# Patient Record
Sex: FEMALE | Race: WHITE | Employment: OTHER | URBAN - METROPOLITAN AREA
[De-identification: names, ages, dates, MRNs, and addresses within clinical notes are randomized per-mention and may not be internally consistent; named-entity substitution may affect disease eponyms.]

---

## 2017-01-03 PROBLEM — F17.200 SMOKER: Chronic | Status: ACTIVE | Noted: 2017-01-03

## 2018-08-20 ENCOUNTER — HOSPITAL ENCOUNTER (OUTPATIENT)
Age: 68
Setting detail: SPECIMEN
Discharge: HOME | End: 2018-08-20
Payer: MEDICARE

## 2018-08-22 LAB — SURGICAL PATHOLOGY REPORT: NORMAL

## 2020-06-29 ENCOUNTER — OFFICE VISIT (OUTPATIENT)
Dept: FAMILY MEDICINE CLINIC | Age: 70
End: 2020-06-29
Payer: MEDICARE

## 2020-06-29 VITALS
HEIGHT: 65 IN | BODY MASS INDEX: 35.32 KG/M2 | DIASTOLIC BLOOD PRESSURE: 72 MMHG | OXYGEN SATURATION: 98 % | RESPIRATION RATE: 16 BRPM | HEART RATE: 62 BPM | SYSTOLIC BLOOD PRESSURE: 132 MMHG | WEIGHT: 212 LBS

## 2020-06-29 PROCEDURE — G8427 DOCREV CUR MEDS BY ELIG CLIN: HCPCS | Performed by: FAMILY MEDICINE

## 2020-06-29 PROCEDURE — G8400 PT W/DXA NO RESULTS DOC: HCPCS | Performed by: FAMILY MEDICINE

## 2020-06-29 PROCEDURE — 4040F PNEUMOC VAC/ADMIN/RCVD: CPT | Performed by: FAMILY MEDICINE

## 2020-06-29 PROCEDURE — 99213 OFFICE O/P EST LOW 20 MIN: CPT | Performed by: FAMILY MEDICINE

## 2020-06-29 PROCEDURE — 1090F PRES/ABSN URINE INCON ASSESS: CPT | Performed by: FAMILY MEDICINE

## 2020-06-29 PROCEDURE — 3017F COLORECTAL CA SCREEN DOC REV: CPT | Performed by: FAMILY MEDICINE

## 2020-06-29 PROCEDURE — 1036F TOBACCO NON-USER: CPT | Performed by: FAMILY MEDICINE

## 2020-06-29 PROCEDURE — 1123F ACP DISCUSS/DSCN MKR DOCD: CPT | Performed by: FAMILY MEDICINE

## 2020-06-29 PROCEDURE — G8417 CALC BMI ABV UP PARAM F/U: HCPCS | Performed by: FAMILY MEDICINE

## 2020-06-29 RX ORDER — ESOMEPRAZOLE MAGNESIUM 40 MG/1
40 FOR SUSPENSION ORAL DAILY
COMMUNITY
End: 2021-11-10

## 2020-06-29 RX ORDER — TRIAMCINOLONE ACETONIDE 5 MG/G
CREAM TOPICAL
Qty: 30 G | Refills: 1 | Status: SHIPPED | OUTPATIENT
Start: 2020-06-29 | End: 2021-11-10

## 2020-06-29 ASSESSMENT — PATIENT HEALTH QUESTIONNAIRE - PHQ9
SUM OF ALL RESPONSES TO PHQ QUESTIONS 1-9: 0
1. LITTLE INTEREST OR PLEASURE IN DOING THINGS: 0
SUM OF ALL RESPONSES TO PHQ9 QUESTIONS 1 & 2: 0
2. FEELING DOWN, DEPRESSED OR HOPELESS: 0
SUM OF ALL RESPONSES TO PHQ QUESTIONS 1-9: 0

## 2020-06-29 ASSESSMENT — ENCOUNTER SYMPTOMS
RESPIRATORY NEGATIVE: 1
GASTROINTESTINAL NEGATIVE: 1

## 2020-06-29 NOTE — PROGRESS NOTES
Subjective:      Patient ID: Essence Valdez is a 77 y.o. female. Had a rash that started 10 days ago that itches. It also painful. Review of Systems   HENT: Negative. Respiratory: Negative. Cardiovascular: Negative. Gastrointestinal: Negative. Skin: Positive for rash. Objective:   Physical Exam  Vitals signs and nursing note reviewed. Constitutional:       Appearance: Normal appearance. HENT:      Head: Normocephalic and atraumatic. Cardiovascular:      Rate and Rhythm: Normal rate and regular rhythm. Heart sounds: No murmur. Pulmonary:      Effort: Pulmonary effort is normal.   Musculoskeletal:      Right lower leg: No edema. Left lower leg: No edema. Skin:     Comments: Has several red raised regions on her buttocks    Neurological:      Mental Status: She is alert. Assessment:      Rash       Plan: The bites may be chiggers. At this time will start some steroid cream and see how this evolves.           Lynn Hernandez MD

## 2021-11-10 ENCOUNTER — HOSPITAL ENCOUNTER (OUTPATIENT)
Dept: PREADMISSION TESTING | Age: 71
Discharge: HOME | End: 2021-11-14
Payer: MEDICARE

## 2021-11-10 VITALS
DIASTOLIC BLOOD PRESSURE: 83 MMHG | RESPIRATION RATE: 16 BRPM | BODY MASS INDEX: 30.77 KG/M2 | OXYGEN SATURATION: 100 % | WEIGHT: 184.7 LBS | HEIGHT: 65 IN | HEART RATE: 68 BPM | SYSTOLIC BLOOD PRESSURE: 132 MMHG | TEMPERATURE: 98 F

## 2021-11-10 LAB
ABO/RH: NORMAL
ANION GAP SERPL CALCULATED.3IONS-SCNC: 13 MMOL/L (ref 9–17)
ANTIBODY SCREEN: NEGATIVE
ARM BAND NUMBER: NORMAL
BUN BLDV-MCNC: 19 MG/DL (ref 8–23)
BUN/CREAT BLD: 27 (ref 9–20)
CALCIUM SERPL-MCNC: 9.4 MG/DL (ref 8.6–10.4)
CHLORIDE BLD-SCNC: 104 MMOL/L (ref 98–107)
CO2: 23 MMOL/L (ref 20–31)
CREAT SERPL-MCNC: 0.71 MG/DL (ref 0.5–0.9)
EKG ATRIAL RATE: 55 BPM
EKG P AXIS: 66 DEGREES
EKG P-R INTERVAL: 142 MS
EKG Q-T INTERVAL: 446 MS
EKG QRS DURATION: 86 MS
EKG QTC CALCULATION (BAZETT): 426 MS
EKG T AXIS: 21 DEGREES
EKG VENTRICULAR RATE: 55 BPM
ESTIMATED AVERAGE GLUCOSE: 117 MG/DL
EXPIRATION DATE: NORMAL
GFR AFRICAN AMERICAN: >60 ML/MIN
GFR NON-AFRICAN AMERICAN: >60 ML/MIN
GFR SERPL CREATININE-BSD FRML MDRD: ABNORMAL ML/MIN/{1.73_M2}
GFR SERPL CREATININE-BSD FRML MDRD: ABNORMAL ML/MIN/{1.73_M2}
GLUCOSE BLD-MCNC: 93 MG/DL (ref 70–99)
HBA1C MFR BLD: 5.7 % (ref 4–6)
HCT VFR BLD CALC: 43.1 % (ref 36.3–47.1)
HEMOGLOBIN: 14 G/DL (ref 11.9–15.1)
MCH RBC QN AUTO: 30 PG (ref 25.2–33.5)
MCHC RBC AUTO-ENTMCNC: 32.5 G/DL (ref 28.4–34.8)
MCV RBC AUTO: 92.5 FL (ref 82.6–102.9)
NRBC AUTOMATED: 0 PER 100 WBC
PDW BLD-RTO: 14.3 % (ref 11.8–14.4)
PLATELET # BLD: 248 K/UL (ref 138–453)
PMV BLD AUTO: 9.8 FL (ref 8.1–13.5)
POTASSIUM SERPL-SCNC: 4.3 MMOL/L (ref 3.7–5.3)
RBC # BLD: 4.66 M/UL (ref 3.95–5.11)
SODIUM BLD-SCNC: 140 MMOL/L (ref 135–144)
WBC # BLD: 5.2 K/UL (ref 3.5–11.3)

## 2021-11-10 PROCEDURE — 83036 HEMOGLOBIN GLYCOSYLATED A1C: CPT

## 2021-11-10 PROCEDURE — 36415 COLL VENOUS BLD VENIPUNCTURE: CPT

## 2021-11-10 PROCEDURE — 86901 BLOOD TYPING SEROLOGIC RH(D): CPT

## 2021-11-10 PROCEDURE — 86850 RBC ANTIBODY SCREEN: CPT

## 2021-11-10 PROCEDURE — 86900 BLOOD TYPING SEROLOGIC ABO: CPT

## 2021-11-10 PROCEDURE — 85027 COMPLETE CBC AUTOMATED: CPT

## 2021-11-10 PROCEDURE — 80048 BASIC METABOLIC PNL TOTAL CA: CPT

## 2021-11-10 PROCEDURE — 93005 ELECTROCARDIOGRAM TRACING: CPT | Performed by: COLON & RECTAL SURGERY

## 2021-11-10 RX ORDER — CYCLOBENZAPRINE HCL 10 MG
10 TABLET ORAL NIGHTLY
Status: ON HOLD | COMMUNITY
Start: 2021-10-29 | End: 2021-11-30 | Stop reason: ALTCHOICE

## 2021-11-10 RX ORDER — SPIRONOLACTONE 25 MG/1
25 TABLET ORAL DAILY
COMMUNITY

## 2021-11-10 RX ORDER — CELECOXIB 200 MG/1
200 CAPSULE ORAL DAILY
COMMUNITY

## 2021-11-10 RX ORDER — ROSUVASTATIN CALCIUM 5 MG/1
5 TABLET, COATED ORAL DAILY
COMMUNITY

## 2021-11-10 RX ORDER — FAMOTIDINE 40 MG/1
40 TABLET, FILM COATED ORAL DAILY PRN
COMMUNITY

## 2021-11-10 RX ORDER — ESOMEPRAZOLE MAGNESIUM 40 MG/1
40 CAPSULE, DELAYED RELEASE ORAL DAILY
COMMUNITY

## 2021-11-10 ASSESSMENT — PAIN SCALES - GENERAL: PAINLEVEL_OUTOF10: 7

## 2021-11-10 ASSESSMENT — PAIN DESCRIPTION - DESCRIPTORS: DESCRIPTORS: CRAMPING;SHARP

## 2021-11-10 ASSESSMENT — PAIN DESCRIPTION - PAIN TYPE: TYPE: CHRONIC PAIN

## 2021-11-10 ASSESSMENT — PAIN DESCRIPTION - LOCATION: LOCATION: ABDOMEN

## 2021-11-10 ASSESSMENT — PAIN DESCRIPTION - FREQUENCY: FREQUENCY: INTERMITTENT

## 2021-11-10 NOTE — PROGRESS NOTES
PAT Progress Note    Pt Name: Florence Mckenzie  MRN: [id number]  YOB: 1950  Date of evaluation: 11/10/2021      [x] Called to PAT. I spoke to the patient, Florence Mckenzie, a 72 y.o. female, who is scheduled for an upcoming abdominal exploration low anterior resection possible stoma by Dr. Karla Rao for sigmoid stricture on 11/24/2021 at 1200. [x] I reviewed the hard copy general surgery progress note by Dr. Karla Rao dated 11/3/2021 for an Interval History and Physical Note the day of surgery. Hard copy placed in short chart. Functional Capacity per pt:  1) Pt is able to walk 2 city blocks on level ground without SOB. 2) Pt is not able to climb 2 flights of stairs without SOB. 3) Pt is not able to walk up a hill for 1-2 city blocks without SOB. Physical Exam:     General Appearance:  Alert, well appearing, and in no acute distress. Mental status:  Oriented to person, place, and time. Lungs:  Bilateral equal air entry, clear to auscultation, no wheezing, rales or rhonchi, and normal effort. Cardiovascular:  Normal rate, regular rhythm, no murmur, gallop, or rub.     Vital signs: /83   Pulse 68   Temp 98 °F (36.7 °C)   Resp 16   Ht 5' 4.5\" (1.638 m)   Wt 184 lb 11.2 oz (83.8 kg)   LMP  (LMP Unknown)   SpO2 100%   BMI 31.21 kg/m²     LESLY Otero CNP    Electronically signed 11/10/2021 at 8:16 AM

## 2021-11-10 NOTE — PLAN OF CARE
powder, deodorant, lotion/cream/oil, perfume/aftershave, cosmetics, or alcohol-based skin or hair products after showering. Do Not shave near the planned surgical site unless specifically instructed to. Additional Instructions:      1. If you are having any type of anesthesia, you are to have NOTHING to eat or drink after midnight the night before surgery. This includes no gum, hard candy, mints or water. The only exception to this is small sips of water to take the medications listed above. No smoking or chewing tobacco after midnight. No alcoholic beverages for 24 hours prior to surgery. 2. Brush your teeth but do not swallow any water. 3. If you wear glasses bring a case for them if you have one. No contacts should be worn the day of surgery. You may also bring your hearing aids. If you have dentures, most surgical procedures involving anesthesia will require that you remove them prior to surgery. 4. If you sleep with a CPAP or BiPAP machine at home and plan on staying in the hospital overnight after surgery, please bring your machine with you. 5. Do not wear any jewelry or body piercings the day of surgery. No nail polish on the operative extremity (arm/hand or leg/foot surgeries). 6. If you are staying overnight with us, you may bring a small bag of necessary personal items. 7. Please wear loose, comfortable clothing. If you are potentially going to have a cast, sling, brace or bulky dressing, make sure to wear clothing that will fit over it. 8. In case of illness - If you have cold or flu like symptoms (high fever, runny nose, sore throat, cough, etc.) rash, nausea, vomiting, loose stools, and/or recent contact with someone who has a contagious disease (chicken pox, measles, COVID-19, etc.). Please call your surgeon before coming to the hospital.    Transportation After Your Surgery/Procedure:      If you are going home the same day of surgery you need someone to drive you home. Your  must be at least 25years of age. A taxi cab or other nonmedical public transportation is not acceptable unless you have someone to ride home in the vehicle with you. For your safety, someone must remain with you for the first 24 hours after your surgery if you receive anesthesia or medication. If you do not have someone to stay with you, your procedure may be cancelled. As a patient at [facility] you can expect quality medical and nursing care that is centered on you individual needs. Our goal is to make your surgical experience as comfortable as possible.     Any questions about preparing for your surgery please call [phone].      ____________________________   ____________________________  Signature (Patient)                                 Signature (Nurse)                     Date

## 2021-11-24 ENCOUNTER — ANESTHESIA (OUTPATIENT)
Dept: OPERATING ROOM | Age: 71
DRG: 329 | End: 2021-11-24
Payer: MEDICARE

## 2021-11-24 ENCOUNTER — HOSPITAL ENCOUNTER (INPATIENT)
Age: 71
LOS: 29 days | Discharge: ANOTHER HOSPITAL | DRG: 329 | End: 2021-12-23
Attending: COLON & RECTAL SURGERY | Admitting: COLON & RECTAL SURGERY
Payer: MEDICARE

## 2021-11-24 ENCOUNTER — ANESTHESIA EVENT (OUTPATIENT)
Dept: OPERATING ROOM | Age: 71
DRG: 329 | End: 2021-11-24
Payer: MEDICARE

## 2021-11-24 VITALS
DIASTOLIC BLOOD PRESSURE: 67 MMHG | TEMPERATURE: 99.7 F | SYSTOLIC BLOOD PRESSURE: 115 MMHG | RESPIRATION RATE: 16 BRPM | OXYGEN SATURATION: 100 %

## 2021-11-24 DIAGNOSIS — R07.9 CHEST PAIN, UNSPECIFIED TYPE: Primary | ICD-10-CM

## 2021-11-24 PROBLEM — K56.699 STRICTURE OF SIGMOID COLON (HCC): Status: ACTIVE | Noted: 2021-11-24

## 2021-11-24 LAB
GLUCOSE BLD-MCNC: 130 MG/DL (ref 65–105)
GLUCOSE BLD-MCNC: 95 MG/DL (ref 65–105)

## 2021-11-24 PROCEDURE — 2580000003 HC RX 258: Performed by: STUDENT IN AN ORGANIZED HEALTH CARE EDUCATION/TRAINING PROGRAM

## 2021-11-24 PROCEDURE — 82947 ASSAY GLUCOSE BLOOD QUANT: CPT

## 2021-11-24 PROCEDURE — 2720000010 HC SURG SUPPLY STERILE: Performed by: COLON & RECTAL SURGERY

## 2021-11-24 PROCEDURE — 6360000002 HC RX W HCPCS: Performed by: NURSE ANESTHETIST, CERTIFIED REGISTERED

## 2021-11-24 PROCEDURE — 3700000001 HC ADD 15 MINUTES (ANESTHESIA): Performed by: COLON & RECTAL SURGERY

## 2021-11-24 PROCEDURE — 2580000003 HC RX 258: Performed by: ANESTHESIOLOGY

## 2021-11-24 PROCEDURE — 0DTB0ZZ RESECTION OF ILEUM, OPEN APPROACH: ICD-10-PCS | Performed by: UROLOGY

## 2021-11-24 PROCEDURE — 2500000003 HC RX 250 WO HCPCS: Performed by: NURSE ANESTHETIST, CERTIFIED REGISTERED

## 2021-11-24 PROCEDURE — 7100000001 HC PACU RECOVERY - ADDTL 15 MIN: Performed by: COLON & RECTAL SURGERY

## 2021-11-24 PROCEDURE — C1769 GUIDE WIRE: HCPCS | Performed by: COLON & RECTAL SURGERY

## 2021-11-24 PROCEDURE — 0DJD8ZZ INSPECTION OF LOWER INTESTINAL TRACT, VIA NATURAL OR ARTIFICIAL OPENING ENDOSCOPIC: ICD-10-PCS | Performed by: UROLOGY

## 2021-11-24 PROCEDURE — C1758 CATHETER, URETERAL: HCPCS | Performed by: COLON & RECTAL SURGERY

## 2021-11-24 PROCEDURE — 3700000000 HC ANESTHESIA ATTENDED CARE: Performed by: COLON & RECTAL SURGERY

## 2021-11-24 PROCEDURE — 6360000002 HC RX W HCPCS: Performed by: COLON & RECTAL SURGERY

## 2021-11-24 PROCEDURE — 6360000002 HC RX W HCPCS: Performed by: STUDENT IN AN ORGANIZED HEALTH CARE EDUCATION/TRAINING PROGRAM

## 2021-11-24 PROCEDURE — 0T788DZ DILATION OF BILATERAL URETERS WITH INTRALUMINAL DEVICE, VIA NATURAL OR ARTIFICIAL OPENING ENDOSCOPIC: ICD-10-PCS | Performed by: UROLOGY

## 2021-11-24 PROCEDURE — 2500000003 HC RX 250 WO HCPCS: Performed by: STUDENT IN AN ORGANIZED HEALTH CARE EDUCATION/TRAINING PROGRAM

## 2021-11-24 PROCEDURE — 2500000003 HC RX 250 WO HCPCS: Performed by: COLON & RECTAL SURGERY

## 2021-11-24 PROCEDURE — 88307 TISSUE EXAM BY PATHOLOGIST: CPT

## 2021-11-24 PROCEDURE — 0DBH0ZZ EXCISION OF CECUM, OPEN APPROACH: ICD-10-PCS | Performed by: UROLOGY

## 2021-11-24 PROCEDURE — 3600000003 HC SURGERY LEVEL 3 BASE: Performed by: COLON & RECTAL SURGERY

## 2021-11-24 PROCEDURE — 7100000000 HC PACU RECOVERY - FIRST 15 MIN: Performed by: COLON & RECTAL SURGERY

## 2021-11-24 PROCEDURE — 0DN80ZZ RELEASE SMALL INTESTINE, OPEN APPROACH: ICD-10-PCS | Performed by: UROLOGY

## 2021-11-24 PROCEDURE — 88300 SURGICAL PATH GROSS: CPT

## 2021-11-24 PROCEDURE — 2060000000 HC ICU INTERMEDIATE R&B

## 2021-11-24 PROCEDURE — 3600000013 HC SURGERY LEVEL 3 ADDTL 15MIN: Performed by: COLON & RECTAL SURGERY

## 2021-11-24 PROCEDURE — 6360000002 HC RX W HCPCS: Performed by: ANESTHESIOLOGY

## 2021-11-24 PROCEDURE — 2709999900 HC NON-CHARGEABLE SUPPLY: Performed by: COLON & RECTAL SURGERY

## 2021-11-24 RX ORDER — POTASSIUM CHLORIDE 20 MEQ/1
40 TABLET, EXTENDED RELEASE ORAL PRN
Status: DISCONTINUED | OUTPATIENT
Start: 2021-11-24 | End: 2021-11-30

## 2021-11-24 RX ORDER — LEVOTHYROXINE SODIUM 0.1 MG/1
100 TABLET ORAL DAILY
Status: DISCONTINUED | OUTPATIENT
Start: 2021-11-25 | End: 2021-12-24 | Stop reason: HOSPADM

## 2021-11-24 RX ORDER — ONDANSETRON 2 MG/ML
4 INJECTION INTRAMUSCULAR; INTRAVENOUS
Status: DISCONTINUED | OUTPATIENT
Start: 2021-11-24 | End: 2021-11-24 | Stop reason: HOSPADM

## 2021-11-24 RX ORDER — SODIUM CHLORIDE 9 MG/ML
25 INJECTION, SOLUTION INTRAVENOUS PRN
Status: DISCONTINUED | OUTPATIENT
Start: 2021-11-24 | End: 2021-12-24 | Stop reason: HOSPADM

## 2021-11-24 RX ORDER — ACETAMINOPHEN 500 MG
1000 TABLET ORAL EVERY 8 HOURS SCHEDULED
Status: DISCONTINUED | OUTPATIENT
Start: 2021-11-24 | End: 2021-12-24 | Stop reason: HOSPADM

## 2021-11-24 RX ORDER — ONDANSETRON 2 MG/ML
INJECTION INTRAMUSCULAR; INTRAVENOUS PRN
Status: DISCONTINUED | OUTPATIENT
Start: 2021-11-24 | End: 2021-11-24 | Stop reason: SDUPTHER

## 2021-11-24 RX ORDER — LIDOCAINE HYDROCHLORIDE 20 MG/ML
INJECTION, SOLUTION EPIDURAL; INFILTRATION; INTRACAUDAL; PERINEURAL PRN
Status: DISCONTINUED | OUTPATIENT
Start: 2021-11-24 | End: 2021-11-24 | Stop reason: SDUPTHER

## 2021-11-24 RX ORDER — FENTANYL CITRATE 50 UG/ML
50 INJECTION, SOLUTION INTRAMUSCULAR; INTRAVENOUS EVERY 5 MIN PRN
Status: DISCONTINUED | OUTPATIENT
Start: 2021-11-24 | End: 2021-11-24 | Stop reason: HOSPADM

## 2021-11-24 RX ORDER — FENTANYL CITRATE 50 UG/ML
50 INJECTION, SOLUTION INTRAMUSCULAR; INTRAVENOUS
Status: COMPLETED | OUTPATIENT
Start: 2021-11-24 | End: 2021-11-26

## 2021-11-24 RX ORDER — MIDAZOLAM HYDROCHLORIDE 1 MG/ML
INJECTION INTRAMUSCULAR; INTRAVENOUS PRN
Status: DISCONTINUED | OUTPATIENT
Start: 2021-11-24 | End: 2021-11-24 | Stop reason: SDUPTHER

## 2021-11-24 RX ORDER — DEXAMETHASONE SODIUM PHOSPHATE 10 MG/ML
INJECTION INTRAMUSCULAR; INTRAVENOUS PRN
Status: DISCONTINUED | OUTPATIENT
Start: 2021-11-24 | End: 2021-11-24 | Stop reason: SDUPTHER

## 2021-11-24 RX ORDER — FENTANYL CITRATE 50 UG/ML
INJECTION, SOLUTION INTRAMUSCULAR; INTRAVENOUS PRN
Status: DISCONTINUED | OUTPATIENT
Start: 2021-11-24 | End: 2021-11-24 | Stop reason: SDUPTHER

## 2021-11-24 RX ORDER — PROMETHAZINE HYDROCHLORIDE 25 MG/ML
6.25 INJECTION, SOLUTION INTRAMUSCULAR; INTRAVENOUS
Status: DISCONTINUED | OUTPATIENT
Start: 2021-11-24 | End: 2021-11-24 | Stop reason: HOSPADM

## 2021-11-24 RX ORDER — GLYCOPYRROLATE 1 MG/5 ML
SYRINGE (ML) INTRAVENOUS PRN
Status: DISCONTINUED | OUTPATIENT
Start: 2021-11-24 | End: 2021-11-24 | Stop reason: SDUPTHER

## 2021-11-24 RX ORDER — SODIUM CHLORIDE 0.9 % (FLUSH) 0.9 %
5-40 SYRINGE (ML) INJECTION PRN
Status: DISCONTINUED | OUTPATIENT
Start: 2021-11-24 | End: 2021-12-24 | Stop reason: HOSPADM

## 2021-11-24 RX ORDER — ONDANSETRON 2 MG/ML
4 INJECTION INTRAMUSCULAR; INTRAVENOUS EVERY 6 HOURS PRN
Status: DISCONTINUED | OUTPATIENT
Start: 2021-11-24 | End: 2021-12-24 | Stop reason: HOSPADM

## 2021-11-24 RX ORDER — CEFAZOLIN SODIUM 1 G/3ML
INJECTION, POWDER, FOR SOLUTION INTRAMUSCULAR; INTRAVENOUS PRN
Status: DISCONTINUED | OUTPATIENT
Start: 2021-11-24 | End: 2021-11-24 | Stop reason: SDUPTHER

## 2021-11-24 RX ORDER — GABAPENTIN 300 MG/1
300 CAPSULE ORAL EVERY 8 HOURS
Status: DISCONTINUED | OUTPATIENT
Start: 2021-11-24 | End: 2021-12-22

## 2021-11-24 RX ORDER — MAGNESIUM SULFATE IN WATER 40 MG/ML
2000 INJECTION, SOLUTION INTRAVENOUS PRN
Status: DISCONTINUED | OUTPATIENT
Start: 2021-11-24 | End: 2021-11-30

## 2021-11-24 RX ORDER — SODIUM CHLORIDE, SODIUM LACTATE, POTASSIUM CHLORIDE, CALCIUM CHLORIDE 600; 310; 30; 20 MG/100ML; MG/100ML; MG/100ML; MG/100ML
INJECTION, SOLUTION INTRAVENOUS CONTINUOUS
Status: DISCONTINUED | OUTPATIENT
Start: 2021-11-24 | End: 2021-11-24

## 2021-11-24 RX ORDER — SPIRONOLACTONE 25 MG/1
25 TABLET ORAL DAILY
Status: DISCONTINUED | OUTPATIENT
Start: 2021-11-25 | End: 2021-11-30

## 2021-11-24 RX ORDER — AMLODIPINE BESYLATE 10 MG/1
10 TABLET ORAL DAILY
Status: DISCONTINUED | OUTPATIENT
Start: 2021-11-25 | End: 2021-11-26

## 2021-11-24 RX ORDER — CITALOPRAM 20 MG/1
20 TABLET ORAL DAILY
Status: DISCONTINUED | OUTPATIENT
Start: 2021-11-25 | End: 2021-11-30

## 2021-11-24 RX ORDER — OXYCODONE HYDROCHLORIDE 5 MG/1
5 TABLET ORAL EVERY 6 HOURS PRN
Status: DISCONTINUED | OUTPATIENT
Start: 2021-11-24 | End: 2021-11-28

## 2021-11-24 RX ORDER — KETAMINE HCL IN NACL, ISO-OSM 100MG/10ML
SYRINGE (ML) INJECTION PRN
Status: DISCONTINUED | OUTPATIENT
Start: 2021-11-24 | End: 2021-11-24 | Stop reason: SDUPTHER

## 2021-11-24 RX ORDER — ROSUVASTATIN CALCIUM 5 MG/1
5 TABLET, COATED ORAL NIGHTLY
Status: DISCONTINUED | OUTPATIENT
Start: 2021-11-25 | End: 2021-12-24 | Stop reason: HOSPADM

## 2021-11-24 RX ORDER — ONDANSETRON 4 MG/1
4 TABLET, ORALLY DISINTEGRATING ORAL EVERY 8 HOURS PRN
Status: DISCONTINUED | OUTPATIENT
Start: 2021-11-24 | End: 2021-12-24 | Stop reason: HOSPADM

## 2021-11-24 RX ORDER — ROCURONIUM BROMIDE 10 MG/ML
INJECTION, SOLUTION INTRAVENOUS PRN
Status: DISCONTINUED | OUTPATIENT
Start: 2021-11-24 | End: 2021-11-24 | Stop reason: SDUPTHER

## 2021-11-24 RX ORDER — KETOROLAC TROMETHAMINE 30 MG/ML
INJECTION, SOLUTION INTRAMUSCULAR; INTRAVENOUS PRN
Status: DISCONTINUED | OUTPATIENT
Start: 2021-11-24 | End: 2021-11-24 | Stop reason: SDUPTHER

## 2021-11-24 RX ORDER — POTASSIUM CHLORIDE 7.45 MG/ML
10 INJECTION INTRAVENOUS PRN
Status: DISCONTINUED | OUTPATIENT
Start: 2021-11-24 | End: 2021-11-30

## 2021-11-24 RX ORDER — DIAZEPAM 2 MG/1
2 TABLET ORAL EVERY 6 HOURS PRN
Status: DISCONTINUED | OUTPATIENT
Start: 2021-11-24 | End: 2021-11-28

## 2021-11-24 RX ORDER — NEOSTIGMINE METHYLSULFATE 5 MG/5 ML
SYRINGE (ML) INTRAVENOUS PRN
Status: DISCONTINUED | OUTPATIENT
Start: 2021-11-24 | End: 2021-11-24 | Stop reason: SDUPTHER

## 2021-11-24 RX ORDER — FENTANYL CITRATE 50 UG/ML
25 INJECTION, SOLUTION INTRAMUSCULAR; INTRAVENOUS EVERY 5 MIN PRN
Status: DISCONTINUED | OUTPATIENT
Start: 2021-11-24 | End: 2021-11-24 | Stop reason: HOSPADM

## 2021-11-24 RX ORDER — PANTOPRAZOLE SODIUM 40 MG/1
40 TABLET, DELAYED RELEASE ORAL DAILY
Status: DISCONTINUED | OUTPATIENT
Start: 2021-11-25 | End: 2021-12-06

## 2021-11-24 RX ORDER — LABETALOL HYDROCHLORIDE 5 MG/ML
INJECTION, SOLUTION INTRAVENOUS PRN
Status: DISCONTINUED | OUTPATIENT
Start: 2021-11-24 | End: 2021-11-24 | Stop reason: SDUPTHER

## 2021-11-24 RX ORDER — DEXTROSE, SODIUM CHLORIDE, AND POTASSIUM CHLORIDE 5; .45; .15 G/100ML; G/100ML; G/100ML
INJECTION INTRAVENOUS CONTINUOUS
Status: DISCONTINUED | OUTPATIENT
Start: 2021-11-24 | End: 2021-11-30

## 2021-11-24 RX ORDER — HYDROMORPHONE HCL 110MG/55ML
PATIENT CONTROLLED ANALGESIA SYRINGE INTRAVENOUS PRN
Status: DISCONTINUED | OUTPATIENT
Start: 2021-11-24 | End: 2021-11-24 | Stop reason: SDUPTHER

## 2021-11-24 RX ORDER — PROPOFOL 10 MG/ML
INJECTION, EMULSION INTRAVENOUS PRN
Status: DISCONTINUED | OUTPATIENT
Start: 2021-11-24 | End: 2021-11-24 | Stop reason: SDUPTHER

## 2021-11-24 RX ORDER — SODIUM CHLORIDE 0.9 % (FLUSH) 0.9 %
5-40 SYRINGE (ML) INJECTION EVERY 12 HOURS SCHEDULED
Status: DISCONTINUED | OUTPATIENT
Start: 2021-11-24 | End: 2021-12-24 | Stop reason: HOSPADM

## 2021-11-24 RX ORDER — KETOROLAC TROMETHAMINE 30 MG/ML
15 INJECTION, SOLUTION INTRAMUSCULAR; INTRAVENOUS EVERY 6 HOURS
Status: DISPENSED | OUTPATIENT
Start: 2021-11-24 | End: 2021-11-27

## 2021-11-24 RX ORDER — CYCLOBENZAPRINE HCL 10 MG
10 TABLET ORAL 3 TIMES DAILY
Status: DISCONTINUED | OUTPATIENT
Start: 2021-11-24 | End: 2021-11-28

## 2021-11-24 RX ADMIN — CEFAZOLIN SODIUM 2000 MG: 10 INJECTION, POWDER, FOR SOLUTION INTRAVENOUS at 22:23

## 2021-11-24 RX ADMIN — METRONIDAZOLE 500 MG: 500 INJECTION, SOLUTION INTRAVENOUS at 23:04

## 2021-11-24 RX ADMIN — HYDROMORPHONE HYDROCHLORIDE 1 MG: 2 INJECTION INTRAMUSCULAR; INTRAVENOUS; SUBCUTANEOUS at 17:58

## 2021-11-24 RX ADMIN — Medication 50 MCG: at 12:18

## 2021-11-24 RX ADMIN — Medication 0.8 MG: at 19:26

## 2021-11-24 RX ADMIN — SODIUM CHLORIDE, POTASSIUM CHLORIDE, SODIUM LACTATE AND CALCIUM CHLORIDE: 600; 310; 30; 20 INJECTION, SOLUTION INTRAVENOUS at 17:37

## 2021-11-24 RX ADMIN — KETOROLAC TROMETHAMINE 30 MG: 30 INJECTION, SOLUTION INTRAMUSCULAR; INTRAVENOUS at 19:10

## 2021-11-24 RX ADMIN — ROCURONIUM BROMIDE 10 MG: 10 INJECTION, SOLUTION INTRAVENOUS at 14:16

## 2021-11-24 RX ADMIN — KETOROLAC TROMETHAMINE 15 MG: 30 INJECTION, SOLUTION INTRAMUSCULAR; INTRAVENOUS at 22:19

## 2021-11-24 RX ADMIN — METRONIDAZOLE 500 MG: 500 INJECTION, SOLUTION INTRAVENOUS at 11:41

## 2021-11-24 RX ADMIN — HYDROMORPHONE HYDROCHLORIDE 0.5 MG: 2 INJECTION INTRAMUSCULAR; INTRAVENOUS; SUBCUTANEOUS at 15:52

## 2021-11-24 RX ADMIN — ROCURONIUM BROMIDE 10 MG: 10 INJECTION, SOLUTION INTRAVENOUS at 17:26

## 2021-11-24 RX ADMIN — LIDOCAINE HYDROCHLORIDE 100 MG: 20 INJECTION, SOLUTION EPIDURAL; INFILTRATION; INTRACAUDAL; PERINEURAL at 11:16

## 2021-11-24 RX ADMIN — HYDROMORPHONE HYDROCHLORIDE 0.5 MG: 2 INJECTION INTRAMUSCULAR; INTRAVENOUS; SUBCUTANEOUS at 16:10

## 2021-11-24 RX ADMIN — ROCURONIUM BROMIDE 50 MG: 10 INJECTION, SOLUTION INTRAVENOUS at 11:16

## 2021-11-24 RX ADMIN — MIDAZOLAM 2 MG: 1 INJECTION INTRAMUSCULAR; INTRAVENOUS at 11:11

## 2021-11-24 RX ADMIN — Medication 25 MG: at 12:26

## 2021-11-24 RX ADMIN — PROPOFOL 130 MG: 10 INJECTION, EMULSION INTRAVENOUS at 11:16

## 2021-11-24 RX ADMIN — DEXAMETHASONE SODIUM PHOSPHATE 10 MG: 10 INJECTION INTRAMUSCULAR; INTRAVENOUS at 11:54

## 2021-11-24 RX ADMIN — SODIUM CHLORIDE, POTASSIUM CHLORIDE, SODIUM LACTATE AND CALCIUM CHLORIDE: 600; 310; 30; 20 INJECTION, SOLUTION INTRAVENOUS at 19:17

## 2021-11-24 RX ADMIN — CEFAZOLIN 2000 MG: 1 INJECTION, POWDER, FOR SOLUTION INTRAMUSCULAR; INTRAVENOUS at 19:20

## 2021-11-24 RX ADMIN — POTASSIUM CHLORIDE, DEXTROSE MONOHYDRATE AND SODIUM CHLORIDE: 150; 5; 450 INJECTION, SOLUTION INTRAVENOUS at 21:46

## 2021-11-24 RX ADMIN — LABETALOL HYDROCHLORIDE 5 MG: 5 INJECTION, SOLUTION INTRAVENOUS at 18:45

## 2021-11-24 RX ADMIN — Medication 50 MCG: at 11:16

## 2021-11-24 RX ADMIN — CEFAZOLIN 2000 MG: 10 INJECTION, POWDER, FOR SOLUTION INTRAVENOUS at 11:22

## 2021-11-24 RX ADMIN — ONDANSETRON 4 MG: 2 INJECTION, SOLUTION INTRAMUSCULAR; INTRAVENOUS at 18:36

## 2021-11-24 RX ADMIN — CEFAZOLIN 2000 MG: 10 INJECTION, POWDER, FOR SOLUTION INTRAVENOUS at 15:20

## 2021-11-24 RX ADMIN — Medication 50 MCG: at 12:26

## 2021-11-24 RX ADMIN — ROCURONIUM BROMIDE 20 MG: 10 INJECTION, SOLUTION INTRAVENOUS at 13:44

## 2021-11-24 RX ADMIN — LABETALOL HYDROCHLORIDE 5 MG: 5 INJECTION, SOLUTION INTRAVENOUS at 16:44

## 2021-11-24 RX ADMIN — Medication 50 MCG: at 11:50

## 2021-11-24 RX ADMIN — SODIUM CHLORIDE, POTASSIUM CHLORIDE, SODIUM LACTATE AND CALCIUM CHLORIDE: 600; 310; 30; 20 INJECTION, SOLUTION INTRAVENOUS at 10:24

## 2021-11-24 RX ADMIN — Medication 4.3 MG: at 19:26

## 2021-11-24 RX ADMIN — Medication 50 MCG: at 19:36

## 2021-11-24 RX ADMIN — Medication 0.2 MG: at 12:10

## 2021-11-24 RX ADMIN — SODIUM CHLORIDE, POTASSIUM CHLORIDE, SODIUM LACTATE AND CALCIUM CHLORIDE: 600; 310; 30; 20 INJECTION, SOLUTION INTRAVENOUS at 15:30

## 2021-11-24 RX ADMIN — ROCURONIUM BROMIDE 20 MG: 10 INJECTION, SOLUTION INTRAVENOUS at 15:03

## 2021-11-24 RX ADMIN — ROCURONIUM BROMIDE 10 MG: 10 INJECTION, SOLUTION INTRAVENOUS at 16:04

## 2021-11-24 RX ADMIN — Medication 25 MG: at 11:53

## 2021-11-24 ASSESSMENT — PULMONARY FUNCTION TESTS
PIF_VALUE: 20
PIF_VALUE: 21
PIF_VALUE: 21
PIF_VALUE: 22
PIF_VALUE: 19
PIF_VALUE: 21
PIF_VALUE: 22
PIF_VALUE: 20
PIF_VALUE: 24
PIF_VALUE: 21
PIF_VALUE: 23
PIF_VALUE: 21
PIF_VALUE: 22
PIF_VALUE: 23
PIF_VALUE: 20
PIF_VALUE: 20
PIF_VALUE: 22
PIF_VALUE: 21
PIF_VALUE: 20
PIF_VALUE: 26
PIF_VALUE: 20
PIF_VALUE: 21
PIF_VALUE: 17
PIF_VALUE: 23
PIF_VALUE: 25
PIF_VALUE: 23
PIF_VALUE: 20
PIF_VALUE: 21
PIF_VALUE: 2
PIF_VALUE: 20
PIF_VALUE: 21
PIF_VALUE: 22
PIF_VALUE: 19
PIF_VALUE: 25
PIF_VALUE: 21
PIF_VALUE: 17
PIF_VALUE: 22
PIF_VALUE: 24
PIF_VALUE: 21
PIF_VALUE: 21
PIF_VALUE: 22
PIF_VALUE: 0
PIF_VALUE: 22
PIF_VALUE: 21
PIF_VALUE: 20
PIF_VALUE: 21
PIF_VALUE: 21
PIF_VALUE: 23
PIF_VALUE: 21
PIF_VALUE: 17
PIF_VALUE: 19
PIF_VALUE: 13
PIF_VALUE: 20
PIF_VALUE: 21
PIF_VALUE: 18
PIF_VALUE: 13
PIF_VALUE: 25
PIF_VALUE: 18
PIF_VALUE: 23
PIF_VALUE: 22
PIF_VALUE: 21
PIF_VALUE: 19
PIF_VALUE: 20
PIF_VALUE: 25
PIF_VALUE: 21
PIF_VALUE: 20
PIF_VALUE: 21
PIF_VALUE: 22
PIF_VALUE: 21
PIF_VALUE: 21
PIF_VALUE: 12
PIF_VALUE: 15
PIF_VALUE: 20
PIF_VALUE: 23
PIF_VALUE: 23
PIF_VALUE: 22
PIF_VALUE: 9
PIF_VALUE: 24
PIF_VALUE: 25
PIF_VALUE: 20
PIF_VALUE: 23
PIF_VALUE: 23
PIF_VALUE: 20
PIF_VALUE: 21
PIF_VALUE: 22
PIF_VALUE: 21
PIF_VALUE: 24
PIF_VALUE: 20
PIF_VALUE: 21
PIF_VALUE: 24
PIF_VALUE: 21
PIF_VALUE: 23
PIF_VALUE: 20
PIF_VALUE: 18
PIF_VALUE: 24
PIF_VALUE: 2
PIF_VALUE: 21
PIF_VALUE: 24
PIF_VALUE: 22
PIF_VALUE: 22
PIF_VALUE: 21
PIF_VALUE: 25
PIF_VALUE: 22
PIF_VALUE: 21
PIF_VALUE: 23
PIF_VALUE: 20
PIF_VALUE: 21
PIF_VALUE: 19
PIF_VALUE: 21
PIF_VALUE: 20
PIF_VALUE: 22
PIF_VALUE: 23
PIF_VALUE: 14
PIF_VALUE: 23
PIF_VALUE: 21
PIF_VALUE: 22
PIF_VALUE: 21
PIF_VALUE: 20
PIF_VALUE: 1
PIF_VALUE: 21
PIF_VALUE: 18
PIF_VALUE: 24
PIF_VALUE: 20
PIF_VALUE: 22
PIF_VALUE: 21
PIF_VALUE: 22
PIF_VALUE: 20
PIF_VALUE: 20
PIF_VALUE: 22
PIF_VALUE: 19
PIF_VALUE: 19
PIF_VALUE: 22
PIF_VALUE: 22
PIF_VALUE: 20
PIF_VALUE: 19
PIF_VALUE: 21
PIF_VALUE: 20
PIF_VALUE: 17
PIF_VALUE: 2
PIF_VALUE: 18
PIF_VALUE: 24
PIF_VALUE: 14
PIF_VALUE: 25
PIF_VALUE: 20
PIF_VALUE: 17
PIF_VALUE: 21
PIF_VALUE: 17
PIF_VALUE: 22
PIF_VALUE: 20
PIF_VALUE: 20
PIF_VALUE: 19
PIF_VALUE: 23
PIF_VALUE: 22
PIF_VALUE: 20
PIF_VALUE: 26
PIF_VALUE: 21
PIF_VALUE: 23
PIF_VALUE: 20
PIF_VALUE: 14
PIF_VALUE: 22
PIF_VALUE: 26
PIF_VALUE: 18
PIF_VALUE: 20
PIF_VALUE: 18
PIF_VALUE: 21
PIF_VALUE: 19
PIF_VALUE: 22
PIF_VALUE: 21
PIF_VALUE: 1
PIF_VALUE: 23
PIF_VALUE: 20
PIF_VALUE: 23
PIF_VALUE: 22
PIF_VALUE: 22
PIF_VALUE: 24
PIF_VALUE: 21
PIF_VALUE: 21
PIF_VALUE: 18
PIF_VALUE: 21
PIF_VALUE: 25
PIF_VALUE: 22
PIF_VALUE: 21
PIF_VALUE: 23
PIF_VALUE: 21
PIF_VALUE: 25
PIF_VALUE: 21
PIF_VALUE: 20
PIF_VALUE: 18
PIF_VALUE: 20
PIF_VALUE: 21
PIF_VALUE: 23
PIF_VALUE: 19
PIF_VALUE: 20
PIF_VALUE: 20
PIF_VALUE: 19
PIF_VALUE: 22
PIF_VALUE: 22
PIF_VALUE: 20
PIF_VALUE: 22
PIF_VALUE: 22
PIF_VALUE: 24
PIF_VALUE: 20
PIF_VALUE: 24
PIF_VALUE: 21
PIF_VALUE: 19
PIF_VALUE: 20
PIF_VALUE: 19
PIF_VALUE: 20
PIF_VALUE: 21
PIF_VALUE: 22
PIF_VALUE: 20
PIF_VALUE: 21
PIF_VALUE: 21
PIF_VALUE: 20
PIF_VALUE: 24
PIF_VALUE: 22
PIF_VALUE: 23
PIF_VALUE: 20
PIF_VALUE: 21
PIF_VALUE: 20
PIF_VALUE: 20
PIF_VALUE: 19
PIF_VALUE: 22
PIF_VALUE: 21
PIF_VALUE: 20
PIF_VALUE: 21
PIF_VALUE: 24
PIF_VALUE: 22
PIF_VALUE: 19
PIF_VALUE: 20
PIF_VALUE: 21
PIF_VALUE: 19
PIF_VALUE: 21
PIF_VALUE: 23
PIF_VALUE: 22
PIF_VALUE: 20
PIF_VALUE: 21
PIF_VALUE: 22
PIF_VALUE: 22
PIF_VALUE: 21
PIF_VALUE: 21
PIF_VALUE: 23
PIF_VALUE: 20
PIF_VALUE: 20
PIF_VALUE: 21
PIF_VALUE: 20
PIF_VALUE: 18
PIF_VALUE: 20
PIF_VALUE: 21
PIF_VALUE: 21
PIF_VALUE: 23
PIF_VALUE: 24
PIF_VALUE: 20
PIF_VALUE: 21
PIF_VALUE: 21
PIF_VALUE: 18
PIF_VALUE: 21
PIF_VALUE: 24
PIF_VALUE: 22
PIF_VALUE: 21
PIF_VALUE: 21
PIF_VALUE: 20
PIF_VALUE: 21
PIF_VALUE: 20
PIF_VALUE: 21
PIF_VALUE: 23
PIF_VALUE: 20
PIF_VALUE: 21
PIF_VALUE: 21
PIF_VALUE: 22
PIF_VALUE: 1
PIF_VALUE: 22
PIF_VALUE: 20
PIF_VALUE: 22
PIF_VALUE: 21
PIF_VALUE: 20
PIF_VALUE: 20
PIF_VALUE: 23
PIF_VALUE: 21
PIF_VALUE: 22
PIF_VALUE: 20
PIF_VALUE: 23
PIF_VALUE: 15
PIF_VALUE: 23
PIF_VALUE: 23
PIF_VALUE: 20
PIF_VALUE: 22
PIF_VALUE: 20
PIF_VALUE: 21
PIF_VALUE: 20
PIF_VALUE: 21
PIF_VALUE: 18
PIF_VALUE: 20
PIF_VALUE: 21
PIF_VALUE: 23
PIF_VALUE: 21
PIF_VALUE: 21
PIF_VALUE: 24
PIF_VALUE: 21
PIF_VALUE: 19
PIF_VALUE: 23
PIF_VALUE: 20
PIF_VALUE: 22
PIF_VALUE: 24
PIF_VALUE: 21
PIF_VALUE: 20
PIF_VALUE: 21
PIF_VALUE: 20
PIF_VALUE: 21
PIF_VALUE: 15
PIF_VALUE: 26
PIF_VALUE: 18
PIF_VALUE: 23
PIF_VALUE: 20
PIF_VALUE: 20
PIF_VALUE: 18
PIF_VALUE: 23
PIF_VALUE: 1
PIF_VALUE: 20
PIF_VALUE: 20
PIF_VALUE: 22
PIF_VALUE: 17
PIF_VALUE: 21
PIF_VALUE: 21
PIF_VALUE: 23
PIF_VALUE: 21
PIF_VALUE: 19
PIF_VALUE: 22
PIF_VALUE: 22
PIF_VALUE: 21
PIF_VALUE: 24
PIF_VALUE: 23
PIF_VALUE: 23
PIF_VALUE: 21
PIF_VALUE: 20
PIF_VALUE: 19
PIF_VALUE: 20
PIF_VALUE: 21
PIF_VALUE: 23
PIF_VALUE: 24
PIF_VALUE: 21
PIF_VALUE: 20
PIF_VALUE: 21
PIF_VALUE: 22
PIF_VALUE: 20
PIF_VALUE: 21
PIF_VALUE: 20
PIF_VALUE: 21
PIF_VALUE: 19
PIF_VALUE: 25
PIF_VALUE: 20
PIF_VALUE: 17
PIF_VALUE: 19
PIF_VALUE: 20
PIF_VALUE: 19
PIF_VALUE: 22
PIF_VALUE: 21
PIF_VALUE: 20
PIF_VALUE: 21
PIF_VALUE: 21
PIF_VALUE: 17
PIF_VALUE: 21
PIF_VALUE: 17
PIF_VALUE: 24
PIF_VALUE: 21
PIF_VALUE: 14
PIF_VALUE: 21
PIF_VALUE: 21
PIF_VALUE: 20
PIF_VALUE: 23
PIF_VALUE: 21
PIF_VALUE: 21
PIF_VALUE: 19
PIF_VALUE: 21
PIF_VALUE: 20
PIF_VALUE: 20
PIF_VALUE: 22
PIF_VALUE: 21
PIF_VALUE: 20
PIF_VALUE: 23
PIF_VALUE: 20
PIF_VALUE: 19
PIF_VALUE: 22
PIF_VALUE: 20
PIF_VALUE: 21
PIF_VALUE: 21
PIF_VALUE: 20
PIF_VALUE: 23
PIF_VALUE: 20
PIF_VALUE: 22
PIF_VALUE: 22
PIF_VALUE: 21
PIF_VALUE: 21
PIF_VALUE: 17
PIF_VALUE: 21
PIF_VALUE: 19
PIF_VALUE: 21
PIF_VALUE: 21
PIF_VALUE: 23
PIF_VALUE: 21
PIF_VALUE: 23
PIF_VALUE: 23
PIF_VALUE: 21
PIF_VALUE: 22
PIF_VALUE: 21
PIF_VALUE: 24
PIF_VALUE: 1
PIF_VALUE: 21
PIF_VALUE: 21
PIF_VALUE: 20
PIF_VALUE: 22
PIF_VALUE: 20
PIF_VALUE: 22
PIF_VALUE: 24
PIF_VALUE: 21
PIF_VALUE: 24
PIF_VALUE: 22
PIF_VALUE: 20
PIF_VALUE: 20
PIF_VALUE: 24
PIF_VALUE: 1
PIF_VALUE: 18
PIF_VALUE: 21
PIF_VALUE: 23
PIF_VALUE: 20
PIF_VALUE: 20
PIF_VALUE: 21
PIF_VALUE: 20
PIF_VALUE: 21
PIF_VALUE: 22
PIF_VALUE: 19
PIF_VALUE: 20
PIF_VALUE: 21
PIF_VALUE: 24
PIF_VALUE: 21
PIF_VALUE: 20
PIF_VALUE: 24
PIF_VALUE: 21
PIF_VALUE: 18
PIF_VALUE: 22
PIF_VALUE: 23
PIF_VALUE: 21
PIF_VALUE: 20
PIF_VALUE: 18
PIF_VALUE: 20
PIF_VALUE: 24
PIF_VALUE: 18
PIF_VALUE: 20
PIF_VALUE: 19
PIF_VALUE: 1
PIF_VALUE: 22
PIF_VALUE: 20
PIF_VALUE: 22
PIF_VALUE: 24
PIF_VALUE: 19
PIF_VALUE: 23
PIF_VALUE: 22
PIF_VALUE: 21
PIF_VALUE: 26
PIF_VALUE: 23
PIF_VALUE: 20
PIF_VALUE: 20
PIF_VALUE: 24
PIF_VALUE: 23
PIF_VALUE: 22
PIF_VALUE: 21
PIF_VALUE: 19
PIF_VALUE: 21
PIF_VALUE: 24
PIF_VALUE: 20
PIF_VALUE: 23
PIF_VALUE: 21
PIF_VALUE: 19
PIF_VALUE: 15
PIF_VALUE: 17
PIF_VALUE: 20
PIF_VALUE: 20
PIF_VALUE: 19
PIF_VALUE: 20
PIF_VALUE: 24

## 2021-11-24 ASSESSMENT — PAIN - FUNCTIONAL ASSESSMENT: PAIN_FUNCTIONAL_ASSESSMENT: 0-10

## 2021-11-24 ASSESSMENT — ENCOUNTER SYMPTOMS: SHORTNESS OF BREATH: 0

## 2021-11-24 ASSESSMENT — PAIN DESCRIPTION - DESCRIPTORS: DESCRIPTORS: CRAMPING

## 2021-11-24 NOTE — H&P
Interval H&P Note    Pt Name: Graciela Collins  MRN: [id number]  YOB: 1950  Date of evaluation: 11/24/2021      [x] I have reviewed the hardcopy Surgery Progress Note by Dr Chidi Magallanes dated 11/13/21 labeled in short chart for an Interval History and Physical note. [x] I have examined  Graciela Collins  There are no changes to the patient who is scheduled for an abdominal exploration low anterior resection possible stoma by Dr. Celina Nielsen for sigmoid stricture  The patient followed the preoperative bowel prep until clear. The patient denies new health changes, fever, chills, wheezing, cough, increased SOB, chest pain, open sores or wounds. Last Celebrex 11/20 and ibuprofen > month     Functional Capacity per pt:  1) Pt is able to walk 2 city blocks on level ground without SOB. 2) Pt is not able to climb 2 flights of stairs without SOB. 3) Pt is not able to walk up a hill for 1-2 city blocks without SOB. Past Medical History:     Past Medical History:   Diagnosis Date    Abdominal pain     Arthritis     Constipation     COPD (chronic obstructive pulmonary disease) (HCC)     Depressed     GERD (gastroesophageal reflux disease)     HLD (hyperlipidemia)     HTN (hypertension)     Hypothyroid     IBS (irritable bowel syndrome)     Lumbar spondylolysis     Myofascial pain     Poor appetite     RLS (restless legs syndrome)     Wears glasses         Past Surgical History:     Past Surgical History:   Procedure Laterality Date    ABDOMINAL ADHESION SURGERY  3/9/2015    APPENDECTOMY      BLADDER SURGERY      COLONOSCOPY      CYSTOCELE REPAIR      DILATATION, ESOPHAGUS      ENDOSCOPY, COLON, DIAGNOSTIC      ENTEROCELE REPAIR      EYE SURGERY      HERNIA REPAIR      HYSTERECTOMY      RECTAL SURGERY      TONSILLECTOMY          Medications Prior to Admission:     Prior to Admission medications    Medication Sig Start Date End Date Taking?  Authorizing Provider   cyclobenzaprine (FLEXERIL) 10 MG tablet Take 10 mg by mouth nightly 10/29/21  Yes Historical Provider, MD   spironolactone (ALDACTONE) 25 MG tablet Take 25 mg by mouth daily   Yes Historical Provider, MD   rosuvastatin (CRESTOR) 5 MG tablet Take 5 mg by mouth daily   Yes Historical Provider, MD   esomeprazole Magnesium (NEXIUM) 40 MG PACK Take 40 mg by mouth daily   Yes Historical Provider, MD   famotidine (PEPCID) 40 MG tablet Take 40 mg by mouth daily   Yes Historical Provider, MD   amLODIPine (NORVASC) 10 MG tablet  3/17/15  Yes Historical Provider, MD   citalopram (CELEXA) 40 MG tablet  3/5/15  Yes Historical Provider, MD   diphenoxylate-atropine (LOMOTIL) 2.5-0.025 MG per tablet  12/22/14  Yes Historical Provider, MD   levothyroxine (SYNTHROID) 100 MCG tablet  3/5/15  Yes Historical Provider, MD   celecoxib (CELEBREX) 200 MG capsule Take 200 mg by mouth 2 times daily    Historical Provider, MD   ibuprofen (ADVIL;MOTRIN) 800 MG tablet  3/12/15   Historical Provider, MD        Allergies:     Morphine and Sulfa antibiotics    Social History:     Tobacco:    reports that she has been smoking cigarettes. She has a 26.00 pack-year smoking history. She has never used smokeless tobacco.  Alcohol:      reports current alcohol use. Drug Use:  reports current drug use. Drug: Marijuana Shrestha Raza). Family History:     Family History   Problem Relation Age of Onset    High Blood Pressure Mother        Vital signs: BP (!) 140/68   Pulse 75   Temp 96.8 °F (36 °C)   Resp 16   Ht 5' 4.5\" (1.638 m)   Wt 184 lb (83.5 kg)   LMP  (LMP Unknown)   SpO2 95%   BMI 31.10 kg/m²     Allergies:  Morphine and Sulfa antibiotics    This is a 72 y.o. female who is pleasant, cooperative, alert and oriented x3, in no acute distress    Physical Exam:  Lungs: Bilateral equal air entry, unlabored, clear to ausculation, no wheezing, rales or rhonchi, normal effort  Cardiovascular: HR 75 normal rate, regular rhythm, no murmur, gallop, rub.   Abdomen: Soft,round, nontender, nondistended, normal bowel sounds. Labs:  Recent Labs     11/10/21  0745   HGB 14.0   HCT 43.1   WBC 5.2   MCV 92.5         K 4.3      CO2 23   BUN 19   CREATININE 0.71   GLUCOSE 93       No results for input(s): COVID19 in the last 720 hours.     LESLY Delacruz CNP   Electronically signed 11/24/2021 at 10:11 AM

## 2021-11-24 NOTE — ANESTHESIA PRE PROCEDURE
MD Scott           Allergies: Allergies   Allergen Reactions    Morphine Itching    Sulfa Antibiotics        Problem List:    Patient Active Problem List   Diagnosis Code    Intestinal adhesions K66.0    Obesity (BMI 30-39. 9) E66.9    Urinary incontinence R32    Constipation K59.00    Ventral incisional hernia K43.2    Smoker F17.200       Past Medical History:        Diagnosis Date    Abdominal pain     Arthritis     Constipation     COPD (chronic obstructive pulmonary disease) (HCC)     Depressed     GERD (gastroesophageal reflux disease)     HLD (hyperlipidemia)     HTN (hypertension)     Hypothyroid     IBS (irritable bowel syndrome)     Lumbar spondylolysis     Myofascial pain     Poor appetite     RLS (restless legs syndrome)     Wears glasses        Past Surgical History:        Procedure Laterality Date    ABDOMINAL ADHESION SURGERY  3/9/2015    APPENDECTOMY      BLADDER SURGERY      COLONOSCOPY      CYSTOCELE REPAIR      DILATATION, ESOPHAGUS      ENDOSCOPY, COLON, DIAGNOSTIC      ENTEROCELE REPAIR      EYE SURGERY      HERNIA REPAIR      HYSTERECTOMY      RECTAL SURGERY      TONSILLECTOMY         Social History:    Social History     Tobacco Use    Smoking status: Current Every Day Smoker     Packs/day: 0.50     Years: 52.00     Pack years: 26.00     Types: Cigarettes    Smokeless tobacco: Never Used   Substance Use Topics    Alcohol use: Yes     Comment: every other week                                Ready to quit: Not Answered  Counseling given: Not Answered      Vital Signs (Current):   Vitals:    11/24/21 0948 11/24/21 0949   BP: (!) 140/68    Pulse: 75    Resp: 16    Temp: 96.8 °F (36 °C)    SpO2: 95%    Weight:  184 lb (83.5 kg)   Height:  5' 4.5\" (1.638 m)                                              BP Readings from Last 3 Encounters:   11/24/21 (!) 140/68   11/10/21 132/83   06/29/20 132/72       NPO Status: Time of last liquid consumption: 0100 Time of last solid consumption: 1000                        Date of last liquid consumption: 11/24/21                        Date of last solid food consumption: 11/23/21    BMI:   Wt Readings from Last 3 Encounters:   11/24/21 184 lb (83.5 kg)   11/10/21 184 lb 11.2 oz (83.8 kg)   06/29/20 212 lb (96.2 kg)     Body mass index is 31.1 kg/m². CBC:   Lab Results   Component Value Date    WBC 5.2 11/10/2021    RBC 4.66 11/10/2021    HGB 14.0 11/10/2021    HCT 43.1 11/10/2021    MCV 92.5 11/10/2021    RDW 14.3 11/10/2021     11/10/2021       CMP:   Lab Results   Component Value Date     11/10/2021    K 4.3 11/10/2021     11/10/2021    CO2 23 11/10/2021    BUN 19 11/10/2021    CREATININE 0.71 11/10/2021    GFRAA >60 11/10/2021    LABGLOM >60 11/10/2021    GLUCOSE 93 11/10/2021    CALCIUM 9.4 11/10/2021       POC Tests: No results for input(s): POCGLU, POCNA, POCK, POCCL, POCBUN, POCHEMO, POCHCT in the last 72 hours. Coags: No results found for: PROTIME, INR, APTT    HCG (If Applicable): No results found for: PREGTESTUR, PREGSERUM, HCG, HCGQUANT     ABGs: No results found for: PHART, PO2ART, KGL4XAM, YZV5MWG, BEART, A1MWQMQD     Type & Screen (If Applicable):  No results found for: LABABO, LABRH    Drug/Infectious Status (If Applicable):  No results found for: HIV, HEPCAB    COVID-19 Screening (If Applicable): No results found for: COVID19        Anesthesia Evaluation    Airway: Mallampati: I  TM distance: >3 FB   Neck ROM: full  Mouth opening: > = 3 FB Dental:          Pulmonary:   (+) COPD:      (-) shortness of breath                           Cardiovascular:    (+) hypertension:,     (-)  angina                Neuro/Psych:               GI/Hepatic/Renal:             Endo/Other:                     Abdominal:             Vascular:           Other Findings:             Anesthesia Plan      general     ASA 3                                 Lazara [place], MD   11/24/2021

## 2021-11-24 NOTE — CONSULTS
[facility]  Urology Consultation    Patient:  Angelo Salgado  MRN: [id number]  YOB: 1950    CHIEF COMPLAINT: Urologic evaluation, assessment of ureteral status with placement of ureteral catheters    HISTORY OF PRESENT ILLNESS:   The patient is a 72 y.o. female who presents with sigmoid stricture, patient presents for abdominal exploration with low anterior resection. Concern was raised over ureteral obstruction or injury, we were asked by Dr. Alfred Vital to evaluate the patient. 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     Past Medical History:    Past Medical History:   Diagnosis Date    Abdominal pain     Arthritis     Constipation     COPD (chronic obstructive pulmonary disease) (HCC)     Depressed     GERD (gastroesophageal reflux disease)     HLD (hyperlipidemia)     HTN (hypertension)     Hypothyroid     IBS (irritable bowel syndrome)     Lumbar spondylolysis     Myofascial pain     Poor appetite     RLS (restless legs syndrome)     Wears glasses        Past Surgical History:    Past Surgical History:   Procedure Laterality Date    ABDOMINAL ADHESION SURGERY  3/9/2015    APPENDECTOMY      BLADDER SURGERY      COLONOSCOPY      CYSTOCELE REPAIR      DILATATION, ESOPHAGUS      ENDOSCOPY, COLON, DIAGNOSTIC      ENTEROCELE REPAIR      EYE SURGERY      HERNIA REPAIR      HYSTERECTOMY      RECTAL SURGERY      TONSILLECTOMY       Previous  surgery: Pelvic floor surgery most likely sacrocolpopexy   Medications:    Scheduled Meds:   lidocaine 1% (buffered)  0.5 mL Infiltration Once     Continuous Infusions:   lactated ringers 100 mL/hr at 11/24/21 1024     PRN Meds:.fentanNYL, fentanNYL, ondansetron, promethazine    Allergies:  Morphine and Sulfa antibiotics    Social History:    Social History Socioeconomic History    Marital status:      Spouse name: Not on file    Number of children: Not on file    Years of education: Not on file    Highest education level: Not on file   Occupational History    Not on file   Tobacco Use    Smoking status: Current Every Day Smoker     Packs/day: 0.50     Years: 52.00     Pack years: 26.00     Types: Cigarettes    Smokeless tobacco: Never Used   Vaping Use    Vaping Use: Never used   Substance and Sexual Activity    Alcohol use: Yes     Comment: every other week    Drug use: Yes     Types: Marijuana Simmie Gregg)     Comment: every other week-inhalation    Sexual activity: Not on file   Other Topics Concern    Not on file   Social History Narrative    Not on file     Social Determinants of Health     Financial Resource Strain:     Difficulty of Paying Living Expenses: Not on file   Food Insecurity:     Worried About [email or web address] in the Last Year: Not on file    [address] [employer] in the Last Year: Not on file   Transportation Needs:     Lack of Transportation (Medical): Not on file    Lack of Transportation (Non-Medical):  Not on file   Physical Activity:     Days of Exercise per Week: Not on file    Minutes of Exercise per Session: Not on file   Stress:     Feeling of Stress : Not on file   Social Connections:     Frequency of Communication with Friends and Family: Not on file    Frequency of Social Gatherings with Friends and Family: Not on file    Attends Restorationism Services: Not on file    Active Member of Clubs or Organizations: Not on file    Attends Club or Organization Meetings: Not on file    Marital Status: Not on file   Intimate Partner Violence:     Fear of Current or Ex-Partner: Not on file    Emotionally Abused: Not on file    Physically Abused: Not on file    Sexually Abused: Not on file   Housing Stability:     Unable to Pay for Housing in the Last Year: Not on file    Number of [place] in the Last Year: Not on file    Unstable Housing in the Last Year: Not on file       Family History:    Family History   Problem Relation Age of Onset    High Blood Pressure Mother      Previous Urologic Family history: none  REVIEW OF SYSTEMS:  Constitutional: negative  Eyes: negative  Respiratory: negative  Cardiovascular: negative  Gastrointestinal: negative  Genitourinary: see HPI  Musculoskeletal: negative  Skin: negative   Neurological: negative  Hematological/Lymphatic: negative  Psychological: negative    Physical Exam:      This a 72 y.o. female   Patient Vitals for the past 24 hrs:   BP Temp Pulse Resp SpO2 Height Weight   11/24/21 0949 -- -- -- -- -- 5' 4.5\" (1.638 m) 184 lb (83.5 kg)   11/24/21 0948 (!) 140/68 96.8 °F (36 °C) 75 16 95 % -- --     Constitutional: Patient in no acute distress. Neuro: Alert and oriented to person, place and time. Psych: mood and affect normal  HEENT negative  Lungs: Respiratory effort is normal  Cardiovascular: Normal peripheral pulses  Abdomen: S abdominal surgery as mentioned exploratory laparotomy with low anterior resection. No hernias. Kidneys normal.  Lymphatics: No palpable lymphadenopathy. Bladder non-tender and not distended.,  Rose catheter in place      LABS:   No results for input(s): WBC, HGB, HCT, MCV, PLT in the last 72 hours. No results for input(s): NA, K, CL, CO2, PHOS, BUN, CREATININE in the last 72 hours. Invalid input(s): CA    Additional Lab/culture results:    Urinalysis: No results for input(s): COLORU, PHUR, LABCAST, WBCUA, RBCUA, MUCUS, TRICHOMONAS, YEAST, BACTERIA, CLARITYU, SPECGRAV, LEUKOCYTESUR, UROBILINOGEN, Lunda Chol in the last 72 hours.     Invalid input(s): Clarita Razo     -----------------------------------------------------------------  Imaging Results:      Assessment and Plan   Impression: Concerned about ureteral obstruction or ureteral injury request for cystoscopy and ureteral evaluation  Patient Active Problem List   Diagnosis    Intestinal adhesions    Obesity (BMI 30-39. 9)    Urinary incontinence    Constipation    Ventral incisional hernia    Smoker       Plan: Discussed status with Dr. Gali Diamond  we will proceed with urologic evaluation    Ladonna Jeans, MD  5:08 PM 11/24/2021

## 2021-11-24 NOTE — OP NOTE
Operative Note      Patient: Shane Fernández  YOB: 1950  MRN: [id number]    Date of Procedure: 11/24/2021    Pre-Op Diagnosis: DX SIGMOID STRICTURE, evaluation for ureteral laceration or ureteral injury    Post-Op Diagnosis: Same and No evidence of ureteral injury bilateral placement of ureteral catheters       Procedure(s):  ABDOMINAL EXPLORATION LOW ANTERIOR RESECTION POSSIBLE STOMA  CYSTOSCOPY EXPLORATION OF URETER BILATERAL URETERAL STENT INSERTION  SIGMOIDOSCOPY DIAGNOSTIC FLEXIBLE  Surgical exploration of the right ureter and the retrovesical space near the ureterovesical junction  Surgeon(s):  MD Anton Henderson MD    Assistant:   Surgical Assistant: Jacquelin Nicole  Resident: Kt Gibbs DO    Anesthesia: General    Estimated Blood Loss (mL): Minimal    Complications: None    Specimens:   ID Type Source Tests Collected by Time Destination   A : SMALL BOWEL (ILEUM) Tissue Ileum SURGICAL PATHOLOGY Rekha Onofre MD 11/24/2021 1417    B : PELVIC MESH Tissue Abdomen SURGICAL PATHOLOGY Ashley Chavez MD 11/24/2021 1709        Implants:  * No implants in log *      Drains:   Urethral Catheter Double-lumen 18 fr (Active)       [REMOVED] Urethral Catheter Non-latex 16 fr (Removed)       Findings: Indications, 60-year-old female with sigmoid stricture prior pelvic surgery possibly sacrocolpopexy with mesh, concern was raised over ureteral injury of the right ureter. We were asked by Dr. Richie Foster to evaluate the patient. Detailed Description of Procedure: With the patient in supine position, the abdominal incision has been carried out, the retrovesical space exposed, the area of concern to the right side of midline near the ureterovesical junction was identified  Exploration of the right ureter surgically was carried out and discussed with colorectal surgery, Dr. Richie Foster, a tubular structure on the right side near the posterior bladder wall with concern about ureteral surgical laceration.     Upon examination we did not document any Eflux of urine from the site of the injury. There was no bleeding associated with the tubular structure noted above. We then proceeded with cystoscopy. For the purpose of cannulation of the right ureteral orifice    We decided on cystoscopy and ureteral catheter placement to ascertain for sure the status of the laceration of the tubular structure and the possibility of placement of ureteral catheter    We entered the bladder with the cystoscope, there was no evidence of bladder tumors ulcers or stones, no evidence of injury to the bladder or any hematoma. We identified the trigone. We then used a #5 ureteral catheter and cannulated the right ureter. As we gently pushed the ureteral catheter through the ureteral orifice, evaluation intra-abdominal demonstrated that the ureter was lateral to the area of the tubular structure that was partially resected. The ureteral catheter was passed up to 26 cm. Clear urine was documented from the right ureteral catheter. We then proceeded with insertion of a left ureteral catheter up to 26 cm in the same fashion. Clear urine documented from the left ureteral catheter. A Rose catheter was then inserted after removing the cystoscope, both ureteral catheters were secured to the Rose catheter connected to a drainage bag. The procedure was well-tolerated    The patient was then turned over to colorectal surgery for further surgical management. We will be happy to follow-up and manage the status of the ureteral catheters in the postoperative.            Electronically signed by Boris Contreras MD on 11/24/2021 at 5:13 PM

## 2021-11-25 ENCOUNTER — APPOINTMENT (OUTPATIENT)
Dept: GENERAL RADIOLOGY | Age: 71
DRG: 329 | End: 2021-11-25
Attending: COLON & RECTAL SURGERY
Payer: MEDICARE

## 2021-11-25 LAB
ABSOLUTE EOS #: 0 K/UL (ref 0–0.4)
ABSOLUTE IMMATURE GRANULOCYTE: 0.36 K/UL (ref 0–0.3)
ABSOLUTE LYMPH #: 1.51 K/UL (ref 1–4.8)
ABSOLUTE MONO #: 0.45 K/UL (ref 0.2–0.8)
ANION GAP SERPL CALCULATED.3IONS-SCNC: 12 MMOL/L (ref 9–17)
BASOPHILS # BLD: 0 %
BASOPHILS ABSOLUTE: 0 K/UL (ref 0–0.2)
BUN BLDV-MCNC: 27 MG/DL (ref 8–23)
BUN/CREAT BLD: 21 (ref 9–20)
CALCIUM SERPL-MCNC: 8.3 MG/DL (ref 8.6–10.4)
CHLORIDE BLD-SCNC: 104 MMOL/L (ref 98–107)
CO2: 20 MMOL/L (ref 20–31)
CREAT SERPL-MCNC: 1.28 MG/DL (ref 0.5–0.9)
DIFFERENTIAL TYPE: ABNORMAL
EOSINOPHILS RELATIVE PERCENT: 0 % (ref 1–4)
GFR AFRICAN AMERICAN: 50 ML/MIN
GFR NON-AFRICAN AMERICAN: 41 ML/MIN
GFR SERPL CREATININE-BSD FRML MDRD: ABNORMAL ML/MIN/{1.73_M2}
GFR SERPL CREATININE-BSD FRML MDRD: ABNORMAL ML/MIN/{1.73_M2}
GLUCOSE BLD-MCNC: 159 MG/DL (ref 70–99)
HCT VFR BLD CALC: 38.3 % (ref 36.3–47.1)
HEMOGLOBIN: 12.2 G/DL (ref 11.9–15.1)
IMMATURE GRANULOCYTES: 4 %
LYMPHOCYTES # BLD: 17 % (ref 24–44)
MAGNESIUM: 1.6 MG/DL (ref 1.6–2.6)
MCH RBC QN AUTO: 29.6 PG (ref 25.2–33.5)
MCHC RBC AUTO-ENTMCNC: 31.9 G/DL (ref 28.4–34.8)
MCV RBC AUTO: 93 FL (ref 82.6–102.9)
MONOCYTES # BLD: 5 % (ref 1–7)
MORPHOLOGY: ABNORMAL
MORPHOLOGY: ABNORMAL
NRBC AUTOMATED: 0 PER 100 WBC
PDW BLD-RTO: 14.6 % (ref 11.8–14.4)
PHOSPHORUS: 3.6 MG/DL (ref 2.6–4.5)
PLATELET # BLD: 234 K/UL (ref 138–453)
PLATELET ESTIMATE: ABNORMAL
PMV BLD AUTO: 10.3 FL (ref 8.1–13.5)
POTASSIUM SERPL-SCNC: 4.9 MMOL/L (ref 3.7–5.3)
RBC # BLD: 4.12 M/UL (ref 3.95–5.11)
RBC # BLD: ABNORMAL 10*6/UL
SEG NEUTROPHILS: 74 % (ref 36–66)
SEGMENTED NEUTROPHILS ABSOLUTE COUNT: 6.58 K/UL (ref 1.8–7.7)
SODIUM BLD-SCNC: 136 MMOL/L (ref 135–144)
WBC # BLD: 8.9 K/UL (ref 3.5–11.3)
WBC # BLD: ABNORMAL 10*3/UL

## 2021-11-25 PROCEDURE — 83735 ASSAY OF MAGNESIUM: CPT

## 2021-11-25 PROCEDURE — 6360000002 HC RX W HCPCS: Performed by: STUDENT IN AN ORGANIZED HEALTH CARE EDUCATION/TRAINING PROGRAM

## 2021-11-25 PROCEDURE — 80048 BASIC METABOLIC PNL TOTAL CA: CPT

## 2021-11-25 PROCEDURE — 74018 RADEX ABDOMEN 1 VIEW: CPT

## 2021-11-25 PROCEDURE — 97535 SELF CARE MNGMENT TRAINING: CPT

## 2021-11-25 PROCEDURE — 97162 PT EVAL MOD COMPLEX 30 MIN: CPT

## 2021-11-25 PROCEDURE — 36415 COLL VENOUS BLD VENIPUNCTURE: CPT

## 2021-11-25 PROCEDURE — 6370000000 HC RX 637 (ALT 250 FOR IP): Performed by: STUDENT IN AN ORGANIZED HEALTH CARE EDUCATION/TRAINING PROGRAM

## 2021-11-25 PROCEDURE — 6360000002 HC RX W HCPCS: Performed by: COLON & RECTAL SURGERY

## 2021-11-25 PROCEDURE — 97530 THERAPEUTIC ACTIVITIES: CPT

## 2021-11-25 PROCEDURE — 2060000000 HC ICU INTERMEDIATE R&B

## 2021-11-25 PROCEDURE — 97167 OT EVAL HIGH COMPLEX 60 MIN: CPT

## 2021-11-25 PROCEDURE — 2500000003 HC RX 250 WO HCPCS: Performed by: STUDENT IN AN ORGANIZED HEALTH CARE EDUCATION/TRAINING PROGRAM

## 2021-11-25 PROCEDURE — 2580000003 HC RX 258: Performed by: STUDENT IN AN ORGANIZED HEALTH CARE EDUCATION/TRAINING PROGRAM

## 2021-11-25 PROCEDURE — 85025 COMPLETE CBC W/AUTO DIFF WBC: CPT

## 2021-11-25 PROCEDURE — 84100 ASSAY OF PHOSPHORUS: CPT

## 2021-11-25 RX ORDER — SODIUM CHLORIDE, SODIUM LACTATE, POTASSIUM CHLORIDE, AND CALCIUM CHLORIDE .6; .31; .03; .02 G/100ML; G/100ML; G/100ML; G/100ML
1000 INJECTION, SOLUTION INTRAVENOUS ONCE
Status: COMPLETED | OUTPATIENT
Start: 2021-11-25 | End: 2021-11-25

## 2021-11-25 RX ORDER — LORAZEPAM 2 MG/ML
1 INJECTION INTRAMUSCULAR ONCE
Status: COMPLETED | OUTPATIENT
Start: 2021-11-25 | End: 2021-11-25

## 2021-11-25 RX ORDER — MAGNESIUM SULFATE IN WATER 40 MG/ML
2000 INJECTION, SOLUTION INTRAVENOUS ONCE
Status: COMPLETED | OUTPATIENT
Start: 2021-11-25 | End: 2021-11-25

## 2021-11-25 RX ORDER — HEPARIN SODIUM 5000 [USP'U]/ML
5000 INJECTION, SOLUTION INTRAVENOUS; SUBCUTANEOUS EVERY 8 HOURS SCHEDULED
Status: DISCONTINUED | OUTPATIENT
Start: 2021-11-25 | End: 2021-11-27

## 2021-11-25 RX ORDER — LORAZEPAM 2 MG/ML
2 INJECTION INTRAMUSCULAR ONCE
Status: COMPLETED | OUTPATIENT
Start: 2021-11-25 | End: 2021-11-25

## 2021-11-25 RX ADMIN — METRONIDAZOLE 500 MG: 500 INJECTION, SOLUTION INTRAVENOUS at 15:58

## 2021-11-25 RX ADMIN — CEFAZOLIN SODIUM 2000 MG: 10 INJECTION, POWDER, FOR SOLUTION INTRAVENOUS at 05:31

## 2021-11-25 RX ADMIN — POTASSIUM CHLORIDE, DEXTROSE MONOHYDRATE AND SODIUM CHLORIDE: 150; 5; 450 INJECTION, SOLUTION INTRAVENOUS at 19:24

## 2021-11-25 RX ADMIN — CEFAZOLIN SODIUM 2000 MG: 10 INJECTION, POWDER, FOR SOLUTION INTRAVENOUS at 15:37

## 2021-11-25 RX ADMIN — CYCLOBENZAPRINE 10 MG: 10 TABLET, FILM COATED ORAL at 09:58

## 2021-11-25 RX ADMIN — OXYCODONE 5 MG: 5 TABLET ORAL at 09:53

## 2021-11-25 RX ADMIN — PHENOL 1 SPRAY: 1.5 LIQUID ORAL at 06:40

## 2021-11-25 RX ADMIN — PHENOL 1 SPRAY: 1.5 LIQUID ORAL at 08:23

## 2021-11-25 RX ADMIN — LORAZEPAM 2 MG: 2 INJECTION INTRAMUSCULAR; INTRAVENOUS at 12:30

## 2021-11-25 RX ADMIN — METRONIDAZOLE 500 MG: 500 INJECTION, SOLUTION INTRAVENOUS at 06:10

## 2021-11-25 RX ADMIN — FENTANYL CITRATE 50 MCG: 0.05 INJECTION, SOLUTION INTRAMUSCULAR; INTRAVENOUS at 06:18

## 2021-11-25 RX ADMIN — KETOROLAC TROMETHAMINE 15 MG: 30 INJECTION, SOLUTION INTRAMUSCULAR; INTRAVENOUS at 04:10

## 2021-11-25 RX ADMIN — SODIUM CHLORIDE, POTASSIUM CHLORIDE, SODIUM LACTATE AND CALCIUM CHLORIDE 1000 ML: 600; 310; 30; 20 INJECTION, SOLUTION INTRAVENOUS at 10:04

## 2021-11-25 RX ADMIN — POTASSIUM CHLORIDE, DEXTROSE MONOHYDRATE AND SODIUM CHLORIDE: 150; 5; 450 INJECTION, SOLUTION INTRAVENOUS at 09:58

## 2021-11-25 RX ADMIN — HEPARIN SODIUM 5000 UNITS: 5000 INJECTION INTRAVENOUS; SUBCUTANEOUS at 20:42

## 2021-11-25 RX ADMIN — FENTANYL CITRATE 50 MCG: 0.05 INJECTION, SOLUTION INTRAMUSCULAR; INTRAVENOUS at 02:12

## 2021-11-25 RX ADMIN — FENTANYL CITRATE 50 MCG: 0.05 INJECTION, SOLUTION INTRAMUSCULAR; INTRAVENOUS at 19:24

## 2021-11-25 RX ADMIN — METRONIDAZOLE 500 MG: 500 INJECTION, SOLUTION INTRAVENOUS at 22:43

## 2021-11-25 RX ADMIN — FENTANYL CITRATE 50 MCG: 0.05 INJECTION, SOLUTION INTRAMUSCULAR; INTRAVENOUS at 15:58

## 2021-11-25 RX ADMIN — FENTANYL CITRATE 50 MCG: 0.05 INJECTION, SOLUTION INTRAMUSCULAR; INTRAVENOUS at 10:39

## 2021-11-25 RX ADMIN — AMLODIPINE BESYLATE 10 MG: 10 TABLET ORAL at 09:58

## 2021-11-25 RX ADMIN — CEFAZOLIN SODIUM 2000 MG: 10 INJECTION, POWDER, FOR SOLUTION INTRAVENOUS at 20:42

## 2021-11-25 RX ADMIN — CITALOPRAM HYDROBROMIDE 20 MG: 20 TABLET ORAL at 09:58

## 2021-11-25 RX ADMIN — MAGNESIUM SULFATE HEPTAHYDRATE 2000 MG: 2 INJECTION, SOLUTION INTRAVENOUS at 10:09

## 2021-11-25 RX ADMIN — SPIRONOLACTONE 25 MG: 25 TABLET ORAL at 09:58

## 2021-11-25 ASSESSMENT — PAIN SCALES - GENERAL
PAINLEVEL_OUTOF10: 10
PAINLEVEL_OUTOF10: 10
PAINLEVEL_OUTOF10: 5
PAINLEVEL_OUTOF10: 8
PAINLEVEL_OUTOF10: 9
PAINLEVEL_OUTOF10: 7
PAINLEVEL_OUTOF10: 9

## 2021-11-25 NOTE — PROGRESS NOTES
Care; General Safety; Energy Conservation; Precautions; Functional Mobility Training  Patient Education: Pt educated on: purpose of acute PT eval, importance of continued mobility throuhgout admission, general safety awareness, use of abdominal binder, use of incentive spirometer, fall risk prevention, safety w/ RW, log roll technique, pursed lip breathing, paced breathing for pain control, prevention of sedentary complications, importance of being OOB, and PT POC. Pt verbalized fair understanding. Pt would benefit from continued reinforcement of education. REQUIRES PT FOLLOW UP: Yes  Activity Tolerance  Activity Tolerance: Patient limited by endurance; Patient limited by fatigue       Patient Diagnosis(es): There were no encounter diagnoses. has a past medical history of Abdominal pain, Arthritis, Constipation, COPD (chronic obstructive pulmonary disease) ([address] Depressed, GERD (gastroesophageal reflux disease), HLD (hyperlipidemia), HTN (hypertension), Hypothyroid, IBS (irritable bowel syndrome), Lumbar spondylolysis, Myofascial pain, Poor appetite, RLS (restless legs syndrome), and Wears glasses. has a past surgical history that includes Bladder surgery; Rectal surgery; Cystocele repair; Enterocele repair; Abdominal adhesion surgery (3/9/2015); Hysterectomy; Appendectomy; Colonoscopy; Endoscopy, colon, diagnostic; eye surgery; Dilatation, esophagus; hernia repair; Tonsillectomy; Abdomen surgery (11/24/2021); and Cystocopy (11/24/2021).     Restrictions  Restrictions/Precautions  Restrictions/Precautions: General Precautions, Fall Risk  Required Braces or Orthoses?: Yes  Required Braces or Orthoses  Other: Abdominal Binder  Position Activity Restriction  Other position/activity restrictions: NG to suction, palmer, 4L O2 NC, LUE IV, Ambulate pt, Up in chair, NPO except ice chips/popsicles, alarms  Vision/Hearing  Vision: Impaired (denies any recent changes)  Vision Exceptions: Wears glasses at all times  Hearing: Within functional limits     Subjective  General  Patient assessed for rehabilitation services?: Yes  Response To Previous Treatment: Not applicable  Family / Caregiver Present: Yes ( arrived during session)  Follows Commands: Within Functional Limits  General Comment  Comments: RN and pt agreeable to therapy. Pt supine in bed upon arrival.  Pt pleasant and cooperative throughout. Subjective  Subjective: Pt reports 9/10 pain VAS at abdominal incision. Pain Screening  Patient Currently in Pain: Yes        Orientation  Orientation  Overall Orientation Status: Within Functional Limits  Social/Functional History  Social/Functional History  Lives With: Spouse  Type of Home: House  Home Layout: One level  Home Access: Stairs to enter with rails  Entrance Stairs - Number of Steps: 2  Entrance Stairs - Rails: Right  Bathroom Shower/Tub: Walk-in shower  Bathroom Toilet: Handicap height  Bathroom Equipment: Grab bars in shower, Shower chair, Grab bars around toilet  Home Equipment: Wheelchair-manual, Rolling walker (no DME at baseline)  Receives Help From:  (reports son is supportive and local)  ADL Assistance: [address] [place] [address] Avenue: Independent  Homemaking Responsibilities: Yes (shares w/ - pt does cleaning/laundry,  cooks)  Ambulation Assistance: Independent  Transfer Assistance: Independent  Active : Yes  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\"  Additional Comments: Pt denies any falls  Cognition   Cognition  Overall Cognitive Status: Exceptions  Arousal/Alertness: Appropriate responses to stimuli  Following Commands: Follows one step commands with repetition;  Follows one step commands with increased time  Attention Span: Attends with cues to redirect  Memory: Decreased short term memory; Decreased recall of recent events; Decreased recall of precautions  Safety Judgement: Decreased awareness of need for assistance; Decreased awareness of need for safety  Problem Solving: Decreased awareness of errors; Assistance required to identify errors made; Assistance required to correct errors made  Insights: Decreased awareness of deficits  Initiation: Requires cues for some  Sequencing: Requires cues for some    Objective     Observation/Palpation  Posture: Fair  Observation: fragile skin, bruising on hands, dressing over incision, assisted w/ donning abdominal binder prior to mobility  Edema: B hands    AROM RLE (degrees)  RLE AROM: WFL  AROM LLE (degrees)  LLE AROM : WFL  AROM RUE (degrees)  RUE AROM : WFL  AROM LUE (degrees)  LUE AROM : WFL  Strength RLE  Strength RLE: WFL  Comment: MMT not formally assessed d/t pain. Movement against gravity noted at hip, knee, and ankle throughout functional mobility. Strength LLE  Strength LLE: WFL  Comment: MMT not formally assessed d/t pain. Movement against gravity noted at hip, knee, and ankle throughout functional mobility. Strength RUE  Comment: See OT assessment for detail  Strength LUE  Comment: See OT assessment for detail  Tone RLE  RLE Tone: Normotonic  Tone LLE  LLE Tone: Normotonic  Motor Control  Gross Motor?: WFL  Sensation  Overall Sensation Status: WFL (denies numbness/tingling)  Bed mobility  Rolling to Right: Moderate assistance; 2 Person assistance  Supine to Sit: Moderate assistance; 2 Person assistance  Sit to Supine:  (Not assessed this date. Pt retired in bedside chair upon writer's exit.)  Scooting: Moderate assistance; 2 Person assistance  Comment: Pt w/ significant difficulty throughout bed mobility this date, requiring max verbal and tactile cueing for initiation, progression, and sequencing of BLE and trunk throughout w/ fair return demo. Pt also requiring mod verbal cueing for paced breathing for pain control throughout w/ fair return demo. Pt educated in log-roll technique w/ fair return demo.   Abdominal binder donned prior to mobility. Assist required for safe line mgmt throughout. Transfers  Sit to Stand: Moderate Assistance; 2 Person Assistance  Stand to sit: Moderate Assistance; 2 Person Assistance  Bed to Chair: Moderate assistance; 2 Person Assistance  Comment: Pt w/ fair steadiness throughout transfers this date, requiring max verbal and tactile cueing for proper hand placement throughout transfers w/ RW w/ fair return demo. Pt denying dizziness/lightheadedness upon standing. Upon standing EOB, pt performed pre-gait lateral weight shifting and standing marches w/ fair steadiness noted. Ambulation  Ambulation?: Yes  More Ambulation?: No  Ambulation 1  Surface: level tile  Device: Rolling Walker  Assistance: Moderate assistance; 2 Person assistance  Quality of Gait: fair steadiness, assist for progression of RW  Gait Deviations: Slow Claire; Increased CYNTHIA; Decreased step length; Decreased step height; Shuffles  Distance: 5ft  Comments: Pt ambulating w/ fair steadiness this date requiring mod verbal cueing for safety w/ RW throughout w/ fair return demo. Throughout ambulation, pt reporting mild dizziness so pt was assisted to bedside chair and dizziness subsided quickly.   Stairs/Curb  Stairs?: No     Balance  Posture: Fair  Sitting - Static: Good  Sitting - Dynamic: Good; -  Standing - Static: Fair; +  Standing - Dynamic: Fair; -  Comments: Standing balance assessed w/ RW        Plan   Plan  Times per week: 1-2x/day, 6-7x/week  Current Treatment Recommendations: Strengthening, Balance Training, Functional Mobility Training, Stair training, Gait Training, Transfer Training, Endurance Training, Neuromuscular Re-education, Safety Education & Training, Home Exercise Program, Equipment Evaluation, Education, & procurement, Patient/Caregiver Education & Training  Safety Devices  Type of devices: Call light within reach, Chair alarm in place, Gait belt, Nurse notified, Left in chair  Restraints  Initially in place: No    AM-PAC Score  -[facility] Inpatient Mobility Raw Score : 11 (11/25/21 0955)  -PAC Inpatient T-Scale Score : 33.86 (11/25/21 0955)  Mobility Inpatient CMS 0-100% Score: 72.57 (11/25/21 0955)  Mobility Inpatient CMS G-Code Modifier : CL (11/25/21 0955)       Functional Outcome Measure-   Single Leg Stance Test:  0 sec. (<5 sec.= fall risk)    Goals  Short term goals  Time Frame for Short term goals: 14 visits  Short term goal 1: Pt to demonstrate bed mobility using log-roll technique Claus  Short term goal 2: Pt to perform STS transfers w/ least restrictive AD Claus  Short term goal 3: Pt to ambulate at least 300ft w/ least restrictive AD Claus  Short term goal 4: Pt to ascend/descend 2 steps w/ handrails Loulou  Short term goal 5: Pt to actively participate in at least 30 minutes of physical therapy for ther act, ther ex, balance, gait, and endurance training  Patient Goals   Patient goals : To go home       Therapy Time   Individual Concurrent Group Co-treatment   Time In 0804         Time Out 0848         Minutes 44           Treatment time: 30 minutes       Co-treatment with OT warranted secondary to decreased safety and independence requiring 2 skilled therapy professionals to address individual discipline's goals.     Taurus So, PT

## 2021-11-25 NOTE — PROGRESS NOTES
Occupational Therapy   Occupational Therapy Initial Assessment  Date: 2021   Patient Name: Danii Contreras  MRN: [id number]     : 1950    RN Tristin Light reports patient is medically stable for therapy treatment this date. Chart reviewed prior to treatment and patient is agreeable for therapy. All lines intact and patient positioned comfortably at end of treatment. All patient needs addressed prior to ending therapy session. Pt currently functioning below baseline. Would suggest additional therapy at time of discharge to maximize long term outcomes and prevent re-admission. Please refer to AM-PAC score for current level of function. Date of Service: 2021    Discharge Recommendations:  Patient would benefit from continued therapy after discharge  OT Equipment Recommendations  Equipment Needed: Yes  Mobility Devices: ADL Assistive Devices; Jaclyn Call: Rolling  ADL Assistive Devices: Reacher; Sock-Aid Soft; Long-handled Shoe Horn; Long-handled Sponge; Emergency Alert System    Assessment   Performance deficits / Impairments: Decreased functional mobility ; Decreased safe awareness; Decreased balance; Decreased coordination; Decreased ADL status; Decreased endurance; Decreased high-level IADLs; Decreased posture  Assessment: Skilled OT is indicated for teach and train of AE use to increase overall I and safety in function to return home with assist as needed. Prognosis: Good; Fair  Decision Making: High Complexity  OT Education: OT Role; Plan of Care; Transfer Training; Energy Conservation; Precautions;  Family Education  Patient Education: proper bed mob tech, pursed lip breathing, safety in function, call ligth use/fall prevention, recommendation for continued therapy, benefits of being up OOB as able, postural control, purpose of abd binder, pain mgt tech  REQUIRES OT FOLLOW UP: Yes  Activity Tolerance  Activity Tolerance: Patient limited by fatigue; Patient limited by pain  Activity Tolerance: poor plus and pt fatigues easily; max edu and encouragement needed  Safety Devices  Safety Devices in place: Yes  Type of devices: Call light within reach; Chair alarm in place; Left in chair; Patient at risk for falls; Gait belt; Nurse notified (in recliner and BLE's elevated/pillow under to increase overall comfort)           Patient Diagnosis(es): There were no encounter diagnoses. has a past medical history of Abdominal pain, Arthritis, Constipation, COPD (chronic obstructive pulmonary disease) ([facility] [address] Depressed, GERD (gastroesophageal reflux disease), HLD (hyperlipidemia), HTN (hypertension), Hypothyroid, IBS (irritable bowel syndrome), Lumbar spondylolysis, Myofascial pain, Poor appetite, RLS (restless legs syndrome), and Wears glasses. has a past surgical history that includes Bladder surgery; Rectal surgery; Cystocele repair; Enterocele repair; Abdominal adhesion surgery (3/9/2015); Hysterectomy; Appendectomy; Colonoscopy; Endoscopy, colon, diagnostic; eye surgery; Dilatation, esophagus; hernia repair; Tonsillectomy; Abdomen surgery (11/24/2021); and Cystocopy (11/24/2021).       Pt is s/p:    Date:  11/24/2021            Surgeon: Surgeon(s):  Desirae Henderson MD     Procedure: Procedure(s):  ABDOMINAL EXPLORATION LOW ANTERIOR RESECTION POSSIBLE STOMA        Restrictions  Restrictions/Precautions  Restrictions/Precautions: General Precautions, Fall Risk  Required Braces or Orthoses?: Yes  Required Braces or Orthoses  Other: Abdominal Binder  Position Activity Restriction  Other position/activity restrictions: NG to suction, palmer, 4L O2 NC, LUE IV, Ambulate pt, Up in chair, NPO except ice chips/popsicles, alarms    Subjective   General  Chart Reviewed: Yes  Patient assessed for rehabilitation services?: Yes  Family / Caregiver Present: Yes (spouse in during middle of session)  Patient Currently in Pain: Yes  Pre Treatment Pain Screening  Intervention List: Nurse/Physician notified  Comments / Details: Pt states she has 9/10 pain in her abd incision. Social/Functional History  Social/Functional History  Lives With: Spouse  Type of Home: House  Home Layout: One level  Home Access: Stairs to enter with rails  Entrance Stairs - Number of Steps: 2  Entrance Stairs - Rails: Right  Bathroom Shower/Tub: Walk-in shower  Bathroom Toilet: Handicap height  Bathroom Equipment: Grab bars in shower, Shower chair, Grab bars around toilet  Home Equipment: Hobert Cease, Rolling walker (no DME at baseline; spouse says access to device)  Receives Help From:  (reports son is supportive and local)  ADL Assistance: [address]: Independent  Homemaking Responsibilities: Yes (shares w/ - pt does cleaning/laundry,  cooks)  Ambulation Assistance: Independent  Transfer Assistance: Independent  Active : Yes  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[address] Ave: \"drink a damn beer\"  Additional Comments: Pt denies any falls       Objective   Vision: Impaired (denies any recent changes)  Vision Exceptions: Wears glasses at all times (states she had previous cataract sx)  Hearing: Within functional limits    Orientation  Overall Orientation Status: Within Functional Limits  Observation/Palpation  Posture: Fair  Observation: fragile skin, bruising on hands, NG tube, O2 per NC, dressing over incision, assisted w/donning abdominal binder prior to mobility; O2 sats WFLS when up and O2 on per NC  Edema: B hands  Balance  Sitting Balance: Contact guard assistance (CG to SBA)  Standing Balance: Moderate assistance (x2)  Standing Balance  Time: stand akila 1-2 mins with RW  Functional Mobility  Functional - Mobility Device: Rolling Walker  Activity:  (bed to recliner provided by writer)  Assist Level:  Moderate assistance (x2 for safety/balance)  Functional Mobility Comments: MAX cues/tactile assist for upright posture, pacing, pursed lip breathing, looking up and scanning room, RW safety/mgt and awareness/assist with lines to increase overall safety. Toilet Transfers  Toilet Transfers Comments: N/T as pt has palmer     ADL  Feeding: NPO (NG tube; ice chips in room)  Grooming: Setup; Minimal assistance (seated)  UE Bathing: Setup; Moderate assistance  LE Bathing: Setup; Dependent/Total  UE Dressing: Setup; Moderate assistance (with hosp gown; DEP for donning abd binder with assist of 2 staff sitting EOB)  LE Dressing: Setup; Maximum assistance (with B socks supine in bed; x2 staff to stand for gown mgt)  Toileting: Dependent/Total (palmer)  Additional Comments: *Pt is DEP when up with RW and assist of 2 staff for safety/balance. Tone RUE  RUE Tone: Normotonic  Tone LUE  LUE Tone: Normotonic  Coordination  Movements Are Fluid And Coordinated: No  Coordination and Movement description: Fine motor impairments     Bed mobility  Rolling to Right: Moderate assistance; 2 Person assistance  Supine to Sit: Moderate assistance; 2 Person assistance  Sit to Supine: Unable to assess (pt agreed to sit up in recliner provided after max edu and encouragement needed)  Scooting: Moderate assistance; 2 Person assistance  Comment: MAX cues/tactile assist for proper log rolling tech and initiating movmements, pursed lip breathing, hand placement on bed rail to assist as well as awareness/assist with lines to increase overall safety. Transfers  Stand Step Transfers: Moderate assistance; 2 Person assistance (bed to recliner and with RW)  Sit to stand: 2 Person assistance; Moderate assistance  Stand to sit: Moderate assistance; 2 Person assistance  Transfer Comments: MAX cues/tactile assist for B hand placement pushing from surface seated as well as reaching back, scanning, pacing, RW safety/mgt, pursed lip breathing, squaring self/AD up to surface prior to sitting and controlled as well as awareness/assist with lines to increase overall safety.      Cognition  Overall Cognitive Status: Exceptions  Arousal/Alertness: Appropriate responses to stimuli  Following Commands: Follows one step commands with repetition; Follows one step commands with increased time  Attention Span: Attends with cues to redirect  Memory: Decreased short term memory; Decreased recall of recent events; Decreased recall of precautions  Safety Judgement: Decreased awareness of need for assistance; Decreased awareness of need for safety  Problem Solving: Decreased awareness of errors; Assistance required to identify errors made; Assistance required to correct errors made  Insights: Decreased awareness of deficits  Initiation: Requires cues for some  Sequencing: Requires cues for some  Perception  Overall Perceptual Status: WFL     Sensation  Overall Sensation Status: WFL (denies numbness/tingling)        LUE AROM (degrees)  LUE AROM : WFL  RUE AROM (degrees)  RUE AROM : WFL  LUE Strength  LUE Strength Comment: N/T due to abd pain issues/appears WFLS in function                   Plan   Plan  Times per week: 4-5x/week 1-2x/day as akila  Current Treatment Recommendations: Strengthening, Balance Training, Functional Mobility Training, Safety Education & Training, Positioning, Endurance Training, Neuromuscular Re-education, Patient/Caregiver Education & Training, Equipment Evaluation, Education, & procurement, Home Management Training, Cognitive/Perceptual Training, Pain Management, Self-Care / ADL                                                 AM-PAC Score   9    Co-treatment with PT warranted secondary to decreased safety and independence requiring 2 skilled therapy professionals to address individual discipline's goals.  OT addressing preparation for ADL transfer, sitting and standing balance for increased ADL performance, sitting/activity tolerance, functional reaching, environmental safety/scanning, fall prevention, proper bed mob tech, functional mobility for ADL transfers, ability to sequence and follow directions and functional UE strength. Goals  Short term goals  Time Frame for Short term goals: by discharge, pt to demo  Short term goal 1: bed mob tasks with use of rail/lproper log rolling as needed to min assist of 1. Short term goal 2: I with BUE HEP with use of handouts to maintain functional use as well as abd precautions for pain mgt tech. Short term goal 3: UB ADL to set up and LB ADL to mod assist of 1 and use of AD/AE. Short term goal 4: toileting tasks with use of BSC/AD and grab bar to mod assist of 1. Short term goal 5: ADL transfers and fucntional mob with AD to min assist of 1. Long term goals  Long term goal 1: Pt to stand with SBA and AD akila > 6 mins to reduce falls in function. Long term goal 2: Pt/caregivers to be I with pressure relief, EC/WS and fall prevention tech as well as DME/AD and AE recommendations with use of handouts. Patient Goals   Patient goals : Get home soon!        Therapy Time   Individual Concurrent Group Co-treatment   Time In 4037 (plus 10 min chart review/nursing communication)         Time Out 0848         Minutes 34=44 mins          Timed Code Treatment Minutes: 24 Minutes       Dalila Pettit OT

## 2021-11-25 NOTE — PROGRESS NOTES
General Surgery:  Daily Progress Note                    PATIENT NAME: Alejo Santacruz     TODAY'S DATE: 11/25/2021, 5:56 AM  CC:  I'm tired    SUBJECTIVE:     Pt seen and examined at bedside. Postop from exploratory laparotomy with extensive lysis of adhesions, explantation pelvic mesh, ileocecectomy with ileocolonic anastomosis, sigmoidoscopy x2, bilateral ureteral stent placement 11/24/2021. No acute events overnight, vital signs stable, few episodes of hypertension, pain controlled, denies CP or SOB. Minimal from NGT, continue LIWS. Patient urine output 0.3 mL/kg/h, BUN and creatinine elevated this morning will administer fluids 1 L lactated Ringer's, suspect partially due to ADH response postop. Rose and stents to stay in for 2 days. OBJECTIVE:   VITALS:  /83   Pulse 86   Temp 97.7 °F (36.5 °C) (Oral)   Resp 18   Ht 5' 4.5\" (1.638 m)   Wt 189 lb 11.2 oz (86 kg)   LMP  (LMP Unknown)   SpO2 90%   BMI 32.06 kg/m²      INTAKE/OUTPUT:      Intake/Output Summary (Last 24 hours) at 11/25/2021 0556  Last data filed at 11/25/2021 0533  Gross per 24 hour   Intake 4145 ml   Output 1230 ml   Net 2915 ml       PHYSICAL EXAM:  General Appearance: awake, alert, oriented, in no acute distress  HEENT:  Normocephalic, atraumatic, mucus membranes moist   NG tube low intermittent wall suction secured 60 cm right nare  Heart: Heart regular rate and rhythm  Lungs: No chest wall tenderness., Breathing Pattern: regular, no distress  Abdomen: Soft, appropriately tender, no strikethrough on midline bandages, without guarding or rebound. Extremities: No cyanosis, pitting edema, rashes noted. Skin: Skin color, texture, turgor normal. No rashes or lesions.     Data:  CBC with Differential:    Lab Results   Component Value Date    WBC 8.9 11/25/2021    RBC 4.12 11/25/2021    HGB 12.2 11/25/2021    HCT 38.3 11/25/2021     11/25/2021    MCV 93.0 11/25/2021    MCH 29.6 11/25/2021    MCHC 31.9 11/25/2021    RDW 14.6 11/25/2021    LYMPHOPCT PENDING 11/25/2021    MONOPCT PENDING 11/25/2021    BASOPCT PENDING 11/25/2021    MONOSABS PENDING 11/25/2021    LYMPHSABS PENDING 11/25/2021    EOSABS PENDING 11/25/2021    BASOSABS PENDING 11/25/2021    DIFFTYPE NOT REPORTED 11/25/2021     BMP:    Lab Results   Component Value Date     11/25/2021    K 4.9 11/25/2021     11/25/2021    CO2 20 11/25/2021    BUN 27 11/25/2021    CREATININE 1.28 11/25/2021    CALCIUM 8.3 11/25/2021    GFRAA 50 11/25/2021    LABGLOM 41 11/25/2021    GLUCOSE 159 11/25/2021     Magnesium:    Lab Results   Component Value Date    MG 1.6 11/25/2021     Phosphorus:    Lab Results   Component Value Date    PHOS 3.6 11/25/2021       Radiology Review:  No results found. ASSESSMENT:  Active Hospital Problems    Diagnosis Date Noted    Stricture of sigmoid colon [facility]) [H62.823] 11/24/2021       72 y.o. female with sigmoid colon stricture status post exploratory laparotomy with explantation pelvic mesh and mobilization sigmoid and proximal rectum, status post ileocecectomy with ileocolonic anastomosis. Plan:  Diet: Continue n.p.o., may use NG tube for p.o. medications and clamp for 45 minutes.   May have ice chips and popsicles for comfort  Add Chloraseptic  1L LR bolus over four hours for elevation in BUN/Cr then continue maintenance fluid  Continue abx for 4d total  Analgesia: Multimodal pain therapy, may have Valium 2 mg every 6 hours as needed anxiety agitation, hold toradol until creatine clearance improved  GI prophylaxis: Continue Protonix  Bowel regimen: Hold until return of bowel function  DVT prophylaxis: start heparin 5000 units q8h w/ transition to lovenox when Cr clearance improved  Activity:  Continue to encourage ambulation/activity w/ PT/OT  Continue to encourage incentive spirometry  Dressing takedown in a.m.  Mild hypomagnesemia we will replete 2 g  PT OT to evaluate    Electronically signed by Pilar Waldrop DO  on 11/25/2021 at

## 2021-11-25 NOTE — PLAN OF CARE
Problem: HEMODYNAMIC STATUS  Goal: Patient has stable vital signs and fluid balance  Outcome: Ongoing  Note: Monitor vital signs at least every shift & as needed. Monitor intake & output at least every shift. Problem: OXYGENATION/RESPIRATORY FUNCTION  Goal: Patient will achieve/maintain normal respiratory rate/effort  Outcome: Ongoing  Note: Assess breath sounds every shift and as needed. Assess oxygenation level & respiration rate. Encourage coughing & deep breathing. Encourage use of incentive spirometer. Assess cough & sputum. Administer oxygen as needed. Problem: MOBILITY  Goal: Early mobilization is achieved  Outcome: Ongoing  Note: Assessed musculoskeletal functional level. Assessed ROM & ability to care for self. Problem: ELIMINATION  Goal: Elimination patterns are normal or improving  Description: Elimination patterns return to pre-surgery normal patterns  Outcome: Ongoing  Note: Assess intake & output. Maintain hydration as needed. Provide perineal care. Assist with toileting every 2 hours & as needed. Assess for infection. Problem: SKIN INTEGRITY  Goal: Skin integrity is maintained or improved  Outcome: Ongoing  Note: Continuing to monitor for skin integrity risks. Patient independent with turning/repositioning. Turning/repositioning encouraged at least once every 2 hrs, and prn basis. Hygiene care being completed independently per patient; assistance provided when deemed necessary. Problem: Pain:  Goal: Pain level will decrease  Description: Pain level will decrease  Outcome: Ongoing  Note: Monitoring pain with each assessment and prn. JULIA 0-10 pain scale utilized. Non-pharmacological measures to be encouraged prior to pharmacological measures.     Goal: Control of acute pain  Description: Control of acute pain  Outcome: Ongoing  Goal: Control of chronic pain  Description: Control of chronic pain  Outcome: Ongoing

## 2021-11-25 NOTE — CARE COORDINATION
Discharge planning    Attempted x 2 to see patient. Having NG placed, and not feeling well.  Will follow up later as time permits

## 2021-11-25 NOTE — PLAN OF CARE
Problem: HEMODYNAMIC STATUS  Goal: Patient has stable vital signs and fluid balance  Outcome: Ongoing     Problem: OXYGENATION/RESPIRATORY FUNCTION  Goal: Patient will achieve/maintain normal respiratory rate/effort  Outcome: Ongoing     Problem: MOBILITY  Goal: Early mobilization is achieved  Outcome: Ongoing     Problem: ELIMINATION  Goal: Elimination patterns are normal or improving  Description: Elimination patterns return to pre-surgery normal patterns  Outcome: Ongoing

## 2021-11-25 NOTE — PROGRESS NOTES
Tino Soto   Urology Progress Note            Subjective: follow-up and bilateral ureteral catheter placement    Patient Vitals for the past 24 hrs:   BP Temp Temp src Pulse Resp SpO2 Height Weight   11/25/21 0759 124/72 98.1 °F (36.7 °C) Oral 82 16 93 % -- --   11/25/21 0613 -- -- -- -- -- -- -- 182 lb 4 oz (82.7 kg)   11/25/21 0402 126/83 97.7 °F (36.5 °C) Oral 86 18 90 % -- --   11/24/21 2358 108/72 97.7 °F (36.5 °C) Oral 76 18 92 % -- --   11/24/21 2114 106/78 99.3 °F (37.4 °C) Axillary 91 16 91 % 5' 4.5\" (1.638 m) 189 lb 11.2 oz (86 kg)   11/24/21 2100 114/83 97.5 °F (36.4 °C) Temporal 89 11 90 % -- --   11/24/21 2045 110/79 -- -- 89 10 90 % -- --   11/24/21 2030 115/79 -- -- 87 9 93 % -- --   11/24/21 2015 121/81 -- -- 86 10 94 % -- --   11/24/21 2000 103/74 -- -- 77 8 94 % -- --   11/24/21 1945 126/79 98.9 °F (37.2 °C) Temporal 74 10 92 % -- --       Intake/Output Summary (Last 24 hours) at 11/25/2021 1205  Last data filed at 11/25/2021 0533  Gross per 24 hour   Intake 3995 ml   Output 1230 ml   Net 2765 ml       Recent Labs     11/25/21  0602   WBC 8.9   HGB 12.2   HCT 38.3   MCV 93.0        Recent Labs     11/25/21  0602      K 4.9      CO2 20   PHOS 3.6   BUN 27*   CREATININE 1.28*       No results for input(s): COLORU, PHUR, LABCAST, WBCUA, RBCUA, MUCUS, TRICHOMONAS, YEAST, BACTERIA, CLARITYU, SPECGRAV, LEUKOCYTESUR, UROBILINOGEN, Jean Marie Timoteo in the last 72 hours. Invalid input(s): NITRATE, GLUCOSEUKETONESUAMORPHOUS    Additional Lab/culture results:    Physical Exam: patient alert not in acute distress bladder decompressed. Rose catheter  And ureteral catheters in place    Interval Imaging Findings:    Impression:    Patient Active Problem List   Diagnosis    Intestinal adhesions    Obesity (BMI 30-39. 9)    Urinary incontinence    Constipation    Ventral incisional hernia    Smoker    Stricture of sigmoid colon ([facility] [address] Plan: we will DC Rose and ureteral catheters prior to discharge    Boris Contreras MD  12:05 PM 11/25/2021

## 2021-11-25 NOTE — OP NOTE
Operative Note      Patient: Ranulfo De Dios  YOB: 1950  MRN: [id number]    Date of Procedure: 11/24/2021    Pre-Op Diagnosis: DX SIGMOID STRICTURE    Post-Op Diagnosis: Same       Procedure(s):  ABDOMINAL EXPLORATION LYSIS OF EXTENSIVE ADHESIONS SMALL BOWEL RESECTION WITH ILEOCECECTOMY  EXPLANTATIOIN OF PELVIC MESH  ILEOCLONIC ANASTAMOSIS MOBILIZATION OF RECTUM AND SIGMOID  CYSTOSCOPY EXPLORATION OF URETER BILATERAL URETERAL STENT INSERTION  SIGMOIDOSCOPY FLEXIBLE X 2    Surgeon(s):  MD Jaron Cade MD    Assistant:   Surgical Assistant: Taya Turner  Resident: Selam Young DO    Anesthesia: General    Antibiotics: 2g ancef, 500mg flagyl    IVF: 3000ml crystalloid    Estimated Blood Loss (mL): 700ml    Urine: 266MF    Complications: Unplanned intestinal injury    Specimens:   ID Type Source Tests Collected by Time Destination   A : SMALL BOWEL (ILEUM) Tissue Ileum SURGICAL PATHOLOGY Jaron Adames MD 11/24/2021 1417    B : PELVIC MESH Tissue Abdomen SURGICAL PATHOLOGY Jaron Adames MD 11/24/2021 1709        Implants:  * No implants in log *      Drains:   Urethral Catheter Double-lumen 18 fr (Active)       [REMOVED] Urethral Catheter Non-latex 16 fr (Removed)       Findings: WC-III. extreme amount of intra-abdominal adhesions, pelvic mesh with fixation of sigmoid colon and kinked position requiring explantation of pelvic mesh and mobilization of colon, traction injury to terminal ileum with injury to ileal mesentery requiring ileocecectomy with ileocolonic reanastomosis. Sigmoidoscopy to confirm patency and straightening of sigmoid colon. Bilateral ureteral stents placed by urology to help with identification of ureters due to extreme amount of adhesions in pelvis.     Indication: Patient is a 71-year-old female with multiple prior abdominal and pelvic surgeries that presents for exploratory laparotomy with suspected need for LAR due to sigmoid stricture and inability to pass normal stools or tolerate normal diet. Patient has had numerous colonoscopies and on her most recent examinations we are unable to pass the scope distal to the strictured/kinked area of the sigmoid. Decision was made to undergo exploratory laparotomy at that time. Risks and complications were discussed with the patient by the attending in the office, informed consent was obtained and present in chart. Detailed Description of Procedure:   Patient was taken to the operating room and placed onto the operating room table in supine position. The patient was intubated and general anesthesia induced. All pressure points were padded, arms extended on arm boards, patient placed in lithotomy position with lap belt applied. At this point a timeout was performed ensuring proper patient, positioning, procedure, antibiotics administration, acknowledging allergies and consent present in chart. All present were in agreement. The patient was then prepped and draped in the usual sterile fashion. [place] Schlatter was placed over the abdomen prior to incision. A #10 blade was then used to create an infraumbilical incision through her old scar line that extended to just superior to the umbilicus. The incision was carried down to the fascia with Bovie electrocautery ensuring hemostasis along the way. The fascia was noted to have a mesh in place so the decision was made to extend the incision superiorly approximately 2 to 3 cm to get to an area devoid of mesh. The abdomen was then carefully entered and digital exploration revealed innumerable adhesions to the mesh. A combination of blunt dissection and Bovie electrocautery was used to free up the adhesions and open the fascia the rest of the way, this was made difficult due to the presence of the abdominal mesh. Once the abdomen had been completely entered we undertook extensive lysis of adhesions lasting greater than 2 hours.  During the process of mobilizing the terminal ileum and cecum a traction injury was encountered to to the terminal ileum along with a mesenteric rent. The bowel was notably dusky and well perfused quickly thus necessitating an ileocecectomy. A pair of DANAE 75 blue load staplers were used to transect the terminal ileum approximately 20 cm from the ileocecal valve and approximately 10 cm of cecum. A LigaSure energy device was used to free the ileum and cecum from its mesenteric root. The ileum and cecum were then passed off the specimen. The proximal and distal bowel ends were tagged with silk suture and decision made to leave them in discontinuity until the procedure was finished. We then continued our lysis of adhesions freeing the small bowel from the pelvic wall and elevating it out of the pelvis to allow us access to the sigmoid colon. During this process a tubular structure was identified near the bladder that was concerning for the ureter. This structure was inadvertently struck with the electrocautery device and the decision was made to perform an intraoperative consult to urology for cystoscopy and evaluation. Dr. Donavon Garcia came in and performed a cystoscopy with bilateral ureteral stent placement at that time. It was noted that the tubular structure was the remnant of the round ligament on the right side and the ureter was palpated deep to this structure and noted to be intact. The ureteral stents were then secured to the Rose catheter and we continued with the intra-abdominal portion of the procedure. During the pelvic dissection a second mesh was encountered that was a bladder sling noted to have been placed several years prior and suspected, at this point, to be the cause of the narrowing and kinking of the patient's sigmoid colon. Starting with the descending colon the bowel was carefully mobilized and meticulous dissection was performed around the sigmoid and rectum freeing the bowel from its adhesions to the bladder mesh.  Once the mesh had been explanted and passed off for specimen a flexible sigmoidoscopy was performed and the sigmoid colon was noted to still be significantly kinked not allowing passage of the sigmoidoscope. The decision was made at this time to continue deep pelvic dissection around the sigmoid and rectum in an attempt to straighten the bowel and gain passage through the colon with the sigmoidoscope. Once the rectum and sigmoid had been sufficiently freed a repeat sigmoidoscopy was performed with easy passage of the sigmoidoscope distal to the previously kinked and strictured area. At this point it was noted that the presumed stricture was actually mass-effect from adhesions from the pelvic mesh and the bowel was noted to be normal in caliber at this time. The air was evacuated from the colon and the sigmoidoscope removed thus concluding the endoscopic portion of the procedure. At this time the decision was made to not proceed with resection of any of the sigmoid colon as there appeared to be no further impediment to stool passage based on endoscopic exam. Decision made to perform the ileocolonic anastomosis. This was performed with 2 DANAE-75 green load staplers by creating an antimesenteric and tinea enterotomy and colotomy respectively with stapled anastomosis and stapling of the common channel. The staple line was then imbricated with 2-0 silk suture and any bleeding points in the mesentery controlled with figure-of-eight silk suture. The mesenteric defect was closed and the bowel anastomosis palpated and noted to be widely patent. At this time the bowel was ran from the ligament of Treitz to the terminal ileum and additional lysis of adhesions performed freeing up any doubled over loops of small bowel. The stomach was then palpated ensuring proper positioning of the NG tube. The liver was palpated and noted to be smooth and soft.  The transverse colon was inspected and noted to have a small mesenteric defect which was approximated with 2-0 silk suture to prevent passage of small bowel and formation of internal hernia. The abdomen was then copiously irrigated with 2 L of warm saline. We then proceeded with the clean closure portion of the case, new gowns, gloves, instruments, light handles, and sterile towels were placed prior to abdominal closure. The abdomen was then irrigated with 1 L of irrisept solution. The edges of the abdominal wall mesh were  carefully trimmed down to the fascia and the decision was made to not explant this mesh as it was well incorporated into the abdominal wall. The fascia was then closed using 2 #0 looped PDS in a running fashion meeting in the middle just inferior to the umbilicus. The subcutaneous tissues were then copiously irrigated with warm saline. The abdomen was washed and dried and the skin reapposed with skin staples. Fluffs and ABD pads were used as dressing followed by paper tape. This concluded our case. The patient was taken out of lithotomy position and carefully transferred back to her hospital bed where she was extubated to be taken to PACU to recover in stable condition. The patient tolerated the procedure well. The case was complicated by unplanned injury to the terminal ileum with mesenteric rent requiring ileocecectomy and ileocolonic anastomosis. Dr. Richie Foster was present and scrubbed for the entire procedure. Electronically signed by Kt Gibbs DO on 11/24/2021 at 7:51 PM     I Dr. Richie Foster was present throughout and performed the entire procedure. Ashley Chavez  Colorectal Surgery

## 2021-11-26 LAB
ABSOLUTE EOS #: 0 K/UL (ref 0–0.44)
ABSOLUTE IMMATURE GRANULOCYTE: 0.11 K/UL (ref 0–0.3)
ABSOLUTE LYMPH #: 1.17 K/UL (ref 1.1–3.7)
ABSOLUTE MONO #: 0.53 K/UL (ref 0.1–1.2)
ANION GAP SERPL CALCULATED.3IONS-SCNC: 10 MMOL/L (ref 9–17)
BASOPHILS # BLD: 1 % (ref 0–2)
BASOPHILS ABSOLUTE: 0.11 K/UL (ref 0–0.2)
BUN BLDV-MCNC: 15 MG/DL (ref 8–23)
BUN/CREAT BLD: 29 (ref 9–20)
CALCIUM SERPL-MCNC: 8.5 MG/DL (ref 8.6–10.4)
CHLORIDE BLD-SCNC: 105 MMOL/L (ref 98–107)
CO2: 23 MMOL/L (ref 20–31)
CREAT SERPL-MCNC: 0.51 MG/DL (ref 0.5–0.9)
DIFFERENTIAL TYPE: ABNORMAL
EOSINOPHILS RELATIVE PERCENT: 0 % (ref 1–4)
GFR AFRICAN AMERICAN: >60 ML/MIN
GFR NON-AFRICAN AMERICAN: >60 ML/MIN
GFR SERPL CREATININE-BSD FRML MDRD: ABNORMAL ML/MIN/{1.73_M2}
GFR SERPL CREATININE-BSD FRML MDRD: ABNORMAL ML/MIN/{1.73_M2}
GLUCOSE BLD-MCNC: 110 MG/DL (ref 70–99)
HCT VFR BLD CALC: 31 % (ref 36.3–47.1)
HCT VFR BLD CALC: 32.8 % (ref 36.3–47.1)
HEMOGLOBIN: 10 G/DL (ref 11.9–15.1)
HEMOGLOBIN: 10.6 G/DL (ref 11.9–15.1)
IMMATURE GRANULOCYTES: 1 %
LYMPHOCYTES # BLD: 11 % (ref 24–43)
MAGNESIUM: 2 MG/DL (ref 1.6–2.6)
MCH RBC QN AUTO: 30 PG (ref 25.2–33.5)
MCHC RBC AUTO-ENTMCNC: 32.3 G/DL (ref 28.4–34.8)
MCV RBC AUTO: 92.9 FL (ref 82.6–102.9)
MONOCYTES # BLD: 5 % (ref 3–12)
MORPHOLOGY: ABNORMAL
NRBC AUTOMATED: 0 PER 100 WBC
PDW BLD-RTO: 14.7 % (ref 11.8–14.4)
PHOSPHORUS: 1.6 MG/DL (ref 2.6–4.5)
PLATELET # BLD: 199 K/UL (ref 138–453)
PLATELET ESTIMATE: ABNORMAL
PMV BLD AUTO: 10.6 FL (ref 8.1–13.5)
POTASSIUM SERPL-SCNC: 4.5 MMOL/L (ref 3.7–5.3)
RBC # BLD: 3.53 M/UL (ref 3.95–5.11)
RBC # BLD: ABNORMAL 10*6/UL
SEG NEUTROPHILS: 82 % (ref 36–65)
SEGMENTED NEUTROPHILS ABSOLUTE COUNT: 8.68 K/UL (ref 1.5–8.1)
SODIUM BLD-SCNC: 138 MMOL/L (ref 135–144)
WBC # BLD: 10.6 K/UL (ref 3.5–11.3)
WBC # BLD: ABNORMAL 10*3/UL

## 2021-11-26 PROCEDURE — 2500000003 HC RX 250 WO HCPCS: Performed by: STUDENT IN AN ORGANIZED HEALTH CARE EDUCATION/TRAINING PROGRAM

## 2021-11-26 PROCEDURE — 83735 ASSAY OF MAGNESIUM: CPT

## 2021-11-26 PROCEDURE — 97535 SELF CARE MNGMENT TRAINING: CPT

## 2021-11-26 PROCEDURE — 85018 HEMOGLOBIN: CPT

## 2021-11-26 PROCEDURE — 36415 COLL VENOUS BLD VENIPUNCTURE: CPT

## 2021-11-26 PROCEDURE — 2580000003 HC RX 258: Performed by: STUDENT IN AN ORGANIZED HEALTH CARE EDUCATION/TRAINING PROGRAM

## 2021-11-26 PROCEDURE — 80048 BASIC METABOLIC PNL TOTAL CA: CPT

## 2021-11-26 PROCEDURE — 2060000000 HC ICU INTERMEDIATE R&B

## 2021-11-26 PROCEDURE — 94761 N-INVAS EAR/PLS OXIMETRY MLT: CPT

## 2021-11-26 PROCEDURE — 85014 HEMATOCRIT: CPT

## 2021-11-26 PROCEDURE — 6360000002 HC RX W HCPCS: Performed by: STUDENT IN AN ORGANIZED HEALTH CARE EDUCATION/TRAINING PROGRAM

## 2021-11-26 PROCEDURE — 85025 COMPLETE CBC W/AUTO DIFF WBC: CPT

## 2021-11-26 PROCEDURE — 84100 ASSAY OF PHOSPHORUS: CPT

## 2021-11-26 PROCEDURE — 2700000000 HC OXYGEN THERAPY PER DAY

## 2021-11-26 PROCEDURE — 97530 THERAPEUTIC ACTIVITIES: CPT

## 2021-11-26 RX ORDER — AMLODIPINE BESYLATE 5 MG/1
5 TABLET ORAL DAILY
Status: DISCONTINUED | OUTPATIENT
Start: 2021-11-26 | End: 2021-11-30

## 2021-11-26 RX ADMIN — METRONIDAZOLE 500 MG: 500 INJECTION, SOLUTION INTRAVENOUS at 05:02

## 2021-11-26 RX ADMIN — KETOROLAC TROMETHAMINE 15 MG: 30 INJECTION, SOLUTION INTRAMUSCULAR; INTRAVENOUS at 16:42

## 2021-11-26 RX ADMIN — HEPARIN SODIUM 5000 UNITS: 5000 INJECTION INTRAVENOUS; SUBCUTANEOUS at 21:28

## 2021-11-26 RX ADMIN — CEFAZOLIN SODIUM 2000 MG: 10 INJECTION, POWDER, FOR SOLUTION INTRAVENOUS at 14:26

## 2021-11-26 RX ADMIN — METRONIDAZOLE 500 MG: 500 INJECTION, SOLUTION INTRAVENOUS at 22:13

## 2021-11-26 RX ADMIN — FENTANYL CITRATE 50 MCG: 0.05 INJECTION, SOLUTION INTRAMUSCULAR; INTRAVENOUS at 08:17

## 2021-11-26 RX ADMIN — HEPARIN SODIUM 5000 UNITS: 5000 INJECTION INTRAVENOUS; SUBCUTANEOUS at 14:26

## 2021-11-26 RX ADMIN — HEPARIN SODIUM 5000 UNITS: 5000 INJECTION INTRAVENOUS; SUBCUTANEOUS at 05:02

## 2021-11-26 RX ADMIN — CEFAZOLIN SODIUM 2000 MG: 10 INJECTION, POWDER, FOR SOLUTION INTRAVENOUS at 21:28

## 2021-11-26 RX ADMIN — FENTANYL CITRATE 50 MCG: 0.05 INJECTION, SOLUTION INTRAMUSCULAR; INTRAVENOUS at 14:18

## 2021-11-26 RX ADMIN — KETOROLAC TROMETHAMINE 15 MG: 30 INJECTION, SOLUTION INTRAMUSCULAR; INTRAVENOUS at 21:28

## 2021-11-26 RX ADMIN — CEFAZOLIN SODIUM 2000 MG: 10 INJECTION, POWDER, FOR SOLUTION INTRAVENOUS at 06:41

## 2021-11-26 RX ADMIN — FENTANYL CITRATE 50 MCG: 0.05 INJECTION, SOLUTION INTRAMUSCULAR; INTRAVENOUS at 00:30

## 2021-11-26 RX ADMIN — SODIUM PHOSPHATE, MONOBASIC, MONOHYDRATE 30 MMOL: 276; 142 INJECTION, SOLUTION INTRAVENOUS at 09:17

## 2021-11-26 RX ADMIN — POTASSIUM CHLORIDE, DEXTROSE MONOHYDRATE AND SODIUM CHLORIDE: 150; 5; 450 INJECTION, SOLUTION INTRAVENOUS at 22:13

## 2021-11-26 RX ADMIN — FENTANYL CITRATE 50 MCG: 0.05 INJECTION, SOLUTION INTRAMUSCULAR; INTRAVENOUS at 19:58

## 2021-11-26 RX ADMIN — FENTANYL CITRATE 50 MCG: 0.05 INJECTION, SOLUTION INTRAMUSCULAR; INTRAVENOUS at 11:26

## 2021-11-26 RX ADMIN — METRONIDAZOLE 500 MG: 500 INJECTION, SOLUTION INTRAVENOUS at 15:48

## 2021-11-26 ASSESSMENT — PAIN SCALES - GENERAL
PAINLEVEL_OUTOF10: 7
PAINLEVEL_OUTOF10: 6
PAINLEVEL_OUTOF10: 9
PAINLEVEL_OUTOF10: 5
PAINLEVEL_OUTOF10: 9
PAINLEVEL_OUTOF10: 8

## 2021-11-26 NOTE — PLAN OF CARE
Problem: HEMODYNAMIC STATUS  Goal: Patient has stable vital signs and fluid balance  11/26/2021 0414 by Neno Waldrop RN  Outcome: Met This Shift  11/25/2021 1750 by Angeli Singer RN  Outcome: Ongoing     Problem: OXYGENATION/RESPIRATORY FUNCTION  Goal: Patient will achieve/maintain normal respiratory rate/effort  11/26/2021 0414 by Neno Waldrop RN  Outcome: Met This Shift  11/25/2021 1750 by Angeli Singer RN  Outcome: Ongoing     Problem: MOBILITY  Goal: Early mobilization is achieved  11/26/2021 0414 by Neno Waldrop RN  Outcome: Met This Shift  11/25/2021 1750 by Angeli Singer RN  Outcome: Ongoing     Problem: ELIMINATION  Goal: Elimination patterns are normal or improving  Description: Elimination patterns return to pre-surgery normal patterns  11/26/2021 0414 by Neno Waldrop RN  Outcome: Met This Shift  11/25/2021 1750 by Angeli Singer RN  Outcome: Ongoing     Problem: SKIN INTEGRITY  Goal: Skin integrity is maintained or improved  Outcome: Met This Shift

## 2021-11-26 NOTE — PROGRESS NOTES
General Surgery:  Daily Progress Note                    PATIENT NAME: Chandrika Eric     TODAY'S DATE: 11/26/2021, 8:20 AM  CC:  I'm sore this morning    SUBJECTIVE:     Pt seen and examined at bedside. Postop from exploratory laparotomy with extensive lysis of adhesions, explantation pelvic mesh, ileocecectomy with ileocolonic anastomosis, sigmoidoscopy x2, bilateral ureteral stent placement 11/24/2021. Patient seen and examined, vital signs stable, no acute events overnight. Patient did remove NG tube on accident yesterday morning requiring replacement and administration of Ativan for anxiety. Patient states that she is not nauseated, complaining of pain from the tube, abdominal pain somewhat worse today than yesterday, urine output improved. Received 1 L lactated Ringer's bolus. Magnesium normalized, phosphorus low today plan to replace. Patient needs to be up in chair minimum 3 times daily 1 hour at a time. Continue work with PT OT.  this morning continue work. OBJECTIVE:   VITALS:  BP (!) 132/51   Pulse 95   Temp 98.6 °F (37 °C) (Oral)   Resp 20   Ht 5' 4.5\" (1.638 m)   Wt 183 lb 3.2 oz (83.1 kg)   LMP  (LMP Unknown)   SpO2 95%   BMI 30.96 kg/m²      INTAKE/OUTPUT:      Intake/Output Summary (Last 24 hours) at 11/26/2021 0820  Last data filed at 11/26/2021 0503  Gross per 24 hour   Intake 2741 ml   Output 1300 ml   Net 1441 ml       PHYSICAL EXAM:  General Appearance: awake, alert, oriented, in no acute distress  HEENT:  Normocephalic, atraumatic, mucus membranes moist   NG tube low intermittent wall suction secured 65 cm right nare  Heart: Heart regular rate and rhythm  Lungs: No chest wall tenderness., Breathing Pattern: regular, no distress  Abdomen: Soft, appropriately tender, staple line clean, dry, dressing change this a.m. Extremities: No cyanosis, pitting edema, rashes noted. Skin: Skin color, texture, turgor normal. No rashes or lesions.     Data:  CBC with Differential:    Lab Results   Component Value Date    WBC 10.6 11/26/2021    RBC 3.53 11/26/2021    HGB 10.6 11/26/2021    HCT 32.8 11/26/2021     11/26/2021    MCV 92.9 11/26/2021    MCH 30.0 11/26/2021    MCHC 32.3 11/26/2021    RDW 14.7 11/26/2021    LYMPHOPCT 11 11/26/2021    MONOPCT 5 11/26/2021    BASOPCT 1 11/26/2021    MONOSABS 0.53 11/26/2021    LYMPHSABS 1.17 11/26/2021    EOSABS 0.00 11/26/2021    BASOSABS 0.11 11/26/2021    DIFFTYPE NOT REPORTED 11/26/2021     BMP:    Lab Results   Component Value Date     11/26/2021    K 4.5 11/26/2021     11/26/2021    CO2 23 11/26/2021    BUN 15 11/26/2021    CREATININE 0.51 11/26/2021    CALCIUM 8.5 11/26/2021    GFRAA >60 11/26/2021    LABGLOM >60 11/26/2021    GLUCOSE 110 11/26/2021     Magnesium:    Lab Results   Component Value Date    MG 2.0 11/26/2021     Phosphorus:    Lab Results   Component Value Date    PHOS 1.6 11/26/2021       Radiology Review:  XR ABDOMEN FOR NG/OG/NE TUBE PLACEMENT    Result Date: 11/25/2021  Enteric tube terminates at the level of the body of the stomach. ASSESSMENT:  Active Hospital Problems    Diagnosis Date Noted    Stricture of sigmoid colon [facility]) [M55.001] 11/24/2021       80 y.o. female with sigmoid colon stricture status post exploratory laparotomy with explantation pelvic mesh and mobilization sigmoid and proximal rectum, status post ileocecectomy with ileocolonic anastomosis. Plan:  Diet: Continue n.p.o., may use NG tube for p.o. medications and clamp for 45 minutes.   May have ice chips and popsicles for comfort  Continue Chloraseptic throat spray  Replace phosphorus 30 mmol sodium phosphate IV over 240 minutes  Continue abx for 4d total  Analgesia: Multimodal pain therapy, may have Valium 2 mg every 6 hours as needed anxiety agitation, resume Toradol 15 mg every 6 hours IV  GI prophylaxis: Continue Protonix  Bowel regimen: Hold until return of bowel function  DVT prophylaxis: Continue heparin 5000 every 8 hours today  Hemoglobin 10 from 12, recheck H&H at 4 PM  Activity:  Continue to encourage ambulation/activity w/ PT/OT  Continue to encourage incentive spirometry  Dressing taken down, replace with 4 x 4 gauze and ABD, use minimum amount of tape    Electronically signed by Pilar Waldrop DO  on 11/26/2021 at 8:20 AM     I Dr. Isha Edwards saw and examined the patient. I have edited the above and agree with the above. Ashley Chavez  Colorectal Surgery

## 2021-11-26 NOTE — PLAN OF CARE
Problem: HEMODYNAMIC STATUS  Goal: Patient has stable vital signs and fluid balance  11/26/2021 1715 by Cata Hardwick RN  Outcome: Ongoing  11/26/2021 0414 by Irving Carlisle RN  Outcome: Met This Shift     Problem: OXYGENATION/RESPIRATORY FUNCTION  Goal: Patient will achieve/maintain normal respiratory rate/effort  11/26/2021 1715 by Cata Hardwick RN  Outcome: Ongoing  11/26/2021 0414 by Irving Carlisle RN  Outcome: Met This Shift     Problem: MOBILITY  Goal: Early mobilization is achieved  11/26/2021 1715 by Cata Hardwick RN  Outcome: Ongoing  11/26/2021 0414 by Irving Carlisle RN  Outcome: Met This Shift     Problem: ELIMINATION  Goal: Elimination patterns are normal or improving  Description: Elimination patterns return to pre-surgery normal patterns  11/26/2021 1715 by Cata Hardwick RN  Outcome: Ongoing  11/26/2021 0414 by Irving Carlisle RN  Outcome: Met This Shift     Problem: SKIN INTEGRITY  Goal: Skin integrity is maintained or improved  11/26/2021 0414 by Irving Carlisle RN  Outcome: Met This Shift

## 2021-11-26 NOTE — CARE COORDINATION
Case Management Initial Discharge Plan  Shelly Harp,         Readmission Risk              Risk of Unplanned Readmission:  9             Met with:patient to discuss discharge plans. Information verified: address, contacts, phone number, , insurance Yes  PCP: RAMON Kirk  Date of last visit: last month     Insurance Provider:Medicare/[employer]     Discharge Planning  Current Residence:  1 story   Living Arrangements:    spouse   Home has 1 stories/2 stairs to climb  Support Systems:  Spouse/Significant Other  Current Services PTA:  Na  Agency: na   Patient able to perform ADL's:Independent pta  DME in home:  Hand rails shower chair   DME used to aid ambulation prior to admission:   tbd   DME used during admission:  , tbd     Potential Assistance Needed:   home care vs snf     Pharmacy: [facility]    Potential Assistance Purchasing Medications:     Does patient want to participate in local refill/ meds to beds program?  Not Assessed    Patient agreeable to home care: Yes  Freedom of choice provided:  yes      Type of Home Care Services:     Patient expects to be discharged to:       Prior SNF/Rehab Placement and Facility: no   Agreeable to SNF/Rehab: maybe   Coden of choice provided: yes   Evaluation: yes    Expected Discharge date: Follow Up Appointment: Best Day/ Time:      Transportation provider: spouse   Transportation arrangements needed for discharge: No    Discharge Plan: [address] for 02-on 4l bach NOT wear at home. If home will need a walker. Need f/u on hhc vs snf. POD2-large abd surgery. NG. DME-shower chair. Discussed with pt, she would like to home with home care and spouse. Explained she may need snf. Will need to reassess-she agrees she will see how she does in the next few days. She does not want to make any decisions at this time. LSW will follow. She is on 02-4l she bach NOT wear at home.      POD #2  exploratory laparotomy Telephone Encounter by Chago Macdonald CMA at 6/29/2020  3:46 PM     Author: Chago Macdonald CMA Service: -- Author Type: Certified Medical Assistant    Filed: 6/29/2020  3:48 PM Encounter Date: 6/29/2020 Status: Signed    : Chago Macdonald CMA (Certified Medical Assistant)       Received fax from pharmacy.  Pt requests RX for 20 mg Citalopram instead of 40.  Pt does not like splitting tablets.    Pt last seen 2/21/20  Due to be seen around 8/21/20    Plan:      1. PHQ9 due at next visit.   2. Check blood work and urine tests this visit.  3. Continue off of lisinopril at this time.  4. Further advice based on tests.  5. Continue current medications.           Jonathan Perez MD  General Internal Medicine  [facility]    Personal office fax - [phone]   Voice mail - [phone]  E-mail - [email or web address]      Return in about 6 months (around 8/21/2020), or if symptoms worsen or fail to improve, for visit and blood work.

## 2021-11-26 NOTE — PROGRESS NOTES
(restless legs syndrome), and Wears glasses. has a past surgical history that includes Bladder surgery; Rectal surgery; Cystocele repair; Enterocele repair; Abdominal adhesion surgery (3/9/2015); Hysterectomy; Appendectomy; Colonoscopy; Endoscopy, colon, diagnostic; eye surgery; Dilatation, esophagus; hernia repair; Tonsillectomy; Abdomen surgery (11/24/2021); Cystocopy (11/24/2021); colectomy (N/A, 11/24/2021); Cystoscopy (11/24/2021); and sigmoidoscopy (11/24/2021). Restrictions  Restrictions/Precautions  Restrictions/Precautions: General Precautions, Fall Risk  Required Braces or Orthoses?: Yes  Required Braces or Orthoses  Other: Abdominal Binder  Position Activity Restriction  Other position/activity restrictions: NG to suction, palmer, 4L O2 NC, LUE IV, Ambulate pt, Up in chair, NPO except ice chips/popsicles, alarms  Subjective   General  Chart Reviewed: Yes  Patient assessed for rehabilitation services?: Yes  Response to previous treatment: Patient with no complaints from previous session  Family / Caregiver Present: Yes (spouse)  Subjective  Subjective: \"I need to get to the chair\"  General Comment  Comments: Patient agreeable to OT treatment session      Orientation  Orientation  Overall Orientation Status: Within Functional Limits  Objective        Balance  Sitting Balance: Stand by assistance  Standing Balance: Moderate assistance (x2 line mgmt)    Functional Mobility  Functional - Mobility Device: Rolling Walker  Activity: Other (from bed>chair)  Assist Level: Moderate assistance (x2)  Functional Mobility Comments: MAX cues/tactile assist for upright posture, pacing, pursed lip breathing, looking up and scanning room, RW safety/mgt and awareness/assist with lines to increase overall safety. Bed mobility  Supine to Sit: Moderate assistance; 2 Person assistance  Scooting:  Moderate assistance; 2 Person assistance  Comment: MAX cues/tactile assist for proper log rolling tech and initiating movmements, pursed lip breathing, hand placement on bed rail to assist as well as awareness/assist with lines to increase overall safety. Transfers  Sit to stand: 2 Person assistance; Minimal assistance  Stand to sit: 2 Person assistance; Minimal assistance  Transfer Comments: MAX cues/tactile assist for B hand placement pushing from surface seated as well as reaching back, scanning, pacing, RW safety/mgt, pursed lip breathing, squaring self/AD up to surface prior to sitting and controlled as well as awareness/assist with lines to increase overall safety. Cognition  Overall Cognitive Status: Exceptions  Arousal/Alertness: Appropriate responses to stimuli  Following Commands: Follows one step commands with repetition;  Follows one step commands with increased time  Attention Span: Attends with cues to redirect  Memory: Decreased short term memory; Decreased recall of recent events; Decreased recall of precautions  Safety Judgement: Decreased awareness of need for assistance; Decreased awareness of need for safety  Problem Solving: Decreased awareness of errors; Assistance required to identify errors made; Assistance required to correct errors made  Insights: Decreased awareness of deficits  Initiation: Requires cues for some  Sequencing: Requires cues for some      Plan   Plan  Times per week: 4-5x/week 1-2x/day as akila  Times per day: Daily  Current Treatment Recommendations: Strengthening, Balance Training, Functional Mobility Training, Safety Education & Training, Positioning, Endurance Training, Neuromuscular Re-education, Patient/Caregiver Education & Training, Equipment Evaluation, Education, & procurement, Home Management Training, Cognitive/Perceptual Training, Pain Management, Self-Care / ADL  AM-PAC Score        AM-[facility] Inpatient Daily Activity Raw Score: 10 (11/26/21 1411)  AM-PAC Inpatient ADL T-Scale Score : 27.31 (11/26/21 1411)  ADL Inpatient CMS 0-100% Score: 74.7 (11/26/21 1411)  ADL Inpatient CMS G-Code Modifier : CL (11/26/21 1411)    Goals  Short term goals  Time Frame for Short term goals: by discharge, pt to demo  Short term goal 1: bed mob tasks with use of rail/lproper log rolling as needed to min assist of 1. Short term goal 2: I with BUE HEP with use of handouts to maintain functional use as well as abd precautions for pain mgt tech. Short term goal 3: UB ADL to set up and LB ADL to mod assist of 1 and use of AD/AE. Short term goal 4: toileting tasks with use of BSC/AD and grab bar to mod assist of 1. Short term goal 5: ADL transfers and fucntional mob with AD to min assist of 1. Long term goals  Long term goal 1: Pt to stand with SBA and AD akila > 6 mins to reduce falls in function. Long term goal 2: Pt/caregivers to be I with pressure relief, EC/WS and fall prevention tech as well as DME/AD and AE recommendations with use of handouts. Patient Goals   Patient goals : Get home soon! Therapy Time   Individual Concurrent Group Co-treatment   Time In 1253         Time Out 6711         Minutes 14              Co-treatment with PT warranted secondary to decreased safety and independence requiring 2 skilled therapy professionals to address individual discipline's goals. OT addressing preparation for ADL transfer, functional reaching, environmental safety/scanning, fall prevention, functional mobility for ADL transfers and functional UE strength. Upon writer exit, call light within reach, pt retired to chair. All lines intact and patient positioned comfortably. Chair alarm in place. All patient needs addressed prior to ending therapy session. Chart reviewed prior to treatment and patient is agreeable for therapy. RN reports patient is medically stable for therapy treatment this date.     VERNON Minaya/ZA

## 2021-11-26 NOTE — ANESTHESIA POSTPROCEDURE EVALUATION
Department of Anesthesiology  Postprocedure Note    Patient: Ritesh Tom  MRN: [id number]  YOB: 1950  Date of evaluation: 11/26/2021  Time:  12:54 PM     Procedure Summary     Date: 11/24/21 Room / Location: [address]    Anesthesia Start: 1111 Anesthesia Stop: 1949    Procedures:       ABDOMINAL EXPLORATION LYSIS OF EXTENSIVE ADHESIONS SMALL BOWEL RESECTION WITH ILEOCECECTOMY  EXPLANTATIOIN OF PELVIC MESH  ILEOCLONIC ANASTAMOSIS MOBILIZATION OF RECTUM AND SIGMOID (N/A Abdomen)      CYSTOSCOPY EXPLORATION OF URETER BILATERAL URETERAL STENT INSERTION (Ureter)      SIGMOIDOSCOPY FLEXIBLE X 2 (Abdomen) Diagnosis: (DX SIGMOID STRICTURE)    Surgeons: Trinity Garcia MD Responsible Provider: Kiana Casillas MD    Anesthesia Type: general ASA Status: 3          Anesthesia Type: general    Tamara Phase I: Tamara Score: 8    Tamara Phase II:      Last vitals: Reviewed and per EMR flowsheets.        Anesthesia Post Evaluation    Patient location during evaluation: PACU  Patient participation: complete - patient participated  Level of consciousness: awake  Airway patency: patent  Nausea & Vomiting: no nausea  Complications: no  Cardiovascular status: blood pressure returned to baseline  Respiratory status: acceptable  Hydration status: euvolemic  Comments: Multimodal analgesia pain management as indicated by procedure  Multimodal analgesia pain management approach

## 2021-11-26 NOTE — PROGRESS NOTES
Car[facility]   Urology Progress Note            Subjective: Follow-up cystoscopy with bilateral stents, ureteral catheters    Patient Vitals for the past 24 hrs:   BP Temp Temp src Pulse Resp SpO2 Weight   11/26/21 1013 -- -- -- -- -- 95 % --   11/26/21 0829 (!) 141/78 98.1 °F (36.7 °C) Oral 94 18 94 % --   11/26/21 0544 -- -- -- -- -- -- 183 lb 3.2 oz (83.1 kg)   11/26/21 0403 (!) 132/51 98.6 °F (37 °C) Oral 95 20 95 % --   11/26/21 0023 (!) 152/70 97.3 °F (36.3 °C) Oral 97 20 94 % --   11/25/21 1946 (!) 166/75 98.2 °F (36.8 °C) Oral 97 20 94 % --   11/25/21 1554 (!) 148/77 98.2 °F (36.8 °C) Oral 98 16 90 % --   11/25/21 1221 135/72 98.2 °F (36.8 °C) Oral 92 16 93 % --       Intake/Output Summary (Last 24 hours) at 11/26/2021 1053  Last data filed at 11/26/2021 0503  Gross per 24 hour   Intake 2741 ml   Output 1300 ml   Net 1441 ml       Recent Labs     11/25/21  0602 11/26/21  0605   WBC 8.9 10.6   HGB 12.2 10.6*   HCT 38.3 32.8*   MCV 93.0 92.9    199     Recent Labs     11/25/21  0602 11/26/21  0605    138   K 4.9 4.5    105   CO2 20 23   PHOS 3.6 1.6*   BUN 27* 15   CREATININE 1.28* 0.51       No results for input(s): COLORU, PHUR, LABCAST, WBCUA, RBCUA, MUCUS, TRICHOMONAS, YEAST, BACTERIA, CLARITYU, SPECGRAV, LEUKOCYTESUR, UROBILINOGEN, BILIRUBINUR, BLOODU in the last 72 hours. Invalid input(s): NITRATE, GLUCOSEUKETONESUAMORPHOUS    Additional Lab/culture results:    Physical Exam: Patient alert not in acute distress bladder decompressed Rose catheter in place    Interval Imaging Findings:    Impression:    Patient Active Problem List   Diagnosis    Intestinal adhesions    Obesity (BMI 30-39. 9)    Urinary incontinence    Constipation    Ventral incisional hernia    Smoker    Stricture of sigmoid colon (HCC)       Plan: Obtain indwelling Rose and ureteral catheters until the patient is ready for discharge    Cherie Irving MD  10:53 AM 11/26/2021

## 2021-11-27 LAB
ABSOLUTE EOS #: 0 K/UL (ref 0–0.44)
ABSOLUTE IMMATURE GRANULOCYTE: 0.16 K/UL (ref 0–0.3)
ABSOLUTE LYMPH #: 0.81 K/UL (ref 1.1–3.7)
ABSOLUTE MONO #: 0.41 K/UL (ref 0.1–1.2)
ANION GAP SERPL CALCULATED.3IONS-SCNC: 10 MMOL/L (ref 9–17)
BASOPHILS # BLD: 0 % (ref 0–2)
BASOPHILS ABSOLUTE: 0 K/UL (ref 0–0.2)
BUN BLDV-MCNC: 8 MG/DL (ref 8–23)
BUN/CREAT BLD: 19 (ref 9–20)
CALCIUM SERPL-MCNC: 8.2 MG/DL (ref 8.6–10.4)
CHLORIDE BLD-SCNC: 104 MMOL/L (ref 98–107)
CO2: 24 MMOL/L (ref 20–31)
CREAT SERPL-MCNC: 0.42 MG/DL (ref 0.5–0.9)
DIFFERENTIAL TYPE: ABNORMAL
EOSINOPHILS RELATIVE PERCENT: 0 % (ref 1–4)
GFR AFRICAN AMERICAN: >60 ML/MIN
GFR NON-AFRICAN AMERICAN: >60 ML/MIN
GFR SERPL CREATININE-BSD FRML MDRD: ABNORMAL ML/MIN/{1.73_M2}
GFR SERPL CREATININE-BSD FRML MDRD: ABNORMAL ML/MIN/{1.73_M2}
GLUCOSE BLD-MCNC: 110 MG/DL (ref 70–99)
HCT VFR BLD CALC: 27.9 % (ref 36.3–47.1)
HEMOGLOBIN: 9.2 G/DL (ref 11.9–15.1)
IMMATURE GRANULOCYTES: 2 %
LYMPHOCYTES # BLD: 10 % (ref 24–43)
MAGNESIUM: 1.8 MG/DL (ref 1.6–2.6)
MCH RBC QN AUTO: 30.2 PG (ref 25.2–33.5)
MCHC RBC AUTO-ENTMCNC: 33 G/DL (ref 28.4–34.8)
MCV RBC AUTO: 91.5 FL (ref 82.6–102.9)
MONOCYTES # BLD: 5 % (ref 3–12)
MORPHOLOGY: ABNORMAL
MORPHOLOGY: ABNORMAL
NRBC AUTOMATED: 0 PER 100 WBC
PDW BLD-RTO: 14.5 % (ref 11.8–14.4)
PHOSPHORUS: 2.1 MG/DL (ref 2.6–4.5)
PLATELET # BLD: 182 K/UL (ref 138–453)
PLATELET ESTIMATE: ABNORMAL
PMV BLD AUTO: 10.4 FL (ref 8.1–13.5)
POTASSIUM SERPL-SCNC: 3.8 MMOL/L (ref 3.7–5.3)
RBC # BLD: 3.05 M/UL (ref 3.95–5.11)
RBC # BLD: ABNORMAL 10*6/UL
SEG NEUTROPHILS: 83 % (ref 36–65)
SEGMENTED NEUTROPHILS ABSOLUTE COUNT: 6.72 K/UL (ref 1.5–8.1)
SODIUM BLD-SCNC: 138 MMOL/L (ref 135–144)
WBC # BLD: 8.1 K/UL (ref 3.5–11.3)
WBC # BLD: ABNORMAL 10*3/UL

## 2021-11-27 PROCEDURE — 84100 ASSAY OF PHOSPHORUS: CPT

## 2021-11-27 PROCEDURE — 85025 COMPLETE CBC W/AUTO DIFF WBC: CPT

## 2021-11-27 PROCEDURE — 83735 ASSAY OF MAGNESIUM: CPT

## 2021-11-27 PROCEDURE — 80048 BASIC METABOLIC PNL TOTAL CA: CPT

## 2021-11-27 PROCEDURE — 97530 THERAPEUTIC ACTIVITIES: CPT

## 2021-11-27 PROCEDURE — 6360000002 HC RX W HCPCS: Performed by: STUDENT IN AN ORGANIZED HEALTH CARE EDUCATION/TRAINING PROGRAM

## 2021-11-27 PROCEDURE — 2580000003 HC RX 258: Performed by: STUDENT IN AN ORGANIZED HEALTH CARE EDUCATION/TRAINING PROGRAM

## 2021-11-27 PROCEDURE — 97116 GAIT TRAINING THERAPY: CPT

## 2021-11-27 PROCEDURE — 2500000003 HC RX 250 WO HCPCS: Performed by: STUDENT IN AN ORGANIZED HEALTH CARE EDUCATION/TRAINING PROGRAM

## 2021-11-27 PROCEDURE — 2060000000 HC ICU INTERMEDIATE R&B

## 2021-11-27 PROCEDURE — 36415 COLL VENOUS BLD VENIPUNCTURE: CPT

## 2021-11-27 PROCEDURE — 6370000000 HC RX 637 (ALT 250 FOR IP): Performed by: STUDENT IN AN ORGANIZED HEALTH CARE EDUCATION/TRAINING PROGRAM

## 2021-11-27 PROCEDURE — 97112 NEUROMUSCULAR REEDUCATION: CPT

## 2021-11-27 RX ORDER — MAGNESIUM SULFATE IN WATER 40 MG/ML
2000 INJECTION, SOLUTION INTRAVENOUS ONCE
Status: COMPLETED | OUTPATIENT
Start: 2021-11-27 | End: 2021-11-27

## 2021-11-27 RX ADMIN — KETOROLAC TROMETHAMINE 15 MG: 30 INJECTION, SOLUTION INTRAMUSCULAR; INTRAVENOUS at 08:44

## 2021-11-27 RX ADMIN — SODIUM CHLORIDE, PRESERVATIVE FREE 10 ML: 5 INJECTION INTRAVENOUS at 21:22

## 2021-11-27 RX ADMIN — POTASSIUM CHLORIDE, DEXTROSE MONOHYDRATE AND SODIUM CHLORIDE: 150; 5; 450 INJECTION, SOLUTION INTRAVENOUS at 14:09

## 2021-11-27 RX ADMIN — PHENOL 1 SPRAY: 1.5 LIQUID ORAL at 12:50

## 2021-11-27 RX ADMIN — CITALOPRAM HYDROBROMIDE 20 MG: 20 TABLET ORAL at 08:43

## 2021-11-27 RX ADMIN — KETOROLAC TROMETHAMINE 15 MG: 30 INJECTION, SOLUTION INTRAMUSCULAR; INTRAVENOUS at 16:29

## 2021-11-27 RX ADMIN — METRONIDAZOLE 500 MG: 500 INJECTION, SOLUTION INTRAVENOUS at 21:22

## 2021-11-27 RX ADMIN — CYCLOBENZAPRINE 10 MG: 10 TABLET, FILM COATED ORAL at 21:21

## 2021-11-27 RX ADMIN — SPIRONOLACTONE 25 MG: 25 TABLET ORAL at 08:43

## 2021-11-27 RX ADMIN — METRONIDAZOLE 500 MG: 500 INJECTION, SOLUTION INTRAVENOUS at 14:58

## 2021-11-27 RX ADMIN — GABAPENTIN 300 MG: 300 CAPSULE ORAL at 13:54

## 2021-11-27 RX ADMIN — HEPARIN SODIUM 5000 UNITS: 5000 INJECTION INTRAVENOUS; SUBCUTANEOUS at 04:31

## 2021-11-27 RX ADMIN — CEFAZOLIN SODIUM 2000 MG: 10 INJECTION, POWDER, FOR SOLUTION INTRAVENOUS at 05:31

## 2021-11-27 RX ADMIN — GABAPENTIN 300 MG: 300 CAPSULE ORAL at 21:21

## 2021-11-27 RX ADMIN — ROSUVASTATIN CALCIUM 5 MG: 5 TABLET, FILM COATED ORAL at 21:46

## 2021-11-27 RX ADMIN — ENOXAPARIN SODIUM 40 MG: 100 INJECTION SUBCUTANEOUS at 21:28

## 2021-11-27 RX ADMIN — CYCLOBENZAPRINE 10 MG: 10 TABLET, FILM COATED ORAL at 13:55

## 2021-11-27 RX ADMIN — OXYCODONE 5 MG: 5 TABLET ORAL at 04:37

## 2021-11-27 RX ADMIN — AMLODIPINE BESYLATE 5 MG: 5 TABLET ORAL at 08:44

## 2021-11-27 RX ADMIN — KETOROLAC TROMETHAMINE 15 MG: 30 INJECTION, SOLUTION INTRAMUSCULAR; INTRAVENOUS at 04:31

## 2021-11-27 RX ADMIN — MAGNESIUM SULFATE HEPTAHYDRATE 2000 MG: 2 INJECTION, SOLUTION INTRAVENOUS at 08:45

## 2021-11-27 RX ADMIN — ACETAMINOPHEN 1000 MG: 500 TABLET ORAL at 13:54

## 2021-11-27 RX ADMIN — OXYCODONE 5 MG: 5 TABLET ORAL at 13:27

## 2021-11-27 RX ADMIN — ACETAMINOPHEN 1000 MG: 500 TABLET ORAL at 21:21

## 2021-11-27 RX ADMIN — POTASSIUM PHOSPHATE, MONOBASIC AND POTASSIUM PHOSPHATE, DIBASIC 20 MMOL: 224; 236 INJECTION, SOLUTION, CONCENTRATE INTRAVENOUS at 08:45

## 2021-11-27 RX ADMIN — CEFAZOLIN SODIUM 2000 MG: 10 INJECTION, POWDER, FOR SOLUTION INTRAVENOUS at 13:55

## 2021-11-27 RX ADMIN — METRONIDAZOLE 500 MG: 500 INJECTION, SOLUTION INTRAVENOUS at 04:31

## 2021-11-27 RX ADMIN — CEFAZOLIN SODIUM 2000 MG: 10 INJECTION, POWDER, FOR SOLUTION INTRAVENOUS at 21:22

## 2021-11-27 RX ADMIN — CYCLOBENZAPRINE 10 MG: 10 TABLET, FILM COATED ORAL at 08:44

## 2021-11-27 ASSESSMENT — PAIN SCALES - GENERAL
PAINLEVEL_OUTOF10: 3
PAINLEVEL_OUTOF10: 7
PAINLEVEL_OUTOF10: 4
PAINLEVEL_OUTOF10: 7
PAINLEVEL_OUTOF10: 0
PAINLEVEL_OUTOF10: 8
PAINLEVEL_OUTOF10: 9
PAINLEVEL_OUTOF10: 0
PAINLEVEL_OUTOF10: 0
PAINLEVEL_OUTOF10: 9

## 2021-11-27 ASSESSMENT — PAIN DESCRIPTION - PROGRESSION
CLINICAL_PROGRESSION: GRADUALLY WORSENING

## 2021-11-27 ASSESSMENT — PAIN DESCRIPTION - ORIENTATION: ORIENTATION: ANTERIOR

## 2021-11-27 ASSESSMENT — PAIN DESCRIPTION - FREQUENCY: FREQUENCY: CONTINUOUS

## 2021-11-27 ASSESSMENT — PAIN DESCRIPTION - PAIN TYPE: TYPE: ACUTE PAIN

## 2021-11-27 ASSESSMENT — PAIN DESCRIPTION - ONSET: ONSET: GRADUAL

## 2021-11-27 ASSESSMENT — PAIN - FUNCTIONAL ASSESSMENT: PAIN_FUNCTIONAL_ASSESSMENT: PREVENTS OR INTERFERES SOME ACTIVE ACTIVITIES AND ADLS

## 2021-11-27 ASSESSMENT — PAIN DESCRIPTION - LOCATION: LOCATION: HEAD

## 2021-11-27 ASSESSMENT — PAIN DESCRIPTION - DESCRIPTORS: DESCRIPTORS: POUNDING;SHARP

## 2021-11-27 NOTE — PROGRESS NOTES
Occupational Therapy  Facility/Department: [facility] PROGRESSIVE CARE  Daily Treatment Note  NAME: Chante Florence  : 1950  MRN: [id number]    Date of Service: 2021    Discharge Recommendations:  Patient would benefit from continued therapy after discharge  OT Equipment Recommendations  Equipment Needed: Yes  Mobility Devices: ADL Assistive Devices  Walker: Rolling  ADL Assistive Devices: Reacher; Sock-Aid Soft; Long-handled Shoe Horn; Long-handled Sponge; Emergency Alert System    Assessment : Attempted to see patient for a second session at 10:55 AM and at 11:30 AM, patient refused both times d/t pain. RN reported her NG was clamped this AM to see if she can tolerate clear liquids today. Patient only agreeable to sit up in chair for breakfast    Performance deficits / Impairments: Decreased functional mobility ; Decreased safe awareness; Decreased balance; Decreased coordination; Decreased ADL status; Decreased endurance; Decreased high-level IADLs; Decreased posture  Assessment: Skilled OT is indicated for teach and train of AE use to increase overall I and safety in function to return home with assist as needed. Prognosis: Good; Fair  OT Education: OT Role; Plan of Care; Transfer Training; Energy Conservation; Precautions; Family Education  REQUIRES OT FOLLOW UP: Yes  Activity Tolerance  Activity Tolerance: Patient limited by fatigue; Patient limited by pain  Safety Devices  Safety Devices in place: Yes  Type of devices: Call light within reach; Chair alarm in place; Left in chair; Patient at risk for falls; Gait belt; Nurse notified         Patient Diagnosis(es): There were no encounter diagnoses.       has a past medical history of Abdominal pain, Arthritis, Constipation, COPD (chronic obstructive pulmonary disease) ([address] Depressed, GERD (gastroesophageal reflux disease), HLD (hyperlipidemia), HTN (hypertension), Hypothyroid, IBS (irritable bowel syndrome), Lumbar spondylolysis, Myofascial pain, Poor sequencing, hand palcement, use of bed rail and pursed lip breathing. HOB elevated and assist with line mgmt to improve overall safety. Transfers  Sit to stand: Contact guard assistance; Minimal assistance  Stand to sit: Contact guard assistance; Minimal assistance  Transfer Comments: MAX cues/tactile assist for B hand placement pushing from surface seated as well as reaching back, scanning, pacing, RW safety/mgt, pursed lip breathing, squaring self/AD up to surface prior to sitting and controlled as well as awareness/assist with lines to increase overall safety. Cognition  Overall Cognitive Status: Exceptions  Arousal/Alertness: Appropriate responses to stimuli  Following Commands: Follows one step commands with repetition;  Follows one step commands with increased time  Attention Span: Attends with cues to redirect  Memory: Decreased short term memory; Decreased recall of recent events; Decreased recall of precautions  Safety Judgement: Decreased awareness of need for assistance; Decreased awareness of need for safety  Problem Solving: Decreased awareness of errors; Assistance required to identify errors made; Assistance required to correct errors made  Insights: Decreased awareness of deficits  Initiation: Requires cues for some  Sequencing: Requires cues for some      Plan   Plan  Times per week: 4-5x/week 1-2x/day as akila  Times per day: Daily  Current Treatment Recommendations: Strengthening, Balance Training, Functional Mobility Training, Safety Education & Training, Positioning, Endurance Training, Neuromuscular Re-education, Patient/Caregiver Education & Training, Equipment Evaluation, Education, & procurement, Home Management Training, Cognitive/Perceptual Training, Pain Management, Self-Care / ADL    AM-PAC Score        AM-[facility] Inpatient Daily Activity Raw Score: 12 (11/27/21 1149)  AM-PAC Inpatient ADL T-Scale Score : 30.6 (11/27/21 1149)  ADL Inpatient CMS 0-100% Score: 66.57 (11/27/21 1149)  ADL Inpatient CMS G-Code Modifier : CL (11/27/21 1149)    Goals  Short term goals  Time Frame for Short term goals: by discharge, pt to demo  Short term goal 1: bed mob tasks with use of rail/lproper log rolling as needed to min assist of 1. Short term goal 2: I with BUE HEP with use of handouts to maintain functional use as well as abd precautions for pain mgt tech. Short term goal 3: UB ADL to set up and LB ADL to mod assist of 1 and use of AD/AE. Short term goal 4: toileting tasks with use of BSC/AD and grab bar to mod assist of 1. Short term goal 5: ADL transfers and functional mob with AD to min assist of 1. Long term goals  Long term goal 1: Pt to stand with SBA and AD akila > 6 mins to reduce falls in function. Long term goal 2: Pt/caregivers to be I with pressure relief, EC/WS and fall prevention tech as well as DME/AD and AE recommendations with use of handouts. Patient Goals   Patient goals : Get home soon! Therapy Time   Individual Concurrent Group Co-treatment   Time In 6687         Time Out 0914         Minutes 11              Upon writer exit, call light within reach, pt retired to chair. All lines intact and patient positioned comfortably. Chair alarm in place. All patient needs addressed prior to ending therapy session. Chart reviewed prior to treatment and patient is agreeable for therapy. RN reports patient is medically stable for therapy treatment this date.     VERNON Minaya/ZA

## 2021-11-27 NOTE — PROGRESS NOTES
General Surgery:  Daily Progress Note                    PATIENT NAME: Germán Forrest     TODAY'S DATE: 11/27/2021, 7:12 AM  CC:  I want something to eat    SUBJECTIVE:     Pt seen and examined at bedside. Postop from exploratory laparotomy with extensive lysis of adhesions, explantation pelvic mesh, ileocecectomy with ileocolonic anastomosis, sigmoidoscopy x2, bilateral ureteral stent placement 11/24/2021. Patient seen and examined, vital signs stable, no acute events overnight. Pt pain controlled, denies nausea or emesis, NGT to LIWS, was up in chair most of the day yesterday, abdominal binder in place, dressing changed this AM, no flatus or BM yet. OBJECTIVE:   VITALS:  BP (!) 121/58   Pulse 80   Temp 98.2 °F (36.8 °C) (Oral)   Resp 20   Ht 5' 4.5\" (1.638 m)   Wt 184 lb 3.2 oz (83.6 kg)   LMP  (LMP Unknown)   SpO2 90%   BMI 31.13 kg/m²      INTAKE/OUTPUT:      Intake/Output Summary (Last 24 hours) at 11/27/2021 8028  Last data filed at 11/27/2021 9719  Gross per 24 hour   Intake 1330 ml   Output 2750 ml   Net -1420 ml       PHYSICAL EXAM:  General Appearance: awake, alert, oriented, in no acute distress  HEENT:  Normocephalic, atraumatic, mucus membranes moist   NG tube low intermittent wall suction secured 65 cm right nare  Heart: Heart regular rate and rhythm  Lungs: No chest wall tenderness., Breathing Pattern: regular, no distress  Abdomen: Soft, appropriately tender, staple line clean, dry, dressing without strike through  Extremities: No cyanosis, pitting edema, rashes noted. Skin: Skin color, texture, turgor normal. No rashes or lesions.     Data:  CBC with Differential:    Lab Results   Component Value Date    WBC 8.1 11/27/2021    RBC 3.05 11/27/2021    HGB 9.2 11/27/2021    HCT 27.9 11/27/2021     11/27/2021    MCV 91.5 11/27/2021    MCH 30.2 11/27/2021    MCHC 33.0 11/27/2021    RDW 14.5 11/27/2021    LYMPHOPCT PENDING 11/27/2021    MONOPCT PENDING 11/27/2021    BASOPCT PENDING 11/27/2021    MONOSABS PENDING 11/27/2021    LYMPHSABS PENDING 11/27/2021    EOSABS PENDING 11/27/2021    BASOSABS PENDING 11/27/2021    DIFFTYPE NOT REPORTED 11/27/2021     BMP:    Lab Results   Component Value Date     11/27/2021    K 3.8 11/27/2021     11/27/2021    CO2 24 11/27/2021    BUN 8 11/27/2021    CREATININE 0.42 11/27/2021    CALCIUM 8.2 11/27/2021    GFRAA >60 11/27/2021    LABGLOM >60 11/27/2021    GLUCOSE 110 11/27/2021     Magnesium:    Lab Results   Component Value Date    MG 1.8 11/27/2021     Phosphorus:    Lab Results   Component Value Date    PHOS 2.1 11/27/2021       Radiology Review:  XR ABDOMEN FOR NG/OG/NE TUBE PLACEMENT    Result Date: 11/25/2021  Enteric tube terminates at the level of the body of the stomach. ASSESSMENT:  Active Hospital Problems    Diagnosis Date Noted    Stricture of sigmoid colon [facility]) [S11.896] 11/24/2021       80 y.o. female with sigmoid colon stricture status post exploratory laparotomy with explantation pelvic mesh and mobilization sigmoid and proximal rectum, status post ileocecectomy with ileocolonic anastomosis. Plan:  Diet: Clear liquid with supplements  Clamp NGT  Replace phos 20mmol Kphos  Analgesia: Multimodal pain therapy  GI prophylaxis: Continue Protonix  Bowel regimen: Hold until return of bowel function  DVT prophylaxis: lovenox 40mg daily  Activity:  Continue to encourage ambulation/activity w/ PT/OT  Patient ok to shower today  Continue to encourage incentive spirometry    Electronically signed by Benita Gee DO  on 11/27/2021 at 7:12 AM      I Dr. Zoltan Leong saw and examined the patient. I have edited the above and agree with the above. Ashley Chavez  Colorectal Surgery

## 2021-11-27 NOTE — PROGRESS NOTES
Diagnosis(es): There were no encounter diagnoses. has a past medical history of Abdominal pain, Arthritis, Constipation, COPD (chronic obstructive pulmonary disease) ([address] Depressed, GERD (gastroesophageal reflux disease), HLD (hyperlipidemia), HTN (hypertension), Hypothyroid, IBS (irritable bowel syndrome), Lumbar spondylolysis, Myofascial pain, Poor appetite, RLS (restless legs syndrome), and Wears glasses. has a past surgical history that includes Bladder surgery; Rectal surgery; Cystocele repair; Enterocele repair; Abdominal adhesion surgery (3/9/2015); Hysterectomy; Appendectomy; Colonoscopy; Endoscopy, colon, diagnostic; eye surgery; Dilatation, esophagus; hernia repair; Tonsillectomy; Abdomen surgery (11/24/2021); Cystocopy (11/24/2021); colectomy (N/A, 11/24/2021); Cystoscopy (11/24/2021); and sigmoidoscopy (11/24/2021). Restrictions  Restrictions/Precautions  Restrictions/Precautions: General Precautions, Fall Risk  Required Braces or Orthoses?: No  Required Braces or Orthoses  Other: Abdominal Binder  Position Activity Restriction  Other position/activity restrictions: NG to suction (Clamped per RN 11/27), palmer, 4L O2 NC, LUE IV, Ambulate pt, Up in chair, CLD 11/27, alarms  Subjective   General  Chart Reviewed: Yes  Response To Previous Treatment: Patient with no complaints from previous session. Family / Caregiver Present: No  Subjective  Subjective: Patient reports adominal pain 7-8/10  General Comment  Comments: RN and pt agreeable to therapy. Pt supine in bed upon arrival.  Pt pleasant and cooperative throughout. Orientation  Orientation  Overall Orientation Status: Within Functional Limits  Cognition   Cognition  Overall Cognitive Status: Exceptions  Arousal/Alertness: Appropriate responses to stimuli  Following Commands: Follows one step commands with repetition;  Follows one step commands with increased time  Attention Span: Attends with cues to redirect  Memory: Decreased short term memory; Decreased recall of recent events; Decreased recall of precautions  Safety Judgement: Decreased awareness of need for assistance; Decreased awareness of need for safety  Problem Solving: Decreased awareness of errors; Assistance required to identify errors made; Assistance required to correct errors made  Insights: Decreased awareness of deficits  Initiation: Requires cues for some  Sequencing: Requires cues for some  Objective   Bed mobility  Sit to Supine: Minimal assistance  Scooting: Minimal assistance  Comment: MAX VCs and tactile cues for log roll tech, sequencing, hand placment, use of bed rail, pursed lip breathing. HOB elevated and assist with line mgmt to improve overall safety. Transfers  Sit to Stand: Moderate Assistance  Stand to sit: Moderate Assistance  Bed to Chair: Moderate assistance  Stand Pivot Transfers: Moderate Assistance  Comment: Pt w/ fair steadiness throughout transfers this date, requiring max verbal and tactile cueing for proper hand placement throughout transfers w/ RW w/ fair return demo. Pt c/o dizziness/lightheadedness upon standing. Upon standing EOB, pt performed pre-gait lateral weight shifting and standing marches w/ fair steadiness noted. Ambulation  Ambulation?: Yes  More Ambulation?: No  Ambulation 1  Surface: level tile  Device: Rolling Walker  Assistance: Moderate assistance  Quality of Gait: fair steadiness, assist for progression of RW  Gait Deviations: Slow Claire; Increased CYNTHIA; Decreased step length; Decreased step height; Shuffles  Distance: 5' x 3  Comments: Pt ambulating w/ fair steadiness this date requiring mod verbal cueing for safety w/ RW throughout w/ fair return demo. Throughout ambulation, pt reporting mild dizziness so pt was assisted to bedside chair and dizziness subsided quickly.   Stairs/Curb  Stairs?: No     Balance  Posture: Fair  Sitting - Static: Good  Sitting - Dynamic: Good; -  Standing - Static: Fair; +  Standing - Dynamic: Fair; -  Comments: Standing balance assessed w/ RW  Exercises  Comments: Seated israel LE AROM x 10 reps with slow controlled movements. AM-PAC Score  AM-PAC Inpatient Mobility Raw Score : 11 (11/26/21 1604)  AM-PAC Inpatient T-Scale Score : 33.86 (11/26/21 1604)  Mobility Inpatient CMS 0-100% Score: 72.57 (11/26/21 1604)  Mobility Inpatient CMS G-Code Modifier : CL (11/26/21 1604)          Goals  Short term goals  Time Frame for Short term goals: 14 visits  Short term goal 1: Pt to demonstrate bed mobility using log-roll technique Claus  Short term goal 2: Pt to perform STS transfers w/ least restrictive AD Claus  Short term goal 3: Pt to ambulate at least 300ft w/ least restrictive AD Claus  Short term goal 4: Pt to ascend/descend 2 steps w/ handrails Loulou  Short term goal 5: Pt to actively participate in at least 30 minutes of physical therapy for ther act, ther ex, balance, gait, and endurance training  Patient Goals   Patient goals :  To go home    Plan    Plan  Times per week: 1-2x/day, 6-7x/week  Current Treatment Recommendations: Strengthening, Balance Training, Functional Mobility Training, Stair training, Gait Training, Transfer Training, Endurance Training, Neuromuscular Re-education, Safety Education & Training, Home Exercise Program, Equipment Evaluation, Education, & procurement, Patient/Caregiver Education & Training  Safety Devices  Type of devices: Call light within reach, Chair alarm in place, Gait belt, Nurse notified, Left in chair, All fall risk precautions in place  Restraints  Initially in place: No     Therapy Time   Individual Concurrent Group Co-treatment   Time In 1031         Time Out 1113         Minutes 1 [address]

## 2021-11-27 NOTE — PROGRESS NOTES
Tanvi Cain   Urology Progress Note            Subjective: follow-up bilateral ureteral catheters urinary retention      Patient Vitals for the past 24 hrs:   BP Temp Temp src Pulse Resp SpO2 Weight   11/27/21 0745 137/70 97.4 °F (36.3 °C) Oral 74 18 93 % --   11/27/21 0623 -- -- -- -- -- -- 184 lb 3.2 oz (83.6 kg)   11/27/21 0255 (!) 121/58 98.2 °F (36.8 °C) Oral 80 20 90 % --   11/26/21 2336 133/65 98.2 °F (36.8 °C) Oral 82 20 92 % --   11/26/21 2008 (!) 126/59 97.9 °F (36.6 °C) Oral 89 18 93 % --   11/26/21 1620 (!) 161/73 98.4 °F (36.9 °C) Oral 95 18 93 % --   11/26/21 1132 (!) 155/79 98.2 °F (36.8 °C) Oral 93 18 94 % --   11/26/21 1013 -- -- -- -- -- 95 % --       Intake/Output Summary (Last 24 hours) at 11/27/2021 0940  Last data filed at 11/27/2021 4745  Gross per 24 hour   Intake 1330 ml   Output 2750 ml   Net -1420 ml       Recent Labs     11/25/21  0602 11/25/21  0602 11/26/21  0605 11/26/21  1646 11/27/21  0549   WBC 8.9  --  10.6  --  8.1   HGB 12.2   < > 10.6* 10.0* 9.2*   HCT 38.3   < > 32.8* 31.0* 27.9*   MCV 93.0  --  92.9  --  91.5     --  199  --  182    < > = values in this interval not displayed. Recent Labs     11/25/21  0602 11/26/21  0605 11/27/21  0549    138 138   K 4.9 4.5 3.8    105 104   CO2 20 23 24   PHOS 3.6 1.6* 2.1*   BUN 27* 15 8   CREATININE 1.28* 0.51 0.42*       No results for input(s): COLORU, PHUR, LABCAST, WBCUA, RBCUA, MUCUS, TRICHOMONAS, YEAST, BACTERIA, CLARITYU, SPECGRAV, LEUKOCYTESUR, UROBILINOGEN, BILIRUBINUR, BLOODU in the last 72 hours.     Invalid input(s): NITRATE, GLUCOSEUKETONESUAMORPHOUS    Additional Lab/culture results:    Physical Exam: Patient alert and oriented sitting at bedside, NG tube was clamped, stable from urology standpoint bladder decompressed catheter draining well hematuria associated with ureteral stents    Interval Imaging Findings:    Impression:    Patient Active Problem List   Diagnosis

## 2021-11-27 NOTE — PLAN OF CARE
Problem: HEMODYNAMIC STATUS  Goal: Patient has stable vital signs and fluid balance  11/27/2021 0518 by Karson Kenny RN  Outcome: Met This Shift  11/26/2021 1715 by Supa Luo RN  Outcome: Ongoing     Problem: OXYGENATION/RESPIRATORY FUNCTION  Goal: Patient will achieve/maintain normal respiratory rate/effort  11/27/2021 0518 by Karson Kenny RN  Outcome: Met This Shift  11/26/2021 1715 by Supa Luo RN  Outcome: Ongoing     Problem: MOBILITY  Goal: Early mobilization is achieved  11/27/2021 0518 by Karson Kenny RN  Outcome: Met This Shift  11/26/2021 1715 by Supa Luo RN  Outcome: Ongoing     Problem: ELIMINATION  Goal: Elimination patterns are normal or improving  Description: Elimination patterns return to pre-surgery normal patterns  11/27/2021 0518 by Karson Kenny RN  Outcome: Met This Shift  11/26/2021 1715 by Supa Luo RN  Outcome: Ongoing     Problem: SKIN INTEGRITY  Goal: Skin integrity is maintained or improved  Outcome: Met This Shift     Problem: Pain:  Goal: Pain level will decrease  Description: Pain level will decrease  Outcome: Met This Shift  Goal: Control of acute pain  Description: Control of acute pain  Outcome: Met This Shift  Goal: Control of chronic pain  Description: Control of chronic pain  Outcome: Met This Shift     Problem: Falls - Risk of:  Goal: Will remain free from falls  Description: Will remain free from falls  Outcome: Met This Shift  Goal: Absence of physical injury  Description: Absence of physical injury  Outcome: Met This Shift

## 2021-11-27 NOTE — FLOWSHEET NOTE
Patient was sleeping. TV, lights were off.    leaves prayer card for possible follow up.     11/27/21 1532   Encounter Summary   Services provided to: Patient   Referral/Consult From: Khari   Continue Visiting   (11-27-21  pt, sleeping)   Complexity of Encounter Low   Length of Encounter 15 minutes   Routine   Type Initial   Assessment Sleeping   Intervention Prayer

## 2021-11-28 LAB
ABSOLUTE EOS #: 0.04 K/UL (ref 0–0.44)
ABSOLUTE IMMATURE GRANULOCYTE: 0.11 K/UL (ref 0–0.3)
ABSOLUTE LYMPH #: 0.73 K/UL (ref 1.1–3.7)
ABSOLUTE MONO #: 0.56 K/UL (ref 0.1–1.2)
ANION GAP SERPL CALCULATED.3IONS-SCNC: 10 MMOL/L (ref 9–17)
BASOPHILS # BLD: 1 % (ref 0–2)
BASOPHILS ABSOLUTE: 0.07 K/UL (ref 0–0.2)
BUN BLDV-MCNC: 6 MG/DL (ref 8–23)
BUN/CREAT BLD: 11 (ref 9–20)
CALCIUM SERPL-MCNC: 8.4 MG/DL (ref 8.6–10.4)
CHLORIDE BLD-SCNC: 101 MMOL/L (ref 98–107)
CO2: 24 MMOL/L (ref 20–31)
CREAT SERPL-MCNC: 0.53 MG/DL (ref 0.5–0.9)
DIFFERENTIAL TYPE: ABNORMAL
EOSINOPHILS RELATIVE PERCENT: 1 % (ref 1–4)
GFR AFRICAN AMERICAN: >60 ML/MIN
GFR NON-AFRICAN AMERICAN: >60 ML/MIN
GFR SERPL CREATININE-BSD FRML MDRD: ABNORMAL ML/MIN/{1.73_M2}
GFR SERPL CREATININE-BSD FRML MDRD: ABNORMAL ML/MIN/{1.73_M2}
GLUCOSE BLD-MCNC: 120 MG/DL (ref 65–105)
GLUCOSE BLD-MCNC: 125 MG/DL (ref 70–99)
HCT VFR BLD CALC: 28.6 % (ref 36.3–47.1)
HEMOGLOBIN: 9.1 G/DL (ref 11.9–15.1)
IMMATURE GRANULOCYTES: 1 %
LYMPHOCYTES # BLD: 9 % (ref 24–43)
MCH RBC QN AUTO: 29.6 PG (ref 25.2–33.5)
MCHC RBC AUTO-ENTMCNC: 31.8 G/DL (ref 28.4–34.8)
MCV RBC AUTO: 93.2 FL (ref 82.6–102.9)
MONOCYTES # BLD: 7 % (ref 3–12)
NRBC AUTOMATED: 0 PER 100 WBC
PDW BLD-RTO: 14.6 % (ref 11.8–14.4)
PLATELET # BLD: 219 K/UL (ref 138–453)
PLATELET ESTIMATE: ABNORMAL
PMV BLD AUTO: 10.6 FL (ref 8.1–13.5)
POTASSIUM SERPL-SCNC: 3.9 MMOL/L (ref 3.7–5.3)
RBC # BLD: 3.07 M/UL (ref 3.95–5.11)
RBC # BLD: ABNORMAL 10*6/UL
SEG NEUTROPHILS: 81 % (ref 36–65)
SEGMENTED NEUTROPHILS ABSOLUTE COUNT: 6.43 K/UL (ref 1.5–8.1)
SODIUM BLD-SCNC: 135 MMOL/L (ref 135–144)
WBC # BLD: 7.9 K/UL (ref 3.5–11.3)
WBC # BLD: ABNORMAL 10*3/UL

## 2021-11-28 PROCEDURE — 2700000000 HC OXYGEN THERAPY PER DAY

## 2021-11-28 PROCEDURE — 82947 ASSAY GLUCOSE BLOOD QUANT: CPT

## 2021-11-28 PROCEDURE — 2580000003 HC RX 258: Performed by: STUDENT IN AN ORGANIZED HEALTH CARE EDUCATION/TRAINING PROGRAM

## 2021-11-28 PROCEDURE — 6370000000 HC RX 637 (ALT 250 FOR IP): Performed by: STUDENT IN AN ORGANIZED HEALTH CARE EDUCATION/TRAINING PROGRAM

## 2021-11-28 PROCEDURE — 6360000002 HC RX W HCPCS: Performed by: STUDENT IN AN ORGANIZED HEALTH CARE EDUCATION/TRAINING PROGRAM

## 2021-11-28 PROCEDURE — 94669 MECHANICAL CHEST WALL OSCILL: CPT

## 2021-11-28 PROCEDURE — 36415 COLL VENOUS BLD VENIPUNCTURE: CPT

## 2021-11-28 PROCEDURE — 2500000003 HC RX 250 WO HCPCS: Performed by: STUDENT IN AN ORGANIZED HEALTH CARE EDUCATION/TRAINING PROGRAM

## 2021-11-28 PROCEDURE — 94761 N-INVAS EAR/PLS OXIMETRY MLT: CPT

## 2021-11-28 PROCEDURE — 80048 BASIC METABOLIC PNL TOTAL CA: CPT

## 2021-11-28 PROCEDURE — 85025 COMPLETE CBC W/AUTO DIFF WBC: CPT

## 2021-11-28 PROCEDURE — 2060000000 HC ICU INTERMEDIATE R&B

## 2021-11-28 RX ORDER — OXYCODONE HYDROCHLORIDE 5 MG/1
5 TABLET ORAL EVERY 8 HOURS PRN
Status: DISCONTINUED | OUTPATIENT
Start: 2021-11-28 | End: 2021-12-24 | Stop reason: HOSPADM

## 2021-11-28 RX ORDER — KETOROLAC TROMETHAMINE 30 MG/ML
15 INJECTION, SOLUTION INTRAMUSCULAR; INTRAVENOUS EVERY 6 HOURS
Status: COMPLETED | OUTPATIENT
Start: 2021-11-28 | End: 2021-11-30

## 2021-11-28 RX ORDER — IPRATROPIUM BROMIDE AND ALBUTEROL SULFATE 2.5; .5 MG/3ML; MG/3ML
1 SOLUTION RESPIRATORY (INHALATION) EVERY 4 HOURS PRN
Status: DISCONTINUED | OUTPATIENT
Start: 2021-11-28 | End: 2021-11-29

## 2021-11-28 RX ORDER — IPRATROPIUM BROMIDE AND ALBUTEROL SULFATE 2.5; .5 MG/3ML; MG/3ML
1 SOLUTION RESPIRATORY (INHALATION)
Status: DISCONTINUED | OUTPATIENT
Start: 2021-11-28 | End: 2021-11-28

## 2021-11-28 RX ORDER — CYCLOBENZAPRINE HCL 5 MG
5 TABLET ORAL 3 TIMES DAILY
Status: DISCONTINUED | OUTPATIENT
Start: 2021-11-28 | End: 2021-12-22

## 2021-11-28 RX ADMIN — OXYCODONE 5 MG: 5 TABLET ORAL at 06:05

## 2021-11-28 RX ADMIN — GABAPENTIN 300 MG: 300 CAPSULE ORAL at 06:06

## 2021-11-28 RX ADMIN — OXYCODONE 5 MG: 5 TABLET ORAL at 13:34

## 2021-11-28 RX ADMIN — ENOXAPARIN SODIUM 40 MG: 100 INJECTION SUBCUTANEOUS at 09:45

## 2021-11-28 RX ADMIN — CEFAZOLIN SODIUM 2000 MG: 10 INJECTION, POWDER, FOR SOLUTION INTRAVENOUS at 14:57

## 2021-11-28 RX ADMIN — KETOROLAC TROMETHAMINE 15 MG: 30 INJECTION, SOLUTION INTRAMUSCULAR at 09:46

## 2021-11-28 RX ADMIN — METRONIDAZOLE 500 MG: 500 INJECTION, SOLUTION INTRAVENOUS at 17:59

## 2021-11-28 RX ADMIN — GABAPENTIN 300 MG: 300 CAPSULE ORAL at 14:57

## 2021-11-28 RX ADMIN — CEFAZOLIN SODIUM 2000 MG: 10 INJECTION, POWDER, FOR SOLUTION INTRAVENOUS at 06:06

## 2021-11-28 RX ADMIN — CITALOPRAM HYDROBROMIDE 20 MG: 20 TABLET ORAL at 09:45

## 2021-11-28 RX ADMIN — LEVOTHYROXINE SODIUM 100 MCG: 0.1 TABLET ORAL at 06:06

## 2021-11-28 RX ADMIN — ROSUVASTATIN CALCIUM 5 MG: 5 TABLET, FILM COATED ORAL at 21:21

## 2021-11-28 RX ADMIN — ACETAMINOPHEN 1000 MG: 500 TABLET ORAL at 21:21

## 2021-11-28 RX ADMIN — SODIUM CHLORIDE, PRESERVATIVE FREE 10 ML: 5 INJECTION INTRAVENOUS at 20:17

## 2021-11-28 RX ADMIN — CYCLOBENZAPRINE 5 MG: 10 TABLET, FILM COATED ORAL at 14:57

## 2021-11-28 RX ADMIN — ACETAMINOPHEN 1000 MG: 500 TABLET ORAL at 14:57

## 2021-11-28 RX ADMIN — METRONIDAZOLE 500 MG: 500 INJECTION, SOLUTION INTRAVENOUS at 06:06

## 2021-11-28 RX ADMIN — SPIRONOLACTONE 25 MG: 25 TABLET ORAL at 09:45

## 2021-11-28 RX ADMIN — AMLODIPINE BESYLATE 5 MG: 5 TABLET ORAL at 09:45

## 2021-11-28 RX ADMIN — CYCLOBENZAPRINE 5 MG: 10 TABLET, FILM COATED ORAL at 20:15

## 2021-11-28 RX ADMIN — ACETAMINOPHEN 1000 MG: 500 TABLET ORAL at 06:05

## 2021-11-28 RX ADMIN — KETOROLAC TROMETHAMINE 15 MG: 30 INJECTION, SOLUTION INTRAMUSCULAR at 14:57

## 2021-11-28 RX ADMIN — PANTOPRAZOLE SODIUM 40 MG: 40 TABLET, DELAYED RELEASE ORAL at 06:06

## 2021-11-28 RX ADMIN — KETOROLAC TROMETHAMINE 15 MG: 30 INJECTION, SOLUTION INTRAMUSCULAR at 20:15

## 2021-11-28 RX ADMIN — CYCLOBENZAPRINE 5 MG: 10 TABLET, FILM COATED ORAL at 09:45

## 2021-11-28 ASSESSMENT — PAIN DESCRIPTION - PROGRESSION
CLINICAL_PROGRESSION: GRADUALLY WORSENING

## 2021-11-28 ASSESSMENT — PAIN SCALES - GENERAL
PAINLEVEL_OUTOF10: 0
PAINLEVEL_OUTOF10: 9
PAINLEVEL_OUTOF10: 6
PAINLEVEL_OUTOF10: 0
PAINLEVEL_OUTOF10: 0
PAINLEVEL_OUTOF10: 7
PAINLEVEL_OUTOF10: 5
PAINLEVEL_OUTOF10: 0
PAINLEVEL_OUTOF10: 9
PAINLEVEL_OUTOF10: 6
PAINLEVEL_OUTOF10: 0

## 2021-11-28 NOTE — PROGRESS NOTES
Dr. Danice Davidson called to clarify order for palmer/stent removal. Per Dr. Jorje Aponte remove palmer with stents attached to palmer. Patient placed in supine position with knees raised and . Palmer balloon drained and palmer gently pulled out along with urethral stents. Patient tolerated procedure well. Will monitor for urinary output.

## 2021-11-28 NOTE — PROGRESS NOTES
General Surgery:  Daily Progress Note                    PATIENT NAME: Lavonne Collier     TODAY'S DATE: 11/28/2021, 6:26 AM  CC:  I feel a bit better today    SUBJECTIVE:     Pt seen and examined at bedside. Postop from exploratory laparotomy with extensive lysis of adhesions, explantation pelvic mesh, ileocecectomy with ileocolonic anastomosis, sigmoidoscopy x2, bilateral ureteral stent placement 11/24/2021. Patient seen and examined, vital signs stable, no acute events overnight. NG tube removed yesterday, started on clear liquid diet, tolerating well. Pain controlled, improved from yesterday. Rose catheter in place with good urine output, less hematuria than yesterday. No flatus or BM yet. No nausea or emesis. Midline clean and dry, staples intact, abdominal binder in place. Patient continues to require 4 to 5 L of nasal cannula oxygen to maintain oxygen saturation in the mid 90s. Given the patient's significant smoking history I expect her home oxygen is likely closer to 88-90. Titration goal should be greater than 87 SPO2, will initiate respiratory evaluation and treatment with duo nebs. OBJECTIVE:   VITALS:  /75   Pulse 74   Temp 98.6 °F (37 °C) (Oral)   Resp 18   Ht 5' 4.5\" (1.638 m)   Wt 183 lb 1.6 oz (83.1 kg)   LMP  (LMP Unknown)   SpO2 92%   BMI 30.94 kg/m²      INTAKE/OUTPUT:      Intake/Output Summary (Last 24 hours) at 11/28/2021 9519  Last data filed at 11/28/2021 0551  Gross per 24 hour   Intake 700 ml   Output 1900 ml   Net -1200 ml       PHYSICAL EXAM:  General Appearance: awake, alert, oriented, in no acute distress  HEENT:  Normocephalic, atraumatic, mucus membranes moist   Heart: Heart regular rate and rhythm  Lungs: No chest wall tenderness., Breathing Pattern: regular, no distress  Abdomen: Soft, appropriately tender, staple line clean, dry, dressing without strike through  Extremities: No cyanosis, pitting edema, rashes noted.     Skin: Skin color, texture, turgor normal. No rashes or lesions. Data:  CBC with Differential:    Lab Results   Component Value Date    WBC 8.1 11/27/2021    RBC 3.05 11/27/2021    HGB 9.2 11/27/2021    HCT 27.9 11/27/2021     11/27/2021    MCV 91.5 11/27/2021    MCH 30.2 11/27/2021    MCHC 33.0 11/27/2021    RDW 14.5 11/27/2021    LYMPHOPCT 10 11/27/2021    MONOPCT 5 11/27/2021    BASOPCT 0 11/27/2021    MONOSABS 0.41 11/27/2021    LYMPHSABS 0.81 11/27/2021    EOSABS 0.00 11/27/2021    BASOSABS 0.00 11/27/2021    DIFFTYPE NOT REPORTED 11/27/2021     BMP:    Lab Results   Component Value Date     11/27/2021    K 3.8 11/27/2021     11/27/2021    CO2 24 11/27/2021    BUN 8 11/27/2021    CREATININE 0.42 11/27/2021    CALCIUM 8.2 11/27/2021    GFRAA >60 11/27/2021    LABGLOM >60 11/27/2021    GLUCOSE 110 11/27/2021     Magnesium:    Lab Results   Component Value Date    MG 1.8 11/27/2021     Phosphorus:    Lab Results   Component Value Date    PHOS 2.1 11/27/2021       Radiology Review:  XR ABDOMEN FOR NG/OG/NE TUBE PLACEMENT    Result Date: 11/25/2021  Enteric tube terminates at the level of the body of the stomach. ASSESSMENT:  Active Hospital Problems    Diagnosis Date Noted    Stricture of sigmoid colon [facility]) [L70.260] 11/24/2021       80 y.o. female with sigmoid colon stricture status post exploratory laparotomy with explantation pelvic mesh and mobilization sigmoid and proximal rectum, status post ileocecectomy with ileocolonic anastomosis.     Plan:  Diet: full liquids with supplements  Titrate O2 to maintain SpO2 >87%, initiate Duonebs q4h and acapella  Will replete electrolytes as needed  Decrease IV fluids to 60ml/hr, plan to discontinue IVF if patient taking adequate PO  Analgesia: Multimodal pain therapy  GI prophylaxis: Continue Protonix  Bowel regimen: Hold until return of bowel function  DVT prophylaxis: lovenox 40mg daily  Activity:  Continue to encourage ambulation/activity w/ PT/OT  Get patient up in chair  Patient ok to shower today  Continue to encourage incentive spirometry    Electronically signed by Ephraim Rome DO  on 11/28/2021 at 6:26 AM        I Dr. Vivian Bowen saw and examined the patient. I have edited the above and agree with the above. Ashley Chavez  Colorectal Surgery

## 2021-11-28 NOTE — PROGRESS NOTES
Physical Therapy  DATE: 2021    NAME: Ashtyn Pastor  MRN: [id number]   : 1950    Patient not seen this date for Physical Therapy due to:      [] Cancel by RN or physician due to:    [] Hemodialysis    [] Critical Lab Value Level     [] Blood transfusion in progress    [] Acute or unstable cardiovascular status   _MAP < 55 or more than >115  _HR < 40 or > 130    [] Acute or unstable pulmonary status   -FiO2 > 60%   _RR < 5 or >40    _O2 sats < 85%    [] Strict Bedrest    [] Off Unit for surgery or procedure    [] Off Unit for testing       [] Pending imaging to R/O fracture    [x] Refusal by Patient (Patient declined PT treatment reported too much pain)    [] Other      [] PT being discontinued at this time. Patient independent. No further needs. [] PT being discontinued at this time as the patient has been transferred to hospice care. No further needs.       Bridgett Leader, PTA

## 2021-11-28 NOTE — FLOWSHEET NOTE
Pt alert/o x 4, surgeon at the bedside, instructing Neto Flattery to change abd drsg, request met, pt noted coughing thick mucus, md aware  And will order breathing treatments, pt given am meds, pt o2 lowered from 5l to 2 l per md stating she is a smoker and it is ok to have sats at 87%. abd binder intact, faint bowel sounds x4, pt denies passing any gas at this time.

## 2021-11-28 NOTE — PLAN OF CARE
Problem: OXYGENATION/RESPIRATORY FUNCTION  Goal: Patient will achieve/maintain normal respiratory rate/effort  Outcome: Ongoing     Problem: MOBILITY  Goal: Early mobilization is achieved  Outcome: Ongoing  Note: Patient encouraged to ambulate and sit up in chair. Patient needs much encouragement to move and walk around room. Problem: ELIMINATION  Goal: Elimination patterns are normal or improving  Description: Elimination patterns return to pre-surgery normal patterns  Outcome: Ongoing     Problem: SKIN INTEGRITY  Goal: Skin integrity is maintained or improved  Outcome: Ongoing  Note: Skin assessed each shift and as needed. Mucus membranes moist and intact. Patient turns and repositions as needed. Heels elevated as needed. Continue to monitor skin for breakdown.

## 2021-11-28 NOTE — RT PROTOCOL NOTE
RT Inhaler-Nebulizer Bronchodilator Protocol Note    There is a bronchodilator order in the chart from a provider indicating to follow the RT Bronchodilator Protocol and there is an Initiate RT Inhaler-Nebulizer Bronchodilator Protocol order as well (see protocol at bottom of note). CXR Findings:  No results found. The findings from the last RT Protocol Assessment were as follows:   History Pulmonary Disease: Smoker 15 pack years or more  Respiratory Pattern: Regular pattern and RR 12-20 bpm  Breath Sounds: Clear breath sounds  Cough: Strong, productive  Indication for Bronchodilator Therapy: Decreased or absent breath sounds  Bronchodilator Assessment Score: 2    Aerosolized bronchodilator medication orders have been revised according to the RT Inhaler-Nebulizer Bronchodilator Protocol below. Respiratory Therapist to perform RT Therapy Protocol Assessment initially then follow the protocol. Repeat RT Therapy Protocol Assessment PRN for score 0-3 or on second treatment, BID, and PRN for scores above 3. No Indications - adjust the frequency to every 6 hours PRN wheezing or bronchospasm, if no treatments needed after 48 hours then discontinue using Per Protocol order mode. If indication present, adjust the RT bronchodilator orders based on the Bronchodilator Assessment Score as indicated below. Use Inhaler orders unless patient has one or more of the following: on home nebulizer, not able to hold breath for 10 seconds, is not alert and oriented, cannot activate and use MDI correctly, or respiratory rate 25 breaths per minute or more, then use the equivalent nebulizer order(s) with same Frequency and PRN reasons based on the score. If a patient is on this medication at home then do not decrease Frequency below that used at home.     0-3 - enter or revise RT bronchodilator order(s) to equivalent RT Bronchodilator order with Frequency of every 4 hours PRN for wheezing or increased work of breathing using Per Protocol order mode. 4-6 - enter or revise RT Bronchodilator order(s) to two equivalent RT bronchodilator orders with one order with BID Frequency and one order with Frequency of every 4 hours PRN wheezing or increased work of breathing using Per Protocol order mode. 7-10 - enter or revise RT Bronchodilator order(s) to two equivalent RT bronchodilator orders with one order with TID Frequency and one order with Frequency of every 4 hours PRN wheezing or increased work of breathing using Per Protocol order mode. 11-13 - enter or revise RT Bronchodilator order(s) to one equivalent RT bronchodilator order with QID Frequency and an Albuterol order with Frequency of every 4 hours PRN wheezing or increased work of breathing using Per Protocol order mode. Greater than 13 - enter or revise RT Bronchodilator order(s) to one equivalent RT bronchodilator order with every 4 hours Frequency and an Albuterol order with Frequency of every 2 hours PRN wheezing or increased work of breathing using Per Protocol order mode. RT to enter RT Home Evaluation for COPD & MDI Assessment order using Per Protocol order mode.     Electronically signed by Karen Laird RCP on 11/28/2021 at 10:09 AM

## 2021-11-28 NOTE — PROGRESS NOTES
Brandy Wall   Urology Progress Note            Subjective: Follow-up bilateral ureteral stents indwelling Rose    Patient Vitals for the past 24 hrs:   BP Temp Temp src Pulse Resp SpO2 Weight   11/28/21 1009 -- -- -- -- -- 92 % --   11/28/21 0748 133/79 98.6 °F (37 °C) Oral 77 18 90 % --   11/28/21 2108 -- -- -- -- -- -- 183 lb 1.6 oz (83.1 kg)   11/28/21 0359 130/75 98.6 °F (37 °C) Oral 74 18 92 % --   11/27/21 2333 107/69 98.2 °F (36.8 °C) Oral 70 18 94 % --   11/27/21 1958 119/71 98.1 °F (36.7 °C) Oral 79 20 90 % --   11/27/21 1515 123/62 98.1 °F (36.7 °C) Oral 78 -- 92 % --   11/27/21 1122 133/71 97.5 °F (36.4 °C) Oral 80 18 93 % --       Intake/Output Summary (Last 24 hours) at 11/28/2021 1059  Last data filed at 11/28/2021 4287  Gross per 24 hour   Intake 900 ml   Output 1550 ml   Net -650 ml       Recent Labs     11/26/21  0605 11/26/21  0605 11/26/21  1646 11/27/21  0549 11/28/21  0645   WBC 10.6  --   --  8.1 7.9   HGB 10.6*   < > 10.0* 9.2* 9.1*   HCT 32.8*   < > 31.0* 27.9* 28.6*   MCV 92.9  --   --  91.5 93.2     --   --  182 219    < > = values in this interval not displayed. Recent Labs     11/26/21  0605 11/27/21  0549 11/28/21  0645    138 135   K 4.5 3.8 3.9    104 101   CO2 23 24 24   PHOS 1.6* 2.1*  --    BUN 15 8 6*   CREATININE 0.51 0.42* 0.53       No results for input(s): COLORU, PHUR, LABCAST, WBCUA, RBCUA, MUCUS, TRICHOMONAS, YEAST, BACTERIA, CLARITYU, SPECGRAV, LEUKOCYTESUR, UROBILINOGEN, BILIRUBINUR, BLOODU in the last 72 hours. Invalid input(s): NITRATE, GLUCOSEUKETONESUAMORPHOUS    Additional Lab/culture results:    Physical Exam: Patient alert oriented not in acute distress tolerating diet well bladder decompressed catheter draining well some hematuria present    Interval Imaging Findings:    Impression:    Patient Active Problem List   Diagnosis    Intestinal adhesions    Obesity (BMI 30-39. 9)    Urinary incontinence    Constipation    Ventral incisional hernia    Smoker    Stricture of sigmoid colon ([facility] [address]       Plan: Discussed with the patient catheter removal she is requesting catheter removal today  We will monitor voiding symptoms    Frederico Boxer, MD  10:59 AM 11/28/2021

## 2021-11-29 ENCOUNTER — APPOINTMENT (OUTPATIENT)
Dept: GENERAL RADIOLOGY | Age: 71
DRG: 329 | End: 2021-11-29
Attending: COLON & RECTAL SURGERY
Payer: MEDICARE

## 2021-11-29 LAB
ABSOLUTE EOS #: 0.09 K/UL (ref 0–0.44)
ABSOLUTE IMMATURE GRANULOCYTE: 0.23 K/UL (ref 0–0.3)
ABSOLUTE LYMPH #: 0.85 K/UL (ref 1.1–3.7)
ABSOLUTE MONO #: 0.73 K/UL (ref 0.1–1.2)
ANION GAP SERPL CALCULATED.3IONS-SCNC: 10 MMOL/L (ref 9–17)
BASOPHILS # BLD: 1 % (ref 0–2)
BASOPHILS ABSOLUTE: 0.07 K/UL (ref 0–0.2)
BUN BLDV-MCNC: 10 MG/DL (ref 8–23)
BUN/CREAT BLD: 19 (ref 9–20)
CALCIUM SERPL-MCNC: 8.7 MG/DL (ref 8.6–10.4)
CHLORIDE BLD-SCNC: 103 MMOL/L (ref 98–107)
CO2: 23 MMOL/L (ref 20–31)
CREAT SERPL-MCNC: 0.52 MG/DL (ref 0.5–0.9)
DIFFERENTIAL TYPE: ABNORMAL
EOSINOPHILS RELATIVE PERCENT: 1 % (ref 1–4)
GFR AFRICAN AMERICAN: >60 ML/MIN
GFR NON-AFRICAN AMERICAN: >60 ML/MIN
GFR SERPL CREATININE-BSD FRML MDRD: NORMAL ML/MIN/{1.73_M2}
GFR SERPL CREATININE-BSD FRML MDRD: NORMAL ML/MIN/{1.73_M2}
GLUCOSE BLD-MCNC: 99 MG/DL (ref 70–99)
HCT VFR BLD CALC: 28.6 % (ref 36.3–47.1)
HEMOGLOBIN: 9.1 G/DL (ref 11.9–15.1)
IMMATURE GRANULOCYTES: 3 %
LYMPHOCYTES # BLD: 10 % (ref 24–43)
MCH RBC QN AUTO: 29.8 PG (ref 25.2–33.5)
MCHC RBC AUTO-ENTMCNC: 31.8 G/DL (ref 28.4–34.8)
MCV RBC AUTO: 93.8 FL (ref 82.6–102.9)
MONOCYTES # BLD: 9 % (ref 3–12)
NRBC AUTOMATED: 0 PER 100 WBC
PDW BLD-RTO: 14.9 % (ref 11.8–14.4)
PLATELET # BLD: 227 K/UL (ref 138–453)
PLATELET ESTIMATE: ABNORMAL
PMV BLD AUTO: 10.4 FL (ref 8.1–13.5)
POTASSIUM SERPL-SCNC: 3.9 MMOL/L (ref 3.7–5.3)
RBC # BLD: 3.05 M/UL (ref 3.95–5.11)
RBC # BLD: ABNORMAL 10*6/UL
SEG NEUTROPHILS: 76 % (ref 36–65)
SEGMENTED NEUTROPHILS ABSOLUTE COUNT: 6.35 K/UL (ref 1.5–8.1)
SODIUM BLD-SCNC: 136 MMOL/L (ref 135–144)
SURGICAL PATHOLOGY REPORT: NORMAL
WBC # BLD: 8.3 K/UL (ref 3.5–11.3)
WBC # BLD: ABNORMAL 10*3/UL

## 2021-11-29 PROCEDURE — 94669 MECHANICAL CHEST WALL OSCILL: CPT

## 2021-11-29 PROCEDURE — 2500000003 HC RX 250 WO HCPCS: Performed by: STUDENT IN AN ORGANIZED HEALTH CARE EDUCATION/TRAINING PROGRAM

## 2021-11-29 PROCEDURE — 2060000000 HC ICU INTERMEDIATE R&B

## 2021-11-29 PROCEDURE — 6370000000 HC RX 637 (ALT 250 FOR IP): Performed by: STUDENT IN AN ORGANIZED HEALTH CARE EDUCATION/TRAINING PROGRAM

## 2021-11-29 PROCEDURE — 94640 AIRWAY INHALATION TREATMENT: CPT

## 2021-11-29 PROCEDURE — 6370000000 HC RX 637 (ALT 250 FOR IP): Performed by: COLON & RECTAL SURGERY

## 2021-11-29 PROCEDURE — 93005 ELECTROCARDIOGRAM TRACING: CPT | Performed by: COLON & RECTAL SURGERY

## 2021-11-29 PROCEDURE — 85025 COMPLETE CBC W/AUTO DIFF WBC: CPT

## 2021-11-29 PROCEDURE — 2580000003 HC RX 258: Performed by: STUDENT IN AN ORGANIZED HEALTH CARE EDUCATION/TRAINING PROGRAM

## 2021-11-29 PROCEDURE — 93005 ELECTROCARDIOGRAM TRACING: CPT | Performed by: INTERNAL MEDICINE

## 2021-11-29 PROCEDURE — 94761 N-INVAS EAR/PLS OXIMETRY MLT: CPT

## 2021-11-29 PROCEDURE — 80048 BASIC METABOLIC PNL TOTAL CA: CPT

## 2021-11-29 PROCEDURE — 6360000002 HC RX W HCPCS: Performed by: STUDENT IN AN ORGANIZED HEALTH CARE EDUCATION/TRAINING PROGRAM

## 2021-11-29 PROCEDURE — 36415 COLL VENOUS BLD VENIPUNCTURE: CPT

## 2021-11-29 PROCEDURE — 2700000000 HC OXYGEN THERAPY PER DAY

## 2021-11-29 PROCEDURE — 74018 RADEX ABDOMEN 1 VIEW: CPT

## 2021-11-29 RX ORDER — IPRATROPIUM BROMIDE AND ALBUTEROL SULFATE 2.5; .5 MG/3ML; MG/3ML
1 SOLUTION RESPIRATORY (INHALATION) EVERY 4 HOURS PRN
Status: DISCONTINUED | OUTPATIENT
Start: 2021-11-29 | End: 2021-12-04 | Stop reason: SDUPTHER

## 2021-11-29 RX ORDER — IPRATROPIUM BROMIDE AND ALBUTEROL SULFATE 2.5; .5 MG/3ML; MG/3ML
1 SOLUTION RESPIRATORY (INHALATION)
Status: DISCONTINUED | OUTPATIENT
Start: 2021-11-29 | End: 2021-11-29

## 2021-11-29 RX ORDER — DOCUSATE SODIUM 100 MG/1
100 CAPSULE, LIQUID FILLED ORAL 2 TIMES DAILY
Status: DISCONTINUED | OUTPATIENT
Start: 2021-11-29 | End: 2021-12-24 | Stop reason: HOSPADM

## 2021-11-29 RX ADMIN — KETOROLAC TROMETHAMINE 15 MG: 30 INJECTION, SOLUTION INTRAMUSCULAR at 20:38

## 2021-11-29 RX ADMIN — POTASSIUM CHLORIDE, DEXTROSE MONOHYDRATE AND SODIUM CHLORIDE: 150; 5; 450 INJECTION, SOLUTION INTRAVENOUS at 20:38

## 2021-11-29 RX ADMIN — ACETAMINOPHEN 1000 MG: 500 TABLET ORAL at 20:38

## 2021-11-29 RX ADMIN — CYCLOBENZAPRINE 5 MG: 10 TABLET, FILM COATED ORAL at 20:38

## 2021-11-29 RX ADMIN — OXYCODONE 5 MG: 5 TABLET ORAL at 11:59

## 2021-11-29 RX ADMIN — SODIUM CHLORIDE, PRESERVATIVE FREE 10 ML: 5 INJECTION INTRAVENOUS at 09:11

## 2021-11-29 RX ADMIN — IPRATROPIUM BROMIDE AND ALBUTEROL SULFATE 1 AMPULE: .5; 2.5 SOLUTION RESPIRATORY (INHALATION) at 08:37

## 2021-11-29 RX ADMIN — ENOXAPARIN SODIUM 40 MG: 100 INJECTION SUBCUTANEOUS at 09:11

## 2021-11-29 RX ADMIN — ACETAMINOPHEN 1000 MG: 500 TABLET ORAL at 05:15

## 2021-11-29 RX ADMIN — CITALOPRAM HYDROBROMIDE 20 MG: 20 TABLET ORAL at 09:11

## 2021-11-29 RX ADMIN — KETOROLAC TROMETHAMINE 15 MG: 30 INJECTION, SOLUTION INTRAMUSCULAR at 09:11

## 2021-11-29 RX ADMIN — KETOROLAC TROMETHAMINE 15 MG: 30 INJECTION, SOLUTION INTRAMUSCULAR at 02:02

## 2021-11-29 RX ADMIN — LEVOTHYROXINE SODIUM 100 MCG: 0.1 TABLET ORAL at 05:15

## 2021-11-29 RX ADMIN — ROSUVASTATIN CALCIUM 5 MG: 5 TABLET, FILM COATED ORAL at 21:14

## 2021-11-29 RX ADMIN — OXYCODONE 5 MG: 5 TABLET ORAL at 20:45

## 2021-11-29 RX ADMIN — IPRATROPIUM BROMIDE AND ALBUTEROL SULFATE 1 AMPULE: .5; 2.5 SOLUTION RESPIRATORY (INHALATION) at 06:06

## 2021-11-29 RX ADMIN — CYCLOBENZAPRINE 5 MG: 10 TABLET, FILM COATED ORAL at 14:58

## 2021-11-29 RX ADMIN — ONDANSETRON 4 MG: 2 INJECTION INTRAMUSCULAR; INTRAVENOUS at 14:59

## 2021-11-29 RX ADMIN — SPIRONOLACTONE 25 MG: 25 TABLET ORAL at 09:11

## 2021-11-29 RX ADMIN — AMLODIPINE BESYLATE 5 MG: 5 TABLET ORAL at 09:12

## 2021-11-29 RX ADMIN — CYCLOBENZAPRINE 5 MG: 10 TABLET, FILM COATED ORAL at 09:12

## 2021-11-29 RX ADMIN — ACETAMINOPHEN 1000 MG: 500 TABLET ORAL at 14:58

## 2021-11-29 RX ADMIN — PANTOPRAZOLE SODIUM 40 MG: 40 TABLET, DELAYED RELEASE ORAL at 05:16

## 2021-11-29 RX ADMIN — KETOROLAC TROMETHAMINE 15 MG: 30 INJECTION, SOLUTION INTRAMUSCULAR at 14:59

## 2021-11-29 RX ADMIN — ONDANSETRON 4 MG: 2 INJECTION INTRAMUSCULAR; INTRAVENOUS at 09:11

## 2021-11-29 RX ADMIN — DOCUSATE SODIUM 100 MG: 100 CAPSULE ORAL at 20:38

## 2021-11-29 ASSESSMENT — PAIN SCALES - GENERAL
PAINLEVEL_OUTOF10: 4
PAINLEVEL_OUTOF10: 4
PAINLEVEL_OUTOF10: 7
PAINLEVEL_OUTOF10: 4
PAINLEVEL_OUTOF10: 4
PAINLEVEL_OUTOF10: 6
PAINLEVEL_OUTOF10: 7
PAINLEVEL_OUTOF10: 4
PAINLEVEL_OUTOF10: 7

## 2021-11-29 ASSESSMENT — PAIN DESCRIPTION - PROGRESSION
CLINICAL_PROGRESSION: GRADUALLY WORSENING

## 2021-11-29 NOTE — PROGRESS NOTES
Physical Therapy  DATE: 2021    NAME: Eddi Martin  MRN: [id number]   : 1950    Patient not seen this date for Physical Therapy due to:      [] Cancel by RN or physician due to:    [] Hemodialysis    [] Critical Lab Value Level     [] Blood transfusion in progress    [] Acute or unstable cardiovascular status   _MAP < 55 or more than >115  _HR < 40 or > 130    [] Acute or unstable pulmonary status   -FiO2 > 60%   _RR < 5 or >40    _O2 sats < 85%    [] Strict Bedrest    [] Off Unit for surgery or procedure    [] Off Unit for testing       [] Pending imaging to R/O fracture    [x] Refusal by Patient (Patient reporting too much abdominal pain and not agreeable for any exercises or OOB activity.)     [] Other      [] PT being discontinued at this time. Patient independent. No further needs. [] PT being discontinued at this time as the patient has been transferred to hospice care. No further needs.       Taina Cade, PTA

## 2021-11-29 NOTE — PLAN OF CARE
Problem: Falls - Risk of:  Goal: Will remain free from falls  Description: Will remain free from falls  Outcome: Ongoing   Pt fall risk, fall band present, falling star, safety alarm activated and in use as needed. Hourly rounding performed. Pt encouraged to use call light. See Fernando Sheppard fall risk assessment.

## 2021-11-29 NOTE — RT PROTOCOL NOTE
RT Inhaler-Nebulizer Bronchodilator Protocol Note    There is a bronchodilator order in the chart from a provider indicating to follow the RT Bronchodilator Protocol and there is an Initiate RT Inhaler-Nebulizer Bronchodilator Protocol order as well (see protocol at bottom of note). CXR Findings:  No results found. The findings from the last RT Protocol Assessment were as follows:   History Pulmonary Disease: None or smoker <15 pack years  Respiratory Pattern: Dyspnea on exertion or RR 21-25 bpm  Breath Sounds: Clear breath sounds  Cough: Strong, spontaneous, non-productive  Indication for Bronchodilator Therapy: Decreased or absent breath sounds  Bronchodilator Assessment Score: 2    Aerosolized bronchodilator medication orders have been revised according to the RT Inhaler-Nebulizer Bronchodilator Protocol below. Respiratory Therapist to perform RT Therapy Protocol Assessment initially then follow the protocol. Repeat RT Therapy Protocol Assessment PRN for score 0-3 or on second treatment, BID, and PRN for scores above 3. No Indications - adjust the frequency to every 6 hours PRN wheezing or bronchospasm, if no treatments needed after 48 hours then discontinue using Per Protocol order mode. If indication present, adjust the RT bronchodilator orders based on the Bronchodilator Assessment Score as indicated below. Use Inhaler orders unless patient has one or more of the following: on home nebulizer, not able to hold breath for 10 seconds, is not alert and oriented, cannot activate and use MDI correctly, or respiratory rate 25 breaths per minute or more, then use the equivalent nebulizer order(s) with same Frequency and PRN reasons based on the score. If a patient is on this medication at home then do not decrease Frequency below that used at home.     0-3 - enter or revise RT bronchodilator order(s) to equivalent RT Bronchodilator order with Frequency of every 4 hours PRN for wheezing or increased work of breathing using Per Protocol order mode. 4-6 - enter or revise RT Bronchodilator order(s) to two equivalent RT bronchodilator orders with one order with BID Frequency and one order with Frequency of every 4 hours PRN wheezing or increased work of breathing using Per Protocol order mode. 7-10 - enter or revise RT Bronchodilator order(s) to two equivalent RT bronchodilator orders with one order with TID Frequency and one order with Frequency of every 4 hours PRN wheezing or increased work of breathing using Per Protocol order mode. 11-13 - enter or revise RT Bronchodilator order(s) to one equivalent RT bronchodilator order with QID Frequency and an Albuterol order with Frequency of every 4 hours PRN wheezing or increased work of breathing using Per Protocol order mode. Greater than 13 - enter or revise RT Bronchodilator order(s) to one equivalent RT bronchodilator order with every 4 hours Frequency and an Albuterol order with Frequency of every 2 hours PRN wheezing or increased work of breathing using Per Protocol order mode. RT to enter RT Home Evaluation for COPD & MDI Assessment order using Per Protocol order mode.     Electronically signed by Fatmata Kyle RCP on 11/29/2021 at 6:18 AM

## 2021-11-29 NOTE — RT PROTOCOL NOTE
RT Inhaler-Nebulizer Bronchodilator Protocol Note    There is a bronchodilator order in the chart from a provider indicating to follow the RT Bronchodilator Protocol and there is an Initiate RT Inhaler-Nebulizer Bronchodilator Protocol order as well (see protocol at bottom of note). CXR Findings:  No results found. The findings from the last RT Protocol Assessment were as follows:   History Pulmonary Disease: None or smoker <15 pack years  Respiratory Pattern: Dyspnea on exertion or RR 21-25 bpm  Breath Sounds: Clear breath sounds  Cough: Strong, productive  Indication for Bronchodilator Therapy: None  Bronchodilator Assessment Score: 2    Aerosolized bronchodilator medication orders have been revised according to the RT Inhaler-Nebulizer Bronchodilator Protocol below. Respiratory Therapist to perform RT Therapy Protocol Assessment initially then follow the protocol. Repeat RT Therapy Protocol Assessment PRN for score 0-3 or on second treatment, BID, and PRN for scores above 3. No Indications - adjust the frequency to every 6 hours PRN wheezing or bronchospasm, if no treatments needed after 48 hours then discontinue using Per Protocol order mode. If indication present, adjust the RT bronchodilator orders based on the Bronchodilator Assessment Score as indicated below. Use Inhaler orders unless patient has one or more of the following: on home nebulizer, not able to hold breath for 10 seconds, is not alert and oriented, cannot activate and use MDI correctly, or respiratory rate 25 breaths per minute or more, then use the equivalent nebulizer order(s) with same Frequency and PRN reasons based on the score. If a patient is on this medication at home then do not decrease Frequency below that used at home.     0-3 - enter or revise RT bronchodilator order(s) to equivalent RT Bronchodilator order with Frequency of every 4 hours PRN for wheezing or increased work of breathing using Per Protocol order mode.        4-6 - enter or revise RT Bronchodilator order(s) to two equivalent RT bronchodilator orders with one order with BID Frequency and one order with Frequency of every 4 hours PRN wheezing or increased work of breathing using Per Protocol order mode. 7-10 - enter or revise RT Bronchodilator order(s) to two equivalent RT bronchodilator orders with one order with TID Frequency and one order with Frequency of every 4 hours PRN wheezing or increased work of breathing using Per Protocol order mode. 11-13 - enter or revise RT Bronchodilator order(s) to one equivalent RT bronchodilator order with QID Frequency and an Albuterol order with Frequency of every 4 hours PRN wheezing or increased work of breathing using Per Protocol order mode. Greater than 13 - enter or revise RT Bronchodilator order(s) to one equivalent RT bronchodilator order with every 4 hours Frequency and an Albuterol order with Frequency of every 2 hours PRN wheezing or increased work of breathing using Per Protocol order mode. RT to enter RT Home Evaluation for COPD & MDI Assessment order using Per Protocol order mode.     Electronically signed by Prince Ozzy RCP on 11/29/2021 at 8:40 AM

## 2021-11-29 NOTE — PROGRESS NOTES
Sheryl 56  Occupational Therapy Not Seen    DATE: 2021    NAME: Sofiya Berrios  MRN: [id number]   : 1950    Patient not seen this date for Occupational Therapy due to:      [] Cancel by RN or physician due to:    [] Hemodialysis    [] Critical Lab Value Level     [] Blood transfusion in progress    [] Acute or unstable cardiovascular status   _MAP < 55 or more than >115  _HR < 40 or > 130    [] Acute or unstable pulmonary status   -FiO2 > 60%   _RR < 5 or >40    _O2 sats < 85%    [] Strict Bedrest    [] Off Unit for surgery or procedure    [] Off Unit for testing       [] Pending imaging to R/O fracture    [x] Refusal by Patient: Pt in bed upon arrival. Pt stated she has not slept at all and is in too much pain to participate in therapy at this time. RN present and aware. Will cont to follow. [] Other      [] OT being discontinued at this time. Patient independent. No further needs. [] OT being discontinued at this time as the patient has been transferred to hospice care. No further needs.       Burt Kim, TAM

## 2021-11-29 NOTE — PROGRESS NOTES
Carlee Muniz   Urology Progress Note            Subjective: Patient doing well following removal of Rose catheter and ureteral catheters    Patient Vitals for the past 24 hrs:   BP Temp Temp src Pulse Resp SpO2 Weight   11/29/21 0606 -- -- -- -- -- 90 % --   11/29/21 0515 -- -- -- -- -- -- 182 lb 8 oz (82.8 kg)   11/29/21 0444 120/67 97.9 °F (36.6 °C) -- 65 16 93 % --   11/28/21 2356 121/65 98.1 °F (36.7 °C) -- 66 16 92 % --   11/28/21 1928 136/70 98.1 °F (36.7 °C) -- 70 16 91 % --   11/28/21 1647 105/61 98.8 °F (37.1 °C) Oral 73 19 92 % --   11/28/21 1152 99/68 98.6 °F (37 °C) Oral 79 16 91 % --   11/28/21 1009 -- -- -- -- -- 92 % --   11/28/21 0748 133/79 98.6 °F (37 °C) Oral 77 18 90 % --       Intake/Output Summary (Last 24 hours) at 11/29/2021 0721  Last data filed at 11/28/2021 1600  Gross per 24 hour   Intake --   Output 800 ml   Net -800 ml       Recent Labs     11/27/21  0549 11/28/21  0645 11/29/21  0525   WBC 8.1 7.9 8.3   HGB 9.2* 9.1* 9.1*   HCT 27.9* 28.6* 28.6*   MCV 91.5 93.2 93.8    219 227     Recent Labs     11/27/21  0549 11/28/21  0645 11/29/21  0525    135 136   K 3.8 3.9 3.9    101 103   CO2 24 24 23   PHOS 2.1*  --   --    BUN 8 6* 10   CREATININE 0.42* 0.53 0.52       No results for input(s): COLORU, PHUR, LABCAST, WBCUA, RBCUA, MUCUS, TRICHOMONAS, YEAST, BACTERIA, CLARITYU, SPECGRAV, LEUKOCYTESUR, UROBILINOGEN, BILIRUBINUR, BLOODU in the last 72 hours. Invalid input(s): NITRATE, GLUCOSEUKETONESUAMORPHOUS    Additional Lab/culture results:    Physical Exam: Alert not in acute distress, bladder scan will be performed to check postvoid residual, continue to monitor renal function evaluate upper tract to rule out hydronephrosis    Interval Imaging Findings:    Impression:    Patient Active Problem List   Diagnosis    Intestinal adhesions    Obesity (BMI 30-39. 9)    Urinary incontinence    Constipation    Ventral incisional hernia  Smoker    Stricture of sigmoid colon ([facility].)       Plan: Bladder scan check postvoid residual    Noris Reyes MD  7:21 AM 11/29/2021

## 2021-11-29 NOTE — PROGRESS NOTES
General Surgery:  Daily Progress Note                    PATIENT NAME: Luz Fields     TODAY'S DATE: 11/29/2021, 6:38 AM  CC:  I pooped    SUBJECTIVE:     Pt seen and examined at bedside. Postop from exploratory laparotomy with extensive lysis of adhesions, explantation pelvic mesh, ileocecectomy with ileocolonic anastomosis, sigmoidoscopy x2, bilateral ureteral stent placement 11/24/2021. Patient seen and examined, vital signs stable, no acute events overnight. Rose catheter and ureteral stents removed per urology, voiding spontaneously. Tolerating liquids, poor appetite though. Denies nausea, vomiting, chest pain, shortness of breath. Oxygen off this morning saturating 91%. 2 bowel movements overnight with passing flatus. Patient up in chair most of the day yesterday. OBJECTIVE:   VITALS:  /67   Pulse 65   Temp 97.9 °F (36.6 °C)   Resp 16   Ht 5' 4.5\" (1.638 m)   Wt 182 lb 8 oz (82.8 kg)   LMP  (LMP Unknown)   SpO2 90% Comment: with oxygen off  BMI 30.84 kg/m²      INTAKE/OUTPUT:      Intake/Output Summary (Last 24 hours) at 11/29/2021 5752  Last data filed at 11/28/2021 1600  Gross per 24 hour   Intake --   Output 800 ml   Net -800 ml       PHYSICAL EXAM:  General Appearance: awake, alert, oriented, in no acute distress  HEENT:  Normocephalic, atraumatic, mucus membranes moist   Heart: Heart regular rate and rhythm  Lungs: No chest wall tenderness., Breathing Pattern: regular, no distress  Abdomen: Soft, appropriately tender, staple line clean, dry, dressing without strike through  Extremities: No cyanosis, pitting edema, rashes noted. Skin: Skin color, texture, turgor normal. No rashes or lesions.     Data:  CBC with Differential:    Lab Results   Component Value Date    WBC 8.3 11/29/2021    RBC 3.05 11/29/2021    HGB 9.1 11/29/2021    HCT 28.6 11/29/2021     11/29/2021    MCV 93.8 11/29/2021    MCH 29.8 11/29/2021    MCHC 31.8 11/29/2021    RDW 14.9 11/29/2021 LYMPHOPCT 10 11/29/2021    MONOPCT 9 11/29/2021    BASOPCT 1 11/29/2021    MONOSABS 0.73 11/29/2021    LYMPHSABS 0.85 11/29/2021    EOSABS 0.09 11/29/2021    BASOSABS 0.07 11/29/2021    DIFFTYPE NOT REPORTED 11/29/2021     BMP:    Lab Results   Component Value Date     11/29/2021    K 3.9 11/29/2021     11/29/2021    CO2 23 11/29/2021    BUN 10 11/29/2021    CREATININE 0.52 11/29/2021    CALCIUM 8.7 11/29/2021    GFRAA >60 11/29/2021    LABGLOM >60 11/29/2021    GLUCOSE 99 11/29/2021     Magnesium:    Lab Results   Component Value Date    MG 1.8 11/27/2021     Phosphorus:    Lab Results   Component Value Date    PHOS 2.1 11/27/2021       Radiology Review:  XR ABDOMEN FOR NG/OG/NE TUBE PLACEMENT    Result Date: 11/25/2021  Enteric tube terminates at the level of the body of the stomach. ASSESSMENT:  Active Hospital Problems    Diagnosis Date Noted    Stricture of sigmoid colon [facility]) [O95.582] 11/24/2021       80 y.o. female with sigmoid colon stricture status post exploratory laparotomy with explantation pelvic mesh and mobilization sigmoid and proximal rectum, status post ileocecectomy with ileocolonic anastomosis. Plan:  Diet: full liquids with supplements  If patient tolerating full liquids please message for advancement to low fiber  Titrate O2 to maintain SpO2 >87%, initiate Duonebs q4h and acapella  Discontinue lab checks  Continue IV fluid at 50 mL/h until patient taking more p.o. Analgesia: Multimodal pain therapy  GI prophylaxis: Continue Protonix  Bowel regimen: Add Colace 100 mg twice daily  DVT prophylaxis: lovenox 40mg daily  Activity:  Continue to encourage ambulation/activity w/ PT/OT  Get patient up in chair  Patient ok to shower  Continue to encourage incentive spirometry  Will discuss with case management possibility of short term SNF for rehab    Electronically signed by Dariel Gerard DO  on 11/29/2021 at 6:38 AM       I Dr. Sherri Leblanc saw and examined the patient.  I have edited the above and agree with the above. Ashley Chavez  Colorectal Surgery

## 2021-11-29 NOTE — CARE COORDINATION
Social work: Met with patient at bedside to discuss [employer], states she wants to go home and spouse will care for her. SW discussed that she had surgery and would benefit from rehab placement. Pt agreed to discuss plans with spouse when he visits tomorrow, if rehab, choice would be [facility]. Referral faxed. Revisit Tuesday [facility] vs SNF.

## 2021-11-30 ENCOUNTER — APPOINTMENT (OUTPATIENT)
Dept: CT IMAGING | Age: 71
DRG: 329 | End: 2021-11-30
Attending: COLON & RECTAL SURGERY
Payer: MEDICARE

## 2021-11-30 LAB
-: ABNORMAL
ABSOLUTE EOS #: 0 K/UL (ref 0–0.4)
ABSOLUTE IMMATURE GRANULOCYTE: 0.19 K/UL (ref 0–0.3)
ABSOLUTE LYMPH #: 0.83 K/UL (ref 1–4.8)
ABSOLUTE MONO #: 0.32 K/UL (ref 0.2–0.8)
AMORPHOUS: ABNORMAL
ANION GAP SERPL CALCULATED.3IONS-SCNC: 14 MMOL/L (ref 9–17)
ANION GAP SERPL CALCULATED.3IONS-SCNC: 16 MMOL/L (ref 9–17)
BACTERIA: ABNORMAL
BASOPHILS # BLD: 0 %
BASOPHILS ABSOLUTE: 0 K/UL (ref 0–0.2)
BILIRUBIN URINE: ABNORMAL
BUN BLDV-MCNC: 21 MG/DL (ref 8–23)
BUN BLDV-MCNC: 27 MG/DL (ref 8–23)
BUN/CREAT BLD: 12 (ref 9–20)
BUN/CREAT BLD: 17 (ref 9–20)
CALCIUM SERPL-MCNC: 8.2 MG/DL (ref 8.6–10.4)
CALCIUM SERPL-MCNC: 8.2 MG/DL (ref 8.6–10.4)
CASTS UA: ABNORMAL /LPF
CHLORIDE BLD-SCNC: 101 MMOL/L (ref 98–107)
CHLORIDE BLD-SCNC: 97 MMOL/L (ref 98–107)
CO2: 17 MMOL/L (ref 20–31)
CO2: 18 MMOL/L (ref 20–31)
COLOR: ABNORMAL
COMMENT UA: ABNORMAL
CREAT SERPL-MCNC: 1.56 MG/DL (ref 0.5–0.9)
CREAT SERPL-MCNC: 1.74 MG/DL (ref 0.5–0.9)
CREATININE URINE: 149.6 MG/DL (ref 28–217)
CRYSTALS, UA: ABNORMAL /HPF
DIFFERENTIAL TYPE: ABNORMAL
EKG ATRIAL RATE: 91 BPM
EKG ATRIAL RATE: 93 BPM
EKG P AXIS: 49 DEGREES
EKG P AXIS: 69 DEGREES
EKG P-R INTERVAL: 120 MS
EKG P-R INTERVAL: 132 MS
EKG Q-T INTERVAL: 304 MS
EKG Q-T INTERVAL: 322 MS
EKG QRS DURATION: 82 MS
EKG QRS DURATION: 84 MS
EKG QTC CALCULATION (BAZETT): 373 MS
EKG QTC CALCULATION (BAZETT): 400 MS
EKG R AXIS: 25 DEGREES
EKG R AXIS: 34 DEGREES
EKG T AXIS: 18 DEGREES
EKG T AXIS: 46 DEGREES
EKG VENTRICULAR RATE: 91 BPM
EKG VENTRICULAR RATE: 93 BPM
EOSINOPHILS RELATIVE PERCENT: 0 % (ref 1–4)
EPITHELIAL CELLS UA: ABNORMAL /HPF (ref 0–5)
GFR AFRICAN AMERICAN: 35 ML/MIN
GFR AFRICAN AMERICAN: 40 ML/MIN
GFR NON-AFRICAN AMERICAN: 29 ML/MIN
GFR NON-AFRICAN AMERICAN: 33 ML/MIN
GFR SERPL CREATININE-BSD FRML MDRD: ABNORMAL ML/MIN/{1.73_M2}
GLUCOSE BLD-MCNC: 105 MG/DL (ref 70–99)
GLUCOSE BLD-MCNC: 94 MG/DL (ref 70–99)
GLUCOSE URINE: NEGATIVE
HCT VFR BLD CALC: 30.9 % (ref 36.3–47.1)
HEMOGLOBIN: 9.9 G/DL (ref 11.9–15.1)
IMMATURE GRANULOCYTES: 3 %
KETONES, URINE: NEGATIVE
LACTIC ACID: 1.9 MMOL/L (ref 0.5–2.2)
LEUKOCYTE ESTERASE, URINE: ABNORMAL
LYMPHOCYTES # BLD: 13 % (ref 24–44)
MAGNESIUM: 1.5 MG/DL (ref 1.6–2.6)
MCH RBC QN AUTO: 29.4 PG (ref 25.2–33.5)
MCHC RBC AUTO-ENTMCNC: 32 G/DL (ref 28.4–34.8)
MCV RBC AUTO: 91.7 FL (ref 82.6–102.9)
MICROALBUMIN/CREAT 24H UR: 27 MG/L
MICROALBUMIN/CREAT UR-RTO: 18 MCG/MG CREAT
MONOCYTES # BLD: 5 % (ref 1–7)
MORPHOLOGY: ABNORMAL
MUCUS: ABNORMAL
MYOGLOBIN: 48 NG/ML (ref 25–58)
NITRITE, URINE: POSITIVE
NRBC AUTOMATED: 0 PER 100 WBC
OTHER OBSERVATIONS UA: ABNORMAL
PDW BLD-RTO: 15.2 % (ref 11.8–14.4)
PH UA: 5 (ref 5–8)
PHOSPHORUS: 5.5 MG/DL (ref 2.6–4.5)
PLATELET # BLD: 275 K/UL (ref 138–453)
PLATELET ESTIMATE: ABNORMAL
PMV BLD AUTO: 10.7 FL (ref 8.1–13.5)
POTASSIUM SERPL-SCNC: 4.8 MMOL/L (ref 3.7–5.3)
POTASSIUM SERPL-SCNC: 5.3 MMOL/L (ref 3.7–5.3)
PROTEIN UA: NEGATIVE
RBC # BLD: 3.37 M/UL (ref 3.95–5.11)
RBC # BLD: ABNORMAL 10*6/UL
RBC UA: ABNORMAL /HPF (ref 0–2)
RENAL EPITHELIAL, UA: ABNORMAL /HPF
SEG NEUTROPHILS: 79 % (ref 36–66)
SEGMENTED NEUTROPHILS ABSOLUTE COUNT: 5.06 K/UL (ref 1.8–7.7)
SODIUM BLD-SCNC: 128 MMOL/L (ref 135–144)
SODIUM BLD-SCNC: 135 MMOL/L (ref 135–144)
SODIUM,UR: 29 MMOL/L
SPECIFIC GRAVITY UA: 1.02 (ref 1–1.03)
TOTAL CK: 46 U/L (ref 26–192)
TRICHOMONAS: ABNORMAL
TROPONIN INTERP: NORMAL
TROPONIN T: NORMAL NG/ML
TROPONIN, HIGH SENSITIVITY: 6 NG/L (ref 0–14)
TURBIDITY: CLEAR
URINE HGB: ABNORMAL
UROBILINOGEN, URINE: NORMAL
WBC # BLD: 6.4 K/UL (ref 3.5–11.3)
WBC # BLD: ABNORMAL 10*3/UL
WBC UA: ABNORMAL /HPF (ref 0–5)
YEAST: ABNORMAL

## 2021-11-30 PROCEDURE — 6370000000 HC RX 637 (ALT 250 FOR IP): Performed by: STUDENT IN AN ORGANIZED HEALTH CARE EDUCATION/TRAINING PROGRAM

## 2021-11-30 PROCEDURE — 82550 ASSAY OF CK (CPK): CPT

## 2021-11-30 PROCEDURE — 2060000000 HC ICU INTERMEDIATE R&B

## 2021-11-30 PROCEDURE — 97110 THERAPEUTIC EXERCISES: CPT

## 2021-11-30 PROCEDURE — 82043 UR ALBUMIN QUANTITATIVE: CPT

## 2021-11-30 PROCEDURE — 74176 CT ABD & PELVIS W/O CONTRAST: CPT

## 2021-11-30 PROCEDURE — P9047 ALBUMIN (HUMAN), 25%, 50ML: HCPCS | Performed by: INTERNAL MEDICINE

## 2021-11-30 PROCEDURE — 6360000002 HC RX W HCPCS: Performed by: INTERNAL MEDICINE

## 2021-11-30 PROCEDURE — 93005 ELECTROCARDIOGRAM TRACING: CPT | Performed by: INTERNAL MEDICINE

## 2021-11-30 PROCEDURE — 82570 ASSAY OF URINE CREATININE: CPT

## 2021-11-30 PROCEDURE — 83605 ASSAY OF LACTIC ACID: CPT

## 2021-11-30 PROCEDURE — 84100 ASSAY OF PHOSPHORUS: CPT

## 2021-11-30 PROCEDURE — 87205 SMEAR GRAM STAIN: CPT

## 2021-11-30 PROCEDURE — 2580000003 HC RX 258: Performed by: STUDENT IN AN ORGANIZED HEALTH CARE EDUCATION/TRAINING PROGRAM

## 2021-11-30 PROCEDURE — 81001 URINALYSIS AUTO W/SCOPE: CPT

## 2021-11-30 PROCEDURE — 83874 ASSAY OF MYOGLOBIN: CPT

## 2021-11-30 PROCEDURE — 51798 US URINE CAPACITY MEASURE: CPT

## 2021-11-30 PROCEDURE — 97530 THERAPEUTIC ACTIVITIES: CPT

## 2021-11-30 PROCEDURE — 85025 COMPLETE CBC W/AUTO DIFF WBC: CPT

## 2021-11-30 PROCEDURE — 51702 INSERT TEMP BLADDER CATH: CPT

## 2021-11-30 PROCEDURE — 80048 BASIC METABOLIC PNL TOTAL CA: CPT

## 2021-11-30 PROCEDURE — 36415 COLL VENOUS BLD VENIPUNCTURE: CPT

## 2021-11-30 PROCEDURE — 6360000002 HC RX W HCPCS: Performed by: COLON & RECTAL SURGERY

## 2021-11-30 PROCEDURE — 84300 ASSAY OF URINE SODIUM: CPT

## 2021-11-30 PROCEDURE — 6360000002 HC RX W HCPCS: Performed by: STUDENT IN AN ORGANIZED HEALTH CARE EDUCATION/TRAINING PROGRAM

## 2021-11-30 PROCEDURE — 84484 ASSAY OF TROPONIN QUANT: CPT

## 2021-11-30 PROCEDURE — 83735 ASSAY OF MAGNESIUM: CPT

## 2021-11-30 RX ORDER — HEPARIN SODIUM 5000 [USP'U]/ML
5000 INJECTION, SOLUTION INTRAVENOUS; SUBCUTANEOUS EVERY 8 HOURS SCHEDULED
Status: DISCONTINUED | OUTPATIENT
Start: 2021-12-01 | End: 2021-12-05

## 2021-11-30 RX ORDER — MAGNESIUM SULFATE 1 G/100ML
1000 INJECTION INTRAVENOUS
Status: DISCONTINUED | OUTPATIENT
Start: 2021-11-30 | End: 2021-11-30

## 2021-11-30 RX ORDER — SODIUM CHLORIDE, SODIUM LACTATE, POTASSIUM CHLORIDE, AND CALCIUM CHLORIDE .6; .31; .03; .02 G/100ML; G/100ML; G/100ML; G/100ML
1000 INJECTION, SOLUTION INTRAVENOUS ONCE
Status: COMPLETED | OUTPATIENT
Start: 2021-11-30 | End: 2021-11-30

## 2021-11-30 RX ORDER — MAGNESIUM SULFATE 1 G/100ML
1000 INJECTION INTRAVENOUS
Status: COMPLETED | OUTPATIENT
Start: 2021-11-30 | End: 2021-11-30

## 2021-11-30 RX ORDER — 0.9 % SODIUM CHLORIDE 0.9 %
1000 INTRAVENOUS SOLUTION INTRAVENOUS ONCE
Status: DISCONTINUED | OUTPATIENT
Start: 2021-11-30 | End: 2021-11-30

## 2021-11-30 RX ORDER — DEXTROSE AND SODIUM CHLORIDE 5; .9 G/100ML; G/100ML
INJECTION, SOLUTION INTRAVENOUS CONTINUOUS
Status: DISCONTINUED | OUTPATIENT
Start: 2021-11-30 | End: 2021-12-02

## 2021-11-30 RX ORDER — ALBUMIN (HUMAN) 12.5 G/50ML
50 SOLUTION INTRAVENOUS ONCE
Status: COMPLETED | OUTPATIENT
Start: 2021-11-30 | End: 2021-11-30

## 2021-11-30 RX ORDER — CITALOPRAM 20 MG/1
60 TABLET ORAL DAILY
Status: DISCONTINUED | OUTPATIENT
Start: 2021-12-01 | End: 2021-12-24 | Stop reason: HOSPADM

## 2021-11-30 RX ADMIN — HYDROMORPHONE HYDROCHLORIDE 0.2 MG: 1 INJECTION, SOLUTION INTRAMUSCULAR; INTRAVENOUS; SUBCUTANEOUS at 00:38

## 2021-11-30 RX ADMIN — CYCLOBENZAPRINE 5 MG: 10 TABLET, FILM COATED ORAL at 09:30

## 2021-11-30 RX ADMIN — ALBUMIN (HUMAN) 50 G: 0.25 INJECTION, SOLUTION INTRAVENOUS at 19:45

## 2021-11-30 RX ADMIN — DOCUSATE SODIUM 100 MG: 100 CAPSULE ORAL at 21:04

## 2021-11-30 RX ADMIN — ONDANSETRON 4 MG: 2 INJECTION INTRAMUSCULAR; INTRAVENOUS at 12:49

## 2021-11-30 RX ADMIN — OXYCODONE 5 MG: 5 TABLET ORAL at 09:30

## 2021-11-30 RX ADMIN — ACETAMINOPHEN 1000 MG: 500 TABLET ORAL at 21:04

## 2021-11-30 RX ADMIN — LEVOTHYROXINE SODIUM 100 MCG: 0.1 TABLET ORAL at 04:48

## 2021-11-30 RX ADMIN — ROSUVASTATIN CALCIUM 5 MG: 5 TABLET, FILM COATED ORAL at 21:04

## 2021-11-30 RX ADMIN — CYCLOBENZAPRINE 5 MG: 10 TABLET, FILM COATED ORAL at 14:10

## 2021-11-30 RX ADMIN — OXYCODONE 5 MG: 5 TABLET ORAL at 21:04

## 2021-11-30 RX ADMIN — MAGNESIUM SULFATE HEPTAHYDRATE 1000 MG: 1 INJECTION, SOLUTION INTRAVENOUS at 12:49

## 2021-11-30 RX ADMIN — CITALOPRAM HYDROBROMIDE 20 MG: 20 TABLET ORAL at 09:30

## 2021-11-30 RX ADMIN — MAGNESIUM SULFATE HEPTAHYDRATE 1000 MG: 1 INJECTION, SOLUTION INTRAVENOUS at 14:10

## 2021-11-30 RX ADMIN — ENOXAPARIN SODIUM 40 MG: 100 INJECTION SUBCUTANEOUS at 09:37

## 2021-11-30 RX ADMIN — DOCUSATE SODIUM 100 MG: 100 CAPSULE ORAL at 09:30

## 2021-11-30 RX ADMIN — HYDROMORPHONE HYDROCHLORIDE 0.5 MG: 1 INJECTION, SOLUTION INTRAMUSCULAR; INTRAVENOUS; SUBCUTANEOUS at 11:48

## 2021-11-30 RX ADMIN — KETOROLAC TROMETHAMINE 15 MG: 30 INJECTION, SOLUTION INTRAMUSCULAR at 02:09

## 2021-11-30 RX ADMIN — SODIUM CHLORIDE, POTASSIUM CHLORIDE, SODIUM LACTATE AND CALCIUM CHLORIDE 1000 ML: 600; 310; 30; 20 INJECTION, SOLUTION INTRAVENOUS at 09:32

## 2021-11-30 RX ADMIN — CYCLOBENZAPRINE 5 MG: 10 TABLET, FILM COATED ORAL at 21:04

## 2021-11-30 RX ADMIN — DEXTROSE AND SODIUM CHLORIDE: 5; 900 INJECTION, SOLUTION INTRAVENOUS at 16:20

## 2021-11-30 RX ADMIN — SPIRONOLACTONE 25 MG: 25 TABLET ORAL at 09:37

## 2021-11-30 RX ADMIN — ACETAMINOPHEN 1000 MG: 500 TABLET ORAL at 04:47

## 2021-11-30 RX ADMIN — MAGNESIUM SULFATE HEPTAHYDRATE 1000 MG: 1 INJECTION, SOLUTION INTRAVENOUS at 15:06

## 2021-11-30 RX ADMIN — ACETAMINOPHEN 1000 MG: 500 TABLET ORAL at 14:10

## 2021-11-30 RX ADMIN — PANTOPRAZOLE SODIUM 40 MG: 40 TABLET, DELAYED RELEASE ORAL at 04:48

## 2021-11-30 ASSESSMENT — PAIN DESCRIPTION - PAIN TYPE: TYPE: SURGICAL PAIN

## 2021-11-30 ASSESSMENT — PAIN SCALES - GENERAL
PAINLEVEL_OUTOF10: 3
PAINLEVEL_OUTOF10: 4
PAINLEVEL_OUTOF10: 9
PAINLEVEL_OUTOF10: 6
PAINLEVEL_OUTOF10: 8
PAINLEVEL_OUTOF10: 10
PAINLEVEL_OUTOF10: 8
PAINLEVEL_OUTOF10: 5
PAINLEVEL_OUTOF10: 0
PAINLEVEL_OUTOF10: 8
PAINLEVEL_OUTOF10: 6
PAINLEVEL_OUTOF10: 7

## 2021-11-30 ASSESSMENT — PAIN DESCRIPTION - LOCATION
LOCATION: ABDOMEN
LOCATION: ABDOMEN

## 2021-11-30 NOTE — CONSULTS
Section of Cardiology   Consult Note      Reason for Consult: Chest pain  Requesting Physician: Tony Liu MD    CHIEF COMPLAINT: Patient had abdominal surgery    History Obtained From:  patient, electronic medical record, patient's nurses    HISTORY OF PRESENT ILLNESS:      The patient is a 80 y.o. female with significant past medical history of COPD, hypertension and hyperlipidemia who presents with elective surgery on her colon due to stricture. Yesterday the patient felt upper abdominal discomfort radiating to the chest lasted for an hour before she told her nurses. Clearly, by nursing staff, patient was anxious. She did not have any further discomfort since then. The patient is not known to have any specific cardiac disease. She smokes a few cigarettes a day. Does not drink alcohol on a regular basis. She has no history of chest pain. But she complains of fatigue. A month ago or so she had stress test at [facility] ordered by her family physician and to her knowledge it was normal.  The patient has no restrictive history of dyspnea on exertion and no PND orthopnea. No edema. No weakness or numbness in any arm or leg. Denies any history of bleeding. Previous diagnostic testing for coronary artery disease includes: persantine thallium. Previous history of cardiac disease includes: hypertension. Coronary artery disease risk factors include: advanced age (older than 54 for men, 72 for women), dyslipidemia, hypertension, sedentary lifestyle and smoking/ tobacco exposure.     Past Medical History:    Past Medical History:   Diagnosis Date    Abdominal pain     Arthritis     Constipation     COPD (chronic obstructive pulmonary disease) (HCC)     Depressed     GERD (gastroesophageal reflux disease)     HLD (hyperlipidemia)     HTN (hypertension)     Hypothyroid     IBS (irritable bowel syndrome)     Lumbar spondylolysis     Myofascial pain     Poor appetite     RLS (restless legs syndrome)     Wears glasses      Past Surgical History:    Past Surgical History:   Procedure Laterality Date    ABDOMEN SURGERY  11/24/2021    exploration with lysis of adhesions, small bowel resection with ileocecetomy and pelvic mesh removal    ABDOMINAL ADHESION SURGERY  3/9/2015    APPENDECTOMY      BLADDER SURGERY      COLECTOMY N/A 11/24/2021    ABDOMINAL EXPLORATION LYSIS OF EXTENSIVE ADHESIONS SMALL BOWEL RESECTION WITH ILEOCECECTOMY  EXPLANTATIOIN OF PELVIC MESH  ILEOCLONIC ANASTAMOSIS MOBILIZATION OF RECTUM AND SIGMOID performed by Carmela Plummer MD at [address]  11/24/2021    with bilateral ureteral stent insertion    CYSTOSCOPY  11/24/2021    CYSTOSCOPY EXPLORATION OF URETER BILATERAL URETERAL STENT INSERTION performed by Carmela Plummer MD at [address] [facility]      ENDOSCOPY, COLON, DIAGNOSTIC      ENTEROCELE REPAIR      EYE SURGERY      HERNIA REPAIR      HYSTERECTOMY      RECTAL SURGERY      SIGMOIDOSCOPY  11/24/2021    SIGMOIDOSCOPY FLEXIBLE X 2 performed by Carmela Plummer MD at [address] [facility] Medications:  Prior to Admission medications    Medication Sig Start Date End Date Taking?  Authorizing Provider   cyclobenzaprine (FLEXERIL) 10 MG tablet Take 10 mg by mouth nightly 10/29/21  Yes Historical Provider, MD   spironolactone (ALDACTONE) 25 MG tablet Take 25 mg by mouth daily   Yes Historical Provider, MD   rosuvastatin (CRESTOR) 5 MG tablet Take 5 mg by mouth daily   Yes Historical Provider, MD   esomeprazole (NEXIUM) 40 MG delayed release capsule Take 40 mg by mouth daily    Yes Historical Provider, MD   famotidine (PEPCID) 40 MG tablet Take 40 mg by mouth daily   Yes Historical Provider, MD   amLODIPine (NORVASC) 10 MG tablet Take 10 mg by mouth daily  3/17/15  Yes Historical Provider, MD   citalopram (CELEXA) 40 MG tablet Take 60 mg by mouth daily 1.5 tabs daily 3/5/15  Yes Historical Provider, MD   diphenoxylate-atropine (LOMOTIL) 2.5-0.025 MG per tablet Take 1 tablet by mouth 4 times daily as needed.   12/22/14  Yes Historical Provider, MD   levothyroxine (SYNTHROID) 100 MCG tablet Take 100 mcg by mouth Daily  3/5/15  Yes Historical Provider, MD   celecoxib (CELEBREX) 200 MG capsule Take 200 mg by mouth daily     Historical Provider, MD     Current Medications:    Current Facility-Administered Medications   Medication Dose Route Frequency Provider Last Rate Last Admin    lactated ringers bolus  1,000 mL IntraVENous Once [facility],  mL/hr at 11/30/21 0932 1,000 mL at 11/30/21 0932    magnesium sulfate 1000 mg in dextrose 5% 100 mL IVPB  1,000 mg IntraVENous Q1H [facility], DO        HYDROmorphone (DILAUDID) injection 0.5 mg  0.5 mg IntraVENous Once [facility], DO        docusate sodium (COLACE) capsule 100 mg  100 mg Oral BID [facility], DO   100 mg at 11/30/21 0930    ipratropium-albuterol (DUONEB) nebulizer solution 1 ampule  1 ampule Inhalation Q4H PRN [facility], DO        influenza quadrivalent split vaccine (FLUZONE;FLUARIX;FLULAVAL;AFLURIA) injection 0.5 mL  0.5 mL IntraMUSCular Prior to discharge Ashley Chavez MD        cyclobenzaprine (FLEXERIL) tablet 5 mg  5 mg Oral TID [facility], DO   5 mg at 11/30/21 0930    oxyCODONE (ROXICODONE) immediate release tablet 5 mg  5 mg Oral Q8H PRN [facility], DO   5 mg at 11/30/21 0930    enoxaparin (LOVENOX) injection 40 mg  40 mg SubCUTAneous Daily [facility], DO   40 mg at 11/30/21 0937    amLODIPine (NORVASC) tablet 5 mg  5 mg Oral Daily [facility], DO   5 mg at 11/29/21 0912    phenol 1.4 % mouth spray 1 spray  1 spray Mouth/Throat Q2H PRN [facility], DO   1 spray at 11/27/21 1250    citalopram (CELEXA) tablet 20 mg  20 mg Oral Daily [facility], DO   20 mg at 11/30/21 0930    levothyroxine (SYNTHROID) tablet 100 mcg  100 mcg Oral Daily Liz Ellison DO   100 mcg at 11/30/21 0448    rosuvastatin (Dorchester Foster) tablet 5 mg  5 mg Oral Nightly Beti Berg, DO   5 mg at 11/29/21 2114    spironolactone (ALDACTONE) tablet 25 mg  25 mg Oral Daily Beti Berg, DO   25 mg at 11/30/21 4270    sodium chloride flush 0.9 % injection 5-40 mL  5-40 mL IntraVENous 2 times per day Beti Berg, DO   10 mL at 11/29/21 0911    sodium chloride flush 0.9 % injection 5-40 mL  5-40 mL IntraVENous PRN Beti Berg, DO        0.9 % sodium chloride infusion  25 mL IntraVENous PRN Beti Berg, DO        ondansetron (ZOFRAN-ODT) disintegrating tablet 4 mg  4 mg Oral Q8H PRN Beti Berg, DO        Or    [facility] injection 4 mg  4 mg IntraVENous Q6H PRN Beti Berg, DO   4 mg at 11/29/21 1459    dextrose 5 % and 0.45 % NaCl with KCl 20 mEq infusion   IntraVENous Continuous Beti Berg,  mL/hr at 11/30/21 0932 Rate Change at 11/30/21 0932    magnesium sulfate 2000 mg in 50 mL IVPB premix  2,000 mg IntraVENous PRN Beti Berg, DO        potassium chloride (KLOR-CON M) extended release tablet 40 mEq  40 mEq Oral PRN Beti Berg, DO        Or    potassium bicarb-citric acid (EFFER-K) effervescent tablet 40 mEq  40 mEq Oral PRN Beti Berg, DO        Or    potassium chloride 10 mEq/100 mL IVPB (Peripheral Line)  10 mEq IntraVENous PRN Beti Berg, DO        acetaminophen (TYLENOL) tablet 1,000 mg  1,000 mg Oral 3 times per day Beti Berg, DO   1,000 mg at 11/30/21 0447    pantoprazole (PROTONIX) tablet 40 mg  40 mg Oral Daily Beti Berg, DO   40 mg at 11/30/21 0448    gabapentin (NEURONTIN) capsule 300 mg  300 mg Oral Q8H Beti Berg, DO   300 mg at 11/28/21 1457     Allergies:  Morphine and Sulfa antibiotics    Social History:    Social History     Socioeconomic History    Marital status:      Spouse name: Not on file    Number of children: Not on file    Years of education: Not on file    Highest education level: Not on file   Occupational History    Not on file   Tobacco Use    Smoking status: Current Every Day Smoker     Packs/day: 0.50     Years: 52.00     Pack years: 26.00     Types: Cigarettes    Smokeless tobacco: Never Used   Vaping Use    Vaping Use: Never used   Substance and Sexual Activity    Alcohol use: Yes     Comment: every other week    Drug use: Yes     Types: Marijuana Juanetta Person)     Comment: every other week-inhalation    Sexual activity: Not on file   Other Topics Concern    Not on file   Social History Narrative    Not on file     Social Determinants of Health     Financial Resource Strain:     Difficulty of Paying Living Expenses: Not on file   Food Insecurity:     Worried About Running Out of Food in the Last Year: Not on file    Solomon of Food in the Last Year: Not on file   Transportation Needs:     Lack of Transportation (Medical): Not on file    Lack of Transportation (Non-Medical): Not on file   Physical Activity:     Days of Exercise per Week: Not on file    Minutes of Exercise per Session: Not on file   Stress:     Feeling of Stress : Not on file   Social Connections:     Frequency of Communication with Friends and Family: Not on file    Frequency of Social Gatherings with Friends and Family: Not on file    Attends Restoration Services: Not on file    Active Member of [address] [place] [address] [employer] or Organizations: Not on file    Attends Club or Organization Meetings: Not on file    Marital Status: Not on file   Intimate Partner Violence:     Fear of Current or Ex-Partner: Not on file    Emotionally Abused: Not on file    Physically Abused: Not on file    Sexually Abused: Not on file   Housing Stability:     Unable to Pay for Housing in the Last Year: Not on file    Number of [place] in the Last Year: Not on file    Unstable Housing in the Last Year: Not on file     Family History:   Family History   Problem Relation Age of Onset    High Blood Pressure Mother        · REVIEW OF SYSTEMS   Pertinent information as above.   Patient has anxiety disorder. PHYSICAL EXAM:    Vitals:    VITALS:  /63   Pulse 100   Temp 97.5 °F (36.4 °C) (Axillary)   Resp 30   Ht 5' 4.5\" (1.638 m)   Wt 187 lb 6 oz (85 kg)   LMP  (LMP Unknown)   SpO2 94%   BMI 31.67 kg/m²   24HR INTAKE/OUTPUT:      Intake/Output Summary (Last 24 hours) at 11/30/2021 1124  Last data filed at 11/30/2021 0606  Gross per 24 hour   Intake 2400 ml   Output 650 ml   Net 1750 ml       CONSTITUTIONAL:  awake, alert, cooperative, no apparent distress, and appears stated age  EYES: Pupils equal, round and reactive to light, extra ocular muscles intact, sclera clear, conjunctiva normal  ENT:  normocepalic, without obvious abnormality  NECK:  supple, symmetrical, trachea midline, no carotid bruit ,   No  JVD  BACK:  symmetric  LUNGS: Non-labored, good air exchange, clear to auscultation bilaterally, no crackles or wheezing  CARDIOVASCULAR:  Normal apical impulse, regular rate and rhythm, normal S1 and S2, no S3 or S4, and no murmur noted, no rub.  radial and bilateralpresent 2+  ABDOMEN:  No scars, normal bowel sounds, soft, she has diffuse tenderness,  MUSCULOSKELETAL:  there is no redness, warmth, or swelling of the joints   No leg edema. NEUROLOGIC:  Awake, alert, oriented to name, place and time. SKIN:  no bruising or bleeding, normal skin color, texture, turgor and no jaundice    DATA:   ECG: Preadmission EKG showed normal sinus rhythm with poor R wave progression and APCs  Repeat EKG yesterday showed flattening of the T wave in the anterior lateral leads  ECHO: Date:   Not performed to date  Stress Test: Patient stated that she had a stress test at [facility] few weeks ago.   Angiography:  Not performed to date    Cardiology Labs:  Recent Labs     11/30/21  0100   TROPONINT NOT REPORTED     Warfarin PT/INR:No results found for: PROTIME, INR, WARFARIN  CBC:  Lab Results   Component Value Date    WBC 6.4 11/30/2021    RBC 3.37 11/30/2021    HGB 9.9 11/30/2021    HCT 30.9 11/30/2021 MCV 91.7 11/30/2021    MCH 29.4 11/30/2021    MCHC 32.0 11/30/2021    RDW 15.2 11/30/2021     11/30/2021    MPV 10.7 11/30/2021     CMP:  Lab Results   Component Value Date     11/30/2021    K 4.8 11/30/2021     11/30/2021    CO2 18 11/30/2021    BUN 21 11/30/2021    CREATININE 1.74 11/30/2021    GFRAA 35 11/30/2021    LABGLOM 29 11/30/2021    GLUCOSE 94 11/30/2021    CALCIUM 8.2 11/30/2021     Magnesium:    Lab Results   Component Value Date    MG 1.5 11/30/2021     PTT:  No results found for: APTT, PTT  TSH:  No results found for: TSH  BMP:  Lab Results   Component Value Date     11/30/2021    K 4.8 11/30/2021     11/30/2021    CO2 18 11/30/2021    BUN 21 11/30/2021    CREATININE 1.74 11/30/2021    CALCIUM 8.2 11/30/2021    GFRAA 35 11/30/2021    LABGLOM 29 11/30/2021    GLUCOSE 94 11/30/2021     LIVER PROFILE:No results for input(s): AST, ALT, LABALBU, ALKPHOS, BILITOT, BILIDIR, IBILI, PROT, GLOB, ALBUMIN in the last 72 hours. FLP:  No results found for: CHOL, TRIG, HDL, LDLCHOLESTEROL          IMPRESSION  #1 atypical chest pain without evidence of acute coronary syndrome  2. History of hypertension, currently borderline low blood pressure  3. Probable anxiety disorder  4. Status post colon surgery    Patient Active Problem List   Diagnosis    Intestinal adhesions    Obesity (BMI 30-39. 9)    Urinary incontinence    Constipation    Ventral incisional hernia    Smoker    Stricture of sigmoid colon (HCC)           RECOMMENDATIONS:     Continue current medications  Management plan was discussed with patient     I think her chest pain was mainly epigastric and abdominal pain. Her cardiac enzymes remain normal.  I will repeat her EKG today and ambulate as tolerated. Patient informed me that she had a stress test at [facility] and it was normal approximately a month ago. At this point in time no need for further cardiac work-up.   Discussed with the patient and her  as well as  her nurse  Thank you for consultation      Electronically signed by Jamilah Andres MD on 11/30/2021 at 11:24 AM     CC: RAMON Razo

## 2021-11-30 NOTE — PROGRESS NOTES
Occupational Therapy  Facility/Department: [facility] PROGRESSIVE CARE  Daily Treatment Note  NAME: Sarai Genao  : 1950  MRN: [id number]    Date of Service: 2021    Discharge Recommendations:  Patient would benefit from continued therapy after discharge  OT Equipment Recommendations  Walker: Rolling  ADL Assistive Devices: Reacher; Sock-Aid Soft; Long-handled Shoe Horn; Long-handled Sponge; Emergency Alert System    Assessment   Performance deficits / Impairments: Decreased functional mobility ; Decreased safe awareness; Decreased balance; Decreased coordination; Decreased ADL status; Decreased endurance; Decreased high-level IADLs; Decreased posture  Assessment: Pt. agreed to complete UE exercise while sitting upright in bed. Pt. completed 5 exercises in 10 reps each to promote functional endurance. Pt. educated on HEP and reasons why mobility is important. Pt's  was present and both educated on home safety and energy conservation tech with planning/pacing day. Pt. limited by fatigue and weakness. Skilled OT warranted to promote functional I/safety to return pt to prior living arrangement with assist as needed. Prognosis: Good; Fair  OT Education: OT Role; Plan of Care; Transfer Training; Energy Conservation; Precautions; Family Education; ADL Adaptive Strategies; Home Exercise Program  Patient Education: Pt. educated on HEP and safety awareness after returning home. REQUIRES OT FOLLOW UP: Yes  Activity Tolerance  Activity Tolerance: Patient limited by fatigue; Patient limited by pain  Activity Tolerance: poor+  Safety Devices  Safety Devices in place: Yes  Type of devices: All fall risk precautions in place; Bed alarm in place; Call light within reach; Nurse notified; Left in bed; Patient at risk for falls         Patient Diagnosis(es): The encounter diagnosis was Chest pain, unspecified type.       has a past medical history of Abdominal pain, Arthritis, Constipation, COPD (chronic obstructive pulmonary disease) ([facility] [address] Depressed, GERD (gastroesophageal reflux disease), HLD (hyperlipidemia), HTN (hypertension), Hypothyroid, IBS (irritable bowel syndrome), Lumbar spondylolysis, Myofascial pain, Poor appetite, RLS (restless legs syndrome), and Wears glasses. has a past surgical history that includes Bladder surgery; Rectal surgery; Cystocele repair; Enterocele repair; Abdominal adhesion surgery (3/9/2015); Hysterectomy; Appendectomy; Colonoscopy; Endoscopy, colon, diagnostic; eye surgery; Dilatation, esophagus; hernia repair; Tonsillectomy; Abdomen surgery (11/24/2021); Cystocopy (11/24/2021); colectomy (N/A, 11/24/2021); Cystoscopy (11/24/2021); and sigmoidoscopy (11/24/2021). Restrictions  Restrictions/Precautions  Restrictions/Precautions: General Precautions, Fall Risk  Required Braces or Orthoses?: No  Required Braces or Orthoses  Other: Abdominal Binder  Position Activity Restriction  Other position/activity restrictions: NG to suction (Clamped per RN 11/27), palmer, 4L O2 NC, LUE IV, Ambulate pt, Up in chair, CLD 11/27, alarms  Subjective   General  Chart Reviewed: Yes  Patient assessed for rehabilitation services?: Yes  Response to previous treatment: Patient with no complaints from previous session  Family / Caregiver Present: Yes ( present)  Subjective  Subjective: \"I took a shower and I'm exhausted! \"  General Comment  Comments: Patient agreeable to OT treatment session      Orientation  Orientation  Overall Orientation Status: Within Functional Limits  Objective    ADL  Feeding: Setup  Grooming: Stand by assistance (at bedside)           Bed mobility  Comment: Pt. positioned upright in bed upon arrival to room. Bed mobility not tested but pt. agreed to complete UE exercises. Transfers  Transfer Comments: Not assessed           Cognition  Overall Cognitive Status: Exceptions  Arousal/Alertness: Appropriate responses to stimuli  Following Commands:  Follows one step commands with repetition; Follows one step commands with increased time  Attention Span: Attends with cues to redirect  Memory: Decreased short term memory; Decreased recall of recent events; Decreased recall of precautions  Safety Judgement: Decreased awareness of need for assistance; Decreased awareness of need for safety  Problem Solving: Decreased awareness of errors; Assistance required to identify errors made; Assistance required to correct errors made  Insights: Decreased awareness of deficits  Initiation: Requires cues for some  Sequencing: Requires cues for some     Perception  Overall Perceptual Status: WFL              Type of ROM/Therapeutic Exercise  Type of ROM/Therapeutic Exercise: AROM  Comment: Pt. agreed to complete B UE exercises to promote functional strength and endurance to return home.  Pt. ed on HEP  Exercises  Scapular Protraction: x3  Scapular Retraction: x3  Shoulder Flexion: x10  Shoulder Extension: x10  Horizontal ABduction: x10  Horizontal ADduction: x10  Elbow Flexion: x10  Elbow Extension: x10  Supination: x10  Pronation: x10        Plan   Plan  Times per week: 4-5x/week 1-2x/day as akila  Times per day: Daily  Current Treatment Recommendations: Strengthening, Balance Training, Functional Mobility Training, Safety Education & Training, Positioning, Endurance Training, Neuromuscular Re-education, Patient/Caregiver Education & Training, Equipment Evaluation, Education, & procurement, Home Management Training, Cognitive/Perceptual Training, Pain Management, Self-Care / ADL  Plan Comment: Cont with stated POC             AM-PAC Score        AM-PAC Inpatient Daily Activity Raw Score: 12 (11/30/21 1457)  AM-PAC Inpatient ADL T-Scale Score : 30.6 (11/30/21 1457)  ADL Inpatient CMS 0-100% Score: 66.57 (11/30/21 1457)  ADL Inpatient CMS G-Code Modifier : CL (11/30/21 1457)    Goals  Short term goals  Time Frame for Short term goals: by discharge, pt to demo  Short term goal 1: bed mob tasks with use of rail/lproper log rolling as needed to min assist of 1. Short term goal 2: I with BUE HEP with use of handouts to maintain functional use as well as abd precautions for pain mgt tech. Short term goal 3: UB ADL to set up and LB ADL to mod assist of 1 and use of AD/AE. Short term goal 4: toileting tasks with use of BSC/AD and grab bar to mod assist of 1. Short term goal 5: ADL transfers and functional mob with AD to min assist of 1. Long term goals  Long term goal 1: Pt to stand with SBA and AD akila > 6 mins to reduce falls in function. Long term goal 2: Pt/caregivers to be I with pressure relief, EC/WS and fall prevention tech as well as DME/AD and AE recommendations with use of handouts. Patient Goals   Patient goals : Get home soon! Therapy Time   Co-tx Concurrent Group Co-treatment   Time In 0127         Time Out 0200         Minutes 35              RN reports patient is medically stable for therapy treatment this date. Chart reviewed prior to treatment and patient is agreeable for therapy. All lines intact and patient positioned comfortably at end of treatment. All patient needs addressed prior to ending therapy session.       TAM Watkins

## 2021-11-30 NOTE — PROGRESS NOTES
General Surgery:  Daily Progress Note                    PATIENT NAME: Adarsh Cordova     TODAY'S DATE: 11/30/2021, 6:57 AM  CC:  I feel miserable    SUBJECTIVE:     Pt seen and examined at bedside. Postop from exploratory laparotomy with extensive lysis of adhesions, explantation pelvic mesh, ileocecectomy with ileocolonic anastomosis, sigmoidoscopy x2, bilateral ureteral stent placement 11/24/2021. Patient seen and examined, vital signs stable, patient complaining of upper abdominal/lower chest pain overnight 10 out of 10 received dose of Dilaudid, got EKG and troponin draw along with cardiology consult. EKG and troponin normal, cardiology to see today. The patient endorses some blood in her hat during urination unsure if this was from her urine or from her rectum. Patient complaining of worse abdominal pain this morning. Is receiving breathing treatments, is tolerating liquids but no solids at this time. No nausea or vomiting. OBJECTIVE:   VITALS:  /65   Pulse 93   Temp 97.9 °F (36.6 °C) (Oral)   Resp 21   Ht 5' 4.5\" (1.638 m)   Wt 187 lb 6 oz (85 kg)   LMP  (LMP Unknown)   SpO2 93%   BMI 31.67 kg/m²      INTAKE/OUTPUT:      Intake/Output Summary (Last 24 hours) at 11/30/2021 3351  Last data filed at 11/30/2021 3725  Gross per 24 hour   Intake 2775 ml   Output 650 ml   Net 2125 ml       PHYSICAL EXAM:  General Appearance: awake, alert, oriented, in no acute distress  HEENT:  Normocephalic, atraumatic, mucus membranes moist   Heart: Heart regular rate and rhythm  Lungs: No chest wall tenderness., Breathing Pattern: regular, no distress  Abdomen: Soft, appropriately tender, staple line clean, dry, dressing without strike through  Extremities: No cyanosis, pitting edema, rashes noted. Skin: Skin color, texture, turgor normal. No rashes or lesions.     Data:  CBC with Differential:    Lab Results   Component Value Date    WBC 8.3 11/29/2021    RBC 3.05 11/29/2021    HGB 9.1 11/29/2021 HCT 28.6 11/29/2021     11/29/2021    MCV 93.8 11/29/2021    MCH 29.8 11/29/2021    MCHC 31.8 11/29/2021    RDW 14.9 11/29/2021    LYMPHOPCT 10 11/29/2021    MONOPCT 9 11/29/2021    BASOPCT 1 11/29/2021    MONOSABS 0.73 11/29/2021    LYMPHSABS 0.85 11/29/2021    EOSABS 0.09 11/29/2021    BASOSABS 0.07 11/29/2021    DIFFTYPE NOT REPORTED 11/29/2021     BMP:    Lab Results   Component Value Date     11/29/2021    K 3.9 11/29/2021     11/29/2021    CO2 23 11/29/2021    BUN 10 11/29/2021    CREATININE 0.52 11/29/2021    CALCIUM 8.7 11/29/2021    GFRAA >60 11/29/2021    LABGLOM >60 11/29/2021    GLUCOSE 99 11/29/2021     Magnesium:    Lab Results   Component Value Date    MG 1.8 11/27/2021     Phosphorus:    Lab Results   Component Value Date    PHOS 2.1 11/27/2021       Radiology Review:  XR ABDOMEN (KUB) (SINGLE AP VIEW)    Result Date: 11/29/2021  Nonspecific, nonobstructive bowel gas pattern. Residual pneumoperitoneum in the setting of recent laparotomy. ASSESSMENT:  Active Hospital Problems    Diagnosis Date Noted    Stricture of sigmoid colon [facility]) [U89.545] 11/24/2021       80 y.o. female with sigmoid colon stricture status post exploratory laparotomy with explantation pelvic mesh and mobilization sigmoid and proximal rectum, status post ileocecectomy with ileocolonic anastomosis. Plan:  Diet: full liquids with supplements  Titrate O2 to maintain SpO2 >87%, initiate Duonebs q4h and acapella  Check CBC, BMP, mag Karen this morning  Continue IV fluid at 50 mL/h until patient taking more p.o.   Analgesia: Multimodal pain therapy, Dilaudid added one-time  DVT prophylaxis: lovenox 40mg daily  Activity:  Continue to encourage ambulation/activity w/ PT/OT  Get patient up in chair  Patient needs to take shower this morning, remove dressing and allow cleansing with gentle soap over the midline, place new dry dressing afterwards  Continue to encourage incentive spirometry    Electronically signed by Angus Vanesas DO  on 11/30/2021 at 6:57 AM                 I Dr. Ashok Costello saw and examined the patient. I have edited the above and agree with the above. Ashley Chavez  Colorectal Surgery

## 2021-11-30 NOTE — PROGRESS NOTES
Patient called RN complaining about 10/10 upper abd / chest pain. I took vitals which were all stable and paged Dr. Mick Bowers. I did an EKG in the meantime which reads NSR with nonspecific T wave abnormality. Patients pain does seem to be reproducible while pressing on her abd. She is diaphoretic in the face. Still awaiting call back from Dr. Mick Bowers in regards to our next step. - I spoke to Dr. Mick Bowers who added a consult to cardio, a 1x dose of dilauded and a troponin. Cardio notified of consult but no new orders. Patient states pain is subsiding.  Will continue monitoring

## 2021-11-30 NOTE — PROGRESS NOTES
Intestinal adhesions    Obesity (BMI 30-39. 9)    Urinary incontinence    Constipation    Ventral incisional hernia    Smoker    Stricture of sigmoid colon (HCC)       Plan: CT imaging showed no hydronephrosis, some bladder distention noted, Rose catheter reinserted we will monitor urinary output    Margaret Wilson MD  5:46 PM 11/30/2021

## 2021-11-30 NOTE — PLAN OF CARE
Problem: HEMODYNAMIC STATUS  Goal: Patient has stable vital signs and fluid balance  Outcome: Met This Shift     Problem: OXYGENATION/RESPIRATORY FUNCTION  Goal: Patient will achieve/maintain normal respiratory rate/effort  Outcome: Met This Shift     Problem: MOBILITY  Goal: Early mobilization is achieved  Outcome: Met This Shift     Problem: ELIMINATION  Goal: Elimination patterns are normal or improving  Description: Elimination patterns return to pre-surgery normal patterns  Outcome: Met This Shift     Problem: SKIN INTEGRITY  Goal: Skin integrity is maintained or improved  Outcome: Met This Shift     Problem: Pain:  Goal: Pain level will decrease  Description: Pain level will decrease  Outcome: Met This Shift  Goal: Control of acute pain  Description: Control of acute pain  Outcome: Met This Shift  Goal: Control of chronic pain  Description: Control of chronic pain  Outcome: Met This Shift

## 2021-11-30 NOTE — PROGRESS NOTES
Physical Therapy  Facility/Department: [facility] CARE  Daily Treatment Note  NAME: Evelin Summers  : 1950  MRN: [id number]    Date of Service: 2021    Discharge Recommendations:  Patient would benefit from continued therapy after discharge        Assessment   Body structures, Functions, Activity limitations: Decreased functional mobility ; Decreased strength; Decreased safe awareness; Decreased balance; Decreased endurance; Increased pain  Assessment: pt limited by willingness to participate  Activity Tolerance  Activity Tolerance: Patient limited by endurance; Patient limited by fatigue     Patient Diagnosis(es): The encounter diagnosis was Chest pain, unspecified type. has a past medical history of Abdominal pain, Arthritis, Constipation, COPD (chronic obstructive pulmonary disease) ([address] Depressed, GERD (gastroesophageal reflux disease), HLD (hyperlipidemia), HTN (hypertension), Hypothyroid, IBS (irritable bowel syndrome), Lumbar spondylolysis, Myofascial pain, Poor appetite, RLS (restless legs syndrome), and Wears glasses. has a past surgical history that includes Bladder surgery; Rectal surgery; Cystocele repair; Enterocele repair; Abdominal adhesion surgery (3/9/2015); Hysterectomy; Appendectomy; Colonoscopy; Endoscopy, colon, diagnostic; eye surgery; Dilatation, esophagus; hernia repair; Tonsillectomy; Abdomen surgery (2021); Cystocopy (2021); colectomy (N/A, 2021); Cystoscopy (2021); and sigmoidoscopy (2021).     Restrictions  Restrictions/Precautions  Restrictions/Precautions: General Precautions, Fall Risk  Required Braces or Orthoses?: No  Required Braces or Orthoses  Other: Abdominal Binder  Position Activity Restriction  Other position/activity restrictions: NG to suction (Clamped per RN ), palmer, 4L O2 NC, LUE IV, Ambulate pt, Up in chair, CLD , alarms  Subjective   General  Chart Reviewed: Yes  Subjective  Subjective: Patient reports adominal pain 7-8/10, she reports she is tired from showering and refuses OOB at this time   General Comment  Comments: pt adamantly refuses to amb. too tired from shower    Pain Screening  Patient Currently in Pain: Yes  Pain Assessment  Pain Assessment: 0-10  Pain Level: 6  Pain Location: Abdomen  Vital Signs  Patient Currently in Pain: Yes       Orientation     Cognition      Objective         Ambulation  Ambulation?: No  Ambulation 1  Comments: pt eduacted on the importance of mobility, and ambulation but pt still refuses OOB      Balance  Posture: Fair  Sitting - Static: Good  Sitting - Dynamic: Good; -  Standing - Static: Fair; +  Standing - Dynamic: Fair; -  Exercises  Comments: supine LE AROM x 15 reps                        G-Code     OutComes Score                                                     AM-PAC Score  AM-PAC Inpatient Mobility Raw Score : 15 (11/30/21 1523)  AM-PAC Inpatient T-Scale Score : 39.45 (11/30/21 1523)  Mobility Inpatient CMS 0-100% Score: 57.7 (11/30/21 1523)  Mobility Inpatient CMS G-Code Modifier : CK (11/30/21 1523)          Goals  Short term goals  Time Frame for Short term goals: 14 visits  Short term goal 1: Pt to demonstrate bed mobility using log-roll technique Claus  Short term goal 2: Pt to perform STS transfers w/ least restrictive AD Claus  Short term goal 3: Pt to ambulate at least 300ft w/ least restrictive AD Claus  Short term goal 4: Pt to ascend/descend 2 steps w/ handrails Loulou  Short term goal 5: Pt to actively participate in at least 30 minutes of physical therapy for ther act, ther ex, balance, gait, and endurance training  Patient Goals   Patient goals :  To go home    Plan    Plan  Times per week: 1-2x/day, 6-7x/week  Current Treatment Recommendations: Strengthening, Balance Training, Functional Mobility Training, Stair training, Gait Training, Transfer Training, Endurance Training, Neuromuscular Re-education, Safety Education & Training, Home Exercise Program, Equipment Evaluation, Education, & procurement, Patient/Caregiver Education & Training  Safety Devices  Type of devices: Call light within reach, Chair alarm in place, Gait belt, Nurse notified, Left in chair, All fall risk precautions in place  Restraints  Initially in place: No     Therapy Time   Individual Concurrent Group Co-treatment   Time In  1434         Time Out  1444         Minutes  10                 [address], [place]

## 2021-11-30 NOTE — PROGRESS NOTES
Patient down to stat CT abdomen/pelvis d/t decrease in urinary output and elevated CRE. See chart for more details.

## 2021-12-01 ENCOUNTER — APPOINTMENT (OUTPATIENT)
Dept: GENERAL RADIOLOGY | Age: 71
DRG: 329 | End: 2021-12-01
Attending: COLON & RECTAL SURGERY
Payer: MEDICARE

## 2021-12-01 ENCOUNTER — ANESTHESIA EVENT (OUTPATIENT)
Dept: OPERATING ROOM | Age: 71
DRG: 329 | End: 2021-12-01
Payer: MEDICARE

## 2021-12-01 ENCOUNTER — ANESTHESIA (OUTPATIENT)
Dept: OPERATING ROOM | Age: 71
DRG: 329 | End: 2021-12-01
Payer: MEDICARE

## 2021-12-01 VITALS
OXYGEN SATURATION: 97 % | RESPIRATION RATE: 15 BRPM | SYSTOLIC BLOOD PRESSURE: 139 MMHG | DIASTOLIC BLOOD PRESSURE: 64 MMHG | TEMPERATURE: 86.2 F

## 2021-12-01 LAB
-: NORMAL
ABSOLUTE EOS #: 0 K/UL (ref 0–0.4)
ABSOLUTE IMMATURE GRANULOCYTE: 0.04 K/UL (ref 0–0.3)
ABSOLUTE LYMPH #: 0.29 K/UL (ref 1–4.8)
ABSOLUTE MONO #: 0.21 K/UL (ref 0.2–0.8)
ALBUMIN SERPL-MCNC: 2.1 G/DL (ref 3.5–5.2)
ALBUMIN/GLOBULIN RATIO: ABNORMAL (ref 1–2.5)
ALLEN TEST: ABNORMAL
ALLEN TEST: ABNORMAL
ALP BLD-CCNC: 79 U/L (ref 35–104)
ALT SERPL-CCNC: 6 U/L (ref 5–33)
ANION GAP SERPL CALCULATED.3IONS-SCNC: 12 MMOL/L (ref 9–17)
ANION GAP SERPL CALCULATED.3IONS-SCNC: 14 MMOL/L (ref 9–17)
ANION GAP: 13 MMOL/L (ref 7–16)
AST SERPL-CCNC: 16 U/L
BASOPHILS # BLD: 2 %
BASOPHILS ABSOLUTE: 0.08 K/UL (ref 0–0.2)
BILIRUB SERPL-MCNC: 0.55 MG/DL (ref 0.3–1.2)
BUN BLDV-MCNC: 20 MG/DL (ref 8–23)
BUN BLDV-MCNC: 28 MG/DL (ref 8–23)
BUN/CREAT BLD: 32 (ref 9–20)
BUN/CREAT BLD: 34 (ref 9–20)
CALCIUM SERPL-MCNC: 7 MG/DL (ref 8.6–10.4)
CALCIUM SERPL-MCNC: 8.2 MG/DL (ref 8.6–10.4)
CHLORIDE BLD-SCNC: 100 MMOL/L (ref 98–107)
CHLORIDE BLD-SCNC: 109 MMOL/L (ref 98–107)
CO2: 15 MMOL/L (ref 20–31)
CO2: 19 MMOL/L (ref 20–31)
CREAT SERPL-MCNC: 0.62 MG/DL (ref 0.5–0.9)
CREAT SERPL-MCNC: 0.83 MG/DL (ref 0.5–0.9)
DIFFERENTIAL TYPE: ABNORMAL
EKG ATRIAL RATE: 91 BPM
EKG P AXIS: 69 DEGREES
EKG P-R INTERVAL: 132 MS
EKG Q-T INTERVAL: 304 MS
EKG QRS DURATION: 82 MS
EKG QTC CALCULATION (BAZETT): 373 MS
EKG R AXIS: 34 DEGREES
EKG T AXIS: 18 DEGREES
EKG VENTRICULAR RATE: 91 BPM
EOSINOPHIL,URINE: NORMAL
EOSINOPHILS RELATIVE PERCENT: 0 % (ref 1–4)
FIO2: 100
FIO2: 70
GFR AFRICAN AMERICAN: >60 ML/MIN
GFR AFRICAN AMERICAN: >60 ML/MIN
GFR NON-AFRICAN AMERICAN: >60 ML/MIN
GFR NON-AFRICAN AMERICAN: >60 ML/MIN
GFR NON-AFRICAN AMERICAN: NORMAL ML/MIN
GFR SERPL CREATININE-BSD FRML MDRD: ABNORMAL ML/MIN/{1.73_M2}
GFR SERPL CREATININE-BSD FRML MDRD: NORMAL ML/MIN
GFR SERPL CREATININE-BSD FRML MDRD: NORMAL ML/MIN/{1.73_M2}
GLUCOSE BLD-MCNC: 100 MG/DL (ref 74–100)
GLUCOSE BLD-MCNC: 107 MG/DL (ref 70–99)
GLUCOSE BLD-MCNC: 134 MG/DL (ref 65–105)
GLUCOSE BLD-MCNC: 60 MG/DL (ref 65–105)
GLUCOSE BLD-MCNC: 65 MG/DL (ref 70–99)
GLUCOSE BLD-MCNC: 95 MG/DL (ref 65–105)
HCT VFR BLD CALC: 26.3 % (ref 36.3–47.1)
HCT VFR BLD CALC: 26.9 % (ref 36.3–47.1)
HEMOGLOBIN: 8.1 G/DL (ref 11.9–15.1)
HEMOGLOBIN: 8.6 G/DL (ref 11.9–15.1)
IMMATURE GRANULOCYTES: 1 %
LYMPHOCYTES # BLD: 7 % (ref 24–44)
MAGNESIUM: 2.6 MG/DL (ref 1.6–2.6)
MCH RBC QN AUTO: 29.6 PG (ref 25.2–33.5)
MCH RBC QN AUTO: 29.8 PG (ref 25.2–33.5)
MCHC RBC AUTO-ENTMCNC: 30.1 G/DL (ref 28.4–34.8)
MCHC RBC AUTO-ENTMCNC: 32.7 G/DL (ref 28.4–34.8)
MCV RBC AUTO: 91 FL (ref 82.6–102.9)
MCV RBC AUTO: 98.2 FL (ref 82.6–102.9)
MODE: ABNORMAL
MODE: ABNORMAL
MONOCYTES # BLD: 5 % (ref 1–7)
MORPHOLOGY: ABNORMAL
NEGATIVE BASE EXCESS, ART: 6 (ref 0–2)
NEGATIVE BASE EXCESS, ART: 8 (ref 0–2)
NRBC AUTOMATED: 0 PER 100 WBC
NRBC AUTOMATED: ABNORMAL PER 100 WBC
O2 DEVICE/FLOW/%: ABNORMAL
O2 DEVICE/FLOW/%: ABNORMAL
PATIENT TEMP: 37
PATIENT TEMP: 37
PDW BLD-RTO: 15.4 % (ref 11.8–14.4)
PDW BLD-RTO: 15.8 % (ref 11.8–14.4)
PHOSPHORUS: 3.8 MG/DL (ref 2.6–4.5)
PLATELET # BLD: 250 K/UL (ref 138–453)
PLATELET # BLD: 274 K/UL (ref 138–453)
PLATELET ESTIMATE: ABNORMAL
PMV BLD AUTO: 10.6 FL (ref 8.1–13.5)
PMV BLD AUTO: 11.1 FL (ref 8.1–13.5)
POC BUN: 22 MG/DL (ref 8–26)
POC CHLORIDE: 105 MMOL/L (ref 98–107)
POC CREATININE: 0.9 MG/DL (ref 0.51–1.19)
POC HCO3: 18 MMOL/L (ref 21–28)
POC HCO3: 18.4 MMOL/L (ref 21–28)
POC HEMATOCRIT: 21 % (ref 36–46)
POC HEMOGLOBIN: 7.1 G/DL (ref 12–16)
POC IONIZED CALCIUM: 1.04 MMOL/L (ref 1.15–1.33)
POC O2 SATURATION: 96 % (ref 94–98)
POC O2 SATURATION: 97 % (ref 94–98)
POC PCO2 TEMP: ABNORMAL MM HG
POC PCO2 TEMP: ABNORMAL MM HG
POC PCO2: 32.6 MM HG (ref 35–48)
POC PCO2: 38.4 MM HG (ref 35–48)
POC PH TEMP: ABNORMAL
POC PH TEMP: ABNORMAL
POC PH: 7.28 (ref 7.35–7.45)
POC PH: 7.36 (ref 7.35–7.45)
POC PO2 TEMP: ABNORMAL MM HG
POC PO2 TEMP: ABNORMAL MM HG
POC PO2: 94.9 MM HG (ref 83–108)
POC PO2: 95.3 MM HG (ref 83–108)
POC POTASSIUM: 4.1 MMOL/L (ref 3.5–4.5)
POC SODIUM: 136 MMOL/L (ref 138–146)
POC TCO2: 19 MMOL/L (ref 22–30)
POSITIVE BASE EXCESS, ART: ABNORMAL (ref 0–3)
POSITIVE BASE EXCESS, ART: ABNORMAL (ref 0–3)
POTASSIUM SERPL-SCNC: 4.1 MMOL/L (ref 3.7–5.3)
POTASSIUM SERPL-SCNC: 4.8 MMOL/L (ref 3.7–5.3)
RBC # BLD: 2.74 M/UL (ref 3.95–5.11)
RBC # BLD: 2.89 M/UL (ref 3.95–5.11)
RBC # BLD: ABNORMAL 10*6/UL
REASON FOR REJECTION: NORMAL
SAMPLE SITE: ABNORMAL
SAMPLE SITE: ABNORMAL
SARS-COV-2, RAPID: NOT DETECTED
SEG NEUTROPHILS: 85 % (ref 36–66)
SEGMENTED NEUTROPHILS ABSOLUTE COUNT: 3.48 K/UL (ref 1.8–7.7)
SODIUM BLD-SCNC: 133 MMOL/L (ref 135–144)
SODIUM BLD-SCNC: 136 MMOL/L (ref 135–144)
SPECIMEN DESCRIPTION: NORMAL
TCO2 (CALC), ART: ABNORMAL MMOL/L (ref 22–29)
TCO2 (CALC), ART: ABNORMAL MMOL/L (ref 22–29)
TOTAL PROTEIN: 4.3 G/DL (ref 6.4–8.3)
WBC # BLD: 2.5 K/UL (ref 3.5–11.3)
WBC # BLD: 4.1 K/UL (ref 3.5–11.3)
WBC # BLD: ABNORMAL 10*3/UL
ZZ NTE CLEAN UP: ORDERED TEST: NORMAL
ZZ NTE WITH NAME CLEAN UP: SPECIMEN SOURCE: NORMAL

## 2021-12-01 PROCEDURE — 82565 ASSAY OF CREATININE: CPT

## 2021-12-01 PROCEDURE — 85027 COMPLETE CBC AUTOMATED: CPT

## 2021-12-01 PROCEDURE — 2500000003 HC RX 250 WO HCPCS: Performed by: NURSE ANESTHETIST, CERTIFIED REGISTERED

## 2021-12-01 PROCEDURE — 94002 VENT MGMT INPAT INIT DAY: CPT

## 2021-12-01 PROCEDURE — 6360000002 HC RX W HCPCS: Performed by: INTERNAL MEDICINE

## 2021-12-01 PROCEDURE — 2700000000 HC OXYGEN THERAPY PER DAY

## 2021-12-01 PROCEDURE — 2580000003 HC RX 258: Performed by: STUDENT IN AN ORGANIZED HEALTH CARE EDUCATION/TRAINING PROGRAM

## 2021-12-01 PROCEDURE — 37799 UNLISTED PX VASCULAR SURGERY: CPT

## 2021-12-01 PROCEDURE — P9041 ALBUMIN (HUMAN),5%, 50ML: HCPCS | Performed by: NURSE ANESTHETIST, CERTIFIED REGISTERED

## 2021-12-01 PROCEDURE — 3700000001 HC ADD 15 MINUTES (ANESTHESIA): Performed by: COLON & RECTAL SURGERY

## 2021-12-01 PROCEDURE — 86900 BLOOD TYPING SEROLOGIC ABO: CPT

## 2021-12-01 PROCEDURE — 87635 SARS-COV-2 COVID-19 AMP PRB: CPT

## 2021-12-01 PROCEDURE — 0BH17EZ INSERTION OF ENDOTRACHEAL AIRWAY INTO TRACHEA, VIA NATURAL OR ARTIFICIAL OPENING: ICD-10-PCS | Performed by: COLON & RECTAL SURGERY

## 2021-12-01 PROCEDURE — 84484 ASSAY OF TROPONIN QUANT: CPT

## 2021-12-01 PROCEDURE — 86850 RBC ANTIBODY SCREEN: CPT

## 2021-12-01 PROCEDURE — 6370000000 HC RX 637 (ALT 250 FOR IP): Performed by: INTERNAL MEDICINE

## 2021-12-01 PROCEDURE — 3700000000 HC ANESTHESIA ATTENDED CARE: Performed by: COLON & RECTAL SURGERY

## 2021-12-01 PROCEDURE — 86920 COMPATIBILITY TEST SPIN: CPT

## 2021-12-01 PROCEDURE — 5A1955Z RESPIRATORY VENTILATION, GREATER THAN 96 CONSECUTIVE HOURS: ICD-10-PCS | Performed by: COLON & RECTAL SURGERY

## 2021-12-01 PROCEDURE — 82947 ASSAY GLUCOSE BLOOD QUANT: CPT

## 2021-12-01 PROCEDURE — 2580000003 HC RX 258: Performed by: NURSE ANESTHETIST, CERTIFIED REGISTERED

## 2021-12-01 PROCEDURE — 86901 BLOOD TYPING SEROLOGIC RH(D): CPT

## 2021-12-01 PROCEDURE — 2500000003 HC RX 250 WO HCPCS

## 2021-12-01 PROCEDURE — 83735 ASSAY OF MAGNESIUM: CPT

## 2021-12-01 PROCEDURE — 2720000010 HC SURG SUPPLY STERILE: Performed by: COLON & RECTAL SURGERY

## 2021-12-01 PROCEDURE — 82803 BLOOD GASES ANY COMBINATION: CPT

## 2021-12-01 PROCEDURE — 71045 X-RAY EXAM CHEST 1 VIEW: CPT

## 2021-12-01 PROCEDURE — 3600000002 HC SURGERY LEVEL 2 BASE: Performed by: COLON & RECTAL SURGERY

## 2021-12-01 PROCEDURE — 6360000002 HC RX W HCPCS: Performed by: NURSE ANESTHETIST, CERTIFIED REGISTERED

## 2021-12-01 PROCEDURE — 2000000000 HC ICU R&B

## 2021-12-01 PROCEDURE — 85025 COMPLETE CBC W/AUTO DIFF WBC: CPT

## 2021-12-01 PROCEDURE — 84100 ASSAY OF PHOSPHORUS: CPT

## 2021-12-01 PROCEDURE — 87040 BLOOD CULTURE FOR BACTERIA: CPT

## 2021-12-01 PROCEDURE — 80053 COMPREHEN METABOLIC PANEL: CPT

## 2021-12-01 PROCEDURE — 94761 N-INVAS EAR/PLS OXIMETRY MLT: CPT

## 2021-12-01 PROCEDURE — 6360000002 HC RX W HCPCS: Performed by: COLON & RECTAL SURGERY

## 2021-12-01 PROCEDURE — 2580000003 HC RX 258: Performed by: INTERNAL MEDICINE

## 2021-12-01 PROCEDURE — 94640 AIRWAY INHALATION TREATMENT: CPT

## 2021-12-01 PROCEDURE — 82330 ASSAY OF CALCIUM: CPT

## 2021-12-01 PROCEDURE — 84520 ASSAY OF UREA NITROGEN: CPT

## 2021-12-01 PROCEDURE — 2709999900 HC NON-CHARGEABLE SUPPLY: Performed by: COLON & RECTAL SURGERY

## 2021-12-01 PROCEDURE — 36415 COLL VENOUS BLD VENIPUNCTURE: CPT

## 2021-12-01 PROCEDURE — 0D1E0Z4 BYPASS LARGE INTESTINE TO CUTANEOUS, OPEN APPROACH: ICD-10-PCS | Performed by: COLON & RECTAL SURGERY

## 2021-12-01 PROCEDURE — 6360000002 HC RX W HCPCS: Performed by: STUDENT IN AN ORGANIZED HEALTH CARE EDUCATION/TRAINING PROGRAM

## 2021-12-01 PROCEDURE — 85014 HEMATOCRIT: CPT

## 2021-12-01 PROCEDURE — 3600000012 HC SURGERY LEVEL 2 ADDTL 15MIN: Performed by: COLON & RECTAL SURGERY

## 2021-12-01 PROCEDURE — 80051 ELECTROLYTE PANEL: CPT

## 2021-12-01 PROCEDURE — 6370000000 HC RX 637 (ALT 250 FOR IP): Performed by: STUDENT IN AN ORGANIZED HEALTH CARE EDUCATION/TRAINING PROGRAM

## 2021-12-01 PROCEDURE — 80048 BASIC METABOLIC PNL TOTAL CA: CPT

## 2021-12-01 PROCEDURE — 0W9J00Z DRAINAGE OF PELVIC CAVITY WITH DRAINAGE DEVICE, OPEN APPROACH: ICD-10-PCS | Performed by: COLON & RECTAL SURGERY

## 2021-12-01 RX ORDER — FENTANYL CITRATE 50 UG/ML
25 INJECTION, SOLUTION INTRAMUSCULAR; INTRAVENOUS
Status: DISCONTINUED | OUTPATIENT
Start: 2021-12-01 | End: 2021-12-05

## 2021-12-01 RX ORDER — HYDROMORPHONE HCL 110MG/55ML
PATIENT CONTROLLED ANALGESIA SYRINGE INTRAVENOUS PRN
Status: DISCONTINUED | OUTPATIENT
Start: 2021-12-01 | End: 2021-12-01 | Stop reason: SDUPTHER

## 2021-12-01 RX ORDER — ALBUMIN, HUMAN INJ 5% 5 %
SOLUTION INTRAVENOUS PRN
Status: DISCONTINUED | OUTPATIENT
Start: 2021-12-01 | End: 2021-12-01 | Stop reason: SDUPTHER

## 2021-12-01 RX ORDER — POTASSIUM CHLORIDE 7.45 MG/ML
10 INJECTION INTRAVENOUS PRN
Status: DISCONTINUED | OUTPATIENT
Start: 2021-12-01 | End: 2021-12-24 | Stop reason: HOSPADM

## 2021-12-01 RX ORDER — SODIUM CHLORIDE, SODIUM LACTATE, POTASSIUM CHLORIDE, CALCIUM CHLORIDE 600; 310; 30; 20 MG/100ML; MG/100ML; MG/100ML; MG/100ML
INJECTION, SOLUTION INTRAVENOUS CONTINUOUS PRN
Status: DISCONTINUED | OUTPATIENT
Start: 2021-12-01 | End: 2021-12-01

## 2021-12-01 RX ORDER — CEFAZOLIN SODIUM 1 G/3ML
INJECTION, POWDER, FOR SOLUTION INTRAMUSCULAR; INTRAVENOUS PRN
Status: DISCONTINUED | OUTPATIENT
Start: 2021-12-01 | End: 2021-12-01 | Stop reason: SDUPTHER

## 2021-12-01 RX ORDER — PROPOFOL 10 MG/ML
INJECTION, EMULSION INTRAVENOUS PRN
Status: DISCONTINUED | OUTPATIENT
Start: 2021-12-01 | End: 2021-12-01 | Stop reason: SDUPTHER

## 2021-12-01 RX ORDER — SODIUM CHLORIDE 9 MG/ML
INJECTION, SOLUTION INTRAVENOUS CONTINUOUS PRN
Status: DISCONTINUED | OUTPATIENT
Start: 2021-12-01 | End: 2021-12-01 | Stop reason: SDUPTHER

## 2021-12-01 RX ORDER — PHENYLEPHRINE HCL IN 0.9% NACL 1 MG/10 ML
SYRINGE (ML) INTRAVENOUS PRN
Status: DISCONTINUED | OUTPATIENT
Start: 2021-12-01 | End: 2021-12-01 | Stop reason: SDUPTHER

## 2021-12-01 RX ORDER — DEXTROSE MONOHYDRATE 50 MG/ML
100 INJECTION, SOLUTION INTRAVENOUS PRN
Status: DISCONTINUED | OUTPATIENT
Start: 2021-12-01 | End: 2021-12-24 | Stop reason: HOSPADM

## 2021-12-01 RX ORDER — SUCCINYLCHOLINE CHLORIDE 20 MG/ML
INJECTION INTRAMUSCULAR; INTRAVENOUS PRN
Status: DISCONTINUED | OUTPATIENT
Start: 2021-12-01 | End: 2021-12-01 | Stop reason: SDUPTHER

## 2021-12-01 RX ORDER — SODIUM HYPOCHLORITE 1.25 MG/ML
SOLUTION TOPICAL 2 TIMES DAILY
Status: DISPENSED | OUTPATIENT
Start: 2021-12-01 | End: 2021-12-04

## 2021-12-01 RX ORDER — IPRATROPIUM BROMIDE AND ALBUTEROL SULFATE 2.5; .5 MG/3ML; MG/3ML
1 SOLUTION RESPIRATORY (INHALATION) EVERY 4 HOURS
Status: DISCONTINUED | OUTPATIENT
Start: 2021-12-01 | End: 2021-12-04

## 2021-12-01 RX ORDER — NICOTINE POLACRILEX 4 MG
15 LOZENGE BUCCAL PRN
Status: DISCONTINUED | OUTPATIENT
Start: 2021-12-01 | End: 2021-12-24 | Stop reason: HOSPADM

## 2021-12-01 RX ORDER — SODIUM HYPOCHLORITE 1.25 MG/ML
SOLUTION TOPICAL PRN
Status: DISCONTINUED | OUTPATIENT
Start: 2021-12-01 | End: 2021-12-01

## 2021-12-01 RX ORDER — POTASSIUM CHLORIDE 20 MEQ/1
40 TABLET, EXTENDED RELEASE ORAL PRN
Status: DISCONTINUED | OUTPATIENT
Start: 2021-12-01 | End: 2021-12-24 | Stop reason: HOSPADM

## 2021-12-01 RX ORDER — ROCURONIUM BROMIDE 10 MG/ML
INJECTION, SOLUTION INTRAVENOUS PRN
Status: DISCONTINUED | OUTPATIENT
Start: 2021-12-01 | End: 2021-12-01 | Stop reason: SDUPTHER

## 2021-12-01 RX ORDER — SODIUM CHLORIDE, SODIUM LACTATE, POTASSIUM CHLORIDE, AND CALCIUM CHLORIDE .6; .31; .03; .02 G/100ML; G/100ML; G/100ML; G/100ML
500 INJECTION, SOLUTION INTRAVENOUS ONCE
Status: COMPLETED | OUTPATIENT
Start: 2021-12-01 | End: 2021-12-01

## 2021-12-01 RX ORDER — DEXTROSE MONOHYDRATE 25 G/50ML
12.5 INJECTION, SOLUTION INTRAVENOUS PRN
Status: DISCONTINUED | OUTPATIENT
Start: 2021-12-01 | End: 2021-12-24 | Stop reason: HOSPADM

## 2021-12-01 RX ORDER — LORAZEPAM 2 MG/ML
0.5 INJECTION INTRAMUSCULAR ONCE
Status: COMPLETED | OUTPATIENT
Start: 2021-12-01 | End: 2021-12-01

## 2021-12-01 RX ORDER — FENTANYL CITRATE 50 UG/ML
INJECTION, SOLUTION INTRAMUSCULAR; INTRAVENOUS PRN
Status: DISCONTINUED | OUTPATIENT
Start: 2021-12-01 | End: 2021-12-01 | Stop reason: SDUPTHER

## 2021-12-01 RX ORDER — DEXTROSE MONOHYDRATE 25 G/50ML
INJECTION, SOLUTION INTRAVENOUS
Status: COMPLETED
Start: 2021-12-01 | End: 2021-12-01

## 2021-12-01 RX ORDER — PROPOFOL 10 MG/ML
5-50 INJECTION, EMULSION INTRAVENOUS
Status: DISCONTINUED | OUTPATIENT
Start: 2021-12-01 | End: 2021-12-01 | Stop reason: ALTCHOICE

## 2021-12-01 RX ORDER — LIDOCAINE HYDROCHLORIDE 20 MG/ML
INJECTION, SOLUTION EPIDURAL; INFILTRATION; INTRACAUDAL; PERINEURAL PRN
Status: DISCONTINUED | OUTPATIENT
Start: 2021-12-01 | End: 2021-12-01 | Stop reason: SDUPTHER

## 2021-12-01 RX ORDER — CALCIUM CHLORIDE 100 MG/ML
INJECTION INTRAVENOUS; INTRAVENTRICULAR PRN
Status: DISCONTINUED | OUTPATIENT
Start: 2021-12-01 | End: 2021-12-01 | Stop reason: SDUPTHER

## 2021-12-01 RX ADMIN — CALCIUM CHLORIDE 0.5 G: 100 INJECTION, SOLUTION INTRAVENOUS; INTRAVENTRICULAR at 15:23

## 2021-12-01 RX ADMIN — PIPERACILLIN AND TAZOBACTAM 3375 MG: 3; .375 INJECTION, POWDER, LYOPHILIZED, FOR SOLUTION INTRAVENOUS at 23:42

## 2021-12-01 RX ADMIN — OXYCODONE 5 MG: 5 TABLET ORAL at 10:20

## 2021-12-01 RX ADMIN — SODIUM CHLORIDE, POTASSIUM CHLORIDE, SODIUM LACTATE AND CALCIUM CHLORIDE 500 ML: 600; 310; 30; 20 INJECTION, SOLUTION INTRAVENOUS at 21:51

## 2021-12-01 RX ADMIN — Medication 100 MCG: at 13:28

## 2021-12-01 RX ADMIN — Medication 1 MG/HR: at 21:49

## 2021-12-01 RX ADMIN — ROCURONIUM BROMIDE 20 MG: 10 INJECTION, SOLUTION INTRAVENOUS at 14:20

## 2021-12-01 RX ADMIN — LIDOCAINE HYDROCHLORIDE 100 MG: 20 INJECTION, SOLUTION EPIDURAL; INFILTRATION; INTRACAUDAL; PERINEURAL at 13:21

## 2021-12-01 RX ADMIN — ROCURONIUM BROMIDE 50 MG: 10 INJECTION, SOLUTION INTRAVENOUS at 13:40

## 2021-12-01 RX ADMIN — IPRATROPIUM BROMIDE AND ALBUTEROL SULFATE 1 AMPULE: .5; 2.5 SOLUTION RESPIRATORY (INHALATION) at 23:54

## 2021-12-01 RX ADMIN — HYDROMORPHONE HYDROCHLORIDE 1 MG: 2 INJECTION INTRAMUSCULAR; INTRAVENOUS; SUBCUTANEOUS at 15:28

## 2021-12-01 RX ADMIN — LORAZEPAM 0.5 MG: 2 INJECTION INTRAMUSCULAR; INTRAVENOUS at 10:46

## 2021-12-01 RX ADMIN — Medication 25 MCG/HR: at 21:50

## 2021-12-01 RX ADMIN — FENTANYL CITRATE 50 MCG: 50 INJECTION, SOLUTION INTRAMUSCULAR; INTRAVENOUS at 14:03

## 2021-12-01 RX ADMIN — CEFAZOLIN 2000 MG: 1 INJECTION, POWDER, FOR SOLUTION INTRAMUSCULAR; INTRAVENOUS at 13:42

## 2021-12-01 RX ADMIN — PROPOFOL 100 MG: 10 INJECTION, EMULSION INTRAVENOUS at 13:21

## 2021-12-01 RX ADMIN — DEXTROSE MONOHYDRATE 12.5 G: 25 INJECTION, SOLUTION INTRAVENOUS at 20:49

## 2021-12-01 RX ADMIN — FENTANYL CITRATE 25 MCG: 50 INJECTION, SOLUTION INTRAMUSCULAR; INTRAVENOUS at 20:27

## 2021-12-01 RX ADMIN — HEPARIN SODIUM 5000 UNITS: 5000 INJECTION INTRAVENOUS; SUBCUTANEOUS at 22:29

## 2021-12-01 RX ADMIN — PIPERACILLIN AND TAZOBACTAM 4500 MG: 4; .5 INJECTION, POWDER, LYOPHILIZED, FOR SOLUTION INTRAVENOUS at 09:46

## 2021-12-01 RX ADMIN — FENTANYL CITRATE 100 MCG: 50 INJECTION, SOLUTION INTRAMUSCULAR; INTRAVENOUS at 14:06

## 2021-12-01 RX ADMIN — SODIUM CHLORIDE: 9 INJECTION, SOLUTION INTRAVENOUS at 13:13

## 2021-12-01 RX ADMIN — ALBUMIN (HUMAN) 25 G: 12.5 INJECTION, SOLUTION INTRAVENOUS at 14:27

## 2021-12-01 RX ADMIN — PROPOFOL 40 MCG/KG/MIN: 10 INJECTION, EMULSION INTRAVENOUS at 20:36

## 2021-12-01 RX ADMIN — SUCCINYLCHOLINE CHLORIDE 120 MG: 20 INJECTION, SOLUTION INTRAMUSCULAR; INTRAVENOUS at 13:21

## 2021-12-01 RX ADMIN — IPRATROPIUM BROMIDE AND ALBUTEROL SULFATE 1 AMPULE: .5; 2.5 SOLUTION RESPIRATORY (INHALATION) at 20:03

## 2021-12-01 RX ADMIN — ROCURONIUM BROMIDE 20 MG: 10 INJECTION, SOLUTION INTRAVENOUS at 14:47

## 2021-12-01 RX ADMIN — PROPOFOL 30 MCG/KG/MIN: 10 INJECTION, EMULSION INTRAVENOUS at 16:14

## 2021-12-01 RX ADMIN — FENTANYL CITRATE 100 MCG: 50 INJECTION, SOLUTION INTRAMUSCULAR; INTRAVENOUS at 13:21

## 2021-12-01 RX ADMIN — DEXTROSE MONOHYDRATE 12.5 G: 500 INJECTION PARENTERAL at 20:49

## 2021-12-01 RX ADMIN — DEXTROSE AND SODIUM CHLORIDE: 5; 900 INJECTION, SOLUTION INTRAVENOUS at 06:26

## 2021-12-01 RX ADMIN — HYDROMORPHONE HYDROCHLORIDE 0.5 MG: 1 INJECTION, SOLUTION INTRAMUSCULAR; INTRAVENOUS; SUBCUTANEOUS at 07:55

## 2021-12-01 RX ADMIN — ROCURONIUM BROMIDE 25 MG: 10 INJECTION, SOLUTION INTRAVENOUS at 15:55

## 2021-12-01 ASSESSMENT — PULMONARY FUNCTION TESTS
PIF_VALUE: 24
PIF_VALUE: 1
PIF_VALUE: 24
PIF_VALUE: 25
PIF_VALUE: 22
PIF_VALUE: 27
PIF_VALUE: 24
PIF_VALUE: 24
PIF_VALUE: 25
PIF_VALUE: 26
PIF_VALUE: 24
PIF_VALUE: 27
PIF_VALUE: 24
PIF_VALUE: 24
PIF_VALUE: 23
PIF_VALUE: 24
PIF_VALUE: 24
PIF_VALUE: 26
PIF_VALUE: 26
PIF_VALUE: 25
PIF_VALUE: 24
PIF_VALUE: 25
PIF_VALUE: 24
PIF_VALUE: 25
PIF_VALUE: 26
PIF_VALUE: 24
PIF_VALUE: 23
PIF_VALUE: 27
PIF_VALUE: 24
PIF_VALUE: 22
PIF_VALUE: 25
PIF_VALUE: 22
PIF_VALUE: 2
PIF_VALUE: 24
PIF_VALUE: 24
PIF_VALUE: 26
PIF_VALUE: 4
PIF_VALUE: 24
PIF_VALUE: 24
PIF_VALUE: 23
PIF_VALUE: 24
PIF_VALUE: 25
PIF_VALUE: 27
PIF_VALUE: 24
PIF_VALUE: 26
PIF_VALUE: 24
PIF_VALUE: 25
PIF_VALUE: 25
PIF_VALUE: 26
PIF_VALUE: 24
PIF_VALUE: 24
PIF_VALUE: 27
PIF_VALUE: 24
PIF_VALUE: 39
PIF_VALUE: 24
PIF_VALUE: 24
PIF_VALUE: 25
PIF_VALUE: 24
PIF_VALUE: 5
PIF_VALUE: 27
PIF_VALUE: 25
PIF_VALUE: 27
PIF_VALUE: 26
PIF_VALUE: 24
PIF_VALUE: 24
PIF_VALUE: 25
PIF_VALUE: 24
PIF_VALUE: 25
PIF_VALUE: 24
PIF_VALUE: 26
PIF_VALUE: 24
PIF_VALUE: 26
PIF_VALUE: 22
PIF_VALUE: 25
PIF_VALUE: 24
PIF_VALUE: 5
PIF_VALUE: 27
PIF_VALUE: 24
PIF_VALUE: 26
PIF_VALUE: 0
PIF_VALUE: 24
PIF_VALUE: 24
PIF_VALUE: 23
PIF_VALUE: 24
PIF_VALUE: 25
PIF_VALUE: 24
PIF_VALUE: 24
PIF_VALUE: 25
PIF_VALUE: 23
PIF_VALUE: 22
PIF_VALUE: 25
PIF_VALUE: 24
PIF_VALUE: 21
PIF_VALUE: 25
PIF_VALUE: 15
PIF_VALUE: 24
PIF_VALUE: 23
PIF_VALUE: 24
PIF_VALUE: 25
PIF_VALUE: 24
PIF_VALUE: 26
PIF_VALUE: 24
PIF_VALUE: 25
PIF_VALUE: 24
PIF_VALUE: 25
PIF_VALUE: 25
PIF_VALUE: 1
PIF_VALUE: 27
PIF_VALUE: 21
PIF_VALUE: 23
PIF_VALUE: 24
PIF_VALUE: 23
PIF_VALUE: 24
PIF_VALUE: 27
PIF_VALUE: 24
PIF_VALUE: 25
PIF_VALUE: 24
PIF_VALUE: 22
PIF_VALUE: 24
PIF_VALUE: 24
PIF_VALUE: 35
PIF_VALUE: 23
PIF_VALUE: 25
PIF_VALUE: 24
PIF_VALUE: 25
PIF_VALUE: 26
PIF_VALUE: 24
PIF_VALUE: 22
PIF_VALUE: 24
PIF_VALUE: 26
PIF_VALUE: 25
PIF_VALUE: 24
PIF_VALUE: 23
PIF_VALUE: 26
PIF_VALUE: 1
PIF_VALUE: 24
PIF_VALUE: 23
PIF_VALUE: 35
PIF_VALUE: 24
PIF_VALUE: 23
PIF_VALUE: 25
PIF_VALUE: 24
PIF_VALUE: 23
PIF_VALUE: 23
PIF_VALUE: 24
PIF_VALUE: 24
PIF_VALUE: 25
PIF_VALUE: 2
PIF_VALUE: 24
PIF_VALUE: 24
PIF_VALUE: 25
PIF_VALUE: 24
PIF_VALUE: 23
PIF_VALUE: 26
PIF_VALUE: 28
PIF_VALUE: 23
PIF_VALUE: 24
PIF_VALUE: 26
PIF_VALUE: 24
PIF_VALUE: 25
PIF_VALUE: 24
PIF_VALUE: 24
PIF_VALUE: 22
PIF_VALUE: 27
PIF_VALUE: 23
PIF_VALUE: 23
PIF_VALUE: 25
PIF_VALUE: 24
PIF_VALUE: 26
PIF_VALUE: 25
PIF_VALUE: 2
PIF_VALUE: 27
PIF_VALUE: 24
PIF_VALUE: 26
PIF_VALUE: 23
PIF_VALUE: 27
PIF_VALUE: 27
PIF_VALUE: 24
PIF_VALUE: 27
PIF_VALUE: 24
PIF_VALUE: 25
PIF_VALUE: 23
PIF_VALUE: 25
PIF_VALUE: 26
PIF_VALUE: 27
PIF_VALUE: 24
PIF_VALUE: 24
PIF_VALUE: 25
PIF_VALUE: 25
PIF_VALUE: 1
PIF_VALUE: 24
PIF_VALUE: 25
PIF_VALUE: 25
PIF_VALUE: 27
PIF_VALUE: 27
PIF_VALUE: 24
PIF_VALUE: 26
PIF_VALUE: 24
PIF_VALUE: 25
PIF_VALUE: 24
PIF_VALUE: 1
PIF_VALUE: 25

## 2021-12-01 ASSESSMENT — PAIN SCALES - GENERAL
PAINLEVEL_OUTOF10: 5
PAINLEVEL_OUTOF10: 5
PAINLEVEL_OUTOF10: 10
PAINLEVEL_OUTOF10: 5
PAINLEVEL_OUTOF10: 9
PAINLEVEL_OUTOF10: 9

## 2021-12-01 NOTE — PROGRESS NOTES
Transitions of Care Pharmacy Service   Medication Review    The patient's list of current home medications was reviewed. Source(s) of information: patient, (interviewed 11/30), [employer] refill report    Medications that need to be addressed by a physician/nurse practitioner: none, orders addressed with attending 11/30      Please feel free to call me with any questions about this encounter. Thank you. Hemalatha Champagne [address] [place] [address]   Transitions of Care Pharmacy Service  Phone:  [phone]  Fax: [phone]      Electronically signed by Hemalatha Champgane [address] [place] [address] on 12/1/2021 at 8:54 AM         Medications Prior to Admission:   spironolactone (ALDACTONE) 25 MG tablet, Take 25 mg by mouth daily  rosuvastatin (CRESTOR) 5 MG tablet, Take 5 mg by mouth daily  esomeprazole (NEXIUM) 40 MG delayed release capsule, Take 40 mg by mouth daily   famotidine (PEPCID) 40 MG tablet, Take 40 mg by mouth daily as needed (heartburn)   amLODIPine (NORVASC) 10 MG tablet, Take 10 mg by mouth daily   citalopram (CELEXA) 40 MG tablet, Take 60 mg by mouth daily Takes 1.5 tabs daily  diphenoxylate-atropine (LOMOTIL) 2.5-0.025 MG per tablet, Take 1 tablet by mouth 4 times daily as needed.    levothyroxine (SYNTHROID) 100 MCG tablet, Take 100 mcg by mouth Daily   celecoxib (CELEBREX) 200 MG capsule, Take 200 mg by mouth daily

## 2021-12-01 NOTE — PROGRESS NOTES
Patients  at bedside and upset that patient has to have 2nd surgery. Requesting to speak with general surgery team. Resident Dr. Chloe Luis notified. Patient very anxious and requesting medication to help calm her down. See MAR/orders for more details. Patient now requiring 4L NC with oxygen saturation of 87%. Writer confirmed IVF order with attending physician. No new orders at this time.

## 2021-12-01 NOTE — PLAN OF CARE
Problem: HEMODYNAMIC STATUS  Goal: Patient has stable vital signs and fluid balance  Outcome: Met This Shift     Problem: OXYGENATION/RESPIRATORY FUNCTION  Goal: Patient will achieve/maintain normal respiratory rate/effort  Outcome: Met This Shift     Problem: MOBILITY  Goal: Early mobilization is achieved  Outcome: Met This Shift     Problem: ELIMINATION  Goal: Elimination patterns are normal or improving  Description: Elimination patterns return to pre-surgery normal patterns  Outcome: Met This Shift     Problem: SKIN INTEGRITY  Goal: Skin integrity is maintained or improved  Outcome: Met This Shift     Problem: Pain:  Goal: Pain level will decrease  Description: Pain level will decrease  Outcome: Met This Shift  Goal: Control of acute pain  Description: Control of acute pain  12/1/2021 0509 by Anabel Bryan RN  Outcome: Met This Shift  11/30/2021 1922 by Rupal Santos RN  Outcome: Ongoing  Goal: Control of chronic pain  Description: Control of chronic pain  Outcome: Met This Shift     Problem: Falls - Risk of:  Goal: Will remain free from falls  Description: Will remain free from falls  11/30/2021 1922 by Rupal Santos RN  Outcome: Ongoing

## 2021-12-01 NOTE — PLAN OF CARE
Nutrition Problem #1: Inadequate protein-energy intake  Intervention: Food and/or Nutrient Delivery: Continue NPO  Nutritional Goals: Initiate oral diet or nutrition support in the next 48 hours

## 2021-12-01 NOTE — PLAN OF CARE
Problem: Pain:  Goal: Control of acute pain  Description: Control of acute pain. Pain assessment completed. Patient demonstrates understanding of pain rating scale and interventions. At this time patient states pain is well controlled. Will continue to monitor and reassess with each rounding and PRN. Outcome: Ongoing     Problem: Falls - Risk of:  Goal: Will remain free from falls  Description: Will remain free from falls. Fall risk assessment completed. Patient instructed to use call light. Bed locked and in lowest position, side rails up 2/4, call light and bedside table within reach, clutter removed, and non-skid footwear on when pt out of bed. Hourly rounds will continue. Bed alarm in use.    Outcome: Ongoing

## 2021-12-01 NOTE — OP NOTE
Operative Note      Patient: Chris Mccullough  YOB: 1950  MRN: [id number]    Date of Procedure: 12/1/2021    Pre-Op Diagnosis: feculent peritonitis     Post-Op Diagnosis: feculent peritonitis        Procedure(s):  LAPAROTOMY EXPLORATORY CREATION OF END COLOSTOMY, LAVAGE, PELVIC DRAIN PLACEMENT    Surgeon(s):  Shaista Garner MD    Assistant:   Surgical Assistant: Jeffery Anthony  Resident: Tory Roger DO    Anesthesia: General    Estimated Blood Loss (mL): Minimal    Complications: None    Specimens:   * No specimens in log *    Implants:  * No implants in log *      Drains:   Urethral Catheter (Active)   $ Urethral catheter insertion $ Not inserted for procedure 11/30/21 1921   Catheter Indications Urinary retention (acute or chronic), continuous bladder irrigation or bladder outlet obstruction 11/30/21 1923   Site Assessment No urethral drainage 12/01/21 0800   Urine Color Eve 12/01/21 0800   Urine Appearance Sediment 12/01/21 0800   Urine Odor Malodorous 12/01/21 0800   Output (mL) 325 mL 12/01/21 1206       [REMOVED] NG/OG/NJ/NE Tube Nasogastric Right nostril (Removed)   Surrounding Skin Dry; Intact;  Non reddened 11/24/21 2115   Securement device Yes 11/24/21 2115   Status Suction-low intermittent 11/24/21 2115       [REMOVED] NG/OG/NJ/NE Tube Nasogastric 16 fr Left nostril (Removed)   Status Clamped 11/27/21 1205   Placement Verified by X-Ray (Initial) 11/27/21 1205   NG/OG/NJ/NE External Measurement (cm) 60 cm 11/27/21 0527   Drainage Appearance Brown 11/27/21 1205   Residual Volume (ml) 50 ml 11/27/21 1330   Output (mL) 350 ml 11/27/21 1005       [REMOVED] Urethral Catheter Non-latex 16 fr (Removed)       [REMOVED] Urethral Catheter Double-lumen 18 fr (Removed)   Catheter Indications Perioperative use for selected surgical procedures 11/28/21 1600   Site Assessment No urethral drainage 11/28/21 1600   Urine Color Tea 11/28/21 1600   Urine Appearance Clear 11/28/21 1600   Output (mL) 800 mL 11/28/21  and the source of feculent peritonitis. The previous ileocolonic anastomosis was then found and interrogated. It was difficult to fully mobilize but appeared intact and no signs of anastomotic breakdown or leakage was noted. The distal and proximal ends of the transverse colon were stapled off with a 75 DANAE blue load staplers. The distal and proximal transverse colon ends were mobilized in order to create a end colostomy with the proximal portion. The distal transverse colon was marked with 2-0 silk suture and then also sewed to the anterior abdominal wall in the left upper quadrant for use of possible reversal in the future. A position in the right upper quadrant was selected for colostomy creation site. The skin was incised and the fascia was incised with a cruciate incision large enough to fit the proximal end of the transverse colon for ostomy maturation. At this point the abdominal cavity was again inspected and no other signs of small bowel or large bowel defects were noted. Another 3 L of warmed normal saline was used followed by 2 L of irrisept irrigation. A 19 [place] Antolin drain was placed in the pelvis and exited via the left lower quadrant and secured to the skin with a 3-0 nylon. At this time gowns and gloves were changed and bowel protocol was initiated with re-draping and new surgical instruments acquired . The midline fascia was closed with looped 0 PDS from a superior and inferior fashion meeting in the center. The skin was loosely approximated the umbilicus and the rest was packed with Dakin soaked gauze. The the ostomy was then matured in the right upper quadrant after removing the staple line via electrocautery. Adequate bleeding was noted from the end of the colon signifying healthy-appearing bowel. Hemostasis was achieved and the colon was matured to the skin in a typical [place] fashion  With 3-0 vicryl.   An ostomy appliance was then applied, the midline was covered

## 2021-12-01 NOTE — CONSULTS
Reason for Consult:  Acute kidney injury. Requesting Physician:  Mariana Cook MD    HISTORY OF PRESENT ILLNESS:    The patient is a 80 y.o. female admitted for elective scheduled sigmoidectomy that was performed on 11/24. On previous labs her serum creatinines have been between 0.4 to 0.5 with eGFR of >60s ml/min. Her lowest recorded BP was 86/52 mmHg. On admission her creatinine was 0.5 that bill to 1.74 yesterday and today after iv hydration her creatinine is better at 0.8. She had to go back to the OR today for bowel leak and now has a colostomy in place. She is intubated so not able to get any history from the patient. Most of the history is taken from patient's chart. No recent use iv contrast. She was on NSAIDs at home. Review Of Systems:   Unable to obtain as she is intubated.      Past Medical History:   Diagnosis Date    Abdominal pain     Arthritis     Constipation     COPD (chronic obstructive pulmonary disease) (HCC)     Depressed     GERD (gastroesophageal reflux disease)     HLD (hyperlipidemia)     HTN (hypertension)     Hypothyroid     IBS (irritable bowel syndrome)     Lumbar spondylolysis     Myofascial pain     Poor appetite     RLS (restless legs syndrome)     Wears glasses        Past Surgical History:   Procedure Laterality Date    ABDOMEN SURGERY  11/24/2021    exploration with lysis of adhesions, small bowel resection with ileocecetomy and pelvic mesh removal    ABDOMINAL ADHESION SURGERY  3/9/2015    APPENDECTOMY      BLADDER SURGERY      COLECTOMY N/A 11/24/2021    ABDOMINAL EXPLORATION LYSIS OF EXTENSIVE ADHESIONS SMALL BOWEL RESECTION WITH ILEOCECECTOMY  EXPLANTATIOIN OF PELVIC MESH  ILEOCLONIC ANASTAMOSIS MOBILIZATION OF RECTUM AND SIGMOID performed by Mariana Cook MD at [address] [place] [address]  11/24/2021    with bilateral ureteral stent insertion    CYSTOSCOPY  11/24/2021    CYSTOSCOPY EXPLORATION OF URETER BILATERAL  [MAR Hold] sodium chloride flush  5-40 mL IntraVENous 2 times per day    [MAR Hold] acetaminophen  1,000 mg Oral 3 times per day    [MAR Hold] pantoprazole  40 mg Oral Daily    [MAR Hold] gabapentin  300 mg Oral Q8H     Continuous Infusions:   [MAR Hold] dextrose 5 % and 0.9 % NaCl 100 mL/hr at 12/01/21 0626    [MAR Hold] sodium chloride       PRN Meds:sodium hypochlorite, [MAR Hold] ipratropium-albuterol, [MAR Hold] oxyCODONE, [MAR Hold] phenol, [MAR Hold] sodium chloride flush, [MAR Hold] sodium chloride, [MAR Hold] ondansetron **OR** [MAR Hold] ondansetron    Allergies   Allergen Reactions    Morphine Itching    Sulfa Antibiotics        Social History     Socioeconomic History    Marital status:      Spouse name: Not on file    Number of children: Not on file    Years of education: Not on file    Highest education level: Not on file   Occupational History    Not on file   Tobacco Use    Smoking status: Current Every Day Smoker     Packs/day: 0.50     Years: 52.00     Pack years: 26.00     Types: Cigarettes    Smokeless tobacco: Never Used   Vaping Use    Vaping Use: Never used   Substance and Sexual Activity    Alcohol use: Yes     Comment: every other week    Drug use: Yes     Types: Marijuana Ascencion Nichols)     Comment: every other week-inhalation    Sexual activity: Not on file   Other Topics Concern    Not on file   Social History Narrative    Not on file     Social Determinants of Health     Financial Resource Strain:     Difficulty of Paying Living Expenses: Not on file   Food Insecurity:     Worried About Running Out of Food in the Last Year: Not on file    [address] in the Last Year: Not on file   Transportation Needs:     Lack of Transportation (Medical): Not on file    Lack of Transportation (Non-Medical):  Not on file   Physical Activity:     Days of Exercise per Week: Not on file    Minutes of Exercise per Session: Not on file   Stress:     Feeling of Stress : Not on file   Social Connections:     Frequency of Communication with Friends and Family: Not on file    Frequency of Social Gatherings with Friends and Family: Not on file    Attends Mu-[employer] Services: Not on file    Active Member of [address] [place] [address] [employer] or Organizations: Not on file    Attends Club or Organization Meetings: Not on file    Marital Status: Not on file   Intimate Partner Violence:     Fear of Current or Ex-Partner: Not on file    Emotionally Abused: Not on file    Physically Abused: Not on file    Sexually Abused: Not on file   Housing Stability:     Unable to Pay for Housing in the Last Year: Not on file    Number of [place] in the Last Year: Not on file    Unstable Housing in the Last Year: Not on file       Family History   Problem Relation Age of Onset    High Blood Pressure Mother          Physical Exam:  Vitals:    12/01/21 0414 12/01/21 0742 12/01/21 1141 12/01/21 1402   BP: 104/64 126/64 123/67    Pulse: 97 98 103    Resp: 16 18 16    Temp: 98.2 °F (36.8 °C) 98.1 °F (36.7 °C) 97.9 °F (36.6 °C)    TempSrc:  Oral Oral    SpO2: 90% 91% (!) 89%    Weight:       Height:    5' 4.5\" (1.638 m)     I/O last 3 completed shifts: In: 3500 [P.O.:400; I.V.:3100]  Out: 975 [Urine:975]    General: not in distress. Appears to be stated age. HEENT: Atraumatic, normocephalic. Anicteric sclera. Pink and moist oral mucosa. No carotid bruit. No JVD. Chest: Bilateral air entry, clear to auscultation, no wheezing, rhonchi or rales. Cardiovascular: RRR, S1S2, no murmur, rub or gallop. Has lower extremity edema. Abdomen: Soft, non tender to palpation. Active bowel sounds x 4 quadrants. Musculoskeletal: No cyanosis or clubbing. Integumentary: Pink, warm and dry. Free from rash or lesions. Skin turgor normal.  CNS: Face symmetrical. No tremor.      Data:  CBC:   Lab Results   Component Value Date    WBC 4.1 12/01/2021    HGB 8.6 (L) 12/01/2021    HCT 26.3 (L) 12/01/2021    MCV 91.0 12/01/2021     12/01/2021     BMP:    Lab Results   Component Value Date     (L) 12/01/2021     (L) 11/30/2021     11/30/2021    K 4.8 12/01/2021    K 5.3 11/30/2021    K 4.8 11/30/2021     12/01/2021    CL 97 (L) 11/30/2021     11/30/2021    CO2 19 (L) 12/01/2021    CO2 17 (L) 11/30/2021    CO2 18 (L) 11/30/2021    BUN 28 (H) 12/01/2021    BUN 27 (H) 11/30/2021    BUN 21 11/30/2021    CREATININE 0.90 12/01/2021    CREATININE 0.83 12/01/2021    CREATININE 1.56 (H) 11/30/2021    GLUCOSE 107 (H) 12/01/2021    GLUCOSE 105 (H) 11/30/2021    GLUCOSE 94 11/30/2021     CMP:   Lab Results   Component Value Date     12/01/2021    K 4.8 12/01/2021     12/01/2021    CO2 19 12/01/2021    BUN 28 12/01/2021    CREATININE 0.90 12/01/2021    CREATININE 0.83 12/01/2021    GLUCOSE 107 12/01/2021    CALCIUM 8.2 12/01/2021      Hepatic: No results found for: AST, ALT, ALB, BILITOT, ALKPHOS  BNP: No results found for: BNP  Lipids: No results found for: CHOL, HDL  INR: No results found for: INR  PTH: No results found for: PTH  Phosphorus:    Lab Results   Component Value Date    PHOS 3.8 12/01/2021     Ionized Calcium: No results found for: IONCA  Magnesium:   Lab Results   Component Value Date    MG 2.6 12/01/2021     Albumin: No results found for: LABALBU  Last 3 CK, CKMB, Troponin: @LABRCNT(CKTOTAL:3,CKMB:3,TROPONINI:3)       URINE:)No results found for: Edgardo Sanon     Radiology:   Reviewed. Assessment:  Acute kidney injury, FeNa was 0.24%, appears to be hemodynamically related. Creatinine is improving. Hyponatremia. Anemia. S/p sigmoidectomy and now has colostomy. Plan:  Off NSAIDs, Amlodipine and Aldactone. Place on renal diet/TF with oral fluid restriction of 1000 ml/24 hours when diet is resumed. Monitor BP. Avoid hypotension, nephrotoxic drugs, Lovenox, Fleets enema and IV contrast exposure. Follow up chemistries ordered for AM.    Thank you for the consultation.  Please do not hesitate to contact us for any further questions/concerns. We will continue to follow along with you. Electronically signed by Ravin Fuentes MD  on 12/1/2021 at 4:42 PM   [facility].   Ph: [phone]

## 2021-12-01 NOTE — BRIEF OP NOTE
Brief Postoperative Note      Patient: David Leal  YOB: 1950  MRN: [id number]    Date of Procedure: 12/1/2021    Pre-Op Diagnosis: feculent peritonitis     Post-Op Diagnosis: feculent peritonitis        Procedure(s):  LAPAROTOMY EXPLORATORY CREATION OF END COLOSTOMY, LAVAGE, PELVIC DRAIN PLACEMENT    Surgeon(s):  José Luis Daley MD    Assistant:  Surgical Assistant: Xavi Gonzalez  Resident: Tanvi Manrique DO    Anesthesia: General    Estimated Blood Loss (mL): Minimal    Complications: None    Specimens:   * No specimens in log *    Implants:  * No implants in log *      Drains:   Urethral Catheter (Active)   $ Urethral catheter insertion $ Not inserted for procedure 11/30/21 1921   Catheter Indications Urinary retention (acute or chronic), continuous bladder irrigation or bladder outlet obstruction 11/30/21 1923   Site Assessment No urethral drainage 12/01/21 0800   Urine Color Eve 12/01/21 0800   Urine Appearance Sediment 12/01/21 0800   Urine Odor Malodorous 12/01/21 0800   Output (mL) 325 mL 12/01/21 1206       [REMOVED] NG/OG/NJ/NE Tube Nasogastric Right nostril (Removed)   Surrounding Skin Dry; Intact;  Non reddened 11/24/21 2115   Securement device Yes 11/24/21 2115   Status Suction-low intermittent 11/24/21 2115       [REMOVED] NG/OG/NJ/NE Tube Nasogastric 16 fr Left nostril (Removed)   Status Clamped 11/27/21 1205   Placement Verified by X-Ray (Initial) 11/27/21 1205   NG/OG/NJ/NE External Measurement (cm) 60 cm 11/27/21 0527   Drainage Appearance Brown 11/27/21 1205   Residual Volume (ml) 50 ml 11/27/21 1330   Output (mL) 350 ml 11/27/21 1005       [REMOVED] Urethral Catheter Non-latex 16 fr (Removed)       [REMOVED] Urethral Catheter Double-lumen 18 fr (Removed)   Catheter Indications Perioperative use for selected surgical procedures 11/28/21 1600   Site Assessment No urethral drainage 11/28/21 1600   Urine Color Tea 11/28/21 1600   Urine Appearance Clear 11/28/21 1600   Output (mL) 800 mL 11/28/21 1600   Provider Notified to Remove Yes 11/28/21 1600       Findings: Wound Class IV, feculent peritonitis, complete mid transverse colonic breakdown    Electronically signed by Yayo Shelton DO on 12/1/2021 at 4:24 PM

## 2021-12-01 NOTE — PROGRESS NOTES
Occupational Therapy  DATE: 2021    NAME: Ranulfo De Dios  MRN: [id number]   : 1950    Patient not seen this date for Occupational Therapy due to:      [x] Cancel by RN or physician due to: Pt. Pending possible surgery    [] Hemodialysis    [] Critical Lab Value Level     [] Blood transfusion in progress    [] Acute or unstable cardiovascular status   _MAP < 55 or more than >115  _HR < 40 or > 130    [] Acute or unstable pulmonary status   -FiO2 > 60%   _RR < 5 or >40    _O2 sats < 85%    [] Strict Bedrest    [] Off Unit for surgery or procedure    [] Off Unit for testing       [] Pending imaging to R/O fracture    [] Refusal by Patient      [] Other      [] OT being discontinued at this time. Patient independent. No further needs. [] OT being discontinued at this time as the patient has been transferred to hospice care. No further needs.       TAM Hebert

## 2021-12-01 NOTE — PROGRESS NOTES
DATE: 2021    NAME: Bertha Rincon  MRN: [id number]   : 1950    Patient not seen this date for Physical Therapy due to:      [x] Cancel by RN or physician due to: pending possible surgery     [] Hemodialysis    [] Critical Lab Value Level     [] Blood transfusion in progress    [] Acute or unstable cardiovascular status   _MAP < 55 or more than >115  _HR < 40 or > 130    [] Acute or unstable pulmonary status   -FiO2 > 60%   _RR < 5 or >40    _O2 sats < 85%    [] Strict Bedrest    [] Off Unit for surgery or procedure    [] Off Unit for testing       [] Pending imaging to R/O fracture    [] Refusal by Patient      [] Other      [] PT being discontinued at this time. Patient independent. No further needs. [] PT being discontinued at this time as the patient has been transferred to hospice care. No further needs.       AUBRIE MUSE, PTA

## 2021-12-01 NOTE — PROGRESS NOTES
Patient noted to have a large amount of fecal drainage this morning coming from lower abd staple. This is the first instance I've noticed this kind of drainage from surgical incision. Surgery resident notified.

## 2021-12-01 NOTE — PROGRESS NOTES
Surgery down to pick patient up. Patient and patients  both very anxious. Emotional support offered and reassured that general surgery team would explain procedure prior to patient going under. Bedside report given to RN. See chart for more details.

## 2021-12-01 NOTE — PROGRESS NOTES
Comprehensive Nutrition Assessment    Type and Reason for Visit:  RD Nutrition Re-Screen/LOS (Length of stay)    Nutrition Recommendations/Plan:   1. Continue NPO status  2. Monitor labs, GI status and nutrition progression  3. Patient may need TPN nutrition support    Nutrition Assessment:  Patient assessed for length of stay. Patient admission is related to stricture of sigmoid colon. Patient is status post exploratory abdominal surgery with lysis of adhesions, small bowel resection with ileocecetomy and pelvic mesh removal (11/24). Patient is back in OR related to stool leaking from the inferior aspect of the midline incision. RN reports prior to NPO status for surgery patient was not tolerating Full liquid diet yesterday (11/30). Patient may require TPN and lipids for nutrition support. Monitor labs, GI status and nutrition progression. Malnutrition Assessment:  Malnutrition Status: At risk for malnutrition (Comment)      Estimated Daily Nutrient Needs:  Energy (kcal):  1633 kcal (MSJ, 1.2 factor); Weight Used for Energy Requirements:  Current     Protein (g):  73-84 gm (1.3-1.5 gm/kg); Weight Used for Protein Requirements:  Ideal          Nutrition Related Findings:  No edema. Active bowel sounds. Nausea diet intolerance. S/P Small bowel resection (11/24)      Wounds:  Surgical Incision       Current Nutrition Therapies:    Diet NPO Exceptions are: Sips of Water with Meds, Ice Chips    Anthropometric Measures:  · Height: 5' 4.5\" (163.8 cm)  · Current Body Weight: 187 lb (84.8 kg)   · Admission Body Weight: 184 lb (83.5 kg)    · Ideal Body Weight: 123 lbs; % Ideal Body Weight 152 %   · BMI: 31.6   · BMI Categories: Obese Class 1 (BMI 30.0-34. 9)       Nutrition Diagnosis:   · Inadequate protein-energy intake related to inadequate protein-energy intake as evidenced by NPO or clear liquid status due to medical condition, intake 0-25%, poor intake prior to admission    · Altered GI function related to

## 2021-12-01 NOTE — PROGRESS NOTES
[facility]   Urology Progress Note            Subjective: Follow-up cystoscopy and stent placement    Patient Vitals for the past 24 hrs:   BP Temp Temp src Pulse Resp SpO2   12/01/21 0414 104/64 98.2 °F (36.8 °C) -- 97 16 90 %   12/01/21 0017 111/67 98.2 °F (36.8 °C) Oral 93 20 91 %   11/30/21 1920 106/65 98.2 °F (36.8 °C) Oral 92 20 92 %   11/30/21 1523 100/61 97.5 °F (36.4 °C) Oral 88 18 94 %   11/30/21 1145 100/78 -- -- -- -- --   11/30/21 0927 100/63 97.5 °F (36.4 °C) Axillary 100 30 94 %       Intake/Output Summary (Last 24 hours) at 12/1/2021 0729  Last data filed at 12/1/2021 0630  Gross per 24 hour   Intake 3550 ml   Output 700 ml   Net 2850 ml       Recent Labs     11/29/21  0525 11/30/21  0729   WBC 8.3 6.4   HGB 9.1* 9.9*   HCT 28.6* 30.9*   MCV 93.8 91.7    275     Recent Labs     11/29/21  0525 11/30/21  0729 11/30/21  1800    135 128*   K 3.9 4.8 5.3    101 97*   CO2 23 18* 17*   PHOS  --  5.5*  --    BUN 10 21 27*   CREATININE 0.52 1.74* 1.56*       Recent Labs     11/30/21  1809   COLORU PETER*   PHUR 5.0   WBCUA 5 TO 10   RBCUA 2 TO 5   MUCUS NOT REPORTED   TRICHOMONAS NOT REPORTED   YEAST NOT REPORTED   BACTERIA RARE*   SPECGRAV 1.025   LEUKOCYTESUR TRACE*   UROBILINOGEN Normal   BILIRUBINUR Presumptive positive. Unable to confirm due to unavailability of reagent. *       Additional Lab/culture results:    Physical Exam: Ureteral stents and Rose were removed 2 days ago, patient's urinary output down to 700 mL in 24 hours. CT imaging showed no evidence of hydronephrosis, patient with acute kidney injury    Interval Imaging Findings:    Impression:    Patient Active Problem List   Diagnosis    Intestinal adhesions    Obesity (BMI 30-39. 9)    Urinary incontinence    Constipation    Ventral incisional hernia    Smoker    Stricture of sigmoid colon ([address]       Plan:  We inserted a Rsoe catheter for accurate output measurement, we will defer to primary service whether diuresis considering t the findings on CT imaging of the lungs    Mony Valdez MD  7:29 AM 12/1/2021

## 2021-12-01 NOTE — PROGRESS NOTES
Section of Cardiology  Progress Note      Date:  12/1/2021  Patient: Adarsh Cordova  Admission:  11/24/2021  9:25 AM  Admit DX: Stricture of sigmoid colon ([facility] [address] [K56.699]  Age:  80 y. o., 1950     LOS: 7 days     Reason for evaluation:   Chest pain      SUBJECTIVE:     The patient was seen and examined. Notes and labs reviewed. There were complications over night. Patient had worsening in the overall status and became more hypoxic. At the time I saw her she is very restless. I was told that the CAT scan showed leakage from the anastomosis site and supposed to have surgery this afternoon. Patient's cardiac review of systems: negative for chest pain. The patient is generally feeling gradually worsening. OBJECTIVE:    Telemetry: Sinus  /67   Pulse 103   Temp 97.9 °F (36.6 °C) (Oral)   Resp 16   Ht 5' 4.5\" (1.638 m)   Wt 187 lb 6 oz (85 kg)   LMP  (LMP Unknown)   SpO2 (!) 89%   BMI 31.67 kg/m²     Intake/Output Summary (Last 24 hours) at 12/1/2021 1416  Last data filed at 12/1/2021 1206  Gross per 24 hour   Intake 3950 ml   Output 975 ml   Net 2975 ml       EXAM:   CONSTITUTIONAL:  awake, alert, cooperative, in moderate distress, and appears stated age. HEENT: Normal jugular venous pulsations, no carotid bruits. Head is atraumatic, normocephalic. Eyes and oral mucosa are normal.  LUNGS: Good respiratory effort. Yes for increased work of breathing. On auscultation: diminished breath sounds bibasilar  CARDIOVASCULAR:  Normal apical impulse, regular rate and rhythm, normal S1 and S2, no S3 or S4,   ABDOMEN: Soft, nontender, nondistended. Bowel sounds present. SKIN: Warm and dry. EXTREMITIES: No lower extremity edema. Motor movement grossly intact. No cyanosis or clubbing.     Current Inpatient Medications:   [MAR Hold] piperacillin-tazobactam  3,375 mg IntraVENous Q8H    metroNIDAZOLE  500 mg IntraVENous Once    [MAR Hold] heparin (porcine)  5,000 Units SubCUTAneous 3 times per day  [MAR Hold] citalopram  60 mg Oral Daily    [MAR Hold] docusate sodium  100 mg Oral BID    [MAR Hold] influenza virus vaccine  0.5 mL IntraMUSCular Prior to discharge    [MAR Hold] cyclobenzaprine  5 mg Oral TID    [MAR Hold] levothyroxine  100 mcg Oral Daily    [MAR Hold] rosuvastatin  5 mg Oral Nightly    [MAR Hold] sodium chloride flush  5-40 mL IntraVENous 2 times per day    [MAR Hold] acetaminophen  1,000 mg Oral 3 times per day    [MAR Hold] pantoprazole  40 mg Oral Daily    [MAR Hold] gabapentin  300 mg Oral Q8H       IV Infusions (if any):   [MAR Hold] dextrose 5 % and 0.9 % NaCl 100 mL/hr at 12/01/21 0626    [MAR Hold] sodium chloride         Diagnostics:   EKG: . ECHO: .   Ejection fraction: %  Stress Test: .  Cardiac Angiography: .    Labs:   CBC:   Recent Labs     11/30/21  0729 12/01/21  0727   WBC 6.4 4.1   HGB 9.9* 8.6*   HCT 30.9* 26.3*    250     BMP:   Recent Labs     11/30/21  1800 12/01/21  0727   * 133*   K 5.3 4.8   CO2 17* 19*   BUN 27* 28*   CREATININE 1.56* 0.83   LABGLOM 33* >60   GLUCOSE 105* 107*     No results found for: BNP  PT/INR: No results for input(s): PROTIME, INR in the last 72 hours. APTT:No results for input(s): APTT in the last 72 hours. CARDIAC ENZYMES:  Recent Labs     11/30/21  0100 11/30/21  1800   CKTOTAL  --  46   TROPONINT NOT REPORTED  --      FASTING LIPID PANEL:No results found for: HDL, LDLDIRECT, LDLCALC, TRIG  LIVER PROFILE:No results for input(s): AST, ALT, LABALBU in the last 72 hours. ASSESSMENT:  #1 atypical chest pain without evidence of acute coronary syndrome  2. History of hypertension, currently borderline low blood pressure  3. Probable anxiety disorder  4. Status post colon surgery  Patient Active Problem List   Diagnosis    Intestinal adhesions    Obesity (BMI 30-39. 9)    Urinary incontinence    Constipation    Ventral incisional hernia    Smoker    Stricture of sigmoid colon ([facility] [address]       PLAN:    1.  For urgent abdominal surgery      Please see orders. Discussed with patient and spouse and nursing.     Lm Banegas MD, MD

## 2021-12-01 NOTE — PROGRESS NOTES
General Surgery:  Daily Progress Note                    PATIENT NAME: Ryan Delaney     TODAY'S DATE: 12/1/2021, 6:51 AM  CC:  I feel miserable    SUBJECTIVE:     Patient seen and evaluated. Afebrile, hemodynamically stable, saturating greater than 95% on 3 L nasal cannula but easily desaturates with movement. Complains of significant abdominal pain this morning. Also appears to have stool leaking from the inferior aspect of the midline incision. 700 cc urine via Rose over past 24 hours. CT abdomen pelvis without contrast yesterday with some concern for anastomotic leak. OBJECTIVE:   VITALS:  /64   Pulse 97   Temp 98.2 °F (36.8 °C)   Resp 16   Ht 5' 4.5\" (1.638 m)   Wt 187 lb 6 oz (85 kg)   LMP  (LMP Unknown)   SpO2 90%   BMI 31.67 kg/m²      INTAKE/OUTPUT:      Intake/Output Summary (Last 24 hours) at 12/1/2021 0651  Last data filed at 12/1/2021 0630  Gross per 24 hour   Intake 3550 ml   Output 700 ml   Net 2850 ml       PHYSICAL EXAM:  General Appearance: awake, alert, oriented, in no acute distress  HEENT:  Normocephalic, atraumatic, mucus membranes moist   Heart: Heart regular rate and rhythm  Lungs: No chest wall tenderness., Breathing Pattern: regular, no distress  Abdomen: Soft, moderately tender to palpation, stool leaking from inferior aspect of midline stapled incision. Extremities: No cyanosis, pitting edema, rashes noted. Skin: Skin color, texture, turgor normal. No rashes or lesions.     Data:  CBC with Differential:    Lab Results   Component Value Date    WBC 6.4 11/30/2021    RBC 3.37 11/30/2021    HGB 9.9 11/30/2021    HCT 30.9 11/30/2021     11/30/2021    MCV 91.7 11/30/2021    MCH 29.4 11/30/2021    MCHC 32.0 11/30/2021    RDW 15.2 11/30/2021    LYMPHOPCT 13 11/30/2021    MONOPCT 5 11/30/2021    BASOPCT 0 11/30/2021    MONOSABS 0.32 11/30/2021    LYMPHSABS 0.83 11/30/2021    EOSABS 0.00 11/30/2021    BASOSABS 0.00 11/30/2021    DIFFTYPE NOT REPORTED 11/30/2021

## 2021-12-01 NOTE — CONSULTS
Pulmonary Medicine and [address] Nicole Cotto MD      Patient - Jena Mayorga   MRN -  [id number]   [place] # - [de-identified]   - 1950      Date of Admission -  2021  9:25 AM  Date of evaluation -  2021  Room - -   Hospital Day - Rodo Rinaldi MD Primary Care Physician - Sruthi Watts     Reason for Consult    Post op care    Assessment   · Acute respiratory insufficiency requiring ventilator support  · Feculent peritonitis s/p exploratory laparotomy with end colostomy/pelvic drain placement  · COPD  · History of GERD/dyslipidemia/hypothyroidism/hypertension    Recommendations   · Vent support, increase PEEP to 12, decrease FiO2 as tolerated to keep oxygen saturation above 90%  · IV Diprivan for sedation with RASS goal -1 to -2  · IV fentanyl 50 mics IV every hour as needed for pain control  · Continue IV antibiotics, Zosyn/Flagyl  · IV fluids with D5 normal saline  · Albuterol and Ipratropium Q 4 hours and prn  · X-ray chest in am  · Labs: CBC and BMP in am  · DVT prophylaxis with SQ heparin  · GI prophylaxis with Protonix, changed to IV  · Will follow with you    HPI     Jena Mayorga is 80 y.o.,  female, admitted because of elective sigmoidectomy, which was done on 2021. Patient underwent exploratory laparotomy and pelvic drain placement for feculent peritonitis. Patient came back on ventilator. At this time she is sedated, on the ventilator.     PMHx   Past Medical History      Diagnosis Date    Abdominal pain     Arthritis     Constipation     COPD (chronic obstructive pulmonary disease) (HCC)     Depressed     GERD (gastroesophageal reflux disease)     HLD (hyperlipidemia)     HTN (hypertension)     Hypothyroid     IBS (irritable bowel syndrome)     Lumbar spondylolysis     Myofascial pain     Poor appetite     RLS (restless legs syndrome)     Wears glasses       Past Surgical History        Procedure Laterality Date    ABDOMEN SURGERY  11/24/2021    exploration with lysis of adhesions, small bowel resection with ileocecetomy and pelvic mesh removal    ABDOMINAL ADHESION SURGERY  3/9/2015    APPENDECTOMY      BLADDER SURGERY      COLECTOMY N/A 11/24/2021    ABDOMINAL EXPLORATION LYSIS OF EXTENSIVE ADHESIONS SMALL BOWEL RESECTION WITH ILEOCECECTOMY  EXPLANTATIOIN OF PELVIC MESH  ILEOCLONIC ANASTAMOSIS MOBILIZATION OF RECTUM AND SIGMOID performed by Isaac Burnette MD at [address]  11/24/2021    with bilateral ureteral stent insertion    CYSTOSCOPY  11/24/2021    CYSTOSCOPY EXPLORATION OF URETER BILATERAL URETERAL STENT INSERTION performed by Isaac Burnette MD at [address] [facility]      ENDOSCOPY, COLON, DIAGNOSTIC      ENTEROCELE REPAIR      EYE SURGERY      HERNIA REPAIR      HYSTERECTOMY      LAPAROTOMY N/A 12/1/2021    LAPAROTOMY EXPLORATORY CREATION OF END COLOSTOMY, LAVAGE, PELVIC DRAIN PLACEMENT performed by Isaac Burnette MD at [address] [facility]  11/24/2021    SIGMOIDOSCOPY FLEXIBLE X 2 performed by Isaac Burnette MD at [address] [facility]    Current Medications    piperacillin-tazobactam  3,375 mg IntraVENous Q8H    metroNIDAZOLE  500 mg IntraVENous Once    sodium hypochlorite   Irrigation BID    heparin (porcine)  5,000 Units SubCUTAneous 3 times per day    citalopram  60 mg Oral Daily    docusate sodium  100 mg Oral BID    influenza virus vaccine  0.5 mL IntraMUSCular Prior to discharge    cyclobenzaprine  5 mg Oral TID    levothyroxine  100 mcg Oral Daily    rosuvastatin  5 mg Oral Nightly    sodium chloride flush  5-40 mL IntraVENous 2 times per day    acetaminophen  1,000 mg Oral 3 times per day    pantoprazole  40 mg Oral Daily    gabapentin  300 mg Oral Q8H     ipratropium-albuterol, oxyCODONE, phenol, sodium chloride flush, sodium chloride, ondansetron **OR** ondansetron  IV Drips/Infusions   dextrose 5 % and 0.9 % NaCl 100 mL/hr at 12/01/21 0626    sodium chloride       Home Medications  Medications Prior to Admission: spironolactone (ALDACTONE) 25 MG tablet, Take 25 mg by mouth daily  rosuvastatin (CRESTOR) 5 MG tablet, Take 5 mg by mouth daily  esomeprazole (NEXIUM) 40 MG delayed release capsule, Take 40 mg by mouth daily   famotidine (PEPCID) 40 MG tablet, Take 40 mg by mouth daily as needed (heartburn)   amLODIPine (NORVASC) 10 MG tablet, Take 10 mg by mouth daily   citalopram (CELEXA) 40 MG tablet, Take 60 mg by mouth daily Takes 1.5 tabs daily  diphenoxylate-atropine (LOMOTIL) 2.5-0.025 MG per tablet, Take 1 tablet by mouth 4 times daily as needed. levothyroxine (SYNTHROID) 100 MCG tablet, Take 100 mcg by mouth Daily   celecoxib (CELEBREX) 200 MG capsule, Take 200 mg by mouth daily   [DISCONTINUED] ibuprofen (ADVIL;MOTRIN) 800 MG tablet,     Allergies    Morphine and Sulfa antibiotics  Social History     Social History     Tobacco Use    Smoking status: Current Every Day Smoker     Packs/day: 0.50     Years: 52.00     Pack years: 26.00     Types: Cigarettes    Smokeless tobacco: Never Used   Substance Use Topics    Alcohol use: Yes     Comment: every other week     Family History          Problem Relation Age of Onset    High Blood Pressure Mother      ROS - 11 systems   Unable to do detailed review of systems secondary patient being sedated on the ventilator. Vitals     height is 5' 4.5\" (1.638 m) and weight is 187 lb 6 oz (85 kg). Her oral temperature is 97.9 °F (36.6 °C). Her blood pressure is 123/67 and her pulse is 114. Her respiration is 16 and oxygen saturation is 92%. Body mass index is 31.67 kg/m². I/O        Intake/Output Summary (Last 24 hours) at 12/1/2021 1833  Last data filed at 12/1/2021 1627  Gross per 24 hour   Intake 5600 ml   Output 1525 ml   Net 4075 ml     I/O last 3 completed shifts: In: 3500 [P.O.:400;  I.V.:3100]  Out: 975 [Urine:975]   Patient Vitals for the past 96 hrs (Last 3 readings):   Weight   11/30/21 0616 187 lb 6 oz (85 kg)   11/29/21 0515 182 lb 8 oz (82.8 kg)   11/28/21 0608 183 lb 1.6 oz (83.1 kg)     Exam   General Appearance sedated, intubated, on the ventilator  HEENT - Head is normocephalic, atraumatic. Pupil reactive to light  Neck - Supple, symmetrical, trachea midline and Soft, trachea midline and straight  Lungs -bilateral rhonchi and wheezing  Cardiovascular - Heart sounds are normal.  Regular rhythm normal rate without murmur, gallop or rub.   Abdomen - Soft, nontender, nondistended, surgical dressing with colostomy present  Neurologic -unable to do detailed neurologic exam secondary patient sedation  Skin - No bruising or bleeding  Extremities - No cyanosis, clubbing or edema    Labs  - Old records and notes have been reviewed in [facility]   CBC     Lab Results   Component Value Date    WBC 4.1 12/01/2021    RBC 2.89 12/01/2021    HGB 8.6 12/01/2021    HCT 26.3 12/01/2021     12/01/2021    MCV 91.0 12/01/2021    MCH 29.8 12/01/2021    MCHC 32.7 12/01/2021    RDW 15.4 12/01/2021    LYMPHOPCT 7 12/01/2021    MONOPCT 5 12/01/2021    BASOPCT 2 12/01/2021    MONOSABS 0.21 12/01/2021    LYMPHSABS 0.29 12/01/2021    EOSABS 0.00 12/01/2021    BASOSABS 0.08 12/01/2021    DIFFTYPE NOT REPORTED 12/01/2021     BMP   Lab Results   Component Value Date     12/01/2021    K 4.8 12/01/2021     12/01/2021    CO2 19 12/01/2021    BUN 28 12/01/2021    CREATININE 0.90 12/01/2021    CREATININE 0.83 12/01/2021    GLUCOSE 107 12/01/2021    CALCIUM 8.2 12/01/2021    MG 2.6 12/01/2021     LFTS  No results found for: ALKPHOS, ALT, AST, PROT, BILITOT, BILIDIR, IBILI, LABALBU  ABG   Lab Results   Component Value Date    [id number] NOT REPORTED 12/01/2021         Radiology    CXR  Unable to retrieve patient's x-ray films on the computer  (See actual reports for details)    \"Thank you for asking us to see this patient\"    Case discussed with

## 2021-12-01 NOTE — ANESTHESIA PRE PROCEDURE
Department of Anesthesiology  Preprocedure Note       Name:  Carmina Case   Age:  80 y.o.  :  1950                                          MRN:  [id number]         Date:  2021      Surgeon: Star Paz):  Sneha Gallo MD    Procedure: Procedure(s):  LAPAROTOMY EXPLORATORY    Medications prior to admission:   Prior to Admission medications    Medication Sig Start Date End Date Taking? Authorizing Provider   spironolactone (ALDACTONE) 25 MG tablet Take 25 mg by mouth daily   Yes Historical Provider, MD   rosuvastatin (CRESTOR) 5 MG tablet Take 5 mg by mouth daily   Yes Historical Provider, MD   esomeprazole (NEXIUM) 40 MG delayed release capsule Take 40 mg by mouth daily    Yes Historical Provider, MD   famotidine (PEPCID) 40 MG tablet Take 40 mg by mouth daily as needed (heartburn)    Yes Historical Provider, MD   amLODIPine (NORVASC) 10 MG tablet Take 10 mg by mouth daily  3/17/15  Yes Historical Provider, MD   citalopram (CELEXA) 40 MG tablet Take 60 mg by mouth daily Takes 1.5 tabs daily 3/5/15  Yes Historical Provider, MD   diphenoxylate-atropine (LOMOTIL) 2.5-0.025 MG per tablet Take 1 tablet by mouth 4 times daily as needed.   14  Yes Historical Provider, MD   levothyroxine (SYNTHROID) 100 MCG tablet Take 100 mcg by mouth Daily  3/5/15  Yes Historical Provider, MD   celecoxib (CELEBREX) 200 MG capsule Take 200 mg by mouth daily     Historical Provider, MD       Current medications:    Current Facility-Administered Medications   Medication Dose Route Frequency Provider Last Rate Last Admin    [MAR Hold] piperacillin-tazobactam (ZOSYN) 3,375 mg in dextrose 5 % 50 mL IVPB extended infusion (mini-bag)  3,375 mg IntraVENous Ami Enriquez MD        metronidazole (FLAGYL) 500 mg in NaCl 100 mL IVPB premix  500 mg IntraVENous Once Abed EROS Chavez MD        sodium hypochlorite (DAKINS) 0.125 % external solution   Irrigation PRN Sneha Gallo MD        [MAR Hold] heparin (porcine) injection 5,000 Units  5,000 Units SubCUTAneous 3 times per day Edgar Escalante DO        [facility] Hold] dextrose 5 % and 0.9 % sodium chloride infusion   IntraVENous Continuous Owenmary kay Escalante,  mL/hr at 12/01/21 0626 New Bag at 12/01/21 0626    [MAR Hold] citalopram (CELEXA) tablet 60 mg  60 mg Oral Daily Ashley Chavez MD        [MAR Hold] docusate sodium (COLACE) capsule 100 mg  100 mg Oral BID Edgar Escalante, DO   100 mg at 11/30/21 2104    [MAR Hold] ipratropium-albuterol (DUONEB) nebulizer solution 1 ampule  1 ampule Inhalation Q4H PRN Edgar Escalante DO        [facility] Hold] influenza quadrivalent split vaccine (FLUZONE;FLUARIX;FLULAVAL;AFLURIA) injection 0.5 mL  0.5 mL IntraMUSCular Prior to discharge Carmela Plummer MD        [MAR Hold] cyclobenzaprine (FLEXERIL) tablet 5 mg  5 mg Oral TID Edgar Escalante, DO   5 mg at 11/30/21 2104    [MAR Hold] oxyCODONE (ROXICODONE) immediate release tablet 5 mg  5 mg Oral Q8H PRN Edgar Escalante, DO   5 mg at 12/01/21 1020    [MAR Hold] phenol 1.4 % mouth spray 1 spray  1 spray Mouth/Throat Q2H PRN Edgar Escalante, DO   1 spray at 11/27/21 1250    [MAR Hold] levothyroxine (SYNTHROID) tablet 100 mcg  100 mcg Oral Daily Edgar Escalante, DO   100 mcg at 11/30/21 0448    [MAR Hold] rosuvastatin (CRESTOR) tablet 5 mg  5 mg Oral Nightly Edgar Escalante, DO   5 mg at 11/30/21 2104    [MAR Hold] sodium chloride flush 0.9 % injection 5-40 mL  5-40 mL IntraVENous 2 times per day Edgar Escalante, DO   10 mL at 11/29/21 0911    [MAR Hold] sodium chloride flush 0.9 % injection 5-40 mL  5-40 mL IntraVENous PRN Edgar Escalante DO        [facility] Hold] 0.9 % sodium chloride infusion  25 mL IntraVENous PRN Edgar Bellon, DO        [facility] Hold] ondansetron (ZOFRAN-ODT) disintegrating tablet 4 mg  4 mg Oral Q8H PRN Edgar Escalante, DO        Or    [MAR Hold] ondansetron [facility]) injection 4 mg  4 mg IntraVENous Q6H PRN Edgar Bellon, DO   4 mg at 11/30/21 1249    [MAR Hold] acetaminophen (TYLENOL) tablet 1,000 mg  1,000 mg Oral 3 times per day Sol Shireen, DO   1,000 mg at 11/30/21 2104    [MAR Hold] pantoprazole (PROTONIX) tablet 40 mg  40 mg Oral Daily Sol [place], DO   40 mg at 11/30/21 0448    [MAR Hold] gabapentin (NEURONTIN) capsule 300 mg  300 mg Oral Q8H Sol [place], DO   300 mg at 11/28/21 1457     Facility-Administered Medications Ordered in Other Encounters   Medication Dose Route Frequency Provider Last Rate Last Admin    fentaNYL (SUBLIMAZE) injection   IntraVENous PRN Stan Matthew, APRN - CRNA   100 mcg at 12/01/21 1406    rocuronium (ZEMURON) injection   IntraVENous PRN Stan Matthew, APRN - CRNA   20 mg at 12/01/21 1447    propofol injection   IntraVENous PRN Stan Matthew, APRN - CRNA   100 mg at 12/01/21 1321    lidocaine PF 2 % injection   IntraVENous PRN Stan Matthew, APRN - CRNA   100 mg at 12/01/21 1321    succinylcholine (ANECTINE) injection   IntraVENous PRN Stan Matthew, APRN - CRNA   120 mg at 12/01/21 1321    ceFAZolin (ANCEF) injection   IntraVENous PRN Stan Matthew, APRN - CRNA   2,000 mg at 12/01/21 1342    phenylephrine (BHARATI-SYNEPHRINE) 1 MG/10ML prefilled syringe   IntraVENous PRN Stan Matthew, APRN - CRNA   100 mcg at 12/01/21 1328    albumin human 5 % IV solution   IntraVENous PRN Stan Matthew, APRN - CRNA   25 g at 12/01/21 1427    calcium chloride 10 % injection   IntraVENous PRN Stan Matthew, APRN - CRNA   0.5 g at 12/01/21 1523    HYDROmorphone (DILAUDID) injection   IntraVENous PRN Stan Matthew, APRN - CRNA   1 mg at 12/01/21 1528    0.9 % sodium chloride infusion   IntraVENous Continuous PRN Stan Matthew, APRN - CRNA   New Bag at 12/01/21 1313       Allergies: Allergies   Allergen Reactions    Morphine Itching    Sulfa Antibiotics        Problem List:    Patient Active Problem List   Diagnosis Code    Intestinal adhesions K66.0    Obesity (BMI 30-39. 9) E66.9    Urinary incontinence R32    Constipation K59.00    Ventral incisional hernia K43.2    Smoker F17.200    Stricture of sigmoid colon ([address] K56.699       Past Medical History:        Diagnosis Date    Abdominal pain     Arthritis     Constipation     COPD (chronic obstructive pulmonary disease) (HCC)     Depressed     GERD (gastroesophageal reflux disease)     HLD (hyperlipidemia)     HTN (hypertension)     Hypothyroid     IBS (irritable bowel syndrome)     Lumbar spondylolysis     Myofascial pain     Poor appetite     RLS (restless legs syndrome)     Wears glasses        Past Surgical History:        Procedure Laterality Date    ABDOMEN SURGERY  11/24/2021    exploration with lysis of adhesions, small bowel resection with ileocecetomy and pelvic mesh removal    ABDOMINAL ADHESION SURGERY  3/9/2015    APPENDECTOMY      BLADDER SURGERY      COLECTOMY N/A 11/24/2021    ABDOMINAL EXPLORATION LYSIS OF EXTENSIVE ADHESIONS SMALL BOWEL RESECTION WITH ILEOCECECTOMY  EXPLANTATIOIN OF PELVIC MESH  ILEOCLONIC ANASTAMOSIS MOBILIZATION OF RECTUM AND SIGMOID performed by Ranjan Lee MD at [facility]  11/24/2021    with bilateral ureteral stent insertion    CYSTOSCOPY  11/24/2021    CYSTOSCOPY EXPLORATION OF URETER BILATERAL URETERAL STENT INSERTION performed by Ranjan Lee MD at [address] [place] [address], [facility]      ENDOSCOPY, COLON, DIAGNOSTIC      ENTEROCELE REPAIR      EYE SURGERY      HERNIA REPAIR      HYSTERECTOMY      RECTAL SURGERY      SIGMOIDOSCOPY  11/24/2021    SIGMOIDOSCOPY FLEXIBLE X 2 performed by Ranjan Lee MD at [address] [place] [address]         Social History:    Social History     Tobacco Use    Smoking status: Current Every Day Smoker     Packs/day: 0.50     Years: 52.00     Pack years: 26.00     Types: Cigarettes    Smokeless tobacco: Never Used   Substance Use Topics    Alcohol use: Yes     Comment: every other week                                Ready to quit: Not Answered  Counseling given: Not Answered      Vital Signs (Current):   Vitals:    12/01/21 0414 12/01/21 0742 12/01/21 1141 12/01/21 1402   BP: 104/64 126/64 123/67    Pulse: 97 98 103    Resp: 16 18 16    Temp: 98.2 °F (36.8 °C) 98.1 °F (36.7 °C) 97.9 °F (36.6 °C)    TempSrc:  Oral Oral    SpO2: 90% 91% (!) 89%    Weight:       Height:    5' 4.5\" (1.638 m)                                              BP Readings from Last 3 Encounters:   12/01/21 123/67   12/01/21 139/64   11/24/21 115/67       NPO Status: Time of last liquid consumption: 1208                        Time of last solid consumption: 1000                        Date of last liquid consumption: 12/01/21                        Date of last solid food consumption: 11/23/21    BMI:   Wt Readings from Last 3 Encounters:   11/30/21 187 lb 6 oz (85 kg)   11/10/21 184 lb 11.2 oz (83.8 kg)   06/29/20 212 lb (96.2 kg)     Body mass index is 31.67 kg/m².     CBC:   Lab Results   Component Value Date    WBC 4.1 12/01/2021    RBC 2.89 12/01/2021    HGB 8.6 12/01/2021    HCT 26.3 12/01/2021    MCV 91.0 12/01/2021    RDW 15.4 12/01/2021     12/01/2021       CMP:   Lab Results   Component Value Date     12/01/2021    K 4.8 12/01/2021     12/01/2021    CO2 19 12/01/2021    BUN 28 12/01/2021    CREATININE 0.90 12/01/2021    CREATININE 0.83 12/01/2021    GFRAA >60 12/01/2021    LABGLOM NOT REPORTED 12/01/2021    GLUCOSE 107 12/01/2021    CALCIUM 8.2 12/01/2021       POC Tests:   Recent Labs     12/01/21  1457   POCGLU 100   POCNA 136*   POCK 4.1   POCCL 105   POCBUN 22   POCHEMO 7.1*   POCHCT 21*       Coags: No results found for: PROTIME, INR, APTT    HCG (If Applicable): No results found for: PREGTESTUR, PREGSERUM, HCG, HCGQUANT     ABGs: No results found for: PHART, PO2ART, MZK9KZL, JMN0MFA, BEART, R7DMKMOH     Type & Screen (If Applicable):  No results found for: LABABO, LABRH    Drug/Infectious Status (If Applicable):  No results found for: HIV, HEPCAB    COVID-19 Screening (If Applicable):   Lab Results   Component Value Date    COVID19 Not Detected 12/01/2021           Anesthesia Evaluation  Patient summary reviewed and Nursing notes reviewed no history of anesthetic complications:   Airway: Mallampati: II  TM distance: >3 FB   Neck ROM: full  Mouth opening: > = 3 FB Dental:          Pulmonary:   (+) pneumonia:  COPD:  decreased breath sounds,                             Cardiovascular:  Exercise tolerance: poor (<4 METS),   (+) hypertension:,           Rate: normal                    Neuro/Psych:   (+) psychiatric history:            GI/Hepatic/Renal:   (+) GERD:,           Endo/Other:    (+) hypothyroidism::., .                 Abdominal:             Vascular: Other Findings:             Anesthesia Plan      general     ASA 4       Induction: rapid sequence. MIPS: Prophylactic antiemetics administered. Anesthetic plan and risks discussed with patient and spouse. Plan discussed with CRNA. Attending anesthesiologist reviewed and agrees with Preprocedure content      patient with consolidation and fluid on lungs, sob, sat in 80s on O2.  High likelihood will require remain intubated after case         Jb Contreras DO   12/1/2021

## 2021-12-02 ENCOUNTER — APPOINTMENT (OUTPATIENT)
Dept: GENERAL RADIOLOGY | Age: 71
DRG: 329 | End: 2021-12-02
Attending: COLON & RECTAL SURGERY
Payer: MEDICARE

## 2021-12-02 LAB
ABSOLUTE EOS #: 0 K/UL (ref 0–0.4)
ABSOLUTE IMMATURE GRANULOCYTE: 0.16 K/UL (ref 0–0.3)
ABSOLUTE LYMPH #: 0.16 K/UL (ref 1–4.8)
ABSOLUTE MONO #: 0.13 K/UL (ref 0.2–0.8)
ALLEN TEST: ABNORMAL
ANION GAP SERPL CALCULATED.3IONS-SCNC: 10 MMOL/L (ref 9–17)
BASOPHILS # BLD: 0 %
BASOPHILS ABSOLUTE: 0 K/UL (ref 0–0.2)
BUN BLDV-MCNC: 23 MG/DL (ref 8–23)
BUN/CREAT BLD: 29 (ref 9–20)
CALCIUM IONIZED: 1.18 MMOL/L (ref 1.13–1.33)
CALCIUM SERPL-MCNC: 7.4 MG/DL (ref 8.6–10.4)
CHLORIDE BLD-SCNC: 109 MMOL/L (ref 98–107)
CO2: 18 MMOL/L (ref 20–31)
CREAT SERPL-MCNC: 0.78 MG/DL (ref 0.5–0.9)
DIFFERENTIAL TYPE: ABNORMAL
EOSINOPHILS RELATIVE PERCENT: 0 % (ref 1–4)
FIO2: 70
GFR AFRICAN AMERICAN: >60 ML/MIN
GFR NON-AFRICAN AMERICAN: >60 ML/MIN
GFR SERPL CREATININE-BSD FRML MDRD: ABNORMAL ML/MIN/{1.73_M2}
GFR SERPL CREATININE-BSD FRML MDRD: ABNORMAL ML/MIN/{1.73_M2}
GLUCOSE BLD-MCNC: 121 MG/DL (ref 65–105)
GLUCOSE BLD-MCNC: 124 MG/DL (ref 70–99)
GLUCOSE BLD-MCNC: 152 MG/DL (ref 65–105)
HCT VFR BLD CALC: 43.7 % (ref 36.3–47.1)
HEMOGLOBIN: 13.9 G/DL (ref 11.9–15.1)
IMMATURE GRANULOCYTES: 5 %
LYMPHOCYTES # BLD: 5 % (ref 24–44)
MAGNESIUM: 2.2 MG/DL (ref 1.6–2.6)
MCH RBC QN AUTO: 29.1 PG (ref 25.2–33.5)
MCHC RBC AUTO-ENTMCNC: 31.8 G/DL (ref 28.4–34.8)
MCV RBC AUTO: 91.6 FL (ref 82.6–102.9)
MODE: ABNORMAL
MONOCYTES # BLD: 4 % (ref 1–7)
MORPHOLOGY: ABNORMAL
NEGATIVE BASE EXCESS, ART: 7 (ref 0–2)
NRBC AUTOMATED: 4.3 PER 100 WBC
O2 DEVICE/FLOW/%: ABNORMAL
PATIENT TEMP: 37.8
PDW BLD-RTO: 15.9 % (ref 11.8–14.4)
PHOSPHORUS: 4 MG/DL (ref 2.6–4.5)
PLATELET # BLD: 162 K/UL (ref 138–453)
PLATELET ESTIMATE: ABNORMAL
PMV BLD AUTO: 11.2 FL (ref 8.1–13.5)
POC HCO3: 18.8 MMOL/L (ref 21–28)
POC O2 SATURATION: 96 % (ref 94–98)
POC PCO2 TEMP: 38 MM HG
POC PCO2: 36.4 MM HG (ref 35–48)
POC PH TEMP: 7.31
POC PH: 7.32 (ref 7.35–7.45)
POC PO2 TEMP: 92 MM HG
POC PO2: 87.1 MM HG (ref 83–108)
POSITIVE BASE EXCESS, ART: ABNORMAL (ref 0–3)
POTASSIUM SERPL-SCNC: 4.6 MMOL/L (ref 3.7–5.3)
RBC # BLD: 4.77 M/UL (ref 3.95–5.11)
RBC # BLD: ABNORMAL 10*6/UL
SAMPLE SITE: ABNORMAL
SEG NEUTROPHILS: 86 % (ref 36–66)
SEGMENTED NEUTROPHILS ABSOLUTE COUNT: 2.75 K/UL (ref 1.8–7.7)
SODIUM BLD-SCNC: 137 MMOL/L (ref 135–144)
TCO2 (CALC), ART: ABNORMAL MMOL/L (ref 22–29)
TROPONIN INTERP: NORMAL
TROPONIN T: NORMAL NG/ML
TROPONIN, HIGH SENSITIVITY: 13 NG/L (ref 0–14)
WBC # BLD: 3.2 K/UL (ref 3.5–11.3)
WBC # BLD: ABNORMAL 10*3/UL

## 2021-12-02 PROCEDURE — 82803 BLOOD GASES ANY COMBINATION: CPT

## 2021-12-02 PROCEDURE — 2500000003 HC RX 250 WO HCPCS: Performed by: COLON & RECTAL SURGERY

## 2021-12-02 PROCEDURE — 84100 ASSAY OF PHOSPHORUS: CPT

## 2021-12-02 PROCEDURE — 2580000003 HC RX 258: Performed by: INTERNAL MEDICINE

## 2021-12-02 PROCEDURE — 80048 BASIC METABOLIC PNL TOTAL CA: CPT

## 2021-12-02 PROCEDURE — 37799 UNLISTED PX VASCULAR SURGERY: CPT

## 2021-12-02 PROCEDURE — 02HV33Z INSERTION OF INFUSION DEVICE INTO SUPERIOR VENA CAVA, PERCUTANEOUS APPROACH: ICD-10-PCS | Performed by: COLON & RECTAL SURGERY

## 2021-12-02 PROCEDURE — 2580000003 HC RX 258: Performed by: STUDENT IN AN ORGANIZED HEALTH CARE EDUCATION/TRAINING PROGRAM

## 2021-12-02 PROCEDURE — 6360000002 HC RX W HCPCS: Performed by: STUDENT IN AN ORGANIZED HEALTH CARE EDUCATION/TRAINING PROGRAM

## 2021-12-02 PROCEDURE — 6370000000 HC RX 637 (ALT 250 FOR IP): Performed by: COLON & RECTAL SURGERY

## 2021-12-02 PROCEDURE — 2700000000 HC OXYGEN THERAPY PER DAY

## 2021-12-02 PROCEDURE — 94761 N-INVAS EAR/PLS OXIMETRY MLT: CPT

## 2021-12-02 PROCEDURE — 93005 ELECTROCARDIOGRAM TRACING: CPT | Performed by: INTERNAL MEDICINE

## 2021-12-02 PROCEDURE — 6370000000 HC RX 637 (ALT 250 FOR IP): Performed by: STUDENT IN AN ORGANIZED HEALTH CARE EDUCATION/TRAINING PROGRAM

## 2021-12-02 PROCEDURE — 85025 COMPLETE CBC W/AUTO DIFF WBC: CPT

## 2021-12-02 PROCEDURE — 94003 VENT MGMT INPAT SUBQ DAY: CPT

## 2021-12-02 PROCEDURE — 71045 X-RAY EXAM CHEST 1 VIEW: CPT

## 2021-12-02 PROCEDURE — 6360000002 HC RX W HCPCS: Performed by: INTERNAL MEDICINE

## 2021-12-02 PROCEDURE — 6370000000 HC RX 637 (ALT 250 FOR IP): Performed by: INTERNAL MEDICINE

## 2021-12-02 PROCEDURE — 2000000000 HC ICU R&B

## 2021-12-02 PROCEDURE — 2500000003 HC RX 250 WO HCPCS: Performed by: INTERNAL MEDICINE

## 2021-12-02 PROCEDURE — 94640 AIRWAY INHALATION TREATMENT: CPT

## 2021-12-02 PROCEDURE — 83735 ASSAY OF MAGNESIUM: CPT

## 2021-12-02 PROCEDURE — 82947 ASSAY GLUCOSE BLOOD QUANT: CPT

## 2021-12-02 RX ORDER — ACETAMINOPHEN 650 MG/1
650 SUPPOSITORY RECTAL EVERY 4 HOURS PRN
Status: DISCONTINUED | OUTPATIENT
Start: 2021-12-02 | End: 2021-12-22

## 2021-12-02 RX ADMIN — SODIUM HYPOCHLORITE: 1.25 SOLUTION TOPICAL at 09:00

## 2021-12-02 RX ADMIN — SODIUM HYPOCHLORITE: 1.25 SOLUTION TOPICAL at 22:22

## 2021-12-02 RX ADMIN — Medication 100 MCG/HR: at 18:07

## 2021-12-02 RX ADMIN — HEPARIN SODIUM 5000 UNITS: 5000 INJECTION INTRAVENOUS; SUBCUTANEOUS at 06:20

## 2021-12-02 RX ADMIN — ACETAMINOPHEN 1000 MG: 500 TABLET ORAL at 06:20

## 2021-12-02 RX ADMIN — I.V. FAT EMULSION 100 ML: 20 EMULSION INTRAVENOUS at 18:20

## 2021-12-02 RX ADMIN — LEVOTHYROXINE SODIUM 100 MCG: 0.1 TABLET ORAL at 06:22

## 2021-12-02 RX ADMIN — PHENYLEPHRINE HYDROCHLORIDE 30 MCG/MIN: 10 INJECTION INTRAVENOUS at 02:19

## 2021-12-02 RX ADMIN — SODIUM CHLORIDE, PRESERVATIVE FREE 10 ML: 5 INJECTION INTRAVENOUS at 22:21

## 2021-12-02 RX ADMIN — IPRATROPIUM BROMIDE AND ALBUTEROL SULFATE 1 AMPULE: .5; 2.5 SOLUTION RESPIRATORY (INHALATION) at 11:46

## 2021-12-02 RX ADMIN — PHENYLEPHRINE HYDROCHLORIDE 175 MCG/MIN: 10 INJECTION INTRAVENOUS at 22:00

## 2021-12-02 RX ADMIN — Medication 100 MCG/HR: at 08:36

## 2021-12-02 RX ADMIN — HEPARIN SODIUM 5000 UNITS: 5000 INJECTION INTRAVENOUS; SUBCUTANEOUS at 15:37

## 2021-12-02 RX ADMIN — IPRATROPIUM BROMIDE AND ALBUTEROL SULFATE 1 AMPULE: .5; 2.5 SOLUTION RESPIRATORY (INHALATION) at 22:46

## 2021-12-02 RX ADMIN — DEXTROSE AND SODIUM CHLORIDE: 5; 900 INJECTION, SOLUTION INTRAVENOUS at 06:21

## 2021-12-02 RX ADMIN — PIPERACILLIN AND TAZOBACTAM 3375 MG: 3; .375 INJECTION, POWDER, LYOPHILIZED, FOR SOLUTION INTRAVENOUS at 15:37

## 2021-12-02 RX ADMIN — IPRATROPIUM BROMIDE AND ALBUTEROL SULFATE 1 AMPULE: .5; 2.5 SOLUTION RESPIRATORY (INHALATION) at 03:27

## 2021-12-02 RX ADMIN — Medication: at 13:02

## 2021-12-02 RX ADMIN — PIPERACILLIN AND TAZOBACTAM 3375 MG: 3; .375 INJECTION, POWDER, LYOPHILIZED, FOR SOLUTION INTRAVENOUS at 07:20

## 2021-12-02 RX ADMIN — IPRATROPIUM BROMIDE AND ALBUTEROL SULFATE 1 AMPULE: .5; 2.5 SOLUTION RESPIRATORY (INHALATION) at 08:35

## 2021-12-02 RX ADMIN — PHENYLEPHRINE HYDROCHLORIDE 175 MCG/MIN: 10 INJECTION INTRAVENOUS at 10:45

## 2021-12-02 RX ADMIN — IPRATROPIUM BROMIDE AND ALBUTEROL SULFATE 1 AMPULE: .5; 2.5 SOLUTION RESPIRATORY (INHALATION) at 16:00

## 2021-12-02 RX ADMIN — PHENYLEPHRINE HYDROCHLORIDE 175 MCG/MIN: 10 INJECTION INTRAVENOUS at 16:06

## 2021-12-02 RX ADMIN — POTASSIUM CHLORIDE: 2 INJECTION, SOLUTION, CONCENTRATE INTRAVENOUS at 18:09

## 2021-12-02 RX ADMIN — IPRATROPIUM BROMIDE AND ALBUTEROL SULFATE 1 AMPULE: .5; 2.5 SOLUTION RESPIRATORY (INHALATION) at 19:21

## 2021-12-02 RX ADMIN — INSULIN LISPRO 1 UNITS: 100 INJECTION, SOLUTION INTRAVENOUS; SUBCUTANEOUS at 19:00

## 2021-12-02 RX ADMIN — HEPARIN SODIUM 5000 UNITS: 5000 INJECTION INTRAVENOUS; SUBCUTANEOUS at 22:17

## 2021-12-02 RX ADMIN — ACETAMINOPHEN 1000 MG: 500 TABLET ORAL at 00:34

## 2021-12-02 ASSESSMENT — PULMONARY FUNCTION TESTS
PIF_VALUE: 16
PIF_VALUE: 23
PIF_VALUE: 18
PIF_VALUE: 14
PIF_VALUE: 19
PIF_VALUE: 13
PIF_VALUE: 21

## 2021-12-02 ASSESSMENT — PAIN SCALES - GENERAL
PAINLEVEL_OUTOF10: 0
PAINLEVEL_OUTOF10: 2
PAINLEVEL_OUTOF10: 0
PAINLEVEL_OUTOF10: 0
PAINLEVEL_OUTOF10: 1

## 2021-12-02 NOTE — PROGRESS NOTES
Respiratory will be at bedside after critical patient in ICU is settled. Patient stable. Awaiting orders for pain meds and sedation.  No s/s of distress will continue to moinitor

## 2021-12-02 NOTE — FLOWSHEET NOTE
12/02/21 1521   Provider Notification   Reason for Communication Review case   Provider Name Dr Dimas Mobley   Provider Notification Physician   Method of Communication Call   Notification Time 1520     Dr Dimas Mobley contacted to clarify PO medication orders d/t pt being intubated, NG to liws draining bile. Per MD, switch medications to IV if possible, hold others. Pt has scheduled tylenol - will notify if pt is febrile for okay to use NG.

## 2021-12-02 NOTE — PROGRESS NOTES
Pulmonary Critical Care Progress Note  Amos Esposito MD     Patient seen for the follow up of hypoxic respiratory failure, metabolic acidosis    Subjective:  She is sedated, intubated on ventilator. She remains on Cesar-Synephrine drip currently at 175 mics. She is on fentanyl drip and 1 mg of Versed for sedation. Examination:  Vitals: BP (!) 96/48   Pulse 88   Temp 97.2 °F (36.2 °C) (Temporal)   Resp 14   Ht 5' 4.5\" (1.638 m)   Wt 187 lb (84.8 kg)   LMP  (LMP Unknown)   SpO2 93%   BMI 31.60 kg/m²   General appearance: Sedated, intubated on ventilator  Neck: No JVD  Lungs: Moderate air exchange, positive crackles  Heart: regular rate and rhythm, S1, S2 normal, no gallop  Abdomen: Soft, non tender, + BS  Extremities: no cyanosis or clubbing.  No significant edema    LABs:  CBC:   Recent Labs     12/01/21 1818 12/02/21  0535   WBC 2.5* 3.2*   HGB 8.1* 13.9   HCT 26.9* 43.7    162     BMP:   Recent Labs     12/01/21 1818 12/02/21  0535    137   K 4.1 4.6   CO2 15* 18*   BUN 20 23   CREATININE 0.62 0.78   LABGLOM >60 >60   GLUCOSE 65* 124*     LIVER PROFILE:  Recent Labs     12/01/21 1818   AST 16   ALT 6   LABALBU 2.1*     ABG:  Lab Results   Component Value Date    SQI2JUU NOT REPORTED 12/02/2021    FIO2 70.0 12/02/2021       Lab Results   Component Value Date    POCPH 7.320 12/02/2021    POCPCO2 36.4 12/02/2021    POCPO2 87.1 12/02/2021    POCHCO3 18.8 12/02/2021    PBEA NOT REPORTED 12/02/2021    VLN8QNX NOT REPORTED 12/02/2021    KYXM0NIU 96 12/02/2021    FIO2 70.0 12/02/2021     Radiology:  12/2/2021      Impression:  · Acute respiratory insufficiency requiring ventilator support  · Feculent peritonitis s/p exploratory laparotomy with end colostomy/pelvic drain placement  · COPD  · History of GERD/dyslipidemia/hypothyroidism/hypertension  · Metabolic acidosis    Recommendations:  · Vent support, PEEP 12, FiO2 60% currently, decrease FiO2 as tolerated to keep oxygen saturation above 90%  · IV Versed for sedation, RASS goal -1 to -2  · IV fentanyl 50 mics IV every hour as needed for pain control  · Continue Cesar-Synephrine, titrate for map > 65  · Continue IV antibiotics, Zosyn/Flagyl  · Bicarb drip with nephrology following  · Albuterol and Ipratropium Q 4 hours and prn  · PICC line to be placed  · X-ray chest in am  · Labs: CBC and BMP in am  · DVT prophylaxis with SQ heparin  · GI prophylaxis with IV Protonix  · Will follow with adolfo Delgado MD, [facility]  Pulmonary Critical Care and Sleep Medicine,  [facility]  Cell: [phone]  Office: [phone]  CC: 35 minutes

## 2021-12-02 NOTE — PLAN OF CARE
Problem: HEMODYNAMIC STATUS  Goal: Patient has stable vital signs and fluid balance  Outcome: Ongoing     Problem: OXYGENATION/RESPIRATORY FUNCTION  Goal: Patient will achieve/maintain normal respiratory rate/effort  Outcome: Ongoing     Problem: SKIN INTEGRITY  Goal: Skin integrity is maintained or improved  Outcome: Ongoing     Problem: Pain:  Goal: Control of acute pain  Description: Control of acute pain  Outcome: Ongoing     Problem: Nutrition  Goal: Optimal nutrition therapy  Outcome: Ongoing     Problem: Non-Violent Restraints  Goal: No harm/injury to patient while restraints in use  Outcome: Ongoing

## 2021-12-02 NOTE — PROGRESS NOTES
Reason for Consult:  Acute kidney injury. Requesting Physician:  Mildred Hua MD    Interval history:   Low urine output  Creatinine continues to improve  Blood gas was consistent with metabolic acidosis    HISTORY OF PRESENT ILLNESS:    The patient is a 80 y.o. female admitted for elective scheduled sigmoidectomy that was performed on 11/24. On previous labs her serum creatinines have been between 0.4 to 0.5 with eGFR of >60s ml/min. Her lowest recorded BP was 86/52 mmHg. On admission her creatinine was 0.5 that bill to 1.74 yesterday and today after iv hydration her creatinine is better at 0.8. She had to go back to the OR today for bowel leak and now has a colostomy in place. She is intubated so not able to get any history from the patient. Most of the history is taken from patient's chart. No recent use iv contrast. She was on NSAIDs at home. Review Of Systems:   Unable to obtain as she is intubated. Prior to Admission medications    Medication Sig Start Date End Date Taking? Authorizing Provider   spironolactone (ALDACTONE) 25 MG tablet Take 25 mg by mouth daily   Yes Historical Provider, MD   rosuvastatin (CRESTOR) 5 MG tablet Take 5 mg by mouth daily   Yes Historical Provider, MD   esomeprazole (NEXIUM) 40 MG delayed release capsule Take 40 mg by mouth daily    Yes Historical Provider, MD   famotidine (PEPCID) 40 MG tablet Take 40 mg by mouth daily as needed (heartburn)    Yes Historical Provider, MD   amLODIPine (NORVASC) 10 MG tablet Take 10 mg by mouth daily  3/17/15  Yes Historical Provider, MD   citalopram (CELEXA) 40 MG tablet Take 60 mg by mouth daily Takes 1.5 tabs daily 3/5/15  Yes Historical Provider, MD   diphenoxylate-atropine (LOMOTIL) 2.5-0.025 MG per tablet Take 1 tablet by mouth 4 times daily as needed.   12/22/14  Yes Historical Provider, MD   levothyroxine (SYNTHROID) 100 MCG tablet Take 100 mcg by mouth Daily  3/5/15  Yes Historical Provider, MD   celecoxib (CELEBREX) 200 MG capsule Take 200 mg by mouth daily     Historical Provider, MD       Scheduled Meds:   piperacillin-tazobactam  3,375 mg IntraVENous Q8H    metroNIDAZOLE  500 mg IntraVENous Once    sodium hypochlorite   Irrigation BID    ipratropium-albuterol  1 ampule Inhalation Q4H    heparin (porcine)  5,000 Units SubCUTAneous 3 times per day    citalopram  60 mg Oral Daily    docusate sodium  100 mg Oral BID    influenza virus vaccine  0.5 mL IntraMUSCular Prior to discharge    cyclobenzaprine  5 mg Oral TID    levothyroxine  100 mcg Oral Daily    rosuvastatin  5 mg Oral Nightly    sodium chloride flush  5-40 mL IntraVENous 2 times per day    acetaminophen  1,000 mg Oral 3 times per day    pantoprazole  40 mg Oral Daily    gabapentin  300 mg Oral Q8H     Continuous Infusions:   phenylephrine (BHARATI-SYNEPHRINE) 50mg/250mL infusion 175 mcg/min (12/02/21 1045)    dextrose      midazolam (VERSED) 1 mg/mL in D5W infusion 1 mg/hr (12/02/21 1047)    fentaNYL 100 mcg/hr (12/02/21 0836)    dextrose 5 % and 0.9 % NaCl 100 mL/hr at 12/02/21 0621    sodium chloride       PRN Meds:acetaminophen, fentanNYL, glucose, dextrose, glucagon (rDNA), dextrose, potassium chloride **OR** potassium alternative oral replacement **OR** potassium chloride, ipratropium-albuterol, oxyCODONE, phenol, sodium chloride flush, sodium chloride, ondansetron **OR** ondansetron    Physical Exam:  Vitals:    12/02/21 1000 12/02/21 1100 12/02/21 1146 12/02/21 1200   BP: (!) 109/50 (!) 96/48     Pulse: 91 88 88    Resp: 17 17 14    Temp:    97.2 °F (36.2 °C)   TempSrc:    Temporal   SpO2:  93% 93%    Weight:       Height:         I/O last 3 completed shifts: In: 4769.8 [I.V.:4117.5; IV Piggyback:652.3]  Out: 2140 [Urine:1075; Emesis/NG output:450; Drains:115; Other:360; Stool:40; Blood:100]    General: not in distress. Appears to be stated age. HEENT: Atraumatic, normocephalic. Anicteric sclera. Pink and moist oral mucosa. No carotid bruit.  No JVD.  Chest: Bilateral air entry, clear to auscultation, no wheezing, rhonchi or rales. Cardiovascular: RRR, S1S2, no murmur, rub or gallop. Has lower extremity edema. Abdomen: Soft, non tender to palpation. Active bowel sounds x 4 quadrants. Musculoskeletal: No cyanosis or clubbing. Integumentary: Pink, warm and dry. Free from rash or lesions. Skin turgor normal.  CNS: Face symmetrical. No tremor.      Data:  CBC:   Lab Results   Component Value Date    WBC 3.2 (L) 12/02/2021    HGB 13.9 12/02/2021    HCT 43.7 12/02/2021    MCV 91.6 12/02/2021     12/02/2021     BMP:    Lab Results   Component Value Date     12/02/2021     12/01/2021     (L) 12/01/2021    K 4.6 12/02/2021    K 4.1 12/01/2021    K 4.8 12/01/2021     (H) 12/02/2021     (H) 12/01/2021     12/01/2021    CO2 18 (L) 12/02/2021    CO2 15 (L) 12/01/2021    CO2 19 (L) 12/01/2021    BUN 23 12/02/2021    BUN 20 12/01/2021    BUN 28 (H) 12/01/2021    CREATININE 0.78 12/02/2021    CREATININE 0.62 12/01/2021    CREATININE 0.90 12/01/2021    GLUCOSE 124 (H) 12/02/2021    GLUCOSE 65 (L) 12/01/2021    GLUCOSE 107 (H) 12/01/2021     CMP:   Lab Results   Component Value Date     12/02/2021    K 4.6 12/02/2021     12/02/2021    CO2 18 12/02/2021    BUN 23 12/02/2021    CREATININE 0.78 12/02/2021    GLUCOSE 124 12/02/2021    CALCIUM 7.4 12/02/2021    PROT 4.3 12/01/2021    LABALBU 2.1 12/01/2021    BILITOT 0.55 12/01/2021    ALKPHOS 79 12/01/2021    AST 16 12/01/2021    ALT 6 12/01/2021      Hepatic:   Lab Results   Component Value Date    AST 16 12/01/2021    ALT 6 12/01/2021    BILITOT 0.55 12/01/2021    ALKPHOS 79 12/01/2021     BNP: No results found for: BNP  Lipids: No results found for: CHOL, HDL  INR: No results found for: INR  PTH: No results found for: PTH  Phosphorus:    Lab Results   Component Value Date    PHOS 4.0 12/02/2021     Ionized Calcium: No results found for: IONCA  Magnesium:   Lab Results   Component Value Date    MG 2.2 12/02/2021     Albumin:   Lab Results   Component Value Date    LABALBU 2.1 12/01/2021     Last 3 CK, CKMB, Troponin: @LABRCNT(CKTOTAL:3,CKMB:3,TROPONINI:3)       URINE:)No results found for: Ashok Price     Radiology:   Reviewed. Assessment:  Acute kidney injury, FeNa was 0.24%, appears to be hemodynamically related. Creatinine is improving. Hyponatremia. Metabolic acidosis  Anemia. S/p sigmoidectomy and now has colostomy. Plan:  Renal function is improving  We will change IV fluids to half-normal with D5 and 75 mEq of sodium bicarb   Serum sodium improved to normal range  off NSAIDs, Amlodipine and Aldactone. Place on renal diet/TF with oral fluid restriction of 1000 ml/24 hours when diet is resumed. Monitor BP. Avoid hypotension, nephrotoxic drugs, Lovenox, Fleets enema and IV contrast exposure. Follow up chemistries ordered for AM.    Thank you for the consultation. Please do not hesitate to contact us for any further questions/concerns. We will continue to follow along with you. Electronically signed by Clem Arredondo MD  on 12/2/2021 at 12:25 PM   [facility].   Ph: [phone]

## 2021-12-02 NOTE — PROGRESS NOTES
General Surgery:  Daily Progress Note                    PATIENT NAME: Ritesh Tom     TODAY'S DATE: 12/2/2021, 1:35 PM  CC:  I feel miserable    SUBJECTIVE:     Patient seen and evaluated. Went to OR for ex lap after finding of anastomotic leak and stool leaking from midline. She returned to ICU intubated and ventilated. Remains intubated, sedated, requiring idania for blood pressure support. Drain has put out about 140 cc serosanguineous fluid since surgery. Ostomy appliance in place. Stoma pink and viable with bowel sweat in bag. OBJECTIVE:   VITALS:  BP (!) 96/48   Pulse 88   Temp 97.2 °F (36.2 °C) (Temporal)   Resp 14   Ht 5' 4.5\" (1.638 m)   Wt 187 lb (84.8 kg)   LMP  (LMP Unknown)   SpO2 93%   BMI 31.60 kg/m²      INTAKE/OUTPUT:      Intake/Output Summary (Last 24 hours) at 12/2/2021 1335  Last data filed at 12/2/2021 1200  Gross per 24 hour   Intake 4369.8 ml   Output 2068 ml   Net 2301.8 ml       PHYSICAL EXAM:  General Appearance: Unresponsive, sedated, critically ill  HEENT:  Normocephalic, atraumatic, mucus membranes moist   Heart: Heart regular rate, requiring pressors for blood pressure support. Lungs: Equal chest rise bilaterally, mechanically ventilated. Abdomen: Soft, midline incision covered with dressings. Ostomy appliance in place containing bowel sweat. Stoma pink and viable. Extremities: No cyanosis, pitting edema, rashes noted. Skin: Skin color, texture, turgor normal. No rashes or lesions.     Data:  CBC with Differential:    Lab Results   Component Value Date    WBC 3.2 12/02/2021    RBC 4.77 12/02/2021    HGB 13.9 12/02/2021    HCT 43.7 12/02/2021     12/02/2021    MCV 91.6 12/02/2021    MCH 29.1 12/02/2021    MCHC 31.8 12/02/2021    RDW 15.9 12/02/2021    LYMPHOPCT 5 12/02/2021    MONOPCT 4 12/02/2021    BASOPCT 0 12/02/2021    MONOSABS 0.13 12/02/2021    LYMPHSABS 0.16 12/02/2021    EOSABS 0.00 12/02/2021    BASOSABS 0.00 12/02/2021    DIFFTYPE NOT REPORTED 12/02/2021     BMP:    Lab Results   Component Value Date     12/02/2021    K 4.6 12/02/2021     12/02/2021    CO2 18 12/02/2021    BUN 23 12/02/2021    LABALBU 2.1 12/01/2021    CREATININE 0.78 12/02/2021    CALCIUM 7.4 12/02/2021    GFRAA >60 12/02/2021    LABGLOM >60 12/02/2021    GLUCOSE 124 12/02/2021     Magnesium:    Lab Results   Component Value Date    MG 2.2 12/02/2021     Phosphorus:    Lab Results   Component Value Date    PHOS 4.0 12/02/2021       Radiology Review:  XR ABDOMEN (KUB) (SINGLE AP VIEW)    Result Date: 11/29/2021  Nonspecific, nonobstructive bowel gas pattern. Residual pneumoperitoneum in the setting of recent laparotomy. ASSESSMENT:  Active Hospital Problems    Diagnosis Date Noted    Stricture of sigmoid colon [facility]) [K44.808] 11/24/2021       80 y.o. female with sigmoid colon stricture. 11/24/21: Status post exploratory laparotomy with explantation pelvic mesh and mobilSzation sigmoid and proximal rectum, status post ileocecectomy with ileocolonic anastomosis. 12/1/21: Status post ex lap, creation of end colostomy, pelvic drain placement    Plan:  -Appreciate critical care management  -Continue NGT to LIWS  -Monitor drain output  -Multimodal pain therapy  -OK for VTE ppx  -Pulm hygiene  resuscitate as needed. -Monitor labs    Electronically signed by Mame Head MD  on 12/2/2021 at 1:35 PM       I Dr. Smita Howe saw and examined the patient. I have edited the above and agree with the above. Ashley Chavez  Colorectal Surgery

## 2021-12-02 NOTE — PROGRESS NOTES
Respiratory called, patient XSANVS [id number], Dr Claudette Verdugo aware, wants PEEp increased to 12. Will continue to monitor.

## 2021-12-02 NOTE — PROGRESS NOTES
Pt stoma from colostomy appeared to have a darker appearance (maroon/purple) compared to previous assessment. Dr. Jay Abts notified via call. No new orders. Pt was also noted to have a fever of 102.3F and becoming increasingly tachycardic. OK to receive PO Tylenol through NG tube. Ice packs applied to pts core points. Will continue to monitor.

## 2021-12-02 NOTE — PROGRESS NOTES
Physical Therapy  DATE: 2021    NAME: Chato Sears  MRN: [id number]   : 1950    Patient not seen this date for Physical Therapy due to:      [] Cancel by RN or physician due to:    [] Hemodialysis    [] Critical Lab Value Level     [] Blood transfusion in progress    [] Acute or unstable cardiovascular status   _MAP < 55 or more than >115  _HR < 40 or > 130    [] Acute or unstable pulmonary status   -FiO2 > 60%   _RR < 5 or >40    _O2 sats < 85%    [] Strict Bedrest    [] Off Unit for surgery or procedure    [] Off Unit for testing       [] Pending imaging to R/O fracture    [] Refusal by Patient      [x] Other:  Pt not medically appropriate for PT at this time. Pt intubated. PT will continue to follow and ck back as able or 12/3/21. [] PT being discontinued at this time. Patient independent. No further needs. [] PT being discontinued at this time as the patient has been transferred to hospice care. No further needs.       Anaya Browning, PT

## 2021-12-02 NOTE — PROGRESS NOTES
Ashlee Frias   Urology Progress Note            Subjective:  Follow-up urinary retention, indwelling Rose    Patient Vitals for the past 24 hrs:   BP Temp Temp src Pulse Resp SpO2 Height Weight   12/02/21 1504 -- -- -- -- -- -- 5' 4.5\" (1.638 m) --   12/02/21 1500 -- 98.2 °F (36.8 °C) Temporal -- -- -- -- --   12/02/21 1300 (!) 99/47 -- -- 88 17 92 % -- --   12/02/21 1200 (!) 107/48 97.2 °F (36.2 °C) Temporal 89 19 94 % -- --   12/02/21 1146 -- -- -- 88 14 93 % -- --   12/02/21 1100 (!) 96/48 -- -- 88 17 93 % -- --   12/02/21 1000 (!) 109/50 -- -- 91 17 -- -- --   12/02/21 0900 (!) 104/49 -- -- 90 17 -- -- --   12/02/21 0835 -- -- -- -- 18 96 % -- --   12/02/21 0834 -- -- -- 90 18 96 % -- --   12/02/21 0800 (!) 96/50 99.6 °F (37.6 °C) Oral 92 18 95 % -- --   12/02/21 0700 (!) 98/50 -- -- 93 19 -- -- --   12/02/21 0630 -- -- -- 94 16 96 % -- --   12/02/21 0611 -- -- -- -- 18 96 % -- --   12/02/21 0600 (!) 98/52 -- -- 94 19 96 % -- --   12/02/21 0530 -- -- -- 97 23 95 % -- --   12/02/21 0500 (!) 102/58 -- -- 96 19 95 % -- --   12/02/21 0430 -- -- -- 97 19 95 % -- --   12/02/21 0401 -- -- -- 100 20 96 % -- --   12/02/21 0400 (!) 107/58 100 °F (37.8 °C) Oral 98 21 96 % -- 187 lb (84.8 kg)   12/02/21 0330 -- -- -- 100 22 96 % -- --   12/02/21 0326 -- -- -- -- 20 95 % -- --   12/02/21 0300 (!) 112/56 -- -- 103 19 96 % -- --   12/02/21 0230 -- -- -- 105 20 96 % -- --   12/02/21 0200 (!) 105/56 -- -- 108 21 96 % -- --   12/02/21 0130 -- -- -- 112 22 96 % -- --   12/02/21 0100 (!) 115/57 -- -- 113 26 96 % -- --   12/02/21 0030 -- -- -- 115 29 96 % -- --   12/02/21 0000 (!) 99/52 102.3 °F (39.1 °C) Oral 116 (!) 36 96 % -- --   12/01/21 2355 -- -- -- 116 30 94 % -- --   12/01/21 2330 -- -- -- 116 30 95 % -- --   12/01/21 2300 122/61 -- -- 116 (!) 31 94 % -- --   12/01/21 2230 -- -- -- 114 (!) 31 94 % -- --   12/01/21 2200 115/61 -- -- 114 (!) 35 93 % -- --   12/01/21 2130 -- -- -- 115 (!) 33 93 % -- --   12/01/21 2100 (!) 121/58 -- -- 111 28 96 % -- --   12/01/21 2030 -- -- -- 113 28 95 % -- --   12/01/21 2000 (!) 105/52 99.6 °F (37.6 °C) Axillary 110 27 93 % -- --   12/01/21 1954 -- -- -- 113 26 94 % -- --   12/01/21 1945 -- -- -- 110 29 90 % -- --   12/01/21 1930 -- -- -- 110 22 93 % -- --   12/01/21 1915 -- -- -- 108 26 93 % -- --   12/01/21 1900 -- -- -- 109 26 93 % -- --   12/01/21 1858 -- -- -- 109 27 93 % -- --   12/01/21 1802 (!) 121/53 -- -- 107 25 95 % -- --   12/01/21 1738 -- -- -- 105 -- 91 % -- --   12/01/21 1735 -- -- -- 114 -- 92 % -- --       Intake/Output Summary (Last 24 hours) at 12/2/2021 1538  Last data filed at 12/2/2021 1200  Gross per 24 hour   Intake 4369.8 ml   Output 2068 ml   Net 2301.8 ml       Recent Labs     12/01/21  0727 12/01/21 1818 12/02/21  0535   WBC 4.1 2.5* 3.2*   HGB 8.6* 8.1* 13.9   HCT 26.3* 26.9* 43.7   MCV 91.0 98.2 91.6    274 162     Recent Labs     11/30/21  0729 11/30/21  1800 12/01/21  0727 12/01/21  1457 12/01/21 1818 12/02/21  0535      < > 133*  --  136 137   K 4.8   < > 4.8  --  4.1 4.6      < > 100  --  109* 109*   CO2 18*   < > 19*  --  15* 18*   PHOS 5.5*  --  3.8  --   --  4.0   BUN 21   < > 28*  --  20 23   CREATININE 1.74*   < > 0.83 0.90 0.62 0.78    < > = values in this interval not displayed. Recent Labs     11/30/21  1809   COLORU PETER*   PHUR 5.0   WBCUA 5 TO 10   RBCUA 2 TO 5   MUCUS NOT REPORTED   TRICHOMONAS NOT REPORTED   YEAST NOT REPORTED   BACTERIA RARE*   SPECGRAV 1.025   LEUKOCYTESUR TRACE*   UROBILINOGEN Normal   BILIRUBINUR Presumptive positive. Unable to confirm due to unavailability of reagent. *       Additional Lab/culture results:    Physical Exam: Catheter in place bladder decompressed monitoring urinary output and renal function    Interval Imaging Findings:    Impression:    Patient Active Problem List   Diagnosis    Intestinal adhesions    Obesity (BMI 30-39. 9)    Urinary incontinence    Constipation    Ventral incisional hernia    Smoker    Stricture of sigmoid colon ([facility] [address]       Plan:  We will follow as needed    Mohsen Rosas MD  3:38 PM 12/2/2021

## 2021-12-02 NOTE — PROGRESS NOTES
Patient arrived from surgery, report received. Patient had a sigmoid anastomosis leak with new end creation colostomy. 500ml albumin and 2100ml NS in, 400 out of NG. Unable to verbalize EBL. NG to L nare clamped at 73. Rose intact and draining urine. L brachial art line intact and sutured with clean dry dressing placed in surgery. 2 peripheral IVs to RUE infusion CDI. SHANA bulb suction to open ABD packed with 2 Kerlex soaked in dankins solution covered with ABDs and Coverall. CDI. Art line checked, calibrated and zeroed, compared to cuff matching dystolic, 10 mmhg higher than systolic. EPC cuffs placed bilaterally. FiO2 60% PEEP 8, PRVC  rate 14.  went home for the night. Updated after surgery. ET tube 24 at the lip. Patient resting comfortably. No s/s of distress. Sedation propofol running. PIV flushed and placement confirmed. Will continue to monitor, RN remains at bedside.

## 2021-12-02 NOTE — PLAN OF CARE
Problem: HEMODYNAMIC STATUS  Goal: Patient has stable vital signs and fluid balance  Outcome: Ongoing     Problem: OXYGENATION/RESPIRATORY FUNCTION  Goal: Patient will achieve/maintain normal respiratory rate/effort  Outcome: Ongoing     Problem: MOBILITY  Goal: Early mobilization is achieved  Outcome: Ongoing     Problem: ELIMINATION  Goal: Elimination patterns are normal or improving  Description: Elimination patterns return to pre-surgery normal patterns  Outcome: Ongoing     Problem: SKIN INTEGRITY  Goal: Skin integrity is maintained or improved  Outcome: Ongoing     Problem: Pain:  Goal: Pain level will decrease  Description: Pain level will decrease  Outcome: Ongoing  Goal: Control of acute pain  Description: Control of acute pain  Outcome: Ongoing  Goal: Control of chronic pain  Description: Control of chronic pain  Outcome: Ongoing     Problem: Falls - Risk of:  Goal: Will remain free from falls  Description: Will remain free from falls  Outcome: Ongoing  Goal: Absence of physical injury  Description: Absence of physical injury  Outcome: Ongoing     Problem: Skin Integrity:  Goal: Will show no infection signs and symptoms  Description: Will show no infection signs and symptoms  Outcome: Ongoing  Goal: Absence of new skin breakdown  Description: Absence of new skin breakdown  Outcome: Ongoing     Problem: Nutrition  Goal: Optimal nutrition therapy  Outcome: Ongoing     Problem: Non-Violent Restraints  Goal: Removal from restraints as soon as assessed to be safe  Outcome: Ongoing  Goal: No harm/injury to patient while restraints in use  Outcome: Ongoing  Goal: Patient's dignity will be maintained  Outcome: Ongoing

## 2021-12-02 NOTE — PLAN OF CARE
Nutrition Problem #1: Inadequate protein-energy intake  Intervention: Food and/or Nutrient Delivery: Continue NPO  Nutritional Goals: PN will meet greater than 75% of estimated nutrition needs

## 2021-12-02 NOTE — PROGRESS NOTES
Section of Cardiology  Progress Note      Date:  12/2/2021  Patient: Ugo Oliveira  Admission:  11/24/2021  9:25 AM  Admit DX: Stricture of sigmoid colon ([facility] [address] [K56.699]  Age:  80 y. o., 1950     LOS: 8 days     Reason for evaluation:   Chest pain      SUBJECTIVE:     The patient was seen and examined. Notes and labs reviewed. There were complications over night. Patient had emergent abdominal/colon surgery and it was found that the anastomosis had leakage and patient had peritonitis. She had acute respiratory failure with acidosis and remained intubated thereafter. At the time I saw her she is sedated. Blood pressure is maintained on vasopressors and heart rate appeared to be normal.  No reports of arrhythmia overnight. OBJECTIVE:    Telemetry: Sinus  BP (!) 99/47   Pulse 88   Temp 97.2 °F (36.2 °C) (Temporal)   Resp 17   Ht 5' 4.5\" (1.638 m)   Wt 187 lb (84.8 kg)   LMP  (LMP Unknown)   SpO2 92%   BMI 31.60 kg/m²     Intake/Output Summary (Last 24 hours) at 12/2/2021 1532  Last data filed at 12/2/2021 1200  Gross per 24 hour   Intake 4369.8 ml   Output 2068 ml   Net 2301.8 ml       EXAM:   CONSTITUTIONAL: Intubated and sedated, no apparent distress, and appears stated age. HEENT: Normal jugular venous pulsations, no carotid bruits. Head is atraumatic, normocephalic. Eyes and oral mucosa are normal.  LUNGS: Good respiratory effort. No for increased work of breathing. On auscultation: clear to auscultation bilaterally  CARDIOVASCULAR:  Normal apical impulse, regular rate and rhythm, normal S1 and S2, no S3 or S4,   ABDOMEN: Soft, nontender, nondistended. Bowel sounds present. SKIN: Warm and dry. EXTREMITIES: No lower extremity edema. Motor movement grossly intact. No cyanosis or clubbing.     Current Inpatient Medications:   fat emulsion  100 mL IntraVENous Daily    insulin lispro  0-6 Units SubCUTAneous Q6H    piperacillin-tazobactam  3,375 mg IntraVENous Q8H    metroNIDAZOLE 500 mg IntraVENous Once    sodium hypochlorite   Irrigation BID    ipratropium-albuterol  1 ampule Inhalation Q4H    heparin (porcine)  5,000 Units SubCUTAneous 3 times per day    citalopram  60 mg Oral Daily    docusate sodium  100 mg Oral BID    influenza virus vaccine  0.5 mL IntraMUSCular Prior to discharge    cyclobenzaprine  5 mg Oral TID    levothyroxine  100 mcg Oral Daily    rosuvastatin  5 mg Oral Nightly    sodium chloride flush  5-40 mL IntraVENous 2 times per day    acetaminophen  1,000 mg Oral 3 times per day    pantoprazole  40 mg Oral Daily    gabapentin  300 mg Oral Q8H       IV Infusions (if any):   phenylephrine (BHARATI-SYNEPHRINE) 50mg/250mL infusion 175 mcg/min (12/02/21 1045)    sodium bicarbonate infusion 75 mL/hr at 12/02/21 1302    PN-Adult 2 in 1 Central(5/20 Standard Electrolytes)      dextrose      midazolam (VERSED) 1 mg/mL in D5W infusion 1 mg/hr (12/02/21 1047)    fentaNYL 100 mcg/hr (12/02/21 0836)    sodium chloride         Diagnostics:   EKG: EKG postoperatively showed sinus rhythm with flattening of the T waves. ECHO: .   Ejection fraction: %  Stress Test: .  Cardiac Angiography: .    Labs:   CBC:   Recent Labs     12/01/21 1818 12/02/21  0535   WBC 2.5* 3.2*   HGB 8.1* 13.9   HCT 26.9* 43.7    162     BMP:   Recent Labs     12/01/21 1818 12/02/21  0535    137   K 4.1 4.6   CO2 15* 18*   BUN 20 23   CREATININE 0.62 0.78   LABGLOM >60 >60   GLUCOSE 65* 124*     No results found for: BNP  PT/INR: No results for input(s): PROTIME, INR in the last 72 hours. APTT:No results for input(s): APTT in the last 72 hours.   CARDIAC ENZYMES:  Recent Labs     11/30/21  0100 11/30/21  1800 12/01/21  2341   CKTOTAL  --  46  --    TROPONINT NOT REPORTED  --  NOT REPORTED     FASTING LIPID PANEL:No results found for: HDL, LDLDIRECT, LDLCALC, TRIG  LIVER PROFILE:  Recent Labs     12/01/21 1818   AST 16   ALT 6   LABALBU 2.1*       ASSESSMENT:  1. atypical chest pain without evidence of acute coronary syndrome  2.  History of hypertension, currently borderline low blood pressure  3.  Probable anxiety disorder  4.  Status post colon surgery  5. Acute respiratory failure  6. Acute kidney injury, improving  7. septic shock  Patient Active Problem List   Diagnosis    Intestinal adhesions    Obesity (BMI 30-39. 9)    Urinary incontinence    Constipation    Ventral incisional hernia    Smoker    Stricture of sigmoid colon (HCC)       PLAN:    1. Maintain vasopressors to keep good systolic blood pressure for better perfusion to the kidneys and the brain as well as the heart  2. Antibiotic treatment as per ID and the intensive care service  3. Repeat EKG  4. Prognosis is guarded. Please see orders. Discussed with other Dr. Bo Hilario and nursing.     Chato Healy MD, MD

## 2021-12-02 NOTE — PROGRESS NOTES
Comprehensive Nutrition Assessment    Type and Reason for Visit:  Consult (PN order and management)    Nutrition Recommendations/Plan:   1. Continue NPO status  2. Start central standard 5/20 TPN at 41.7 mL/hr (1000 mL total volume) and 100 mL of lipids. TPN and lipids provides 1080 kcal and 50 gm of protein  3. Will monitor TPN rate/ tolerance, vent status, GI status and labs    Nutrition Assessment:  Patient is had second GI surgery this admission. Patient is status post lap exploratory surgery with end colostomy on 12/1/21 and remains intubated and sedated. Nutrition consult received for PN order and management. Will start central standard TPN at 41.7 mL/hr (1000 mL total volume) and 100 mL of lipids. Continue NPO status. Monitor TPN rate/ tolerance, labs, GI status and vent status. Malnutrition Assessment:  Malnutrition Status: At risk for malnutrition (Comment)      Estimated Daily Nutrient Needs:  Energy (kcal):  1555 kcal [facility] 2010); Weight Used for Energy Requirements:  Current     Protein (g):  84-95 gm of protein (1.5-1.7 gm/kg); Weight Used for Protein Requirements:  Ideal        Fluid (ml/day):   ; Method Used for Fluid Requirements:  1 ml/kcal      Nutrition Related Findings:  No edema. Hypoactive bowel sounds.  GI status: colostomy, abdominal distention      Wounds:  Surgical Incision       Current Nutrition Therapies:    Diet NPO Exceptions are: Sips of Water with Meds, Ice Chips  PN-Adult 2-in-1 [facility] [employer] (Standard)  Current Parenteral Nutrition Orders:  · Type and Formula: Premix Central, 2-in-1 Standard   · Lipids: 100ml, Daily  · Duration: Continuous  · Rate/Volume: 41.7 mL/hr (1000 mL total volume)  · Current PN Order Provides: 1080 kcal and 50 gm of protein  · Goal PN Orders Provides: 1555 kcal and 84-95 gm of protein      Anthropometric Measures:  · Height: 5' 4.5\" (163.8 cm)  · Current Body Weight: 187 lb (84.8 kg)   · Admission Body Weight: 184 lb (83.5 kg)    · Ideal Body Weight: 123 lbs; % Ideal Body Weight 152 %   · BMI: 31.6  · BMI Categories: Obese Class 1 (BMI 30.0-34. 9)       Nutrition Diagnosis:   · Inadequate protein-energy intake related to inadequate protein-energy intake as evidenced by NPO or clear liquid status due to medical condition, poor intake prior to admission    · Altered GI function related to altered GI structure as evidenced by GI abnormality, nausea (status post small bowel surgery)      Nutrition Interventions:   Food and/or Nutrient Delivery:  Continue NPO  Nutrition Education/Counseling:  Education not indicated   Coordination of Nutrition Care:  Continue to monitor while inpatient    Goals:  PN will meet greater than 75% of estimated nutrition needs       Nutrition Monitoring and Evaluation:   Food/Nutrient Intake Outcomes:  Diet Advancement/Tolerance, Parenteral Nutrition Intake/Tolerance  Physical Signs/Symptoms Outcomes:  Biochemical Data, Fluid Status or Edema, Skin, Weight, GI Status     Discharge Planning:     Too soon to determine           Stan PETERSENN, RDN, LDN  Lead Clinical Dietitian  RD Office Phone [phone]

## 2021-12-02 NOTE — PROGRESS NOTES
Dr. Ricco Garza at bedside for vent management. No new orders at this time. Will continue to monitor.  Plan for PICC line placement in am 12/2/2021

## 2021-12-03 ENCOUNTER — APPOINTMENT (OUTPATIENT)
Dept: GENERAL RADIOLOGY | Age: 71
DRG: 329 | End: 2021-12-03
Attending: COLON & RECTAL SURGERY
Payer: MEDICARE

## 2021-12-03 LAB
-: NORMAL
ABO/RH: NORMAL
ABSOLUTE EOS #: 0.06 K/UL (ref 0–0.4)
ABSOLUTE IMMATURE GRANULOCYTE: 0.37 K/UL (ref 0–0.3)
ABSOLUTE LYMPH #: 0.61 K/UL (ref 1–4.8)
ABSOLUTE MONO #: 0.31 K/UL (ref 0.2–0.8)
ALBUMIN SERPL-MCNC: 1.8 G/DL (ref 3.5–5.2)
ALBUMIN/GLOBULIN RATIO: ABNORMAL (ref 1–2.5)
ALLEN TEST: ABNORMAL
ALP BLD-CCNC: 68 U/L (ref 35–104)
ALT SERPL-CCNC: <5 U/L (ref 5–33)
ANION GAP SERPL CALCULATED.3IONS-SCNC: 11 MMOL/L (ref 9–17)
ANTIBODY SCREEN: NEGATIVE
ARM BAND NUMBER: NORMAL
AST SERPL-CCNC: 8 U/L
BASOPHILS # BLD: 1 %
BASOPHILS ABSOLUTE: 0.06 K/UL (ref 0–0.2)
BILIRUB SERPL-MCNC: 0.4 MG/DL (ref 0.3–1.2)
BLD PROD TYP BPU: NORMAL
BUN BLDV-MCNC: 21 MG/DL (ref 8–23)
BUN/CREAT BLD: 40 (ref 9–20)
CALCIUM SERPL-MCNC: 7.4 MG/DL (ref 8.6–10.4)
CHLORIDE BLD-SCNC: 106 MMOL/L (ref 98–107)
CO2: 19 MMOL/L (ref 20–31)
CREAT SERPL-MCNC: 0.52 MG/DL (ref 0.5–0.9)
CROSSMATCH RESULT: NORMAL
DIFFERENTIAL TYPE: ABNORMAL
DISPENSE STATUS BLOOD BANK: NORMAL
EKG ATRIAL RATE: 88 BPM
EKG P AXIS: 46 DEGREES
EKG P-R INTERVAL: 140 MS
EKG Q-T INTERVAL: 312 MS
EKG QRS DURATION: 98 MS
EKG QTC CALCULATION (BAZETT): 377 MS
EKG R AXIS: 7 DEGREES
EKG T AXIS: 1 DEGREES
EKG VENTRICULAR RATE: 88 BPM
EOSINOPHILS RELATIVE PERCENT: 1 % (ref 1–4)
EXPIRATION DATE: NORMAL
FIO2: 60
GFR AFRICAN AMERICAN: >60 ML/MIN
GFR NON-AFRICAN AMERICAN: >60 ML/MIN
GFR SERPL CREATININE-BSD FRML MDRD: ABNORMAL ML/MIN/{1.73_M2}
GFR SERPL CREATININE-BSD FRML MDRD: ABNORMAL ML/MIN/{1.73_M2}
GLUCOSE BLD-MCNC: 137 MG/DL (ref 65–105)
GLUCOSE BLD-MCNC: 151 MG/DL (ref 65–105)
GLUCOSE BLD-MCNC: 151 MG/DL (ref 65–105)
GLUCOSE BLD-MCNC: 152 MG/DL (ref 65–105)
GLUCOSE BLD-MCNC: 164 MG/DL (ref 65–105)
GLUCOSE BLD-MCNC: 174 MG/DL (ref 70–99)
HCT VFR BLD CALC: 20.9 % (ref 36.3–47.1)
HCT VFR BLD CALC: 24.4 % (ref 36.3–47.1)
HEMOGLOBIN: 6.5 G/DL (ref 11.9–15.1)
HEMOGLOBIN: 7.7 G/DL (ref 11.9–15.1)
IMMATURE GRANULOCYTES: 6 %
LYMPHOCYTES # BLD: 10 % (ref 24–44)
MAGNESIUM: 2.2 MG/DL (ref 1.6–2.6)
MCH RBC QN AUTO: 29.5 PG (ref 25.2–33.5)
MCHC RBC AUTO-ENTMCNC: 31.1 G/DL (ref 28.4–34.8)
MCV RBC AUTO: 95 FL (ref 82.6–102.9)
MODE: ABNORMAL
MONOCYTES # BLD: 5 % (ref 1–7)
MORPHOLOGY: ABNORMAL
NEGATIVE BASE EXCESS, ART: 2 (ref 0–2)
NRBC AUTOMATED: 0.5 PER 100 WBC
O2 DEVICE/FLOW/%: ABNORMAL
PATIENT TEMP: ABNORMAL
PDW BLD-RTO: 16.4 % (ref 11.8–14.4)
PHOSPHORUS: 2.3 MG/DL (ref 2.6–4.5)
PLATELET # BLD: 256 K/UL (ref 138–453)
PLATELET ESTIMATE: ABNORMAL
PMV BLD AUTO: 11.4 FL (ref 8.1–13.5)
POC HCO3: 22.9 MMOL/L (ref 21–28)
POC O2 SATURATION: 89 % (ref 94–98)
POC PCO2 TEMP: ABNORMAL MM HG
POC PCO2: 41.7 MM HG (ref 35–48)
POC PH TEMP: ABNORMAL
POC PH: 7.35 (ref 7.35–7.45)
POC PO2 TEMP: ABNORMAL MM HG
POC PO2: 59.3 MM HG (ref 83–108)
POSITIVE BASE EXCESS, ART: ABNORMAL (ref 0–3)
POTASSIUM SERPL-SCNC: 3.7 MMOL/L (ref 3.7–5.3)
PREALBUMIN: <4 MG/DL (ref 20–40)
RBC # BLD: 2.2 M/UL (ref 3.95–5.11)
RBC # BLD: ABNORMAL 10*6/UL
REASON FOR REJECTION: NORMAL
SAMPLE SITE: ABNORMAL
SEG NEUTROPHILS: 77 % (ref 36–66)
SEGMENTED NEUTROPHILS ABSOLUTE COUNT: 4.69 K/UL (ref 1.8–7.7)
SODIUM BLD-SCNC: 136 MMOL/L (ref 135–144)
TCO2 (CALC), ART: ABNORMAL MMOL/L (ref 22–29)
TOTAL PROTEIN: 4.1 G/DL (ref 6.4–8.3)
TRANSFUSION STATUS: NORMAL
TRIGL SERPL-MCNC: 103 MG/DL
UNIT DIVISION: 0
UNIT NUMBER: NORMAL
WBC # BLD: 6.1 K/UL (ref 3.5–11.3)
WBC # BLD: ABNORMAL 10*3/UL
ZZ NTE CLEAN UP: ORDERED TEST: NORMAL
ZZ NTE WITH NAME CLEAN UP: SPECIMEN SOURCE: NORMAL

## 2021-12-03 PROCEDURE — 85014 HEMATOCRIT: CPT

## 2021-12-03 PROCEDURE — 2000000000 HC ICU R&B

## 2021-12-03 PROCEDURE — 85018 HEMOGLOBIN: CPT

## 2021-12-03 PROCEDURE — 86900 BLOOD TYPING SEROLOGIC ABO: CPT

## 2021-12-03 PROCEDURE — 82947 ASSAY GLUCOSE BLOOD QUANT: CPT

## 2021-12-03 PROCEDURE — 84100 ASSAY OF PHOSPHORUS: CPT

## 2021-12-03 PROCEDURE — 85025 COMPLETE CBC W/AUTO DIFF WBC: CPT

## 2021-12-03 PROCEDURE — 36415 COLL VENOUS BLD VENIPUNCTURE: CPT

## 2021-12-03 PROCEDURE — 82803 BLOOD GASES ANY COMBINATION: CPT

## 2021-12-03 PROCEDURE — 2500000003 HC RX 250 WO HCPCS: Performed by: INTERNAL MEDICINE

## 2021-12-03 PROCEDURE — 6360000002 HC RX W HCPCS: Performed by: INTERNAL MEDICINE

## 2021-12-03 PROCEDURE — 6360000002 HC RX W HCPCS: Performed by: STUDENT IN AN ORGANIZED HEALTH CARE EDUCATION/TRAINING PROGRAM

## 2021-12-03 PROCEDURE — 6370000000 HC RX 637 (ALT 250 FOR IP): Performed by: INTERNAL MEDICINE

## 2021-12-03 PROCEDURE — 2580000003 HC RX 258: Performed by: STUDENT IN AN ORGANIZED HEALTH CARE EDUCATION/TRAINING PROGRAM

## 2021-12-03 PROCEDURE — 2500000003 HC RX 250 WO HCPCS: Performed by: COLON & RECTAL SURGERY

## 2021-12-03 PROCEDURE — 2700000000 HC OXYGEN THERAPY PER DAY

## 2021-12-03 PROCEDURE — 6370000000 HC RX 637 (ALT 250 FOR IP): Performed by: COLON & RECTAL SURGERY

## 2021-12-03 PROCEDURE — 84478 ASSAY OF TRIGLYCERIDES: CPT

## 2021-12-03 PROCEDURE — 94003 VENT MGMT INPAT SUBQ DAY: CPT

## 2021-12-03 PROCEDURE — 2580000003 HC RX 258: Performed by: INTERNAL MEDICINE

## 2021-12-03 PROCEDURE — 84134 ASSAY OF PREALBUMIN: CPT

## 2021-12-03 PROCEDURE — 02HV33Z INSERTION OF INFUSION DEVICE INTO SUPERIOR VENA CAVA, PERCUTANEOUS APPROACH: ICD-10-PCS | Performed by: RADIOLOGY

## 2021-12-03 PROCEDURE — P9016 RBC LEUKOCYTES REDUCED: HCPCS

## 2021-12-03 PROCEDURE — 94761 N-INVAS EAR/PLS OXIMETRY MLT: CPT

## 2021-12-03 PROCEDURE — 71045 X-RAY EXAM CHEST 1 VIEW: CPT

## 2021-12-03 PROCEDURE — 80053 COMPREHEN METABOLIC PANEL: CPT

## 2021-12-03 PROCEDURE — 94640 AIRWAY INHALATION TREATMENT: CPT

## 2021-12-03 PROCEDURE — 83735 ASSAY OF MAGNESIUM: CPT

## 2021-12-03 RX ORDER — FUROSEMIDE 10 MG/ML
20 INJECTION INTRAMUSCULAR; INTRAVENOUS ONCE
Status: COMPLETED | OUTPATIENT
Start: 2021-12-03 | End: 2021-12-03

## 2021-12-03 RX ORDER — SODIUM CHLORIDE 9 MG/ML
INJECTION, SOLUTION INTRAVENOUS PRN
Status: DISCONTINUED | OUTPATIENT
Start: 2021-12-03 | End: 2021-12-08 | Stop reason: SDUPTHER

## 2021-12-03 RX ADMIN — PIPERACILLIN AND TAZOBACTAM 3375 MG: 3; .375 INJECTION, POWDER, LYOPHILIZED, FOR SOLUTION INTRAVENOUS at 00:36

## 2021-12-03 RX ADMIN — FUROSEMIDE 20 MG: 10 INJECTION, SOLUTION INTRAMUSCULAR; INTRAVENOUS at 13:05

## 2021-12-03 RX ADMIN — Medication: at 22:23

## 2021-12-03 RX ADMIN — HEPARIN SODIUM 5000 UNITS: 5000 INJECTION INTRAVENOUS; SUBCUTANEOUS at 23:55

## 2021-12-03 RX ADMIN — SODIUM CHLORIDE, PRESERVATIVE FREE 10 ML: 5 INJECTION INTRAVENOUS at 21:00

## 2021-12-03 RX ADMIN — I.V. FAT EMULSION 100 ML: 20 EMULSION INTRAVENOUS at 18:13

## 2021-12-03 RX ADMIN — IPRATROPIUM BROMIDE AND ALBUTEROL SULFATE 1 AMPULE: .5; 2.5 SOLUTION RESPIRATORY (INHALATION) at 11:43

## 2021-12-03 RX ADMIN — IPRATROPIUM BROMIDE AND ALBUTEROL SULFATE 1 AMPULE: .5; 2.5 SOLUTION RESPIRATORY (INHALATION) at 16:19

## 2021-12-03 RX ADMIN — HEPARIN SODIUM 5000 UNITS: 5000 INJECTION INTRAVENOUS; SUBCUTANEOUS at 14:10

## 2021-12-03 RX ADMIN — POTASSIUM CHLORIDE: 2 INJECTION, SOLUTION, CONCENTRATE INTRAVENOUS at 18:05

## 2021-12-03 RX ADMIN — IPRATROPIUM BROMIDE AND ALBUTEROL SULFATE 1 AMPULE: .5; 2.5 SOLUTION RESPIRATORY (INHALATION) at 03:10

## 2021-12-03 RX ADMIN — IPRATROPIUM BROMIDE AND ALBUTEROL SULFATE 1 AMPULE: .5; 2.5 SOLUTION RESPIRATORY (INHALATION) at 06:23

## 2021-12-03 RX ADMIN — INSULIN LISPRO 1 UNITS: 100 INJECTION, SOLUTION INTRAVENOUS; SUBCUTANEOUS at 00:36

## 2021-12-03 RX ADMIN — Medication 100 MCG/HR: at 13:50

## 2021-12-03 RX ADMIN — SODIUM HYPOCHLORITE: 1.25 SOLUTION TOPICAL at 21:00

## 2021-12-03 RX ADMIN — INSULIN LISPRO 1 UNITS: 100 INJECTION, SOLUTION INTRAVENOUS; SUBCUTANEOUS at 13:00

## 2021-12-03 RX ADMIN — PIPERACILLIN AND TAZOBACTAM 3375 MG: 3; .375 INJECTION, POWDER, LYOPHILIZED, FOR SOLUTION INTRAVENOUS at 08:10

## 2021-12-03 RX ADMIN — SODIUM HYPOCHLORITE: 1.25 SOLUTION TOPICAL at 13:08

## 2021-12-03 RX ADMIN — Medication 100 MCG/HR: at 23:47

## 2021-12-03 RX ADMIN — PIPERACILLIN AND TAZOBACTAM 3375 MG: 3; .375 INJECTION, POWDER, LYOPHILIZED, FOR SOLUTION INTRAVENOUS at 15:13

## 2021-12-03 RX ADMIN — Medication 3 MG/HR: at 04:26

## 2021-12-03 RX ADMIN — PHENYLEPHRINE HYDROCHLORIDE 125 MCG/MIN: 10 INJECTION INTRAVENOUS at 03:00

## 2021-12-03 RX ADMIN — Medication 100 MCG/HR: at 04:00

## 2021-12-03 RX ADMIN — IPRATROPIUM BROMIDE AND ALBUTEROL SULFATE 1 AMPULE: .5; 2.5 SOLUTION RESPIRATORY (INHALATION) at 19:31

## 2021-12-03 RX ADMIN — Medication: at 03:00

## 2021-12-03 RX ADMIN — INSULIN LISPRO 1 UNITS: 100 INJECTION, SOLUTION INTRAVENOUS; SUBCUTANEOUS at 06:31

## 2021-12-03 ASSESSMENT — PULMONARY FUNCTION TESTS
PIF_VALUE: 30
PIF_VALUE: 16
PIF_VALUE: 18
PIF_VALUE: 12
PIF_VALUE: 16
PIF_VALUE: 12
PIF_VALUE: 18

## 2021-12-03 ASSESSMENT — PAIN SCALES - GENERAL
PAINLEVEL_OUTOF10: 0

## 2021-12-03 NOTE — FLOWSHEET NOTE
Patient is intubated and unresponsive. No family is present. Writer leaves note of support on tray table and offers a prayer. Patient does not respond. Spiritual Care will follow as needed.        12/02/21 2031   Encounter Summary   Services provided to: Patient   Referral/Consult From: Khari   Continue Visiting   (12/2/21 Pt intubated)   Complexity of Encounter Low   Length of Encounter 15 minutes   Routine   Type Follow up   Assessment Unable to respond   Intervention Prayer   Outcome Did not respond

## 2021-12-03 NOTE — FLOWSHEET NOTE
12/03/21 0600   Provider Notification   Reason for Communication Critical Value (comment)  (HGB 6.9, recheck 6.5)   Provider Name Dr. Anthony Tristan   Provider Notification Physician   Method of Communication Secure Message   Response See orders   Notification Time 0600   Order per Dr. Anthony Tristan to transfuse 1 unit of PRBCs then run hgb and Hematocrit every 6 hours after.

## 2021-12-03 NOTE — PLAN OF CARE
Nutrition Problem #1: Inadequate protein-energy intake  Intervention: Food and/or Nutrient Delivery: Continue NPO, Modify Parenteral Nutrition  Nutritional Goals: PN will meet greater than 75% of estimated nutrition needs

## 2021-12-03 NOTE — CARE COORDINATION
Patient is intubated. Will plan to wean in a couple of days. Referral to [facility]. Cont' to follow.

## 2021-12-03 NOTE — PROGRESS NOTES
Colorectal Surgery:  Daily Progress Note                    PATIENT NAME: Armida Fajardo     TODAY'S DATE: 12/3/2021, 6:43 AM  CC:  I feel miserable    SUBJECTIVE:     Patient seen and evaluated. T-max 100.4 overnight, sinus rhythm, down to 50 of phenylephrine drip. Remains debated, sedated on PRVC. Hemoglobin 6.5 this morning, packed cells ordered. Creatinine down to 0.52. Urine output adequate [address]. 550 cc of bilious NG output over past 24 hours. 110 cc serosanguineous fluid from SHANA drain. RLQ soma with bowel sweat in bag. OBJECTIVE:   VITALS:  BP (!) 121/52   Pulse 93   Temp 98.4 °F (36.9 °C) (Axillary)   Resp 17   Ht 5' 4.5\" (1.638 m)   Wt 187 lb (84.8 kg)   LMP  (LMP Unknown)   SpO2 96%   BMI 31.60 kg/m²      INTAKE/OUTPUT:      Intake/Output Summary (Last 24 hours) at 12/3/2021 9380  Last data filed at 12/3/2021 0600  Gross per 24 hour   Intake 3985.93 ml   Output 2110 ml   Net 1875.93 ml       PHYSICAL EXAM:  General Appearance: Unresponsive, sedated, critically ill  HEENT:  Normocephalic, atraumatic, mucus membranes moist   Heart: Heart regular rate, requiring pressors for blood pressure support. Lungs: Equal chest rise bilaterally, mechanically ventilated. Abdomen: Soft, midline incision covered with dressings. Ostomy appliance in place containing bowel sweat. Stoma slightly dusky. Extremities: No cyanosis, pitting edema, rashes noted. Skin: Skin color, texture, turgor normal. No rashes or lesions.     Data:  CBC with Differential:    Lab Results   Component Value Date    WBC 6.1 12/03/2021    RBC 2.20 12/03/2021    HGB 6.5 12/03/2021    HCT 20.9 12/03/2021     12/03/2021    MCV 95.0 12/03/2021    MCH 29.5 12/03/2021    MCHC 31.1 12/03/2021    RDW 16.4 12/03/2021    LYMPHOPCT PENDING 12/03/2021    MONOPCT PENDING 12/03/2021    BASOPCT PENDING 12/03/2021    MONOSABS PENDING 12/03/2021    LYMPHSABS PENDING 12/03/2021    EOSABS PENDING 12/03/2021    BASOSABS PENDING 12/03/2021    DIFFTYPE NOT REPORTED 12/03/2021     BMP:    Lab Results   Component Value Date     12/03/2021    K 3.7 12/03/2021     12/03/2021    CO2 19 12/03/2021    BUN 21 12/03/2021    LABALBU 1.8 12/03/2021    CREATININE 0.52 12/03/2021    CALCIUM 7.4 12/03/2021    GFRAA >60 12/03/2021    LABGLOM >60 12/03/2021    GLUCOSE 174 12/03/2021     Magnesium:    Lab Results   Component Value Date    MG 2.2 12/03/2021     Phosphorus:    Lab Results   Component Value Date    PHOS 2.3 12/03/2021       Radiology Review:  XR ABDOMEN (KUB) (SINGLE AP VIEW)    Result Date: 11/29/2021  Nonspecific, nonobstructive bowel gas pattern. Residual pneumoperitoneum in the setting of recent laparotomy. ASSESSMENT:  Active Hospital Problems    Diagnosis Date Noted    Stricture of sigmoid colon [facility]) [P40.426] 11/24/2021       80 y.o. female with sigmoid colon stricture. 11/24/21: Status post exploratory laparotomy with explantation pelvic mesh and mobilization sigmoid and proximal rectum, status post ileocecectomy with ileocolonic anastomosis. 12/1/21: Status post ex lap, creation of end colostomy, pelvic drain placement    Plan:  -Appreciate critical care management  -Continue NGT to LIWS  -Monitor drain output  -Multimodal pain therapy  -OK for VTE ppx  -Pulm hygiene  -Resuscitate as needed.   -Monitor labs    Electronically signed by Sue Abdi MD  on 12/3/2021 at 6:43 AM     .[email or web address]

## 2021-12-03 NOTE — PROGRESS NOTES
Nutrition Assessment     Type and Reason for Visit: Reassess    Nutrition Recommendations/Plan:   1. Continue NPO status  2. Continue central standard 5/20 TPN and 100 mL of lipids. Increase rate to 65 mL/hr (1560 mL total volume). TPN and lipids provides 1573kcal and 78 gm of protein  3. Will monitor TPN rate/ tolerance, vent status, GI status and labs    Nutrition Assessment:  Patient remains intubated and sedated after bowel surgery on 12/1/21. Patient will continue TPN and rate will be advanced to 65 mL/hr (1560 mL total volume). Continue 100 mL of lipids. Hemoglobin dropped from 13.9 (WNL) to 6.5 (L). Continue NPO status. Monitor TPN rate/ tolerance, labs, GI status and vent status. Malnutrition Assessment:  Malnutrition Status: At risk for malnutrition (Comment)    Estimated Daily Nutrient Needs:  Energy (kcal): 1531 kcal [facility] 2010); Weight Used for Energy Requirements:  Current     Protein (g): 84-95 gm of protein (1.5-1.7 gm/kg); Weight Used for Protein Requirements:  Ideal        Fluid (ml/day):  ; Weight Used for Fluid Requirements:  1 ml/kcal      Nutrition Related Findings: No edema. Colostomy. Hypoactive bowel sounds.  Hgb: 6.5 (L), PRBC transfusion 12/3      Current Nutrition Therapies:    Diet NPO Exceptions are: Sips of Water with Meds, Ice Chips  PN-Adult 2-in-1 [facility] [employer] (Standard)    Anthropometric Measures:  · Height: 5' 4.5\" (163.8 cm)  · Current Body Wt: 187 lb (84.8 kg)   · BMI: 31.6    Nutrition Diagnosis:   · Inadequate protein-energy intake related to inadequate protein-energy intake as evidenced by NPO or clear liquid status due to medical condition, poor intake prior to admission    · Altered GI function related to altered GI structure as evidenced by GI abnormality, nausea (status post small bowel surgery)      Nutrition Interventions:   Food and/or Nutrient Delivery:  Continue NPO, Modify Parenteral Nutrition  Nutrition Education/Counseling:  Education not indicated Coordination of Nutrition Care:  Continue to monitor while inpatient    Goals:  PN will meet greater than 75% of estimated nutrition needs       Nutrition Monitoring and Evaluation:   Food/Nutrient Intake Outcomes:  Diet Advancement/Tolerance, Parenteral Nutrition Intake/Tolerance  Physical Signs/Symptoms Outcomes:  Biochemical Data, Fluid Status or Edema, Skin, Weight, GI Status, Constipation     Discharge Planning:     Too soon to determine         Jossue PETERSENN, RDN, LDN  Lead Clinical Dietitian  RD Office Phone [phone]

## 2021-12-03 NOTE — PROGRESS NOTES
Section of Cardiology  Progress Note      Date:  12/3/2021  Patient: Eddi Martin  Admission:  11/24/2021  9:25 AM  Admit DX: Stricture of sigmoid colon ([address] [K56.699]  Age:  80 y. o., 1950     LOS: 9 days     Reason for evaluation:   arrhythmia      SUBJECTIVE:     The patient was seen and examined. Notes and labs reviewed. There were not complications over night. Patient seen and examined in her room with her nurse at bedside and I came back again to the room to see the  who had many questions. The patient remained intubated and sedated. Her rhythm remained normal.  She is off vasopressors. Still has a lot of fluid suctioned from the NG tube. Her nurse thinks that there is drainage coming from the incision site similar to the fluid coming from the NG tube. She is still on high fluid volume. OBJECTIVE:    Telemetry: Sinus  BP (!) 131/59   Pulse 94   Temp 97.9 °F (36.6 °C) (Temporal)   Resp 16   Ht 5' 4.5\" (1.638 m)   Wt 187 lb (84.8 kg)   LMP  (LMP Unknown)   SpO2 95%   BMI 31.60 kg/m²     Intake/Output Summary (Last 24 hours) at 12/3/2021 1134  Last data filed at 12/3/2021 0945  Gross per 24 hour   Intake 4318.43 ml   Output 2232 ml   Net 2086.43 ml       EXAM:   CONSTITUTIONAL: Intubated, sedated, no apparent distress, and appears stated age. LUNGS: Good respiratory effort. No for increased work of breathing. On auscultation: clear to auscultation bilaterally  CARDIOVASCULAR:  Normal apical impulse, regular rate and rhythm, normal S1 and S2, no S3 or S4,   ABDOMEN: Soft, nontender, nondistended. Bowel sounds present. SKIN: Warm and dry. EXTREMITIES: 1+ lower extremity edema. Motor movement grossly intact. No cyanosis or clubbing.     Current Inpatient Medications:   fat emulsion  100 mL IntraVENous Daily    insulin lispro  0-6 Units SubCUTAneous Q6H    piperacillin-tazobactam  3,375 mg IntraVENous Q8H    metroNIDAZOLE  500 mg IntraVENous Once    sodium hypochlorite Irrigation BID    ipratropium-albuterol  1 ampule Inhalation Q4H    heparin (porcine)  5,000 Units SubCUTAneous 3 times per day    citalopram  60 mg Oral Daily    docusate sodium  100 mg Oral BID    influenza virus vaccine  0.5 mL IntraMUSCular Prior to discharge    cyclobenzaprine  5 mg Oral TID    levothyroxine  100 mcg Oral Daily    rosuvastatin  5 mg Oral Nightly    sodium chloride flush  5-40 mL IntraVENous 2 times per day    acetaminophen  1,000 mg Oral 3 times per day    pantoprazole  40 mg Oral Daily    gabapentin  300 mg Oral Q8H       IV Infusions (if any):   sodium chloride      phenylephrine (BHARATI-SYNEPHRINE) 50mg/250mL infusion Stopped (12/03/21 1004)    sodium bicarbonate infusion 75 mL/hr at 12/03/21 0600    PN-Adult 2 in 1 Central(5/20 Standard Electrolytes) 41.7 mL/hr at 12/03/21 0600    dextrose      midazolam (VERSED) 1 mg/mL in D5W infusion 3 mg/hr (12/03/21 0600)    fentaNYL 100 mcg/hr (12/03/21 0600)    sodium chloride         Diagnostics:   EKG: . ECHO: .   Ejection fraction: %  Stress Test: .  Cardiac Angiography: .    Labs:   CBC:   Recent Labs     12/02/21  0535 12/03/21  0530   WBC 3.2* 6.1   HGB 13.9 6.5*   HCT 43.7 20.9*    256     BMP:   Recent Labs     12/02/21  0535 12/03/21  0437    136   K 4.6 3.7   CO2 18* 19*   BUN 23 21   CREATININE 0.78 0.52   LABGLOM >60 >60   GLUCOSE 124* 174*     No results found for: BNP  PT/INR: No results for input(s): PROTIME, INR in the last 72 hours. APTT:No results for input(s): APTT in the last 72 hours.   CARDIAC ENZYMES:  Recent Labs     11/30/21  1800 12/01/21  2341   CKTOTAL 46  --    TROPONINT  --  NOT REPORTED     FASTING LIPID PANEL:  Lab Results   Component Value Date    TRIG 103 12/03/2021     LIVER PROFILE:  Recent Labs     12/01/21  1818 12/03/21  0437   AST 16 8   ALT 6 <5*   LABALBU 2.1* 1.8*       ASSESSMENT:  1. atypical chest pain without evidence of acute coronary syndrome  2.  History of hypertension, currently borderline low blood pressure  3.  Probable anxiety disorder  4.  Status post colon surgery  5. Acute respiratory failure  6. Acute kidney injury, improving  7. septic shock    Patient Active Problem List   Diagnosis    Intestinal adhesions    Obesity (BMI 30-39. 9)    Urinary incontinence    Constipation    Ventral incisional hernia    Smoker    Stricture of sigmoid colon ([facility] [address]       PLAN:    1. Recommend to minimize the IV fluid  2. Renal function appears to be normalized  3. Recommend to give IV Lasix, 1 dose of 20 mg, if okay with nephrology. Her urine output remained lower than average. 4. I updated the patient's , who had many questions, about the cardiac status and referred him to the surgeon to update him on the abdominal issues. Please see orders. Discussed with spouse and nursing.     Chato Healy MD, MD

## 2021-12-03 NOTE — PROGRESS NOTES
Pulmonary Critical Care Progress Note  Ignacio Fatima MD     Patient seen for the follow up of hypoxic respiratory failure, metabolic acidosis    Subjective:  She is sedated, intubated on ventilator. He has been weaned off of Cesar-Synephrine drip. She is on fentanyl drip at 100 mics per hour. She is on Versed drip for sedation. Examination:  Vitals: BP (!) 131/59   Pulse 96   Temp 97.5 °F (36.4 °C) (Temporal)   Resp 18   Ht 5' 4.5\" (1.638 m)   Wt 187 lb (84.8 kg)   LMP  (LMP Unknown)   SpO2 96%   BMI 31.60 kg/m²   General appearance: Sedated, intubated on ventilator  Neck: No JVD  Lungs: Moderate air exchange, positive crackles  Heart: regular rate and rhythm, S1, S2 normal, no gallop  Abdomen: Soft, non tender, + BS positive surgical dressing positive colostomy  Extremities: no cyanosis or clubbing.   Positive edema    LABs:  CBC:   Recent Labs     12/02/21  0535 12/03/21  0530   WBC 3.2* 6.1   HGB 13.9 6.5*   HCT 43.7 20.9*    256     BMP:   Recent Labs     12/02/21  0535 12/03/21  0437    136   K 4.6 3.7   CO2 18* 19*   BUN 23 21   CREATININE 0.78 0.52   LABGLOM >60 >60   GLUCOSE 124* 174*     LIVER PROFILE:  Recent Labs     12/01/21  1818 12/03/21  0437   AST 16 8   ALT 6 <5*   LABALBU 2.1* 1.8*     ABG:  Lab Results   Component Value Date    IIE4APS NOT REPORTED 12/03/2021    FIO2 60.0 12/03/2021       Lab Results   Component Value Date    POCPH 7.348 12/03/2021    POCPCO2 41.7 12/03/2021    POCPO2 59.3 12/03/2021    POCHCO3 22.9 12/03/2021    PBEA NOT REPORTED 12/03/2021    DYN7URL NOT REPORTED 12/03/2021    AVGJ5VMF 89 12/03/2021    FIO2 60.0 12/03/2021     Radiology:  X-ray chest: Pulmonary edema 12/3/2021      Impression:  · Acute respiratory insufficiency requiring ventilator support  · Feculent peritonitis s/p exploratory laparotomy with end colostomy/pelvic drain placement  · COPD  · History of GERD/dyslipidemia/hypothyroidism/hypertension  · Metabolic acidosis  · Bilateral pleural infiltrate/pulmonary edema    Recommendations:  · Vent support, PEEP 12, FiO2 70% currently, decrease FiO2 as tolerated to keep oxygen saturation above 90%  · IV Versed for sedation, RASS goal -1 to -2  · IV fentanyl 100 mics IV every hour as needed for pain control  · Watch blood pressure off of Cesar-Synephrine  · IV Lasix  · TPN  · Continue IV antibiotics, Zosyn/Flagyl  · Albuterol and Ipratropium Q 4 hours and prn  · X-ray chest in am  · Labs: CBC and BMP in am  · DVT prophylaxis with SQ heparin  · GI prophylaxis with IV Protonix  · Discussed with RN  · Will follow with you    Harpreet Peck MD, [facility]  Pulmonary Critical Care and Sleep Medicine,  [facility]  Cell: [phone]  Office: [phone]

## 2021-12-03 NOTE — PROGRESS NOTES
Marva Glynn), called at this time (2030) and was updated about Pt current status, questions that were able to be answered were, additional questions regarding length of time on the ventilator and follow up care of ostomy have been redirected to Pulmonology and general surgery respectively, questions and concerns will be passed on to be addressed and followed up with during rounding tomorrow, 12/3.

## 2021-12-03 NOTE — PROGRESS NOTES
Reason for Consult:  Acute kidney injury. Requesting Physician:  Pilo Duong MD    Interval history:   Low urine output  Creatinine continues to improve  Blood gas was consistent with metabolic acidosis    HISTORY OF PRESENT ILLNESS:    The patient is a 80 y.o. female admitted for elective scheduled sigmoidectomy that was performed on 11/24. On previous labs her serum creatinines have been between 0.4 to 0.5 with eGFR of >60s ml/min. Her lowest recorded BP was 86/52 mmHg. On admission her creatinine was 0.5 that bill to 1.74 yesterday and today after iv hydration her creatinine is better at 0.8. She had to go back to the OR today for bowel leak and now has a colostomy in place. She is intubated so not able to get any history from the patient. Most of the history is taken from patient's chart. No recent use iv contrast. She was on NSAIDs at home. Review Of Systems:   Unable to obtain as she is intubated. Prior to Admission medications    Medication Sig Start Date End Date Taking? Authorizing Provider   spironolactone (ALDACTONE) 25 MG tablet Take 25 mg by mouth daily   Yes Historical Provider, MD   rosuvastatin (CRESTOR) 5 MG tablet Take 5 mg by mouth daily   Yes Historical Provider, MD   esomeprazole (NEXIUM) 40 MG delayed release capsule Take 40 mg by mouth daily    Yes Historical Provider, MD   famotidine (PEPCID) 40 MG tablet Take 40 mg by mouth daily as needed (heartburn)    Yes Historical Provider, MD   amLODIPine (NORVASC) 10 MG tablet Take 10 mg by mouth daily  3/17/15  Yes Historical Provider, MD   citalopram (CELEXA) 40 MG tablet Take 60 mg by mouth daily Takes 1.5 tabs daily 3/5/15  Yes Historical Provider, MD   diphenoxylate-atropine (LOMOTIL) 2.5-0.025 MG per tablet Take 1 tablet by mouth 4 times daily as needed.   12/22/14  Yes Historical Provider, MD   levothyroxine (SYNTHROID) 100 MCG tablet Take 100 mcg by mouth Daily  3/5/15  Yes Historical Provider, MD   celecoxib (CELEBREX) 200 MG capsule Take 200 mg by mouth daily     Historical Provider, MD       Scheduled Meds:   fat emulsion  100 mL IntraVENous Daily    insulin lispro  0-6 Units SubCUTAneous Q6H    piperacillin-tazobactam  3,375 mg IntraVENous Q8H    metroNIDAZOLE  500 mg IntraVENous Once    sodium hypochlorite   Irrigation BID    ipratropium-albuterol  1 ampule Inhalation Q4H    heparin (porcine)  5,000 Units SubCUTAneous 3 times per day    citalopram  60 mg Oral Daily    docusate sodium  100 mg Oral BID    influenza virus vaccine  0.5 mL IntraMUSCular Prior to discharge    cyclobenzaprine  5 mg Oral TID    levothyroxine  100 mcg Oral Daily    rosuvastatin  5 mg Oral Nightly    sodium chloride flush  5-40 mL IntraVENous 2 times per day    acetaminophen  1,000 mg Oral 3 times per day    pantoprazole  40 mg Oral Daily    gabapentin  300 mg Oral Q8H     Continuous Infusions:   sodium chloride      PN-Adult 2 in 1 Central(5/20 Standard Electrolytes)      phenylephrine (BHARATI-SYNEPHRINE) 50mg/250mL infusion Stopped (12/03/21 1004)    sodium bicarbonate infusion 75 mL/hr at 12/03/21 0600    PN-Adult 2 in 1 Central(5/20 Standard Electrolytes) 41.7 mL/hr at 12/03/21 0600    dextrose      midazolam (VERSED) 1 mg/mL in D5W infusion 3 mg/hr (12/03/21 0600)    fentaNYL 100 mcg/hr (12/03/21 1350)    sodium chloride       PRN Meds:sodium chloride, acetaminophen, fentanNYL, glucose, dextrose, glucagon (rDNA), dextrose, potassium chloride **OR** potassium alternative oral replacement **OR** potassium chloride, ipratropium-albuterol, oxyCODONE, phenol, sodium chloride flush, sodium chloride, ondansetron **OR** ondansetron    Physical Exam:  Vitals:    12/03/21 1200 12/03/21 1300 12/03/21 1334 12/03/21 1400   BP: (!) 134/57 (!) 134/56  (!) 135/56   Pulse: 97 96  98   Resp: 17 17  18   Temp: 97.5 °F (36.4 °C)      TempSrc: Temporal      SpO2: 96%      Weight:       Height:   5' 4.5\" (1.638 m)      I/O last 3 completed shifts:   In: 4318.4 [I.V.:3300.5; Blood:332.5; IV Piggyback:97.9]  Out: 2365 [FLESJ:6095; Emesis/NG output:650; Drains:125]    General: not in distress. Appears to be stated age. HEENT: Atraumatic, normocephalic. Anicteric sclera. Pink and moist oral mucosa. No carotid bruit. No JVD. Chest: Bilateral air entry, clear to auscultation, no wheezing, rhonchi or rales. Cardiovascular: RRR, S1S2, no murmur, rub or gallop. Has lower extremity edema. Abdomen: Soft, non tender to palpation. Active bowel sounds x 4 quadrants. Musculoskeletal: No cyanosis or clubbing. Integumentary: Pink, warm and dry. Free from rash or lesions. Skin turgor normal.  CNS: Face symmetrical. No tremor.      Data:  CBC:   Lab Results   Component Value Date    WBC 6.1 12/03/2021    HGB 6.5 (LL) 12/03/2021    HCT 20.9 (L) 12/03/2021    MCV 95.0 12/03/2021     12/03/2021     BMP:    Lab Results   Component Value Date     12/03/2021     12/02/2021     12/01/2021    K 3.7 12/03/2021    K 4.6 12/02/2021    K 4.1 12/01/2021     12/03/2021     (H) 12/02/2021     (H) 12/01/2021    CO2 19 (L) 12/03/2021    CO2 18 (L) 12/02/2021    CO2 15 (L) 12/01/2021    BUN 21 12/03/2021    BUN 23 12/02/2021    BUN 20 12/01/2021    CREATININE 0.52 12/03/2021    CREATININE 0.78 12/02/2021    CREATININE 0.62 12/01/2021    GLUCOSE 174 (H) 12/03/2021    GLUCOSE 124 (H) 12/02/2021    GLUCOSE 65 (L) 12/01/2021     CMP:   Lab Results   Component Value Date     12/03/2021    K 3.7 12/03/2021     12/03/2021    CO2 19 12/03/2021    BUN 21 12/03/2021    CREATININE 0.52 12/03/2021    GLUCOSE 174 12/03/2021    CALCIUM 7.4 12/03/2021    PROT 4.1 12/03/2021    LABALBU 1.8 12/03/2021    BILITOT 0.40 12/03/2021    ALKPHOS 68 12/03/2021    AST 8 12/03/2021    ALT <5 12/03/2021      Hepatic:   Lab Results   Component Value Date    AST 8 12/03/2021    AST 16 12/01/2021    ALT <5 (L) 12/03/2021    ALT 6 12/01/2021    BILITOT 0.40 12/03/2021 BILITOT 0.55 12/01/2021    ALKPHOS 68 12/03/2021    ALKPHOS 79 12/01/2021     BNP: No results found for: BNP  Lipids: No results found for: CHOL, HDL  INR: No results found for: INR  PTH: No results found for: PTH  Phosphorus:    Lab Results   Component Value Date    PHOS 2.3 12/03/2021     Ionized Calcium: No results found for: IONCA  Magnesium:   Lab Results   Component Value Date    MG 2.2 12/03/2021     Albumin:   Lab Results   Component Value Date    LABALBU 1.8 12/03/2021     Last 3 CK, CKMB, Troponin: @LABRCNT(CKTOTAL:3,CKMB:3,TROPONINI:3)       URINE:)No results found for: Lavelle Mueller     Radiology:   Reviewed. Assessment:  Acute kidney injury, FeNa was 0.24%, appears to be hemodynamically related. Creatinine is improving. Hyponatremia. Serum sodium improved to normal range. Metabolic acidosis. Anemia. S/p sigmoidectomy and now has colostomy. Plan:  We decrease D5 1/2 NS with 75 mEq of sodium bicarbonate to 40 ml/hr. Off NSAIDs, Amlodipine and Aldactone. Place on renal diet/TF with oral fluid restriction of 1000 ml/24 hours when diet is resumed. Monitor BP. Avoid hypotension, nephrotoxic drugs, Lovenox, Fleets enema and IV contrast exposure. Follow up chemistries ordered for AM.    Thank you for the consultation. Please do not hesitate to contact us for any further questions/concerns. We will continue to follow along with you. Electronically signed by Michael Wilkinson MD  on 12/3/2021 at 3:08 PM   [facility].   Ph: [phone]

## 2021-12-03 NOTE — PROGRESS NOTES
Physical Therapy  DATE: 12/3/2021    NAME: Chris Mccullough  MRN: [id number]   : 1950    Patient not seen this date for Physical Therapy due to:      [] Cancel by RN or physician due to:    [] Hemodialysis    [x] Critical Lab Value Level: Pt's HgB 6.5 this AM.  Pt also currently intubated/sedated. Pt not medically appropriate for therapy at this time. PT will continue to follow and ck back as able.      [] Blood transfusion in progress    [] Acute or unstable cardiovascular status   _MAP < 55 or more than >115  _HR < 40 or > 130    [] Acute or unstable pulmonary status   -FiO2 > 60%   _RR < 5 or >40    _O2 sats < 85%    [] Strict Bedrest    [] Off Unit for surgery or procedure    [] Off Unit for testing       [] Pending imaging to R/O fracture    [] Refusal by Patient      [] Other      [] PT being discontinued at this time. Patient independent. No further needs. [] PT being discontinued at this time as the patient has been transferred to hospice care. No further needs.       Sheri Corrales, PT

## 2021-12-03 NOTE — PROGRESS NOTES
-- -- 88 17 92 % --   12/02/21 1300 (!) 99/47 -- -- 88 17 92 % --   12/02/21 1200 (!) 107/48 97.2 °F (36.2 °C) Temporal 89 19 94 % --   12/02/21 1146 -- -- -- 88 14 93 % --       Intake/Output Summary (Last 24 hours) at 12/3/2021 1137  Last data filed at 12/3/2021 0945  Gross per 24 hour   Intake 4318.43 ml   Output 2232 ml   Net 2086.43 ml       Recent Labs     12/01/21  1818 12/02/21  0535 12/03/21  0530   WBC 2.5* 3.2* 6.1   HGB 8.1* 13.9 6.5*   HCT 26.9* 43.7 20.9*   MCV 98.2 91.6 95.0    162 256     Recent Labs     12/01/21  0727 12/01/21  1457 12/01/21  1818 12/02/21  0535 12/03/21  0437   *  --  136 137 136   K 4.8  --  4.1 4.6 3.7     --  109* 109* 106   CO2 19*  --  15* 18* 19*   PHOS 3.8  --   --  4.0 2.3*   BUN 28*  --  20 23 21   CREATININE 0.83   < > 0.62 0.78 0.52    < > = values in this interval not displayed. Recent Labs     11/30/21  1809   COLORU PETER*   PHUR 5.0   WBCUA 5 TO 10   RBCUA 2 TO 5   MUCUS NOT REPORTED   TRICHOMONAS NOT REPORTED   YEAST NOT REPORTED   BACTERIA RARE*   SPECGRAV 1.025   LEUKOCYTESUR TRACE*   UROBILINOGEN Normal   BILIRUBINUR Presumptive positive. Unable to confirm due to unavailability of reagent. *       Additional Lab/culture results:    Physical Exam: Renal function studies normal, bladder decompressed, catheter in place, patient anemic, transfusion per primary service    Interval Imaging Findings:    Impression:    Patient Active Problem List   Diagnosis    Intestinal adhesions    Obesity (BMI 30-39. 9)    Urinary incontinence    Constipation    Ventral incisional hernia    Smoker    Stricture of sigmoid colon ([facility] [address]       Plan:  We will continue to follow urinary retention and renal function    Herb Thornton MD  11:37 AM 12/3/2021

## 2021-12-04 LAB
ABSOLUTE EOS #: 0.06 K/UL (ref 0–0.4)
ABSOLUTE IMMATURE GRANULOCYTE: 0.35 K/UL (ref 0–0.3)
ABSOLUTE LYMPH #: 0.64 K/UL (ref 1–4.8)
ABSOLUTE MONO #: 0.29 K/UL (ref 0.2–0.8)
ALLEN TEST: ABNORMAL
ANION GAP SERPL CALCULATED.3IONS-SCNC: 10 MMOL/L (ref 9–17)
BASOPHILS # BLD: 2 %
BASOPHILS ABSOLUTE: 0.12 K/UL (ref 0–0.2)
BNP INTERPRETATION: ABNORMAL
BUN BLDV-MCNC: 16 MG/DL (ref 8–23)
BUN/CREAT BLD: ABNORMAL (ref 9–20)
CALCIUM SERPL-MCNC: 7.6 MG/DL (ref 8.6–10.4)
CHLORIDE BLD-SCNC: 106 MMOL/L (ref 98–107)
CO2: 24 MMOL/L (ref 20–31)
CREAT SERPL-MCNC: <0.4 MG/DL (ref 0.5–0.9)
DIFFERENTIAL TYPE: ABNORMAL
EOSINOPHILS RELATIVE PERCENT: 1 % (ref 1–4)
FIO2: 60
GFR AFRICAN AMERICAN: ABNORMAL ML/MIN
GFR NON-AFRICAN AMERICAN: ABNORMAL ML/MIN
GFR SERPL CREATININE-BSD FRML MDRD: ABNORMAL ML/MIN/{1.73_M2}
GFR SERPL CREATININE-BSD FRML MDRD: ABNORMAL ML/MIN/{1.73_M2}
GLUCOSE BLD-MCNC: 161 MG/DL (ref 65–105)
GLUCOSE BLD-MCNC: 166 MG/DL (ref 65–105)
GLUCOSE BLD-MCNC: 171 MG/DL (ref 65–105)
GLUCOSE BLD-MCNC: 171 MG/DL (ref 65–105)
GLUCOSE BLD-MCNC: 176 MG/DL (ref 65–105)
GLUCOSE BLD-MCNC: 193 MG/DL (ref 70–99)
GLUCOSE BLD-MCNC: 198 MG/DL (ref 65–105)
HCT VFR BLD CALC: 22.3 % (ref 36.3–47.1)
HCT VFR BLD CALC: 22.5 % (ref 36.3–47.1)
HCT VFR BLD CALC: 23 % (ref 36.3–47.1)
HCT VFR BLD CALC: 23.2 % (ref 36.3–47.1)
HEMOGLOBIN: 7.1 G/DL (ref 11.9–15.1)
HEMOGLOBIN: 7.1 G/DL (ref 11.9–15.1)
HEMOGLOBIN: 7.2 G/DL (ref 11.9–15.1)
HEMOGLOBIN: 7.3 G/DL (ref 11.9–15.1)
IMMATURE GRANULOCYTES: 6 %
LYMPHOCYTES # BLD: 11 % (ref 24–44)
MAGNESIUM: 2 MG/DL (ref 1.6–2.6)
MCH RBC QN AUTO: 29.2 PG (ref 25.2–33.5)
MCHC RBC AUTO-ENTMCNC: 30.9 G/DL (ref 28.4–34.8)
MCV RBC AUTO: 94.7 FL (ref 82.6–102.9)
MODE: ABNORMAL
MONOCYTES # BLD: 5 % (ref 1–7)
MORPHOLOGY: ABNORMAL
NEGATIVE BASE EXCESS, ART: ABNORMAL (ref 0–2)
NRBC AUTOMATED: 0 PER 100 WBC
O2 DEVICE/FLOW/%: ABNORMAL
PATIENT TEMP: ABNORMAL
PDW BLD-RTO: 16 % (ref 11.8–14.4)
PHOSPHORUS: 1.9 MG/DL (ref 2.6–4.5)
PLATELET # BLD: 231 K/UL (ref 138–453)
PLATELET ESTIMATE: ABNORMAL
PMV BLD AUTO: 11.7 FL (ref 8.1–13.5)
POC HCO3: 28.5 MMOL/L (ref 21–28)
POC O2 SATURATION: 99 % (ref 94–98)
POC PCO2 TEMP: ABNORMAL MM HG
POC PCO2: 48 MM HG (ref 35–48)
POC PH TEMP: ABNORMAL
POC PH: 7.38 (ref 7.35–7.45)
POC PO2 TEMP: ABNORMAL MM HG
POC PO2: 121.4 MM HG (ref 83–108)
POSITIVE BASE EXCESS, ART: 3 (ref 0–3)
POTASSIUM SERPL-SCNC: 3.5 MMOL/L (ref 3.7–5.3)
PRO-BNP: 451 PG/ML
RBC # BLD: 2.43 M/UL (ref 3.95–5.11)
RBC # BLD: ABNORMAL 10*6/UL
SAMPLE SITE: ABNORMAL
SEG NEUTROPHILS: 75 % (ref 36–66)
SEGMENTED NEUTROPHILS ABSOLUTE COUNT: 4.34 K/UL (ref 1.8–7.7)
SODIUM BLD-SCNC: 140 MMOL/L (ref 135–144)
TCO2 (CALC), ART: ABNORMAL MMOL/L (ref 22–29)
WBC # BLD: 5.8 K/UL (ref 3.5–11.3)
WBC # BLD: ABNORMAL 10*3/UL

## 2021-12-04 PROCEDURE — 36415 COLL VENOUS BLD VENIPUNCTURE: CPT

## 2021-12-04 PROCEDURE — 80048 BASIC METABOLIC PNL TOTAL CA: CPT

## 2021-12-04 PROCEDURE — 6370000000 HC RX 637 (ALT 250 FOR IP): Performed by: COLON & RECTAL SURGERY

## 2021-12-04 PROCEDURE — 6360000002 HC RX W HCPCS: Performed by: INTERNAL MEDICINE

## 2021-12-04 PROCEDURE — 85025 COMPLETE CBC W/AUTO DIFF WBC: CPT

## 2021-12-04 PROCEDURE — 82803 BLOOD GASES ANY COMBINATION: CPT

## 2021-12-04 PROCEDURE — 2000000000 HC ICU R&B

## 2021-12-04 PROCEDURE — 82947 ASSAY GLUCOSE BLOOD QUANT: CPT

## 2021-12-04 PROCEDURE — 85018 HEMOGLOBIN: CPT

## 2021-12-04 PROCEDURE — 2580000003 HC RX 258: Performed by: INTERNAL MEDICINE

## 2021-12-04 PROCEDURE — 94761 N-INVAS EAR/PLS OXIMETRY MLT: CPT

## 2021-12-04 PROCEDURE — 2700000000 HC OXYGEN THERAPY PER DAY

## 2021-12-04 PROCEDURE — 2580000003 HC RX 258: Performed by: STUDENT IN AN ORGANIZED HEALTH CARE EDUCATION/TRAINING PROGRAM

## 2021-12-04 PROCEDURE — 85014 HEMATOCRIT: CPT

## 2021-12-04 PROCEDURE — 94003 VENT MGMT INPAT SUBQ DAY: CPT

## 2021-12-04 PROCEDURE — 6360000002 HC RX W HCPCS: Performed by: STUDENT IN AN ORGANIZED HEALTH CARE EDUCATION/TRAINING PROGRAM

## 2021-12-04 PROCEDURE — 2500000003 HC RX 250 WO HCPCS: Performed by: INTERNAL MEDICINE

## 2021-12-04 PROCEDURE — 83735 ASSAY OF MAGNESIUM: CPT

## 2021-12-04 PROCEDURE — 2500000003 HC RX 250 WO HCPCS: Performed by: COLON & RECTAL SURGERY

## 2021-12-04 PROCEDURE — 83880 ASSAY OF NATRIURETIC PEPTIDE: CPT

## 2021-12-04 PROCEDURE — 84100 ASSAY OF PHOSPHORUS: CPT

## 2021-12-04 RX ORDER — IPRATROPIUM BROMIDE AND ALBUTEROL SULFATE 2.5; .5 MG/3ML; MG/3ML
1 SOLUTION RESPIRATORY (INHALATION) EVERY 4 HOURS PRN
Status: DISCONTINUED | OUTPATIENT
Start: 2021-12-04 | End: 2021-12-24 | Stop reason: HOSPADM

## 2021-12-04 RX ADMIN — SODIUM CHLORIDE, PRESERVATIVE FREE 10 ML: 5 INJECTION INTRAVENOUS at 21:13

## 2021-12-04 RX ADMIN — POTASSIUM PHOSPHATE, MONOBASIC AND POTASSIUM PHOSPHATE, DIBASIC 15 MMOL: 224; 236 INJECTION, SOLUTION, CONCENTRATE INTRAVENOUS at 11:39

## 2021-12-04 RX ADMIN — Medication 100 MCG/HR: at 09:51

## 2021-12-04 RX ADMIN — PIPERACILLIN AND TAZOBACTAM 3375 MG: 3; .375 INJECTION, POWDER, LYOPHILIZED, FOR SOLUTION INTRAVENOUS at 15:19

## 2021-12-04 RX ADMIN — HEPARIN SODIUM 5000 UNITS: 5000 INJECTION INTRAVENOUS; SUBCUTANEOUS at 21:13

## 2021-12-04 RX ADMIN — HEPARIN SODIUM 5000 UNITS: 5000 INJECTION INTRAVENOUS; SUBCUTANEOUS at 13:19

## 2021-12-04 RX ADMIN — PIPERACILLIN AND TAZOBACTAM 3375 MG: 3; .375 INJECTION, POWDER, LYOPHILIZED, FOR SOLUTION INTRAVENOUS at 00:59

## 2021-12-04 RX ADMIN — HEPARIN SODIUM 5000 UNITS: 5000 INJECTION INTRAVENOUS; SUBCUTANEOUS at 07:05

## 2021-12-04 RX ADMIN — POTASSIUM CHLORIDE: 2 INJECTION, SOLUTION, CONCENTRATE INTRAVENOUS at 17:48

## 2021-12-04 RX ADMIN — INSULIN LISPRO 1 UNITS: 100 INJECTION, SOLUTION INTRAVENOUS; SUBCUTANEOUS at 18:14

## 2021-12-04 RX ADMIN — PIPERACILLIN AND TAZOBACTAM 3375 MG: 3; .375 INJECTION, POWDER, LYOPHILIZED, FOR SOLUTION INTRAVENOUS at 08:09

## 2021-12-04 RX ADMIN — Medication 3 MG/HR: at 15:51

## 2021-12-04 RX ADMIN — INSULIN LISPRO 1 UNITS: 100 INJECTION, SOLUTION INTRAVENOUS; SUBCUTANEOUS at 13:13

## 2021-12-04 RX ADMIN — INSULIN LISPRO 1 UNITS: 100 INJECTION, SOLUTION INTRAVENOUS; SUBCUTANEOUS at 00:59

## 2021-12-04 RX ADMIN — INSULIN LISPRO 1 UNITS: 100 INJECTION, SOLUTION INTRAVENOUS; SUBCUTANEOUS at 07:05

## 2021-12-04 RX ADMIN — SODIUM CHLORIDE, PRESERVATIVE FREE 10 ML: 5 INJECTION INTRAVENOUS at 08:23

## 2021-12-04 RX ADMIN — I.V. FAT EMULSION 100 ML: 20 EMULSION INTRAVENOUS at 17:47

## 2021-12-04 ASSESSMENT — PULMONARY FUNCTION TESTS
PIF_VALUE: 27
PIF_VALUE: 29
PIF_VALUE: 21
PIF_VALUE: 25
PIF_VALUE: 24

## 2021-12-04 NOTE — PROGRESS NOTES
Ventilator Bronchodilator assessment    Breath sounds: clear and diminished  Inspiratory Pressure: 23  Plateau Pressure: 20    Patient assessed at level 1          [x]    Bronchodilator Assessment    BRONCHODILATOR ASSESSMENT SCORE  Score 0 (Home) 1 2 3 4   Breath Sounds   [x]  Chronic Ventilator: Patient at baseline []  Mild Wheezes/ Clear []  Intermittent wheezes with good air entry []  Bilateral/unilateral wheezing with diminished air entry []  Insp/Exp wheeze and/or poor aeration   Ventilator Pressures   []  Chronic Ventilator [x]  Insp. Pressure less than 25 cm H20 []  Insp. Pressure less than 25 cm H20 []  Insp. Pressure exceeds 25 cm H20 []  Insp.  Pressure exceeds 30 cm H20   Plateau Pressure []  NA   [x]  Plateau Pressure less than 4  []  Plateau Pressure less than or equal to 5 []  Plateau Pressure greater than or equal to 6 []  Plateau Pressure greater than or equal to 8       Alexander Ho RCP  6:06 AM

## 2021-12-04 NOTE — PROGRESS NOTES
Continuous  · Rate/Volume: 65 mL/hr (1560 mL total volume)  · Current PN Order Provides: 1573 kcal and 78 gm of protein  · Goal PN Orders Provides: 1555 kcal and 84-95 gm of protein      Anthropometric Measures:  · Height: 5' 4.5\" (163.8 cm)  · Current Body Weight: 187 lb (84.8 kg)   · Admission Body Weight: 184 lb (83.5 kg)    · Ideal Body Weight: 123 lbs; % Ideal Body Weight 152 %   · BMI: 31.6  · BMI Categories: Obese Class 1 (BMI 30.0-34. 9)       Nutrition Diagnosis:   · Inadequate protein-energy intake related to inadequate protein-energy intake as evidenced by NPO or clear liquid status due to medical condition, poor intake prior to admission    Nutrition Interventions:   Food and/or Nutrient Delivery:  Continue NPO, Modify Parenteral Nutrition  Nutrition Education/Counseling:  Education not indicated   Coordination of Nutrition Care:  Continue to monitor while inpatient    Goals:  PN will meet greater than 75% of estimated nutrition needs       Nutrition Monitoring and Evaluation:   Food/Nutrient Intake Outcomes:  Diet Advancement/Tolerance, Parenteral Nutrition Intake/Tolerance  Physical Signs/Symptoms Outcomes:  Biochemical Data, Fluid Status or Edema, Skin, Weight, GI Status, Constipation     Discharge Planning:     Too soon to determine       Some areas of assessment may be incomplete due to COVID-19 precautions    Maribell Mayen RD, LD  Office phone [phone]

## 2021-12-04 NOTE — PROGRESS NOTES
[address]  Occupational Therapy Not Seen    DATE: 2021    NAME: Chandrika Eric  MRN: [id number]   : 1950    Patient not seen this date for Occupational Therapy due to:      [] Cancel by RN or physician due to:    [] Hemodialysis    [] Critical Lab Value Level     [] Blood transfusion in progress    [] Acute or unstable cardiovascular status   _MAP < 55 or more than >115  _HR < 40 or > 130    [] Acute or unstable pulmonary status   -FiO2 > 60%   _RR < 5 or >40    _O2 sats < 85%    [] Strict Bedrest    [] Off Unit for surgery or procedure    [] Off Unit for testing       [] Pending imaging to R/O fracture    [] Refusal by Patient      [x] Other. Pt not medically appropriate for OT at this time. Pt intubated. OT will continue to follow and ck back as able.     [] OT being discontinued at this time. Patient independent. No further needs. [] OT being discontinued at this time as the patient has been transferred to hospice care. No further needs.       TAM Davila

## 2021-12-04 NOTE — PROGRESS NOTES
Physician Progress Note      PATIENT:               Natali Damon  CSN #:                  [id number]  :                       1950  ADMIT DATE:       2021 9:25 AM  DISCH DATE:  RESPONDING  PROVIDER #:        Peng Nielsen MD          QUERY TEXT:    Pt admitted following elective sigmoidectomy. ? Pt noted to have feculent   peritonitis/sepsis/septic shock. ? If possible, please document in progress   notes and discharge summary:      The medical record reflects the following:  Risk Factors: Post OR for elective sigmoidectomy  Clinical Indicators: Per progress note \"stool leaking from inferior aspect of   midline incision\" patient became restless, CT shows leakage from anastomosis   site, per progress note \"emergent surgery for leakage and peritonitis\" During   elective sigmoidectomy \"traction injury to terminal ileum with injury to ileal   mesentery requiring ileocecectomy with ileocolonic reanastomosis. \"  Treatment: Ex-lap with colostomy creation, IVF, IV Zosyn, monitoring in ICU    Thank you,  Patrick Stanford RN  Options provided:  -- Feculent peritonitis/sepsis/septic shock is a postoperative complication  -- Feculent peritonitis/sepsis/septic shock is not a postoperative   complication, but is due to other incidental risk factor, Please specify other   incidental risk factor  -- Other - I will add my own diagnosis  -- Disagree - Not applicable / Not valid  -- Disagree - Clinically unable to determine / Unknown  -- Refer to Clinical Documentation Reviewer    PROVIDER RESPONSE TEXT:    Patient has feculent peritonitis/sepsis/septic shock as a postoperative   complication. Query created by:  Cj Farley on 12/3/2021 11:49 AM      Electronically signed by:  Peng Nielsen MD 2021 10:16 AM

## 2021-12-04 NOTE — PLAN OF CARE
Problem: HEMODYNAMIC STATUS  Goal: Patient has stable vital signs and fluid balance  12/4/2021 0237 by Agnel Bartlett RN  Outcome: Ongoing  12/3/2021 1835 by Jericho Sandra RN  Outcome: Ongoing     Problem: OXYGENATION/RESPIRATORY FUNCTION  Goal: Patient will achieve/maintain normal respiratory rate/effort  12/4/2021 0237 by Angel Bartlett RN  Outcome: Ongoing  12/3/2021 1835 by Jericho Sandra RN  Outcome: Ongoing     Problem: MOBILITY  Goal: Early mobilization is achieved  12/4/2021 0237 by Angel Bartlett RN  Outcome: Ongoing  12/3/2021 1835 by Jericho Sandra RN  Outcome: Ongoing     Problem: ELIMINATION  Goal: Elimination patterns are normal or improving  Description: Elimination patterns return to pre-surgery normal patterns  12/4/2021 0237 by Angel Bartlett RN  Outcome: Ongoing  12/3/2021 1835 by Jericho Sandra RN  Outcome: Ongoing     Problem: SKIN INTEGRITY  Goal: Skin integrity is maintained or improved  12/4/2021 0237 by Angel Bartlett RN  Outcome: Ongoing  12/3/2021 1835 by Jericho Sandra RN  Outcome: Ongoing     Problem: Pain:  Goal: Pain level will decrease  Description: Pain level will decrease  12/4/2021 0237 by Angel Bartlett RN  Outcome: Ongoing  12/3/2021 1835 by Jericho Sandra RN  Outcome: Ongoing  Goal: Control of acute pain  Description: Control of acute pain  12/4/2021 0237 by Angel Bartlett RN  Outcome: Ongoing  12/3/2021 1835 by Jericho Sandra RN  Outcome: Ongoing  Goal: Control of chronic pain  Description: Control of chronic pain  12/4/2021 0237 by Angel Bartlett RN  Outcome: Ongoing  12/3/2021 1835 by Jericho Sandra RN  Outcome: Ongoing     Problem: Falls - Risk of:  Goal: Will remain free from falls  Description: Will remain free from falls  12/4/2021 0237 by Angel Bartlett RN  Outcome: Ongoing  12/3/2021 1835 by Jericho Sandra RN  Outcome: Ongoing  Goal: Absence of physical injury  Description: Absence of physical injury  12/4/2021 0237 by Angel Bartlett RN  Outcome: Ongoing  12/3/2021 1835 by Jericho Sandra, RN  Outcome: Ongoing     Problem: Skin Integrity:  Goal: Will show no infection signs and symptoms  Description: Will show no infection signs and symptoms  12/4/2021 0237 by Treva Brito RN  Outcome: Ongoing  12/3/2021 1835 by Luis Miguel Britton RN  Outcome: Ongoing  Goal: Absence of new skin breakdown  Description: Absence of new skin breakdown  12/4/2021 0237 by Treva Brito RN  Outcome: Ongoing  12/3/2021 1835 by Luis Miguel Britton RN  Outcome: Ongoing     Problem: Nutrition  Goal: Optimal nutrition therapy  12/4/2021 0237 by Treva Brito RN  Outcome: Ongoing  12/3/2021 1835 by Luis Miguel Britton RN  Outcome: Ongoing     Problem: Non-Violent Restraints  Goal: Removal from restraints as soon as assessed to be safe  12/4/2021 0237 by Treva Brito RN  Outcome: Ongoing  12/3/2021 1835 by Luis Miguel Britton RN  Outcome: Ongoing  Goal: No harm/injury to patient while restraints in use  12/4/2021 0237 by Treva Brito RN  Outcome: Ongoing  12/3/2021 1835 by Luis Miguel Britton RN  Outcome: Ongoing  Goal: Patient's dignity will be maintained  12/4/2021 0237 by Treva Brito RN  Outcome: Ongoing  12/3/2021 1835 by Luis Miguel Britton RN  Outcome: Ongoing

## 2021-12-04 NOTE — PROGRESS NOTES
Colorectal Surgery:  Daily Progress Note               PATIENT NAME: Ranulfo De Dios   TODAY'S DATE: 12/4/2021, 8:05 AM    SUBJECTIVE:     Patient seen and evaluated. T-max 37.8 overnight. Off pressors this am. Remains intubated with minimal response on sedation holiday. Hemoglobin 7.1 this morning. Urine output adequate [address]. 1,125ml bilious NG output . 133ml SS SHANA drain  50ml RLQ stoma    OBJECTIVE:   VITALS:  BP (!) 109/52   Pulse 94   Temp 97.5 °F (36.4 °C) (Axillary)   Resp 13   Ht 5' 4.5\" (1.638 m)   Wt 187 lb (84.8 kg)   LMP  (LMP Unknown)   SpO2 98%   BMI 31.60 kg/m²      INTAKE/OUTPUT:      Intake/Output Summary (Last 24 hours) at 12/4/2021 0805  Last data filed at 12/4/2021 0400  Gross per 24 hour   Intake 1244.84 ml   Output 3245 ml   Net -2000.16 ml       PHYSICAL EXAM:  General Appearance: Unresponsive, sedated, critically ill  HEENT:  Normocephalic, atraumatic, mucus membranes moist   Heart: Heart regular rate, requiring pressors for blood pressure support. Lungs: Equal chest rise bilaterally, mechanically ventilated. Abdomen: Soft, midline incision covered with dressings, napoleon-incisional skin clean and free of erythema. Ostomy appliance in place containing bowel sweat. Stoma slightly dusky. Extremities: No cyanosis, pitting edema, rashes noted. Skin: Skin color, texture, turgor normal. No rashes or lesions.     Data:  CBC with Differential:    Lab Results   Component Value Date    WBC 5.8 12/04/2021    RBC 2.43 12/04/2021    HGB 7.1 12/04/2021    HCT 23.0 12/04/2021     12/04/2021    MCV 94.7 12/04/2021    MCH 29.2 12/04/2021    MCHC 30.9 12/04/2021    RDW 16.0 12/04/2021    LYMPHOPCT 11 12/04/2021    MONOPCT 5 12/04/2021    BASOPCT 2 12/04/2021    MONOSABS 0.29 12/04/2021    LYMPHSABS 0.64 12/04/2021    EOSABS 0.06 12/04/2021    BASOSABS 0.12 12/04/2021    DIFFTYPE NOT REPORTED 12/04/2021     BMP:    Lab Results   Component Value Date     12/04/2021    K 3.5 12/04/2021     12/04/2021    CO2 24 12/04/2021    BUN 16 12/04/2021    LABALBU 1.8 12/03/2021    CREATININE <0.40 12/04/2021    CALCIUM 7.6 12/04/2021    GFRAA Can not be calculated 12/04/2021    LABGLOM Can not be calculated 12/04/2021    GLUCOSE 193 12/04/2021     Magnesium:    Lab Results   Component Value Date    MG 2.0 12/04/2021     Phosphorus:    Lab Results   Component Value Date    PHOS 1.9 12/04/2021     Radiology Review:     ASSESSMENT:  Active Hospital Problems    Diagnosis Date Noted    Stricture of sigmoid colon [facility]) [J51.072] 11/24/2021     80 y.o. female with sigmoid colon stricture. 11/24/21: Status post exploratory laparotomy with explantation pelvic mesh and mobilization sigmoid and proximal rectum, status post ileocecectomy with ileocolonic anastomosis. 12/1/21: Status post ex lap, creation of end colostomy, pelvic drain placement    Plan:  -Appreciate critical care management  -Continue NGT to LIWS; Monitor drain output; awaiting ostomy output   -Appreciate wound ostomy and RN for midline dressing changes and stoma care   -Multimodal pain therapy  -OK for VTE ppx  -Pulm hygiene  -Resuscitate as needed. -Monitor labs    Electronically signed by Augusto Cantrell DO  on 12/4/2021 at 8:05 Joselito Costello saw and examined the patient. I have edited the above and agree with the above. Ashley Chavez  Colorectal Surgery

## 2021-12-04 NOTE — PROGRESS NOTES
Section of Cardiology  Progress Note      Date:  12/4/2021  Patient: Evelin Summers  Admission:  11/24/2021  9:25 AM  Ad[facility] [K56.699]  Age:  80 y. o., 1950     LOS: 10 days     Reason for evaluation:   CHF      SUBJECTIVE:     The patient was seen and examined. Notes and labs reviewed. There were not complications over night. Patient seen and examined in her room with her nurse at bedside. Remained intubated, requiring less sedative and she opens her eyes to stimuli. Urine output is good. Still edematous on the upper part of her body. NG tube suctioning is minimal.  No reports of arrhythmia per nursing. OBJECTIVE:    Telemetry: Sinus  BP (!) 98/48   Pulse 92   Temp 98 °F (36.7 °C) (Oral)   Resp 17   Ht 5' 4.5\" (1.638 m)   Wt 187 lb (84.8 kg)   LMP  (LMP Unknown)   SpO2 98%   BMI 31.60 kg/m²     Intake/Output Summary (Last 24 hours) at 12/4/2021 1502  Last data filed at 12/4/2021 0400  Gross per 24 hour   Intake 912.34 ml   Output 2915 ml   Net -2002.66 ml       EXAM:   CONSTITUTIONAL: Intubated, no apparent distress, and appears stated age. HEENT: Normal jugular venous pulsations, no carotid bruits. Head is atraumatic, normocephalic. Eyes and oral mucosa are normal.  LUNGS: Good respiratory effort. No for increased work of breathing. On auscultation: Coarse breath sounds anteriorly  CARDIOVASCULAR:  Normal apical impulse, regular rate and rhythm, normal S1 and S2, no S3 or S4,   ABDOMEN: Soft, nontender, nondistended. Bowel sounds present. SKIN: Warm and dry. EXTREMITIES: No lower extremity edema. Both upper extremities appear to be edematous.     Current Inpatient Medications:   potassium phosphate IVPB  15 mmol IntraVENous Once    fat emulsion  100 mL IntraVENous Daily    insulin lispro  0-6 Units SubCUTAneous Q6H    piperacillin-tazobactam  3,375 mg IntraVENous Q8H    metroNIDAZOLE  500 mg IntraVENous Once    heparin (porcine)  5,000 coronary syndrome  2.  History of hypertension, currently borderline low blood pressure  3.  Probable anxiety disorder  4.  Status post colon surgery  5.  Acute respiratory failure  6.  Acute kidney injury, improving  7. septic shock    Patient Active Problem List   Diagnosis    Intestinal adhesions    Obesity (BMI 30-39. 9)    Urinary incontinence    Constipation    Ventral incisional hernia    Smoker    Stricture of sigmoid colon ([facility] [address]       PLAN:    1. Minimize fluid intake, patient appeared to be hypervolemic in addition her albumin is decreasing and that increases the third spacing      Please see orders. Discussed with  and nursing.     Lm Banegas MD, MD

## 2021-12-04 NOTE — PLAN OF CARE
Problem: HEMODYNAMIC STATUS  Goal: Patient has stable vital signs and fluid balance  Outcome: Ongoing     Problem: OXYGENATION/RESPIRATORY FUNCTION  Goal: Patient will achieve/maintain normal respiratory rate/effort  Outcome: Ongoing     Problem: MOBILITY  Goal: Early mobilization is achieved  Outcome: Ongoing     Problem: ELIMINATION  Goal: Elimination patterns are normal or improving  Description: Elimination patterns return to pre-surgery normal patterns  Outcome: Ongoing     Problem: SKIN INTEGRITY  Goal: Skin integrity is maintained or improved  Outcome: Ongoing     Problem: Pain:  Description: Pain management should include both nonpharmacologic and pharmacologic interventions.   Goal: Pain level will decrease  Description: Pain level will decrease  Outcome: Ongoing  Goal: Control of acute pain  Description: Control of acute pain  Outcome: Ongoing  Goal: Control of chronic pain  Description: Control of chronic pain  Outcome: Ongoing     Problem: Falls - Risk of:  Goal: Will remain free from falls  Description: Will remain free from falls  Outcome: Ongoing  Goal: Absence of physical injury  Description: Absence of physical injury  Outcome: Ongoing     Problem: Skin Integrity:  Goal: Will show no infection signs and symptoms  Description: Will show no infection signs and symptoms  Outcome: Ongoing  Goal: Absence of new skin breakdown  Description: Absence of new skin breakdown  Outcome: Ongoing     Problem: Nutrition  Goal: Optimal nutrition therapy  Outcome: Ongoing

## 2021-12-04 NOTE — PROGRESS NOTES
Pulmonary Critical Care Progress Note       Patient seen for the follow up of hypoxic respiratory failure, metabolic acidosis    Subjective:  She is sedated, intubated on ventilator with FiO2 weaned down to 50% PEEP of 12. Teresa Gip He has been weaned off of Cesar-Synephrine drip. She is on fentanyl drip at 50 mics per hour. She is on Versed drip for sedation. She is not following commands. She has fair urine output. She is on TPN  Examination:  Vitals: BP (!) 98/48   Pulse 92   Temp 98 °F (36.7 °C) (Oral)   Resp 17   Ht 5' 4.5\" (1.638 m)   Wt 187 lb (84.8 kg)   LMP  (LMP Unknown)   SpO2 98%   BMI 31.60 kg/m²   General appearance: Sedated, intubated on ventilator  Neck: No JVD  Lungs: Decreased breath sounds no crackles or wheezing  Heart: regular rate and rhythm, S1, S2 normal, no gallop  Abdomen: Soft, non tender, + BS positive surgical dressing positive colostomy  Extremities: no cyanosis or clubbing. Positive edema    LABs:  CBC:   Recent Labs     12/03/21  0530 12/03/21  1719 12/04/21  0529 12/04/21  1304   WBC 6.1  --  5.8  --    HGB 6.5*   < > 7.1* 7.1*   HCT 20.9*   < > 23.0* 22.3*     --  231  --     < > = values in this interval not displayed.      BMP:   Recent Labs     12/03/21  0437 12/04/21  0529    140   K 3.7 3.5*   CO2 19* 24   BUN 21 16   CREATININE 0.52 <0.40*   LABGLOM >60 Can not be calculated   GLUCOSE 174* 193*     LIVER PROFILE:  Recent Labs     12/01/21  1818 12/03/21 0437   AST 16 8   ALT 6 <5*   LABALBU 2.1* 1.8*     ABG:  Lab Results   Component Value Date    BOI0YMA NOT REPORTED 12/04/2021    FIO2 60.0 12/04/2021       Lab Results   Component Value Date    POCPH 7.382 12/04/2021    POCPCO2 48.0 12/04/2021    POCPO2 121.4 12/04/2021    POCHCO3 28.5 12/04/2021    PBEA 3 12/04/2021    PPM5YAC NOT REPORTED 12/04/2021    VRKX6MSB 99 12/04/2021    FIO2 60.0 12/04/2021     Radiology:  X-ray chest: Pulmonary edema 12/3/2021      Impression:  · Acute respiratory insufficiency requiring ventilator support  · Feculent peritonitis s/p exploratory laparotomy with end colostomy/pelvic drain placement  · COPD  · History of GERD/dyslipidemia/hypothyroidism/hypertension  · Metabolic acidosis  · Bilateral pleural infiltrate/pulmonary edema    Recommendations:  · Vent support, FiO2 50% currently, decrease PEEP keep oxygen saturation above 90%  · CPAP trial in a.m.  · IV Versed for sedation, RASS goal -1 to -2  · IV fentanyl 50 mics IV every hour as needed for pain control  · DuoNeb by nebulizer  · Watch blood pressure off of Cesar-Synephrine  · IV Lasix  · TPN/check liver function  · Monitor hemoglobin  ·  Zosyn/Flagyl  · X-ray chest in am  · Discussed with RN  · Peptic ulcer disease prophylaxis  · DVT prophylaxis      Elisa Iyer MD, [facility]  Pulmonary Critical Care and Sleep Medicine,  [facility]: [phone]

## 2021-12-04 NOTE — PROGRESS NOTES
Ventilator Bronchodilator assessment    Breath sounds: cleAR  Inspiratory Pressure: ***  Plateau Pressure: ***    Patient assessed at level ***          []    Bronchodilator Assessment    BRONCHODILATOR ASSESSMENT SCORE  Score 0 (Home) 1 2 3 4   Breath Sounds   []  Chronic Ventilator: Patient at baseline []  Mild Wheezes/ Clear []  Intermittent wheezes with good air entry []  Bilateral/unilateral wheezing with diminished air entry []  Insp/Exp wheeze and/or poor aeration   Ventilator Pressures   []  Chronic Ventilator []  Insp. Pressure less than 25 cm H20 []  Insp. Pressure less than 25 cm H20 []  Insp. Pressure exceeds 25 cm H20 []  Insp.  Pressure exceeds 30 cm H20   Plateau Pressure []  NA   []  Plateau Pressure less than 4  []  Plateau Pressure less than or equal to 5 []  Plateau Pressure greater than or equal to 6 []  Plateau Pressure greater than or equal to [address], P  11:01 PM

## 2021-12-04 NOTE — FLOWSHEET NOTE
12/04/21 1259   Provider Notification   Reason for Communication Evaluate;  Review case   Provider Name Danielluisagatha Pugh  (CPAP trials Sunday)   Provider Notification Physician   Method of Communication Face to face   Response At bedside   Notification Time 1300

## 2021-12-04 NOTE — PROGRESS NOTES
Reason for Consult:  Acute kidney injury. Requesting Physician:  Bruce Hampton MD    Interval history:   Good urine output  Creatinine continues to remain below 1  On TPN    HISTORY OF PRESENT ILLNESS:    The patient is a 80 y.o. female admitted for elective scheduled sigmoidectomy that was performed on 11/24. On previous labs her serum creatinines have been between 0.4 to 0.5 with eGFR of >60s ml/min. Her lowest recorded BP was 86/52 mmHg. On admission her creatinine was 0.5 that bill to 1.74 yesterday and today after iv hydration her creatinine is better at 0.8. She had to go back to the OR today for bowel leak and now has a colostomy in place. She is intubated so not able to get any history from the patient. Most of the history is taken from patient's chart. No recent use iv contrast. She was on NSAIDs at home. Review Of Systems:   Unable to obtain as she is intubated. Prior to Admission medications    Medication Sig Start Date End Date Taking? Authorizing Provider   spironolactone (ALDACTONE) 25 MG tablet Take 25 mg by mouth daily   Yes Historical Provider, MD   rosuvastatin (CRESTOR) 5 MG tablet Take 5 mg by mouth daily   Yes Historical Provider, MD   esomeprazole (NEXIUM) 40 MG delayed release capsule Take 40 mg by mouth daily    Yes Historical Provider, MD   famotidine (PEPCID) 40 MG tablet Take 40 mg by mouth daily as needed (heartburn)    Yes Historical Provider, MD   amLODIPine (NORVASC) 10 MG tablet Take 10 mg by mouth daily  3/17/15  Yes Historical Provider, MD   citalopram (CELEXA) 40 MG tablet Take 60 mg by mouth daily Takes 1.5 tabs daily 3/5/15  Yes Historical Provider, MD   diphenoxylate-atropine (LOMOTIL) 2.5-0.025 MG per tablet Take 1 tablet by mouth 4 times daily as needed.   12/22/14  Yes Historical Provider, MD   levothyroxine (SYNTHROID) 100 MCG tablet Take 100 mcg by mouth Daily  3/5/15  Yes Historical Provider, MD   celecoxib (CELEBREX) 200 MG capsule Take 200 mg by mouth daily Historical Provider, MD       Scheduled Meds:   potassium phosphate IVPB  15 mmol IntraVENous Once    fat emulsion  100 mL IntraVENous Daily    insulin lispro  0-6 Units SubCUTAneous Q6H    piperacillin-tazobactam  3,375 mg IntraVENous Q8H    metroNIDAZOLE  500 mg IntraVENous Once    heparin (porcine)  5,000 Units SubCUTAneous 3 times per day    citalopram  60 mg Oral Daily    docusate sodium  100 mg Oral BID    influenza virus vaccine  0.5 mL IntraMUSCular Prior to discharge    cyclobenzaprine  5 mg Oral TID    levothyroxine  100 mcg Oral Daily    rosuvastatin  5 mg Oral Nightly    sodium chloride flush  5-40 mL IntraVENous 2 times per day    acetaminophen  1,000 mg Oral 3 times per day    pantoprazole  40 mg Oral Daily    gabapentin  300 mg Oral Q8H     Continuous Infusions:   sodium chloride      PN-Adult 2 in 1 Central(5/20 Standard Electrolytes) 65 mL/hr at 12/04/21 0800    phenylephrine (BHARATI-SYNEPHRINE) 50mg/250mL infusion Stopped (12/03/21 1004)    dextrose      midazolam (VERSED) 1 mg/mL in D5W infusion 3 mg/hr (12/04/21 0800)    fentaNYL 100 mcg/hr (12/04/21 0951)    sodium chloride       PRN Meds:ipratropium-albuterol, sodium chloride, acetaminophen, fentanNYL, glucose, dextrose, glucagon (rDNA), dextrose, potassium chloride **OR** potassium alternative oral replacement **OR** potassium chloride, oxyCODONE, phenol, sodium chloride flush, sodium chloride, ondansetron **OR** ondansetron    Physical Exam:  Vitals:    12/04/21 0500 12/04/21 0600 12/04/21 0718 12/04/21 0800   BP: (!) 105/50 (!) 109/52  (!) 98/48   Pulse: 94 94 94 93   Resp: 14 15 13 14   Temp:    100.1 °F (37.8 °C)   TempSrc:    Oral   SpO2: 98% 97% 98%    Weight:       Height:         I/O last 3 completed shifts: In: 1244.8 [I.V.:136.8; Blood:332.5; IV Piggyback:50]  Out: 3883 [Urine:2115; Emesis/NG output:1125; Drains:133; Stool:50]    General: not in distress. Appears to be stated age.   HEENT: Atraumatic, normocephalic. Anicteric sclera. Pink and moist oral mucosa. No carotid bruit. No JVD. Chest: Bilateral air entry, clear to auscultation, no wheezing, rhonchi or rales. Cardiovascular: RRR, S1S2, no murmur, rub or gallop. Has lower extremity edema. Abdomen: Soft, non tender to palpation. Active bowel sounds x 4 quadrants. Musculoskeletal: No cyanosis or clubbing. Integumentary: Pink, warm and dry. Free from rash or lesions. Skin turgor normal.  CNS: Face symmetrical. No tremor.      Data:  CBC:   Lab Results   Component Value Date    WBC 5.8 12/04/2021    HGB 7.1 (L) 12/04/2021    HCT 23.0 (L) 12/04/2021    MCV 94.7 12/04/2021     12/04/2021     BMP:    Lab Results   Component Value Date     12/04/2021     12/03/2021     12/02/2021    K 3.5 (L) 12/04/2021    K 3.7 12/03/2021    K 4.6 12/02/2021     12/04/2021     12/03/2021     (H) 12/02/2021    CO2 24 12/04/2021    CO2 19 (L) 12/03/2021    CO2 18 (L) 12/02/2021    BUN 16 12/04/2021    BUN 21 12/03/2021    BUN 23 12/02/2021    CREATININE <0.40 (L) 12/04/2021    CREATININE 0.52 12/03/2021    CREATININE 0.78 12/02/2021    GLUCOSE 193 (H) 12/04/2021    GLUCOSE 174 (H) 12/03/2021    GLUCOSE 124 (H) 12/02/2021     CMP:   Lab Results   Component Value Date     12/04/2021    K 3.5 12/04/2021     12/04/2021    CO2 24 12/04/2021    BUN 16 12/04/2021    CREATININE <0.40 12/04/2021    GLUCOSE 193 12/04/2021    CALCIUM 7.6 12/04/2021    PROT 4.1 12/03/2021    LABALBU 1.8 12/03/2021    BILITOT 0.40 12/03/2021    ALKPHOS 68 12/03/2021    AST 8 12/03/2021    ALT <5 12/03/2021      Hepatic:   Lab Results   Component Value Date    AST 8 12/03/2021    AST 16 12/01/2021    ALT <5 (L) 12/03/2021    ALT 6 12/01/2021    BILITOT 0.40 12/03/2021    BILITOT 0.55 12/01/2021    ALKPHOS 68 12/03/2021    ALKPHOS 79 12/01/2021     BNP: No results found for: BNP  Lipids: No results found for: CHOL, HDL  INR: No results found for: INR  PTH: No results found for: PTH  Phosphorus:    Lab Results   Component Value Date    PHOS 1.9 12/04/2021     Ionized Calcium: No results found for: IONCA  Magnesium:   Lab Results   Component Value Date    MG 2.0 12/04/2021     Albumin:   Lab Results   Component Value Date    LABALBU 1.8 12/03/2021     Last 3 CK, CKMB, Troponin: @LABRCNT(CKTOTAL:3,CKMB:3,TROPONINI:3)       URINE:)No results found for: Radhika Will     Radiology:   Reviewed. Assessment:  Acute kidney injury, FeNa was 0.24%, appears to be hemodynamically related. Creatinine is improving. Hyponatremia. Serum sodium improved to normal range. Metabolic acidosis. Anemia. S/p sigmoidectomy and now has colostomy. Plan:  OK to d/c bicarb gtt. Off NSAIDs, Amlodipine and Aldactone. Place on renal diet/TF with oral fluid restriction of 1000 ml/24 hours when diet is resumed. Replace K and phos IV  Continue TPN  Avoid hypotension, nephrotoxic drugs, Lovenox, Fleets enema and IV contrast exposure. Follow up chemistries in the AM.    Thank you for the consultation. Please do not hesitate to contact us for any further questions/concerns. We will continue to follow along with you. Electronically signed by Torrey Nieto MD  on 12/4/2021 at 11:08 AM   [facility].   Ph: [phone]

## 2021-12-04 NOTE — PROGRESS NOTES
Physical Therapy  DATE: 2021    NAME: Armida Fajardo  MRN: [id number]   : 1950    Patient not seen this date for Physical Therapy due to:      [] Cancel by RN or physician due to:    [] Hemodialysis    [] Critical Lab Value Level     [] Blood transfusion in progress    [] Acute or unstable cardiovascular status   _MAP < 55 or more than >115  _HR < 40 or > 130    [] Acute or unstable pulmonary status   -FiO2 > 60%   _RR < 5 or >40    _O2 sats < 85%    [] Strict Bedrest    [] Off Unit for surgery or procedure    [] Off Unit for testing       [] Pending imaging to R/O fracture    [] Refusal by Patient      [x] Other   Pt intubated/dec responsiveness. [] PT being discontinued at this time. Patient independent. No further needs. [] PT being discontinued at this time as the patient has been transferred to hospice care. No further needs.       Dunia Mayorga, PTA

## 2021-12-04 NOTE — FLOWSHEET NOTE
12/04/21 0836   Hygiene   Hygiene Rosaura care   Level of Assistance Dependent   Skin Care Chlorhexidine solution   Rose Care Soap and water  (w/CHG)

## 2021-12-05 ENCOUNTER — APPOINTMENT (OUTPATIENT)
Dept: GENERAL RADIOLOGY | Age: 71
DRG: 329 | End: 2021-12-05
Attending: COLON & RECTAL SURGERY
Payer: MEDICARE

## 2021-12-05 LAB
ABSOLUTE EOS #: 0.08 K/UL (ref 0–0.4)
ABSOLUTE IMMATURE GRANULOCYTE: 0.48 K/UL (ref 0–0.3)
ABSOLUTE LYMPH #: 1.36 K/UL (ref 1–4.8)
ABSOLUTE MONO #: 0.24 K/UL (ref 0.2–0.8)
ALBUMIN SERPL-MCNC: 1.6 G/DL (ref 3.5–5.2)
ALBUMIN/GLOBULIN RATIO: ABNORMAL (ref 1–2.5)
ALP BLD-CCNC: 65 U/L (ref 35–104)
ALT SERPL-CCNC: 6 U/L (ref 5–33)
ANION GAP SERPL CALCULATED.3IONS-SCNC: 11 MMOL/L (ref 9–17)
AST SERPL-CCNC: 15 U/L
BASOPHILS # BLD: 0 %
BASOPHILS ABSOLUTE: 0 K/UL (ref 0–0.2)
BILIRUB SERPL-MCNC: 0.44 MG/DL (ref 0.3–1.2)
BILIRUBIN DIRECT: 0.22 MG/DL
BILIRUBIN, INDIRECT: 0.22 MG/DL (ref 0–1)
BUN BLDV-MCNC: 16 MG/DL (ref 8–23)
BUN/CREAT BLD: ABNORMAL (ref 9–20)
CALCIUM SERPL-MCNC: 7.9 MG/DL (ref 8.6–10.4)
CHLORIDE BLD-SCNC: 104 MMOL/L (ref 98–107)
CO2: 24 MMOL/L (ref 20–31)
CREAT SERPL-MCNC: <0.4 MG/DL (ref 0.5–0.9)
DIFFERENTIAL TYPE: ABNORMAL
EOSINOPHILS RELATIVE PERCENT: 1 % (ref 1–4)
GFR AFRICAN AMERICAN: ABNORMAL ML/MIN
GFR NON-AFRICAN AMERICAN: ABNORMAL ML/MIN
GFR SERPL CREATININE-BSD FRML MDRD: ABNORMAL ML/MIN/{1.73_M2}
GFR SERPL CREATININE-BSD FRML MDRD: ABNORMAL ML/MIN/{1.73_M2}
GLOBULIN: ABNORMAL G/DL (ref 1.5–3.8)
GLUCOSE BLD-MCNC: 201 MG/DL (ref 65–105)
GLUCOSE BLD-MCNC: 209 MG/DL (ref 65–105)
GLUCOSE BLD-MCNC: 210 MG/DL (ref 65–105)
GLUCOSE BLD-MCNC: 214 MG/DL (ref 70–99)
GLUCOSE BLD-MCNC: 215 MG/DL (ref 65–105)
HCT VFR BLD CALC: 21.3 % (ref 36.3–47.1)
HCT VFR BLD CALC: 21.9 % (ref 36.3–47.1)
HCT VFR BLD CALC: 22.5 % (ref 36.3–47.1)
HCT VFR BLD CALC: 22.5 % (ref 36.3–47.1)
HEMOGLOBIN: 6.6 G/DL (ref 11.9–15.1)
HEMOGLOBIN: 7 G/DL (ref 11.9–15.1)
HEMOGLOBIN: 7.1 G/DL (ref 11.9–15.1)
HEMOGLOBIN: 7.2 G/DL (ref 11.9–15.1)
IMMATURE GRANULOCYTES: 6 %
LYMPHOCYTES # BLD: 17 % (ref 24–44)
MAGNESIUM: 1.9 MG/DL (ref 1.6–2.6)
MCH RBC QN AUTO: 29.6 PG (ref 25.2–33.5)
MCHC RBC AUTO-ENTMCNC: 31.6 G/DL (ref 28.4–34.8)
MCV RBC AUTO: 93.8 FL (ref 82.6–102.9)
MONOCYTES # BLD: 3 % (ref 1–7)
MORPHOLOGY: ABNORMAL
NRBC AUTOMATED: 0 PER 100 WBC
PDW BLD-RTO: 15.9 % (ref 11.8–14.4)
PHOSPHORUS: 1.9 MG/DL (ref 2.6–4.5)
PLATELET # BLD: 269 K/UL (ref 138–453)
PLATELET ESTIMATE: ABNORMAL
PMV BLD AUTO: 11.6 FL (ref 8.1–13.5)
POTASSIUM SERPL-SCNC: 3.8 MMOL/L (ref 3.7–5.3)
RBC # BLD: 2.4 M/UL (ref 3.95–5.11)
RBC # BLD: ABNORMAL 10*6/UL
SEG NEUTROPHILS: 73 % (ref 36–66)
SEGMENTED NEUTROPHILS ABSOLUTE COUNT: 5.84 K/UL (ref 1.8–7.7)
SODIUM BLD-SCNC: 139 MMOL/L (ref 135–144)
TOTAL PROTEIN: 4.4 G/DL (ref 6.4–8.3)
WBC # BLD: 8 K/UL (ref 3.5–11.3)
WBC # BLD: ABNORMAL 10*3/UL

## 2021-12-05 PROCEDURE — 2580000003 HC RX 258: Performed by: INTERNAL MEDICINE

## 2021-12-05 PROCEDURE — 94003 VENT MGMT INPAT SUBQ DAY: CPT

## 2021-12-05 PROCEDURE — 2500000003 HC RX 250 WO HCPCS: Performed by: INTERNAL MEDICINE

## 2021-12-05 PROCEDURE — 85018 HEMOGLOBIN: CPT

## 2021-12-05 PROCEDURE — 86920 COMPATIBILITY TEST SPIN: CPT

## 2021-12-05 PROCEDURE — 6360000002 HC RX W HCPCS: Performed by: STUDENT IN AN ORGANIZED HEALTH CARE EDUCATION/TRAINING PROGRAM

## 2021-12-05 PROCEDURE — 2580000003 HC RX 258: Performed by: STUDENT IN AN ORGANIZED HEALTH CARE EDUCATION/TRAINING PROGRAM

## 2021-12-05 PROCEDURE — 85025 COMPLETE CBC W/AUTO DIFF WBC: CPT

## 2021-12-05 PROCEDURE — 6360000002 HC RX W HCPCS: Performed by: INTERNAL MEDICINE

## 2021-12-05 PROCEDURE — 6370000000 HC RX 637 (ALT 250 FOR IP): Performed by: COLON & RECTAL SURGERY

## 2021-12-05 PROCEDURE — 6370000000 HC RX 637 (ALT 250 FOR IP): Performed by: INTERNAL MEDICINE

## 2021-12-05 PROCEDURE — 86850 RBC ANTIBODY SCREEN: CPT

## 2021-12-05 PROCEDURE — 86900 BLOOD TYPING SEROLOGIC ABO: CPT

## 2021-12-05 PROCEDURE — P9016 RBC LEUKOCYTES REDUCED: HCPCS

## 2021-12-05 PROCEDURE — 2000000000 HC ICU R&B

## 2021-12-05 PROCEDURE — 86901 BLOOD TYPING SEROLOGIC RH(D): CPT

## 2021-12-05 PROCEDURE — 80048 BASIC METABOLIC PNL TOTAL CA: CPT

## 2021-12-05 PROCEDURE — 85014 HEMATOCRIT: CPT

## 2021-12-05 PROCEDURE — 94761 N-INVAS EAR/PLS OXIMETRY MLT: CPT

## 2021-12-05 PROCEDURE — 82947 ASSAY GLUCOSE BLOOD QUANT: CPT

## 2021-12-05 PROCEDURE — 02HV33Z INSERTION OF INFUSION DEVICE INTO SUPERIOR VENA CAVA, PERCUTANEOUS APPROACH: ICD-10-PCS | Performed by: RADIOLOGY

## 2021-12-05 PROCEDURE — 84100 ASSAY OF PHOSPHORUS: CPT

## 2021-12-05 PROCEDURE — 71045 X-RAY EXAM CHEST 1 VIEW: CPT

## 2021-12-05 PROCEDURE — 2500000003 HC RX 250 WO HCPCS: Performed by: COLON & RECTAL SURGERY

## 2021-12-05 PROCEDURE — 83735 ASSAY OF MAGNESIUM: CPT

## 2021-12-05 PROCEDURE — 2700000000 HC OXYGEN THERAPY PER DAY

## 2021-12-05 PROCEDURE — 36415 COLL VENOUS BLD VENIPUNCTURE: CPT

## 2021-12-05 PROCEDURE — 80076 HEPATIC FUNCTION PANEL: CPT

## 2021-12-05 RX ORDER — SODIUM CHLORIDE 9 MG/ML
INJECTION, SOLUTION INTRAVENOUS PRN
Status: DISCONTINUED | OUTPATIENT
Start: 2021-12-05 | End: 2021-12-08 | Stop reason: SDUPTHER

## 2021-12-05 RX ORDER — SODIUM HYPOCHLORITE 1.25 MG/ML
SOLUTION TOPICAL DAILY
Status: DISCONTINUED | OUTPATIENT
Start: 2021-12-05 | End: 2021-12-19

## 2021-12-05 RX ORDER — FENTANYL CITRATE 50 UG/ML
25 INJECTION, SOLUTION INTRAMUSCULAR; INTRAVENOUS
Status: DISCONTINUED | OUTPATIENT
Start: 2021-12-05 | End: 2021-12-10

## 2021-12-05 RX ADMIN — I.V. FAT EMULSION 100 ML: 20 EMULSION INTRAVENOUS at 17:12

## 2021-12-05 RX ADMIN — POTASSIUM CHLORIDE: 2 INJECTION, SOLUTION, CONCENTRATE INTRAVENOUS at 17:11

## 2021-12-05 RX ADMIN — INSULIN LISPRO 2 UNITS: 100 INJECTION, SOLUTION INTRAVENOUS; SUBCUTANEOUS at 18:12

## 2021-12-05 RX ADMIN — Medication 100 MCG/HR: at 08:59

## 2021-12-05 RX ADMIN — SODIUM CHLORIDE, PRESERVATIVE FREE 10 ML: 5 INJECTION INTRAVENOUS at 08:55

## 2021-12-05 RX ADMIN — INSULIN LISPRO 1 UNITS: 100 INJECTION, SOLUTION INTRAVENOUS; SUBCUTANEOUS at 01:07

## 2021-12-05 RX ADMIN — SODIUM CHLORIDE 0.2 MCG/KG/HR: 9 INJECTION, SOLUTION INTRAVENOUS at 12:36

## 2021-12-05 RX ADMIN — INSULIN LISPRO 2 UNITS: 100 INJECTION, SOLUTION INTRAVENOUS; SUBCUTANEOUS at 06:47

## 2021-12-05 RX ADMIN — HEPARIN SODIUM 5000 UNITS: 5000 INJECTION INTRAVENOUS; SUBCUTANEOUS at 06:46

## 2021-12-05 RX ADMIN — POTASSIUM PHOSPHATE, MONOBASIC AND POTASSIUM PHOSPHATE, DIBASIC 15 MMOL: 224; 236 INJECTION, SOLUTION, CONCENTRATE INTRAVENOUS at 11:53

## 2021-12-05 RX ADMIN — PIPERACILLIN AND TAZOBACTAM 3375 MG: 3; .375 INJECTION, POWDER, LYOPHILIZED, FOR SOLUTION INTRAVENOUS at 15:43

## 2021-12-05 RX ADMIN — SODIUM HYPOCHLORITE: 1.25 SOLUTION TOPICAL at 16:45

## 2021-12-05 RX ADMIN — ACETAMINOPHEN 650 MG: 650 SUPPOSITORY RECTAL at 15:13

## 2021-12-05 RX ADMIN — PIPERACILLIN AND TAZOBACTAM 3375 MG: 3; .375 INJECTION, POWDER, LYOPHILIZED, FOR SOLUTION INTRAVENOUS at 07:00

## 2021-12-05 RX ADMIN — INSULIN LISPRO 2 UNITS: 100 INJECTION, SOLUTION INTRAVENOUS; SUBCUTANEOUS at 11:52

## 2021-12-05 RX ADMIN — PIPERACILLIN AND TAZOBACTAM 3375 MG: 3; .375 INJECTION, POWDER, LYOPHILIZED, FOR SOLUTION INTRAVENOUS at 01:07

## 2021-12-05 ASSESSMENT — PULMONARY FUNCTION TESTS
PIF_VALUE: 25
PIF_VALUE: 27
PIF_VALUE: 32
PIF_VALUE: 28
PIF_VALUE: 25
PIF_VALUE: 25
PIF_VALUE: 28

## 2021-12-05 ASSESSMENT — PAIN SCALES - GENERAL: PAINLEVEL_OUTOF10: 1

## 2021-12-05 NOTE — PROGRESS NOTES
Section of Cardiology  Progress Note      Date:  12/5/2021  Patient: Ugo Oliveira  Admission:  11/24/2021  9:25 AM  Admit DX: Stricture of sigmoid colon ([address] [K56.699]  Age:  80 y. o., 1950     LOS: 11 days     Reason for evaluation:   CHF      SUBJECTIVE:     The patient was seen and examined. Notes and labs reviewed. There were not complications over night. Patient seen and examined in her room with her nurse at bedside. Remained intubated. She is off sedatives but still not responding however she grimaces to painful stimuli. Oxygenation is good. She is on CPAP. Her urine is very dark. Still on TPN. No reports of arrhythmia. No bleeding. OBJECTIVE:    Telemetry: Sinus  /65   Pulse 103   Temp 99 °F (37.2 °C) (Temporal)   Resp 21   Ht 5' 4.5\" (1.638 m)   Wt 187 lb (84.8 kg)   LMP  (LMP Unknown)   SpO2 99%   BMI 31.60 kg/m²     Intake/Output Summary (Last 24 hours) at 12/5/2021 1413  Last data filed at 12/5/2021 0855  Gross per 24 hour   Intake 2865.47 ml   Output 2125 ml   Net 740.47 ml       EXAM:   CONSTITUTIONAL: Intubated, unresponsive, no apparent distress, and appears stated age. HEENT: Normal jugular venous pulsations, no carotid bruits. Head is atraumatic, normocephalic. Eyes and oral mucosa are normal.  LUNGS: Good respiratory effort. No for increased work of breathing. On auscultation: clear to auscultation bilaterally  CARDIOVASCULAR:  Normal apical impulse, regular rate and rhythm, normal S1 and S2, no S3 or S4,   ABDOMEN: Soft, she grimaces to palpation, nondistended. Bowel sounds present. SKIN: Warm and dry. EXTREMITIES: No lower extremity edema. Motor movement grossly intact. No cyanosis or clubbing.     Current Inpatient Medications:   potassium phosphate IVPB  15 mmol IntraVENous Once    fat emulsion  100 mL IntraVENous Daily    insulin lispro  0-6 Units SubCUTAneous Q6H    piperacillin-tazobactam  3,375 mg IntraVENous Q8H    metroNIDAZOLE  500 mg IntraVENous Once    heparin (porcine)  5,000 Units SubCUTAneous 3 times per day    citalopram  60 mg Oral Daily    docusate sodium  100 mg Oral BID    influenza virus vaccine  0.5 mL IntraMUSCular Prior to discharge    cyclobenzaprine  5 mg Oral TID    levothyroxine  100 mcg Oral Daily    rosuvastatin  5 mg Oral Nightly    sodium chloride flush  5-40 mL IntraVENous 2 times per day    acetaminophen  1,000 mg Oral 3 times per day    pantoprazole  40 mg Oral Daily    gabapentin  300 mg Oral Q8H       IV Infusions (if any):   dexmedetomidine (PRECEDEX) IV infusion 0.2 mcg/kg/hr (12/05/21 1236)    PN-Adult 2 in 1 Central(5/20 Standard Electrolytes)      PN-Adult 2 in 1 Central(5/20 Standard Electrolytes) 65 mL/hr at 12/05/21 0800    sodium chloride      phenylephrine (BHARATI-SYNEPHRINE) 50mg/250mL infusion Stopped (12/03/21 1004)    dextrose      midazolam (VERSED) 1 mg/mL in D5W infusion Stopped (12/05/21 0856)    fentaNYL 50 mcg/hr (12/05/21 1154)    sodium chloride         Diagnostics:   EKG: . ECHO: .   Ejection fraction: %  Stress Test: .  Cardiac Angiography: .    Labs:   CBC:   Recent Labs     12/04/21  0529 12/04/21  1304 12/05/21  0515 12/05/21  1254   WBC 5.8  --  8.0  --    HGB 7.1*   < > 7.1* 7.0*   HCT 23.0*   < > 22.5* 21.9*     --  269  --     < > = values in this interval not displayed. BMP:   Recent Labs     12/04/21  0529 12/05/21  0515    139   K 3.5* 3.8   CO2 24 24   BUN 16 16   CREATININE <0.40* <0.40*   LABGLOM Can not be calculated Can not be calculated   GLUCOSE 193* 214*     No results found for: BNP  PT/INR: No results for input(s): PROTIME, INR in the last 72 hours. APTT:No results for input(s): APTT in the last 72 hours. CARDIAC ENZYMES:No results for input(s): CKTOTAL, CKMB, CKMBINDEX, TROPONINT in the last 72 hours.   FASTING LIPID PANEL:  Lab Results   Component Value Date    TRIG 103 12/03/2021     LIVER PROFILE:  Recent Labs     12/03/21  0662 12/05/21  0515   AST 8 15   ALT <5* 6   LABALBU 1.8* 1.6*       ASSESSMENT:  1. atypical chest pain without evidence of acute coronary syndrome  2.  History of hypertension, currently borderline low blood pressure  3.  Probable anxiety disorder  4.  Status post colon surgery  5.  Acute respiratory failure  6.  Acute kidney injury, improving  7. septic shock    Patient Active Problem List   Diagnosis    Intestinal adhesions    Obesity (BMI 30-39. 9)    Urinary incontinence    Constipation    Ventral incisional hernia    Smoker    Stricture of sigmoid colon ([facility] [address]       PLAN:    1. No active cardiac issue  2. We will see on as-needed basis      Please see orders. Discussed with  and nursing.     Lm Banegas MD, MD

## 2021-12-05 NOTE — PROGRESS NOTES
Comprehensive Nutrition Assessment    Type and Reason for Visit:  Reassess    Nutrition Recommendations/Plan:   1. Continue NPO status  2. Continue central standard 5/20 TPN and 100 mL of lipids. Maintain rate at 65 mL/hr (1560 mL total volume). TPN and lipids provides 1573kcal and 78 gm of protein  3. Will monitor TPN rate/ tolerance, vent status, GI status, meds and labs    Nutrition Assessment:  Patient remains intubated, versed and on Fentanyl drip. RN reports patient is off sedation still not waking up likely due to Fentanyl. RN said TPN to continue. Will continue TPN at 65 ml/hr (1560 ml total volume) with 100 ml lipids. Monitor TPN tolerance, vent status, meds and labs. Malnutrition Assessment:  Malnutrition Status: At risk for malnutrition (Comment)    Context:  Acute Illness     Findings of the 6 clinical characteristics of malnutrition:  Energy Intake:  7 - 50% or less of estimated energy requirements for 5 or more days  Weight Loss:  Unable to assess     Body Fat Loss:  Unable to assess     Muscle Mass Loss:  Unable to assess    Fluid Accumulation:  No significant fluid accumulation Extremities   Strength:  Not Performed    Estimated Daily Nutrient Needs:  Energy (kcal):  1531 kcal [facility] 2010); Weight Used for Energy Requirements:  Current     Protein (g):  84-95 gm of protein (1.5-1.7 gm/kg); Weight Used for Protein Requirements:  Ideal        Fluid (ml/day):   ; Method Used for Fluid Requirements:  1 ml/kcal      Nutrition Related Findings:  Edema: +1 non-pitting BLE, +2 pitting BUE. Bowel sounds absent. Colostomy bag.       Wounds:  Multiple, Surgical Incision       Current Nutrition Therapies:    Diet NPO Exceptions are: Sips of Water with Meds, Ice Chips  PN-Adult 2-in-1 Central Line (Standard)  Current Parenteral Nutrition Orders:  · Type and Formula: Premix Central, 2-in-1 Standard   · Lipids: 100ml, Daily  · Duration: Continuous  · Rate/Volume: 65 mL/hr (1560 mL total volume)  · Current PN Order Provides: 1573 kcal and 78 gm of protein  · Goal PN Orders Provides: 1555 kcal and 84-95 gm of protein    Anthropometric Measures:  · Height: 5' 4.5\" (163.8 cm)  · Current Body Weight: 187 lb (84.8 kg)   · Admission Body Weight: 184 lb (83.5 kg)    · Ideal Body Weight: 123 lbs; % Ideal Body Weight 152 %   · BMI: 31.6  · BMI Categories: Obese Class 1 (BMI 30.0-34. 9)       Nutrition Diagnosis:   · Inadequate protein-energy intake related to inadequate protein-energy intake as evidenced by NPO or clear liquid status due to medical condition, poor intake prior to admission, intubation, nutrition support - parenteral nutrition    Nutrition Interventions:   Food and/or Nutrient Delivery:  Continue NPO, Modify Parenteral Nutrition  Nutrition Education/Counseling:  Education not indicated   Coordination of Nutrition Care:  Continue to monitor while inpatient    Goals:  PN will meet greater than 75% of estimated nutrition needs       Nutrition Monitoring and Evaluation:   Food/Nutrient Intake Outcomes:  Diet Advancement/Tolerance, Parenteral Nutrition Intake/Tolerance  Physical Signs/Symptoms Outcomes:  Biochemical Data, Fluid Status or Edema, Skin, Weight, GI Status, Constipation     Discharge Planning:     Too soon to determine       Some areas of assessment may be incomplete due to COVID-19 precautions    Johnna Martin RD, LD  Office phone [phone]

## 2021-12-05 NOTE — PROGRESS NOTES
Pulmonary Critical Care Progress Note       Patient seen for the follow up of hypoxic respiratory failure, metabolic acidosis    Subjective:  She is s off sedation tolerating CPAP at 50% PEEP of 10. Mariely Wallace He has been weaned off of Cesar-Synephrine drip. She is not following commands. She is off Versed drip for sedation. She has fair urine output. She is on TPN. Examination:  Vitals: /65   Pulse 103   Temp 99 °F (37.2 °C) (Temporal)   Resp 21   Ht 5' 4.5\" (1.638 m)   Wt 187 lb (84.8 kg)   LMP  (LMP Unknown)   SpO2 99%   BMI 31.60 kg/m²   General appearance: Sedated, intubated on ventilator  Neck: No JVD  Lungs: Decreased breath sounds no crackles or wheezing  Heart: regular rate and rhythm, S1, S2 normal, no gallop  Abdomen: Soft, non tender, + BS positive surgical dressing positive colostomy  Extremities: no cyanosis or clubbing. Positive edema    LABs:  CBC:   Recent Labs     12/04/21  0529 12/04/21  1304 12/05/21  0515 12/05/21  1254   WBC 5.8  --  8.0  --    HGB 7.1*   < > 7.1* 7.0*   HCT 23.0*   < > 22.5* 21.9*     --  269  --     < > = values in this interval not displayed.      BMP:   Recent Labs     12/04/21  0529 12/05/21  0515    139   K 3.5* 3.8   CO2 24 24   BUN 16 16   CREATININE <0.40* <0.40*   LABGLOM Can not be calculated Can not be calculated   GLUCOSE 193* 214*     LIVER PROFILE:  Recent Labs     12/03/21  0437 12/05/21  0515   AST 8 15   ALT <5* 6   LABALBU 1.8* 1.6*     ABG:  Lab Results   Component Value Date    CIU7IFP NOT REPORTED 12/04/2021    FIO2 60.0 12/04/2021       Lab Results   Component Value Date    POCPH 7.382 12/04/2021    POCPCO2 48.0 12/04/2021    POCPO2 121.4 12/04/2021    POCHCO3 28.5 12/04/2021    PBEA 3 12/04/2021    DFZ8DHM NOT REPORTED 12/04/2021    UXPZ7AET 99 12/04/2021    FIO2 60.0 12/04/2021     Radiology:  Chest x-ray 12/5  Interval improvement in the pulmonary edema pattern, when compared to the   previous study performed 12/03/2021 Impression:  · Acute respiratory insufficiency requiring ventilator support  · Feculent peritonitis s/p exploratory laparotomy with end colostomy/pelvic drain placement  · COPD  · History of GERD/dyslipidemia/hypothyroidism/hypertension  · Metabolic acidosis  · Bilateral pleural infiltrate/pulmonary edema    Recommendations:  · Vent support, FiO2 50% currently, decrease PEEP keep oxygen saturation above 90%  · CPAP trial  as tolerated/sedation vacation  · IV Versed for sedation if needed, RASS goal -1 to -2  · IV fentanyl 50 mics IV every hour as needed for pain control  · DuoNeb by nebulizer  · Watch blood pressure off of Cesar-Synephrine  · IV Lasix  · TPN/c monitor liver function  · Monitor hemoglobin repeat transfuse for hemoglobin less than 7  ·  Zosyn/ off Flagyl  · X-ray chest in am  · Discussed with RN  · Peptic ulcer disease prophylaxis  · DVT prophylaxis on heparin 5000 every 8 hold for now until hemoglobin stabilizes       Liz Duong MD, [facility]  Pulmonary Critical Care and Sleep Medicine,  [facility]: [phone]

## 2021-12-05 NOTE — PROGRESS NOTES
Colorectal Surgery:  Daily Progress Note               PATIENT NAME: Giorgi Dudley   TODAY'S DATE: 12/5/2021, 8:21 AM    SUBJECTIVE:     Patient seen and evaluated. T-max 37.1 overnight. Remains off pressors this am. Remains intubated with minimal response on sedation holiday. Hemoglobin 7.1 this morning. Urine output adequate [address]. 600ml bilious NG output . 135ml SS SHANA drain  40ml RLQ stoma    OBJECTIVE:   VITALS:  /65   Pulse 103   Temp 98.8 °F (37.1 °C)   Resp 21   Ht 5' 4.5\" (1.638 m)   Wt 187 lb (84.8 kg)   LMP  (LMP Unknown)   SpO2 98%   BMI 31.60 kg/m²      INTAKE/OUTPUT:      Intake/Output Summary (Last 24 hours) at 12/5/2021 5802  Last data filed at 12/5/2021 0500  Gross per 24 hour   Intake 2855.47 ml   Output 2125 ml   Net 730.47 ml       PHYSICAL EXAM:  General Appearance: Unresponsive, sedated, critically ill  HEENT:  Normocephalic, atraumatic, mucus membranes moist   Heart: Heart regular rate, requiring pressors for blood pressure support. Lungs: Equal chest rise bilaterally, mechanically ventilated. Abdomen: Soft, midline incision covered with dressings, napoleon-incisional skin clean and free of erythema. Ostomy appliance in place containing bowel sweat. Stoma slightly dusky. Extremities: No cyanosis, pitting edema, rashes noted. Skin: Skin color, texture, turgor normal. No rashes or lesions.     Data:  CBC with Differential:    Lab Results   Component Value Date    WBC 8.0 12/05/2021    RBC 2.40 12/05/2021    HGB 7.1 12/05/2021    HCT 22.5 12/05/2021     12/05/2021    MCV 93.8 12/05/2021    MCH 29.6 12/05/2021    MCHC 31.6 12/05/2021    RDW 15.9 12/05/2021    LYMPHOPCT 17 12/05/2021    MONOPCT 3 12/05/2021    BASOPCT 0 12/05/2021    MONOSABS 0.24 12/05/2021    LYMPHSABS 1.36 12/05/2021    EOSABS 0.08 12/05/2021    BASOSABS 0.00 12/05/2021    DIFFTYPE NOT REPORTED 12/05/2021     BMP:    Lab Results   Component Value Date     12/05/2021    K 3.8 12/05/2021  12/05/2021    CO2 24 12/05/2021    BUN 16 12/05/2021    LABALBU 1.6 12/05/2021    CREATININE <0.40 12/05/2021    CALCIUM 7.9 12/05/2021    GFRAA Can not be calculated 12/05/2021    LABGLOM Can not be calculated 12/05/2021    GLUCOSE 214 12/05/2021     Magnesium:    Lab Results   Component Value Date    MG 1.9 12/05/2021     Phosphorus:    Lab Results   Component Value Date    PHOS 1.9 12/05/2021     Radiology Review:     ASSESSMENT:  Active Hospital Problems    Diagnosis Date Noted    Stricture of sigmoid colon [facility]) [J11.145] 11/24/2021     80 y.o. female with sigmoid colon stricture. 11/24/21: Status post exploratory laparotomy with explantation pelvic mesh and mobilization sigmoid and proximal rectum, status post ileocecectomy with ileocolonic anastomosis. 12/1/21: Status post ex lap, creation of end colostomy, pelvic drain placement    Plan:  -Appreciate critical care management  -Continue NGT to LIWS; Monitor drain output; awaiting ostomy output   -Appreciate wound ostomy and RN for midline dressing changes and stoma care   -Multimodal pain therapy  -OK for VTE ppx  -Pulm hygiene  -Resuscitate as needed. -Monitor labs    Electronically signed by Tanvi Manrique DO  on 12/5/2021 at 8:21 AM      I Dr. Ashok Hughes saw and examined the patient. I have edited the above and agree with the above. Ashley Chavez  Colorectal Surgery

## 2021-12-05 NOTE — PLAN OF CARE
Problem: HEMODYNAMIC STATUS  Goal: Patient has stable vital signs and fluid balance  12/5/2021 0139 by Scotty Keys RN  Outcome: Ongoing  12/4/2021 1840 by Asael Johnston RN  Outcome: Ongoing     Problem: OXYGENATION/RESPIRATORY FUNCTION  Goal: Patient will achieve/maintain normal respiratory rate/effort  12/5/2021 0139 by Scotty Keys RN  Outcome: Ongoing  12/4/2021 1840 by Asael Johnston RN  Outcome: Ongoing     Problem: MOBILITY  Goal: Early mobilization is achieved  12/5/2021 0139 by Scotty Keys RN  Outcome: Ongoing  12/4/2021 1840 by Asael Johnston RN  Outcome: Ongoing     Problem: ELIMINATION  Goal: Elimination patterns are normal or improving  Description: Elimination patterns return to pre-surgery normal patterns  12/5/2021 0139 by Scotty Keys RN  Outcome: Ongoing  12/4/2021 1840 by Asael Johnston RN  Outcome: Ongoing     Problem: SKIN INTEGRITY  Goal: Skin integrity is maintained or improved  12/5/2021 0139 by Scotty Keys RN  Outcome: Ongoing  12/4/2021 1840 by Asael Johnston RN  Outcome: Ongoing     Problem: Pain:  Goal: Pain level will decrease  Description: Pain level will decrease  12/5/2021 0139 by Scotty Keys RN  Outcome: Ongoing  12/4/2021 1840 by Asael Johnston RN  Outcome: Ongoing  Goal: Control of acute pain  Description: Control of acute pain  12/5/2021 0139 by Scotty Keys RN  Outcome: Ongoing  12/4/2021 1840 by Asael Johnston RN  Outcome: Ongoing  Goal: Control of chronic pain  Description: Control of chronic pain  12/5/2021 0139 by Scotty Keys RN  Outcome: Ongoing  12/4/2021 1840 by Asael Johnston RN  Outcome: Ongoing     Problem: Falls - Risk of:  Goal: Will remain free from falls  Description: Will remain free from falls  12/5/2021 0139 by Scotty Keys RN  Outcome: Ongoing  12/4/2021 1840 by Asael Johnston RN  Outcome: Ongoing  Goal: Absence of physical injury  Description: Absence of physical injury  12/5/2021 0139 by Scotty Keys RN  Outcome: Ongoing  12/4/2021 1840 by Mariely Medrano RN  Outcome: Ongoing     Problem: Skin Integrity:  Goal: Will show no infection signs and symptoms  Description: Will show no infection signs and symptoms  12/5/2021 0139 by Brooks Goldman RN  Outcome: Ongoing  12/4/2021 1840 by Mariely Medrano RN  Outcome: Ongoing  Goal: Absence of new skin breakdown  Description: Absence of new skin breakdown  12/5/2021 0139 by Brooks Goldman RN  Outcome: Ongoing  12/4/2021 1840 by Mariely Medrano RN  Outcome: Ongoing     Problem: Nutrition  Goal: Optimal nutrition therapy  12/5/2021 0139 by Brooks Goldman RN  Outcome: Ongoing  12/4/2021 1840 by Mariely Medrano RN  Outcome: Ongoing

## 2021-12-05 NOTE — PROGRESS NOTES
Reason for Consult:  Acute kidney injury. Requesting Physician:  Ramya Galo MD    Interval history:   Good urine output with palmer  Creatinine continues to remain below 1  On TPN  Phos low again  BP stable    HISTORY OF PRESENT ILLNESS:    The patient is a 80 y.o. female admitted for elective scheduled sigmoidectomy that was performed on 11/24. On previous labs her serum creatinines have been between 0.4 to 0.5 with eGFR of >60s ml/min. Her lowest recorded BP was 86/52 mmHg. On admission her creatinine was 0.5 that bill to 1.74 yesterday and today after iv hydration her creatinine is better at 0.8. She had to go back to the OR today for bowel leak and now has a colostomy in place. She is intubated so not able to get any history from the patient. Most of the history is taken from patient's chart. No recent use iv contrast. She was on NSAIDs at home. Review Of Systems:   Unable to obtain as she is intubated. Prior to Admission medications    Medication Sig Start Date End Date Taking? Authorizing Provider   spironolactone (ALDACTONE) 25 MG tablet Take 25 mg by mouth daily   Yes Historical Provider, MD   rosuvastatin (CRESTOR) 5 MG tablet Take 5 mg by mouth daily   Yes Historical Provider, MD   esomeprazole (NEXIUM) 40 MG delayed release capsule Take 40 mg by mouth daily    Yes Historical Provider, MD   famotidine (PEPCID) 40 MG tablet Take 40 mg by mouth daily as needed (heartburn)    Yes Historical Provider, MD   amLODIPine (NORVASC) 10 MG tablet Take 10 mg by mouth daily  3/17/15  Yes Historical Provider, MD   citalopram (CELEXA) 40 MG tablet Take 60 mg by mouth daily Takes 1.5 tabs daily 3/5/15  Yes Historical Provider, MD   diphenoxylate-atropine (LOMOTIL) 2.5-0.025 MG per tablet Take 1 tablet by mouth 4 times daily as needed.   12/22/14  Yes Historical Provider, MD   levothyroxine (SYNTHROID) 100 MCG tablet Take 100 mcg by mouth Daily  3/5/15  Yes Historical Provider, MD   celecoxib (CELEBREX) 200 MG Atraumatic, normocephalic. Anicteric sclera. Pink and moist oral mucosa. No carotid bruit. No JVD. Chest: Bilateral air entry, clear to auscultation, no wheezing, rhonchi or rales. Cardiovascular: RRR, S1S2, no murmur, rub or gallop. Has lower extremity edema. Abdomen: Soft, non tender to palpation. Musculoskeletal: No cyanosis or clubbing. Integumentary: Pink, warm and dry. Free from rash or lesions. Skin turgor normal.  CNS: Face symmetrical. No tremor.      Data:  CBC:   Lab Results   Component Value Date    WBC 8.0 12/05/2021    HGB 7.1 (L) 12/05/2021    HCT 22.5 (L) 12/05/2021    MCV 93.8 12/05/2021     12/05/2021     BMP:    Lab Results   Component Value Date     12/05/2021     12/04/2021     12/03/2021    K 3.8 12/05/2021    K 3.5 (L) 12/04/2021    K 3.7 12/03/2021     12/05/2021     12/04/2021     12/03/2021    CO2 24 12/05/2021    CO2 24 12/04/2021    CO2 19 (L) 12/03/2021    BUN 16 12/05/2021    BUN 16 12/04/2021    BUN 21 12/03/2021    CREATININE <0.40 (L) 12/05/2021    CREATININE <0.40 (L) 12/04/2021    CREATININE 0.52 12/03/2021    GLUCOSE 214 (H) 12/05/2021    GLUCOSE 193 (H) 12/04/2021    GLUCOSE 174 (H) 12/03/2021     CMP:   Lab Results   Component Value Date     12/05/2021    K 3.8 12/05/2021     12/05/2021    CO2 24 12/05/2021    BUN 16 12/05/2021    CREATININE <0.40 12/05/2021    GLUCOSE 214 12/05/2021    CALCIUM 7.9 12/05/2021    PROT 4.4 12/05/2021    LABALBU 1.6 12/05/2021    BILITOT 0.44 12/05/2021    ALKPHOS 65 12/05/2021    AST 15 12/05/2021    ALT 6 12/05/2021      Hepatic:   Lab Results   Component Value Date    AST 15 12/05/2021    AST 8 12/03/2021    AST 16 12/01/2021    ALT 6 12/05/2021    ALT <5 (L) 12/03/2021    ALT 6 12/01/2021    BILITOT 0.44 12/05/2021    BILITOT 0.40 12/03/2021    BILITOT 0.55 12/01/2021    ALKPHOS 65 12/05/2021    ALKPHOS 68 12/03/2021    ALKPHOS 79 12/01/2021     BNP: No results found for: BNP  Lipids: No results found for: CHOL, HDL  INR: No results found for: INR  PTH: No results found for: PTH  Phosphorus:    Lab Results   Component Value Date    PHOS 1.9 12/05/2021     Ionized Calcium: No results found for: IONCA  Magnesium:   Lab Results   Component Value Date    MG 1.9 12/05/2021     Albumin:   Lab Results   Component Value Date    LABALBU 1.6 12/05/2021     Last 3 CK, CKMB, Troponin: @LABRCNT(CKTOTAL:3,CKMB:3,TROPONINI:3)       URINE:)No results found for: Wanda Pleasants     Radiology:   Reviewed. Assessment:  Acute kidney injury, FeNa was 0.24%, appears to be hemodynamically related. Creatinine is improving. Hyponatremia. Serum sodium improved to normal range. Metabolic acidosis, resolved. Anemia. S/p sigmoidectomy and now has colostomy. Plan:  Na, bicarb, Cr normal.   Off NSAIDs, Amlodipine and Aldactone. Continue TPN today  Replace phos IV  Continue TPN  BP stable, no pressor requirement  Avoid hypotension, nephrotoxic drugs, Lovenox, Fleets enema and IV contrast exposure. Follow up chemistries in the AM.    Thank you for the consultation. Please do not hesitate to contact us for any further questions/concerns. We will continue to follow along with you. Electronically signed by Ana Jackson MD  on 12/5/2021 at 10:37 AM   [facility].   Ph: [phone]

## 2021-12-06 ENCOUNTER — APPOINTMENT (OUTPATIENT)
Dept: GENERAL RADIOLOGY | Age: 71
DRG: 329 | End: 2021-12-06
Attending: COLON & RECTAL SURGERY
Payer: MEDICARE

## 2021-12-06 LAB
ABSOLUTE EOS #: 0.1 K/UL (ref 0–0.4)
ABSOLUTE IMMATURE GRANULOCYTE: 0.68 K/UL (ref 0–0.3)
ABSOLUTE LYMPH #: 1.46 K/UL (ref 1–4.8)
ABSOLUTE MONO #: 0.58 K/UL (ref 0.2–0.8)
ANION GAP SERPL CALCULATED.3IONS-SCNC: 11 MMOL/L (ref 9–17)
BASOPHILS # BLD: 0 %
BASOPHILS ABSOLUTE: 0 K/UL (ref 0–0.2)
BUN BLDV-MCNC: 26 MG/DL (ref 8–23)
BUN/CREAT BLD: 59 (ref 9–20)
CALCIUM SERPL-MCNC: 8.1 MG/DL (ref 8.6–10.4)
CHLORIDE BLD-SCNC: 101 MMOL/L (ref 98–107)
CO2: 25 MMOL/L (ref 20–31)
CREAT SERPL-MCNC: 0.44 MG/DL (ref 0.5–0.9)
DIFFERENTIAL TYPE: ABNORMAL
EOSINOPHILS RELATIVE PERCENT: 1 % (ref 1–4)
GFR AFRICAN AMERICAN: >60 ML/MIN
GFR NON-AFRICAN AMERICAN: >60 ML/MIN
GFR SERPL CREATININE-BSD FRML MDRD: ABNORMAL ML/MIN/{1.73_M2}
GFR SERPL CREATININE-BSD FRML MDRD: ABNORMAL ML/MIN/{1.73_M2}
GLUCOSE BLD-MCNC: 186 MG/DL (ref 65–105)
GLUCOSE BLD-MCNC: 209 MG/DL (ref 65–105)
GLUCOSE BLD-MCNC: 215 MG/DL (ref 65–105)
GLUCOSE BLD-MCNC: 237 MG/DL (ref 70–99)
HCT VFR BLD CALC: 23.2 % (ref 36.3–47.1)
HCT VFR BLD CALC: 24.4 % (ref 36.3–47.1)
HCT VFR BLD CALC: 25.1 % (ref 36.3–47.1)
HEMOGLOBIN: 7.5 G/DL (ref 11.9–15.1)
HEMOGLOBIN: 7.7 G/DL (ref 11.9–15.1)
HEMOGLOBIN: 8 G/DL (ref 11.9–15.1)
IMMATURE GRANULOCYTES: 7 %
LYMPHOCYTES # BLD: 15 % (ref 24–44)
MAGNESIUM: 2.1 MG/DL (ref 1.6–2.6)
MCH RBC QN AUTO: 29.2 PG (ref 25.2–33.5)
MCHC RBC AUTO-ENTMCNC: 31.6 G/DL (ref 28.4–34.8)
MCV RBC AUTO: 92.4 FL (ref 82.6–102.9)
MONOCYTES # BLD: 6 % (ref 1–7)
MORPHOLOGY: ABNORMAL
NRBC AUTOMATED: 0.2 PER 100 WBC
PDW BLD-RTO: 15.6 % (ref 11.8–14.4)
PHOSPHORUS: 3.1 MG/DL (ref 2.6–4.5)
PLATELET # BLD: 311 K/UL (ref 138–453)
PLATELET ESTIMATE: ABNORMAL
PMV BLD AUTO: 11.7 FL (ref 8.1–13.5)
POTASSIUM SERPL-SCNC: 3.9 MMOL/L (ref 3.7–5.3)
RBC # BLD: 2.64 M/UL (ref 3.95–5.11)
RBC # BLD: ABNORMAL 10*6/UL
SEG NEUTROPHILS: 71 % (ref 36–66)
SEGMENTED NEUTROPHILS ABSOLUTE COUNT: 6.88 K/UL (ref 1.8–7.7)
SODIUM BLD-SCNC: 137 MMOL/L (ref 135–144)
WBC # BLD: 9.7 K/UL (ref 3.5–11.3)
WBC # BLD: ABNORMAL 10*3/UL

## 2021-12-06 PROCEDURE — 6370000000 HC RX 637 (ALT 250 FOR IP): Performed by: COLON & RECTAL SURGERY

## 2021-12-06 PROCEDURE — 6360000002 HC RX W HCPCS: Performed by: INTERNAL MEDICINE

## 2021-12-06 PROCEDURE — 6360000002 HC RX W HCPCS

## 2021-12-06 PROCEDURE — 2500000003 HC RX 250 WO HCPCS: Performed by: INTERNAL MEDICINE

## 2021-12-06 PROCEDURE — 6360000002 HC RX W HCPCS: Performed by: STUDENT IN AN ORGANIZED HEALTH CARE EDUCATION/TRAINING PROGRAM

## 2021-12-06 PROCEDURE — 99214 OFFICE O/P EST MOD 30 MIN: CPT

## 2021-12-06 PROCEDURE — 71045 X-RAY EXAM CHEST 1 VIEW: CPT

## 2021-12-06 PROCEDURE — 85025 COMPLETE CBC W/AUTO DIFF WBC: CPT

## 2021-12-06 PROCEDURE — 2500000003 HC RX 250 WO HCPCS: Performed by: COLON & RECTAL SURGERY

## 2021-12-06 PROCEDURE — 36415 COLL VENOUS BLD VENIPUNCTURE: CPT

## 2021-12-06 PROCEDURE — 2000000000 HC ICU R&B

## 2021-12-06 PROCEDURE — 2700000000 HC OXYGEN THERAPY PER DAY

## 2021-12-06 PROCEDURE — 94003 VENT MGMT INPAT SUBQ DAY: CPT

## 2021-12-06 PROCEDURE — 2580000003 HC RX 258: Performed by: INTERNAL MEDICINE

## 2021-12-06 PROCEDURE — 82947 ASSAY GLUCOSE BLOOD QUANT: CPT

## 2021-12-06 PROCEDURE — 80048 BASIC METABOLIC PNL TOTAL CA: CPT

## 2021-12-06 PROCEDURE — 83735 ASSAY OF MAGNESIUM: CPT

## 2021-12-06 PROCEDURE — 94761 N-INVAS EAR/PLS OXIMETRY MLT: CPT

## 2021-12-06 PROCEDURE — 85018 HEMOGLOBIN: CPT

## 2021-12-06 PROCEDURE — 84100 ASSAY OF PHOSPHORUS: CPT

## 2021-12-06 PROCEDURE — C9113 INJ PANTOPRAZOLE SODIUM, VIA: HCPCS | Performed by: INTERNAL MEDICINE

## 2021-12-06 PROCEDURE — 97110 THERAPEUTIC EXERCISES: CPT

## 2021-12-06 PROCEDURE — 85014 HEMATOCRIT: CPT

## 2021-12-06 PROCEDURE — 2580000003 HC RX 258: Performed by: STUDENT IN AN ORGANIZED HEALTH CARE EDUCATION/TRAINING PROGRAM

## 2021-12-06 RX ORDER — FUROSEMIDE 10 MG/ML
40 INJECTION INTRAMUSCULAR; INTRAVENOUS 2 TIMES DAILY
Status: DISCONTINUED | OUTPATIENT
Start: 2021-12-06 | End: 2021-12-17

## 2021-12-06 RX ORDER — PANTOPRAZOLE SODIUM 40 MG/10ML
40 INJECTION, POWDER, LYOPHILIZED, FOR SOLUTION INTRAVENOUS DAILY
Status: DISCONTINUED | OUTPATIENT
Start: 2021-12-06 | End: 2021-12-24 | Stop reason: HOSPADM

## 2021-12-06 RX ORDER — PROPOFOL 10 MG/ML
5-50 INJECTION, EMULSION INTRAVENOUS
Status: DISCONTINUED | OUTPATIENT
Start: 2021-12-06 | End: 2021-12-19

## 2021-12-06 RX ORDER — PROPOFOL 10 MG/ML
INJECTION, EMULSION INTRAVENOUS
Status: COMPLETED
Start: 2021-12-06 | End: 2021-12-06

## 2021-12-06 RX ADMIN — POTASSIUM CHLORIDE: 2 INJECTION, SOLUTION, CONCENTRATE INTRAVENOUS at 18:33

## 2021-12-06 RX ADMIN — INSULIN LISPRO 2 UNITS: 100 INJECTION, SOLUTION INTRAVENOUS; SUBCUTANEOUS at 06:49

## 2021-12-06 RX ADMIN — Medication 50 MCG/HR: at 01:47

## 2021-12-06 RX ADMIN — INSULIN LISPRO 1 UNITS: 100 INJECTION, SOLUTION INTRAVENOUS; SUBCUTANEOUS at 18:35

## 2021-12-06 RX ADMIN — PIPERACILLIN AND TAZOBACTAM 3375 MG: 3; .375 INJECTION, POWDER, LYOPHILIZED, FOR SOLUTION INTRAVENOUS at 01:17

## 2021-12-06 RX ADMIN — PROPOFOL 30 MCG/KG/MIN: 10 INJECTION, EMULSION INTRAVENOUS at 23:23

## 2021-12-06 RX ADMIN — FUROSEMIDE 40 MG: 10 INJECTION, SOLUTION INTRAMUSCULAR; INTRAVENOUS at 17:34

## 2021-12-06 RX ADMIN — PROPOFOL 30 MCG/KG/MIN: 10 INJECTION, EMULSION INTRAVENOUS at 17:30

## 2021-12-06 RX ADMIN — PIPERACILLIN AND TAZOBACTAM 3375 MG: 3; .375 INJECTION, POWDER, LYOPHILIZED, FOR SOLUTION INTRAVENOUS at 16:58

## 2021-12-06 RX ADMIN — I.V. FAT EMULSION 100 ML: 20 EMULSION INTRAVENOUS at 18:37

## 2021-12-06 RX ADMIN — SODIUM CHLORIDE 0.5 MCG/KG/HR: 9 INJECTION, SOLUTION INTRAVENOUS at 14:07

## 2021-12-06 RX ADMIN — PANTOPRAZOLE SODIUM 40 MG: 40 INJECTION, POWDER, FOR SOLUTION INTRAVENOUS at 14:06

## 2021-12-06 RX ADMIN — PHENYLEPHRINE HYDROCHLORIDE 10 MCG/MIN: 10 INJECTION INTRAVENOUS at 14:11

## 2021-12-06 RX ADMIN — PIPERACILLIN AND TAZOBACTAM 3375 MG: 3; .375 INJECTION, POWDER, LYOPHILIZED, FOR SOLUTION INTRAVENOUS at 09:24

## 2021-12-06 RX ADMIN — SODIUM HYPOCHLORITE: 1.25 SOLUTION TOPICAL at 09:07

## 2021-12-06 RX ADMIN — SODIUM CHLORIDE 0.6 MCG/KG/HR: 9 INJECTION, SOLUTION INTRAVENOUS at 04:12

## 2021-12-06 RX ADMIN — INSULIN LISPRO 2 UNITS: 100 INJECTION, SOLUTION INTRAVENOUS; SUBCUTANEOUS at 12:29

## 2021-12-06 RX ADMIN — INSULIN LISPRO 2 UNITS: 100 INJECTION, SOLUTION INTRAVENOUS; SUBCUTANEOUS at 01:16

## 2021-12-06 ASSESSMENT — PULMONARY FUNCTION TESTS
PIF_VALUE: 26
PIF_VALUE: 27
PIF_VALUE: 29
PIF_VALUE: 19
PIF_VALUE: 26
PIF_VALUE: 26
PIF_VALUE: 18
PIF_VALUE: 28
PIF_VALUE: 26
PIF_VALUE: 26
PIF_VALUE: 28
PIF_VALUE: 27
PIF_VALUE: 18
PIF_VALUE: 26
PIF_VALUE: 27
PIF_VALUE: 24
PIF_VALUE: 28
PIF_VALUE: 26
PIF_VALUE: 25

## 2021-12-06 ASSESSMENT — PAIN SCALES - GENERAL: PAINLEVEL_OUTOF10: 1

## 2021-12-06 ASSESSMENT — PAIN DESCRIPTION - PAIN TYPE: TYPE: SURGICAL PAIN

## 2021-12-06 NOTE — PROGRESS NOTES
[facility]   Urology Progress Note            Subjective:  Follow-up urinary retention, indwelling Rose    Patient Vitals for the past 24 hrs:   BP Temp Temp src Pulse Resp SpO2 Weight   12/06/21 0600 108/61 -- -- 66 22 98 % --   12/06/21 0500 -- -- -- 79 21 97 % --   12/06/21 0400 -- 98.9 °F (37.2 °C) -- -- -- -- 187 lb (84.8 kg)   12/06/21 0300 (!) 111/53 -- -- 72 29 95 % --   12/06/21 0255 -- -- -- 82 24 93 % --   12/06/21 0200 (!) 113/57 -- -- 69 24 99 % --   12/06/21 0100 (!) 107/52 -- -- 68 27 97 % --   12/06/21 0019 -- -- -- 80 28 97 % --   12/06/21 0000 (!) 108/53 98.4 °F (36.9 °C) Axillary 80 23 96 % --   12/05/21 2336 -- -- -- 87 17 98 % --   12/05/21 2330 -- 100.2 °F (37.9 °C) Oral 69 21 99 % --   12/05/21 2314 -- -- -- 70 21 100 % --   12/05/21 2300 (!) 105/55 98.6 °F (37 °C) Oral 71 21 99 % --   12/05/21 2200 (!) 104/55 -- -- 74 21 99 % --   12/05/21 2100 (!) 97/55 -- -- 71 19 99 % --   12/05/21 2000 (!) 100/55 99.5 °F (37.5 °C) Oral 74 22 99 % --   12/1950 -- -- -- 74 24 99 % --   12/05/21 1900 (!) 98/49 -- -- 73 21 99 % --   12/05/21 1619 -- -- -- 81 20 99 % --   12/05/21 1600 -- 98.4 °F (36.9 °C) Temporal -- -- -- --   12/05/21 1200 -- 99 °F (37.2 °C) Temporal -- -- -- --   12/05/21 1137 -- -- -- -- -- 99 % --   12/05/21 1011 -- 99.6 °F (37.6 °C) Oral -- -- -- --   12/05/21 0800 -- 98.1 °F (36.7 °C) Temporal -- -- -- --   12/05/21 0749 -- -- -- 103 21 98 % --       Intake/Output Summary (Last 24 hours) at 12/6/2021 0736  Last data filed at 12/6/2021 0245  Gross per 24 hour   Intake 1712 ml   Output 730 ml   Net 982 ml       Recent Labs     12/04/21  0529 12/04/21  1304 12/05/21  0515 12/05/21  0515 12/05/21  1254 12/05/21  1819 12/06/21  0512   WBC 5.8  --  8.0  --   --   --  9.7   HGB 7.1*   < > 7.1*   < > 7.0* 6.6* 7.7*   HCT 23.0*   < > 22.5*   < > 21.9* 21.3* 24.4*   MCV 94.7  --  93.8  --   --   --  92.4     --  269  --   --   --  311    < > =

## 2021-12-06 NOTE — CARE COORDINATION
Spoke with patient's spouse, Anne Sandovallorena. Writer provided him with [facility]. and SNF list. Patient is intubated and they are doing CPAP trials. Continue to follow. Will get choices from spouse.

## 2021-12-06 NOTE — PLAN OF CARE
Problem: HEMODYNAMIC STATUS  Goal: Patient has stable vital signs and fluid balance  12/6/2021 1539 by Tess Yang RN  Outcome: Ongoing    Outcome: Ongoing     Problem: OXYGENATION/RESPIRATORY FUNCTION  Goal: Patient will achieve/maintain normal respiratory rate/effort  12/6/2021 1539 by Tess Yang RN  Outcome: Ongoing    Outcome: Ongoing     Problem: MOBILITY  Goal: Early mobilization is achieved  12/6/2021 1539 by Tess Yang RN  Outcome: Ongoing    Outcome: Ongoing     Problem: ELIMINATION  Goal: Elimination patterns are normal or improving  Description: Elimination patterns return to pre-surgery normal patterns  12/6/2021 1539 by Tess Yang RN  Outcome: Ongoing    Outcome: Ongoing     Problem: SKIN INTEGRITY  Goal: Skin integrity is maintained or improved  12/6/2021 1539 by Tess Yang RN  Outcome: Ongoing    Outcome: Ongoing     Problem: Pain:  Goal: Pain level will decrease  Description: Pain level will decrease    Outcome: Ongoing  Goal: Control of acute pain  Description: Control of acute pain  12/6/2021 1539 by Tess Yang RN  Outcome: Ongoing    Outcome: Ongoing  Goal: Control of chronic pain  Description: Control of chronic pain    Outcome: Ongoing     Problem: Falls - Risk of:  Goal: Will remain free from falls  Description: Will remain free from falls  12/6/2021 1539 by Tess Yang RN    Outcome: Ongoing  Goal: Absence of physical injury  Description: Absence of physical injury    Outcome: Ongoing     Problem: Skin Integrity:  Goal: Will show no infection signs and symptoms  Description: Will show no infection signs and symptoms  12/6/2021 1539 by Tess Yang RN  Outcome: Ongoing    Outcome: Ongoing  Goal: Absence of new skin breakdown  Description: Absence of new skin breakdown    Outcome: Ongoing     Problem: Nutrition  Goal: Optimal nutrition therapy  12/6/2021 1539 by Tess Yang RN  Outcome: Ongoing    Outcome: Ongoing     Problem: Non-Violent Restraints  Goal: Removal from restraints as soon as assessed to be safe  Outcome: Ongoing

## 2021-12-06 NOTE — PLAN OF CARE
Problem: HEMODYNAMIC STATUS  Goal: Patient has stable vital signs and fluid balance  12/6/2021 0142 by Scotty Keys RN  Outcome: Ongoing  12/5/2021 1808 by Asael Johnston RN  Outcome: Ongoing     Problem: OXYGENATION/RESPIRATORY FUNCTION  Goal: Patient will achieve/maintain normal respiratory rate/effort  12/6/2021 0142 by Scotty Keys RN  Outcome: Ongoing  12/5/2021 1808 by Asael Johnston RN  Outcome: Ongoing     Problem: MOBILITY  Goal: Early mobilization is achieved  12/6/2021 0142 by Scotty Keys RN  Outcome: Ongoing  12/5/2021 1808 by Asael Johnston RN  Outcome: Ongoing     Problem: ELIMINATION  Goal: Elimination patterns are normal or improving  Description: Elimination patterns return to pre-surgery normal patterns  12/6/2021 0142 by Scotty Keys RN  Outcome: Ongoing  12/5/2021 1808 by Asael Johnston RN  Outcome: Ongoing     Problem: SKIN INTEGRITY  Goal: Skin integrity is maintained or improved  12/6/2021 0142 by Scotty Keys RN  Outcome: Ongoing  12/5/2021 1808 by Asael Johnston RN  Outcome: Ongoing     Problem: Pain:  Goal: Pain level will decrease  Description: Pain level will decrease  12/6/2021 0142 by Scotty Keys RN  Outcome: Ongoing  12/5/2021 1808 by Asael Johnston RN  Outcome: Ongoing  Goal: Control of acute pain  Description: Control of acute pain  12/6/2021 0142 by Scotty Keys RN  Outcome: Ongoing  12/5/2021 1808 by Asael Johnston RN  Outcome: Ongoing  Goal: Control of chronic pain  Description: Control of chronic pain  12/6/2021 0142 by Scotty Keys RN  Outcome: Ongoing  12/5/2021 1808 by Asael Johnston RN  Outcome: Ongoing     Problem: Falls - Risk of:  Goal: Will remain free from falls  Description: Will remain free from falls  12/6/2021 0142 by Scotty Keys RN  Outcome: Ongoing  12/5/2021 1808 by Asael Johnston RN  Outcome: Ongoing  Goal: Absence of physical injury  Description: Absence of physical injury  12/6/2021 0142 by Scotty Keys RN  Outcome: Ongoing  12/5/2021 200 by Karan Gonzalez RN  Outcome: Ongoing     Problem: Skin Integrity:  Goal: Will show no infection signs and symptoms  Description: Will show no infection signs and symptoms  12/6/2021 0142 by Treva Brito RN  Outcome: Ongoing  12/5/2021 1808 by Karan Gonzalez RN  Outcome: Ongoing  Goal: Absence of new skin breakdown  Description: Absence of new skin breakdown  12/6/2021 0142 by Treva Brito RN  Outcome: Ongoing  12/5/2021 1808 by Karan Gonzalez RN  Outcome: Ongoing     Problem: Nutrition  Goal: Optimal nutrition therapy  12/6/2021 0142 by Treva Brito RN  Outcome: Ongoing  12/5/2021 1808 by Karan Gonzalez RN  Outcome: Ongoing

## 2021-12-06 NOTE — PROGRESS NOTES
Colorectal Surgery:  Daily Progress Note               PATIENT NAME: Julia Nguyen   TODAY'S DATE: 12/6/2021, 7:09 AM    SUBJECTIVE:     Patient seen and evaluated. T-max 37.9 overnight. Remains off pressors. Received 1uPRBC overnight. Remains intubated with minimal response on sedation holiday. Hemoglobin 7.7 this morning. Urine output adequate [address]. Appears night shift did not record prior 12 hrs data for below:  120ml bilious NG output . 30ml cream/tan SHANA drain  30ml RLQ stoma    OBJECTIVE:   VITALS:  /61   Pulse 66   Temp 98.9 °F (37.2 °C)   Resp 22   Ht 5' 4.5\" (1.638 m)   Wt 187 lb (84.8 kg)   LMP  (LMP Unknown)   SpO2 98%   BMI 31.60 kg/m²      INTAKE/OUTPUT:      Intake/Output Summary (Last 24 hours) at 12/6/2021 0709  Last data filed at 12/6/2021 0245  Gross per 24 hour   Intake 1712 ml   Output 730 ml   Net 982 ml       PHYSICAL EXAM:  General Appearance: Unresponsive, sedated, critically ill  HEENT:  Normocephalic, atraumatic, mucus membranes moist   Heart: Heart regular rate, requiring pressors for blood pressure support. Lungs: Equal chest rise bilaterally, mechanically ventilated. Abdomen: Soft, midline incision covered with dressings, napoleon-incisional skin clean and free of erythema. Ostomy appliance in place containing bowel sweat. Stoma slightly dusky. Extremities: No cyanosis, pitting edema, rashes noted. Skin: Skin color, texture, turgor normal. No rashes or lesions.     Data:  CBC with Differential:    Lab Results   Component Value Date    WBC 9.7 12/06/2021    RBC 2.64 12/06/2021    HGB 7.7 12/06/2021    HCT 24.4 12/06/2021     12/06/2021    MCV 92.4 12/06/2021    MCH 29.2 12/06/2021    MCHC 31.6 12/06/2021    RDW 15.6 12/06/2021    LYMPHOPCT 15 12/06/2021    MONOPCT 6 12/06/2021    BASOPCT 0 12/06/2021    MONOSABS 0.58 12/06/2021    LYMPHSABS 1.46 12/06/2021    EOSABS 0.10 12/06/2021    BASOSABS 0.00 12/06/2021    DIFFTYPE NOT REPORTED 12/06/2021     BMP: Lab Results   Component Value Date     12/06/2021    K 3.9 12/06/2021     12/06/2021    CO2 25 12/06/2021    BUN 26 12/06/2021    LABALBU 1.6 12/05/2021    CREATININE 0.44 12/06/2021    CALCIUM 8.1 12/06/2021    GFRAA >60 12/06/2021    LABGLOM >60 12/06/2021    GLUCOSE 237 12/06/2021     Magnesium:    Lab Results   Component Value Date    MG 2.1 12/06/2021     Phosphorus:    Lab Results   Component Value Date    PHOS 3.1 12/06/2021     Radiology Review:     ASSESSMENT:  Active Hospital Problems    Diagnosis Date Noted    Stricture of sigmoid colon [facility]) [K04.588] 11/24/2021     80 y.o. female with sigmoid colon stricture. 11/24/21: Status post exploratory laparotomy with explantation pelvic mesh and mobilization sigmoid and proximal rectum, status post ileocecectomy with ileocolonic anastomosis. 12/1/21: Status post ex lap, creation of end colostomy, pelvic drain placement    Plan:  -Appreciate critical care management  -Continue NGT to LIWS; Monitor drain output and consistency; awaiting ostomy output   -Appreciate wound ostomy and RN for midline dressing changes and stoma care   -Multimodal pain therapy, Resuscitate as needed. -OK for VTE ppx, monitor labs   - appreciate nephrology input     Electronically signed by Tory Roger DO  on 12/6/2021 at 7:09 AM       I Dr. Michael Tyson saw and examined the patient. I have edited the above and agree with the above. Ashley Chavez  Colorectal Surgery

## 2021-12-06 NOTE — PROGRESS NOTES
Reason for Consult:  Acute kidney injury. Requesting Physician:  Kevin Hayden MD    Interval history:   Creatinine continues to remain below 1  On TPN  Remains on vent support. On one pressor. HISTORY OF PRESENT ILLNESS:    The patient is a 80 y.o. female admitted for elective scheduled sigmoidectomy that was performed on 11/24. On previous labs her serum creatinines have been between 0.4 to 0.5 with eGFR of >60s ml/min. Her lowest recorded BP was 86/52 mmHg. On admission her creatinine was 0.5 that bill to 1.74 yesterday and today after iv hydration her creatinine is better at 0.8. She had to go back to the OR today for bowel leak and now has a colostomy in place. She is intubated so not able to get any history from the patient. Most of the history is taken from patient's chart. No recent use iv contrast. She was on NSAIDs at home. Review Of Systems:   Unable to obtain as she is intubated. Prior to Admission medications    Medication Sig Start Date End Date Taking? Authorizing Provider   spironolactone (ALDACTONE) 25 MG tablet Take 25 mg by mouth daily   Yes Historical Provider, MD   rosuvastatin (CRESTOR) 5 MG tablet Take 5 mg by mouth daily   Yes Historical Provider, MD   esomeprazole (NEXIUM) 40 MG delayed release capsule Take 40 mg by mouth daily    Yes Historical Provider, MD   famotidine (PEPCID) 40 MG tablet Take 40 mg by mouth daily as needed (heartburn)    Yes Historical Provider, MD   amLODIPine (NORVASC) 10 MG tablet Take 10 mg by mouth daily  3/17/15  Yes Historical Provider, MD   citalopram (CELEXA) 40 MG tablet Take 60 mg by mouth daily Takes 1.5 tabs daily 3/5/15  Yes Historical Provider, MD   diphenoxylate-atropine (LOMOTIL) 2.5-0.025 MG per tablet Take 1 tablet by mouth 4 times daily as needed.   12/22/14  Yes Historical Provider, MD   levothyroxine (SYNTHROID) 100 MCG tablet Take 100 mcg by mouth Daily  3/5/15  Yes Historical Provider, MD   celecoxib (CELEBREX) 200 MG capsule Take 200 mg by mouth daily     Historical Provider, MD       Scheduled Meds:   sodium hypochlorite   Irrigation Daily    fat emulsion  100 mL IntraVENous Daily    insulin lispro  0-6 Units SubCUTAneous Q6H    piperacillin-tazobactam  3,375 mg IntraVENous Q8H    metroNIDAZOLE  500 mg IntraVENous Once    citalopram  60 mg Oral Daily    docusate sodium  100 mg Oral BID    influenza virus vaccine  0.5 mL IntraMUSCular Prior to discharge    cyclobenzaprine  5 mg Oral TID    levothyroxine  100 mcg Oral Daily    rosuvastatin  5 mg Oral Nightly    sodium chloride flush  5-40 mL IntraVENous 2 times per day    acetaminophen  1,000 mg Oral 3 times per day    pantoprazole  40 mg Oral Daily    gabapentin  300 mg Oral Q8H     Continuous Infusions:   dexmedetomidine (PRECEDEX) IV infusion 0.4 mcg/kg/hr (12/06/21 1006)    PN-Adult 2 in 1 Central(5/20 Standard Electrolytes) 65 mL/hr at 12/05/21 1711    sodium chloride      sodium chloride      phenylephrine (BHARATI-SYNEPHRINE) 50mg/250mL infusion Stopped (12/03/21 1004)    dextrose      midazolam (VERSED) 1 mg/mL in D5W infusion Stopped (12/05/21 0856)    fentaNYL 50 mcg/hr (12/06/21 0147)    sodium chloride       PRN Meds:fentanNYL, sodium chloride, ipratropium-albuterol, sodium chloride, acetaminophen, glucose, dextrose, glucagon (rDNA), dextrose, potassium chloride **OR** potassium alternative oral replacement **OR** potassium chloride, oxyCODONE, phenol, sodium chloride flush, sodium chloride, ondansetron **OR** ondansetron    Physical Exam:  Vitals:    12/06/21 0600 12/06/21 0700 12/06/21 0741 12/06/21 0800   BP: 108/61 106/60  (!) 109/59   Pulse: 66 66 65 65   Resp: 22 22 22 25   Temp:    97.5 °F (36.4 °C)   TempSrc:    Temporal   SpO2: 98% 99% 99% 99%   Weight:       Height:         I/O last 3 completed shifts: In: 4910 [I.V.:310; Blood:375; IV Piggyback:247]  Out: 730 [Urine:550; Emesis/NG output:120; Drains:30; Stool:30]    General: not in distress.  Appears to be stated age. HEENT: Atraumatic, normocephalic. Anicteric sclera. Pink and moist oral mucosa. No carotid bruit. No JVD. Chest: Bilateral air entry, clear to auscultation, no wheezing, rhonchi or rales. Cardiovascular: RRR, S1S2, no murmur, rub or gallop. Has lower extremity edema. Abdomen: Soft, non tender to palpation. Musculoskeletal: No cyanosis or clubbing. Integumentary: Pink, warm and dry. Free from rash or lesions. Skin turgor normal.  CNS: Face symmetrical. No tremor.      Data:  CBC:   Lab Results   Component Value Date    WBC 9.7 12/06/2021    HGB 7.7 (L) 12/06/2021    HCT 24.4 (L) 12/06/2021    MCV 92.4 12/06/2021     12/06/2021     BMP:    Lab Results   Component Value Date     12/06/2021     12/05/2021     12/04/2021    K 3.9 12/06/2021    K 3.8 12/05/2021    K 3.5 (L) 12/04/2021     12/06/2021     12/05/2021     12/04/2021    CO2 25 12/06/2021    CO2 24 12/05/2021    CO2 24 12/04/2021    BUN 26 (H) 12/06/2021    BUN 16 12/05/2021    BUN 16 12/04/2021    CREATININE 0.44 (L) 12/06/2021    CREATININE <0.40 (L) 12/05/2021    CREATININE <0.40 (L) 12/04/2021    GLUCOSE 237 (H) 12/06/2021    GLUCOSE 214 (H) 12/05/2021    GLUCOSE 193 (H) 12/04/2021     CMP:   Lab Results   Component Value Date     12/06/2021    K 3.9 12/06/2021     12/06/2021    CO2 25 12/06/2021    BUN 26 12/06/2021    CREATININE 0.44 12/06/2021    GLUCOSE 237 12/06/2021    CALCIUM 8.1 12/06/2021    PROT 4.4 12/05/2021    LABALBU 1.6 12/05/2021    BILITOT 0.44 12/05/2021    ALKPHOS 65 12/05/2021    AST 15 12/05/2021    ALT 6 12/05/2021      Hepatic:   Lab Results   Component Value Date    AST 15 12/05/2021    AST 8 12/03/2021    AST 16 12/01/2021    ALT 6 12/05/2021    ALT <5 (L) 12/03/2021    ALT 6 12/01/2021    BILITOT 0.44 12/05/2021    BILITOT 0.40 12/03/2021    BILITOT 0.55 12/01/2021    ALKPHOS 65 12/05/2021    ALKPHOS 68 12/03/2021    ALKPHOS 79 12/01/2021     BNP: No results found for: BNP  Lipids: No results found for: CHOL, HDL  INR: No results found for: INR  PTH: No results found for: PTH  Phosphorus:    Lab Results   Component Value Date    PHOS 3.1 12/06/2021     Ionized Calcium: No results found for: IONCA  Magnesium:   Lab Results   Component Value Date    MG 2.1 12/06/2021     Albumin:   Lab Results   Component Value Date    LABALBU 1.6 12/05/2021     Last 3 CK, CKMB, Troponin: @LABRCNT(CKTOTAL:3,CKMB:3,TROPONINI:3)       URINE:)No results found for: Smith Goltz     Radiology:   Reviewed. Assessment:  Acute kidney injury, FeNa was 0.24%, appears to be hemodynamically related. Creatinine has improved. Hyponatremia. Serum sodium improved to normal range. Metabolic acidosis, resolved. Anemia. S/p sigmoidectomy and now has colostomy. Plan:  Off NSAIDs, Amlodipine and Aldactone. Continue TPN. Dietary service managing. On Lasix 40 mg iv daily. Monitor BP. Avoid hypotension, nephrotoxic drugs, Lovenox, Fleets enema and IV contrast exposure. Follow up chemistries in the AM.    Thank you for the consultation. Please do not hesitate to contact us for any further questions/concerns. We will continue to follow along with you. Electronically signed by Lakhwinder Hood MD  on 12/6/2021 at 10:34 AM   [facility].   Ph: [phone]

## 2021-12-06 NOTE — PROGRESS NOTES
Pulmonary Critical Care Progress Note       Patient seen for the follow up of hypoxic respiratory failure, metabolic acidosis    Subjective:  She had increased RR on CPAP trials . She is still not waking up and is not following commands. He has been back on Cesar-Synephrine drip 20 . She is not following commands. She is on Versed drip for sedation. She is back on fentanyl drip at 25 an hour. She has fair urine output. She is on TPN. Examination:  Vitals: BP (!) 108/57   Pulse 78   Temp 98.1 °F (36.7 °C) (Temporal)   Resp 24   Ht 5' 4.5\" (1.638 m)   Wt 187 lb (84.8 kg)   LMP  (LMP Unknown)   SpO2 99%   BMI 31.60 kg/m²   General appearance: Sedated, intubated on ventilator  Neck: No JVD  Lungs: Decreased breath sounds no crackles or wheezing  Heart: regular rate and rhythm, S1, S2 normal, no gallop  Abdomen: Soft, non tender, + BS positive surgical dressing positive colostomy  Extremities: no cyanosis or clubbing. Positive edema    LABs:  CBC:   Recent Labs     12/05/21  0515 12/05/21  1254 12/06/21  0512 12/06/21  1254   WBC 8.0  --  9.7  --    HGB 7.1*   < > 7.7* 7.5*   HCT 22.5*   < > 24.4* 23.2*     --  311  --     < > = values in this interval not displayed.      BMP:   Recent Labs     12/05/21  0515 12/06/21  0512    137   K 3.8 3.9   CO2 24 25   BUN 16 26*   CREATININE <0.40* 0.44*   LABGLOM Can not be calculated >60   GLUCOSE 214* 237*     LIVER PROFILE:  Recent Labs     12/05/21 0515   AST 15   ALT 6   LABALBU 1.6*     ABG:  Lab Results   Component Value Date    YBC9LLM NOT REPORTED 12/04/2021    FIO2 60.0 12/04/2021       Lab Results   Component Value Date    POCPH 7.382 12/04/2021    POCPCO2 48.0 12/04/2021    POCPO2 121.4 12/04/2021    POCHCO3 28.5 12/04/2021    PBEA 3 12/04/2021    KPX5AOK NOT REPORTED 12/04/2021    CEIT6MRZ 99 12/04/2021    FIO2 60.0 12/04/2021     Radiology:  Chest x-ray 12/6  Support tubes and right sided PICC remain stable.  Slight further improvement vascular congestion/infiltrate with persistent findings, as above.           Impression:  · Acute respiratory insufficiency requiring ventilator support  · Feculent peritonitis s/p exploratory laparotomy with end colostomy/pelvic drain placement  · COPD  · History of GERD/dyslipidemia/hypothyroidism/hypertension  · Metabolic acidosis  · Bilateral pleural infiltrate/pulmonary edema    Recommendations:  · Vent support, FiO2 50%  · CPAP trial  as tolerated/sedation vacation  · IV Versed for sedation if needed, RASS goal -1 to -2  · Discontinue IV fentanyl drip/IV fentanyl 25mics IV every hour as needed   · DuoNeb by nebulizer  · Wean off  Cesar-Synephrine  · Restart IV Lasix 40 BID   · TPN/ monitor liver function/ off TF  · Monitor hemoglobin repeat transfuse for hemoglobin less than 7  ·  Zosyn/ off Flagyl  · X-ray chest in am  · Discussed with RN  · Discussed with  at bedside  · Peptic ulcer disease prophylaxis  · DVT prophylaxis on heparin 5000 every 8 hold for now until hemoglobin stabilizes       Karson Corral MD, [facility]  Pulmonary Critical Care and Sleep Medicine,  [facility]  cc min  Office: [phone]

## 2021-12-06 NOTE — PROGRESS NOTES
Physical Therapy  Daily treatment note  DATE: 2021    NAME: Tre Brenner  MRN: [id number]   : 1950      Completed PROM x 4 extremities, all available joints/ planes as pt. Would allow. Pt. Very resistive to movements, making some ROM difficult. Checked for pressure relief of elbows and heels at end of session and ensured UEs positioned with hands higher than elbows and finger extension on pillow for edema reduction. Will continue to progress. Am Pac- raw score 6  [id number]= 10 min.    Rivera Delgadillo, PT

## 2021-12-06 NOTE — CONSULTS
[facility] Continence Nurse  Consult Note       NAME:  Franklin Allred RECORD NUMBER:  [id number]  AGE: 80 y.o. GENDER: female  : 1950  TODAY'S DATE:  2021    Subjective:     Reason for Wound Jose Simeon Care and Assessment: Surgical abdominal wound, new colostomy       Francesca Ramirez is a 80 y.o. female referred by:   [x] Physician  [] Nursing  [] Other:     The patient is intubated and sedated. Pain mgmt with Fentanyl per RN. No family members present at this time. Wound Identification:  Wound Type: non-healing surgical  Contributing Factors: edema and obesity    Wound History: new surgical wound and end colostomy created  due perforation and feculent peritonitis. (Previous ileocecectomy with primary anastomosis 21).     Current Wound Care Treatment:  Dakin's moist to moist daily    Patient Goal of Care:  [x] Wound Healing  [] Odor Control  [x] Palliative Care  [x] Pain Control   [] Other:         PAST MEDICAL HISTORY        Diagnosis Date    Abdominal pain     Arthritis     Constipation     COPD (chronic obstructive pulmonary disease) (HCC)     Depressed     GERD (gastroesophageal reflux disease)     HLD (hyperlipidemia)     HTN (hypertension)     Hypothyroid     IBS (irritable bowel syndrome)     Lumbar spondylolysis     Myofascial pain     Poor appetite     RLS (restless legs syndrome)     Wears glasses        PAST SURGICAL HISTORY    Past Surgical History:   Procedure Laterality Date    ABDOMEN SURGERY  2021    exploration with lysis of adhesions, small bowel resection with ileocecetomy and pelvic mesh removal    ABDOMINAL ADHESION SURGERY  3/9/2015    APPENDECTOMY      BLADDER SURGERY      COLECTOMY N/A 2021    ABDOMINAL EXPLORATION LYSIS OF EXTENSIVE ADHESIONS SMALL BOWEL RESECTION WITH ILEOCECECTOMY  EXPLANTATIOIN OF PELVIC MESH  ILEOCLONIC ANASTAMOSIS MOBILIZATION OF RECTUM AND SIGMOID performed by Lady Oumar MD at [facility] COLONOSCOPY      CYSTOCELE REPAIR      CYSTOSCOPY  11/24/2021    with bilateral ureteral stent insertion    CYSTOSCOPY  11/24/2021    CYSTOSCOPY EXPLORATION OF URETER BILATERAL URETERAL STENT INSERTION performed by Starlene Lefort, MD at [address], [facility]      ENDOSCOPY, COLON, DIAGNOSTIC      ENTEROCELE REPAIR      EYE SURGERY      HERNIA REPAIR      HYSTERECTOMY      LAPAROTOMY N/A 12/1/2021    LAPAROTOMY EXPLORATORY CREATION OF END COLOSTOMY, LAVAGE, PELVIC DRAIN PLACEMENT performed by Starlene Lefort, MD at [facility]  11/24/2021    SIGMOIDOSCOPY FLEXIBLE X 2 performed by Starlene Lefort, MD at [facility]    Family History   Problem Relation Age of Onset    High Blood Pressure Mother        SOCIAL HISTORY    Social History     Tobacco Use    Smoking status: Current Every Day Smoker     Packs/day: 0.50     Years: 52.00     Pack years: 26.00     Types: Cigarettes    Smokeless tobacco: Never Used   Vaping Use    Vaping Use: Never used   Substance Use Topics    Alcohol use: Yes     Comment: every other week    Drug use: Yes     Types: Marijuana (Weed)     Comment: every other week-inhalation       ALLERGIES    Allergies   Allergen Reactions    Morphine Itching    Sulfa Antibiotics            Objective:      BP (!) 109/59   Pulse 65   Temp 97.5 °F (36.4 °C) (Temporal)   Resp 25   Ht 5' 4.5\" (1.638 m)   Wt 187 lb (84.8 kg)   LMP  (LMP Unknown)   SpO2 99%   BMI 31.60 kg/m²       LABS    CBC:   Lab Results   Component Value Date    WBC 9.7 12/06/2021    RBC 2.64 12/06/2021    HGB 7.7 12/06/2021     CMP:  Albumin:    Lab Results   Component Value Date    LABALBU 1.6 12/05/2021     PT/INR:  No results found for: PROTIME, INR  HgBA1c:    Lab Results   Component Value Date    LABA1C 5.7 11/10/2021     PTT: No components found for: LABPTT    Review of Systems    Assessment:     Physical Exam      Juan J Risk Score: Juan J Scale Score: 12    Patient Active Problem List   Diagnosis Code    Intestinal adhesions K66.0    Obesity (BMI 30-39. 9) E66.9    Urinary incontinence R32    Constipation K59.00    Ventral incisional hernia K43.2    Smoker F17.200    Stricture of sigmoid colon ([facility] [address] K56.699       Patient Active Problem List   Diagnosis    Intestinal adhesions    Obesity (BMI 30-39. 9)    Urinary incontinence    Constipation    Ventral incisional hernia    Smoker    Stricture of sigmoid colon (HCC)       Measurements:  Wound Abdomen Mid (Active)   Wound Image   12/06/21 0950   Wound Etiology Surgical 12/06/21 0950   Dressing Status New dressing applied; Old drainage noted 12/06/21 0950   Wound Cleansed Other (Comment) 12/06/21 0950   Dressing/Treatment Pharmaceutical agent (see MAR); Moist to moist; ABD 12/06/21 0950   Dressing Change Due 12/07/21 12/06/21 0950   Wound Length (cm) 19.8 cm 12/06/21 0950   Wound Width (cm) 5.3 cm 12/06/21 0950   Wound Depth (cm) 7.1 cm 12/06/21 0950   Wound Surface Area (cm^2) 104.94 cm^2 12/06/21 0950   Wound Volume (cm^3) 745.074 cm^3 12/06/21 0950   Undermining Starts ___ O'Clock 12 12/06/21 0950   Undermining Maxium Distance (cm) [email or web address] 12/06/21 0950   Wound Assessment Pink/red; Slough; Other (Comment) 12/06/21 0950   Drainage Amount Large 12/06/21 0950   Drainage Description Green; Serosanguinous; Yellow; Purulent 12/06/21 0950   Odor Mild 12/06/21 0950   Rosaura-wound Assessment Blanchable erythema 12/06/21 0950   Margins Unattached edges 12/06/21 0950   Number of days:      Large midline abdominal wound with primarily red/pink tissue. The deepest areas of wound bed are yellow/grayish and sutures can be observed. The wound has several staples retaining the middle aspect of the incision line together, however the wound is open/connects beneath. Dakin's moist to moist currently being used. The wound will likely benefit from Dakin's irrigation NPWT VeraFlo dressing at this time.  This was suggested to Dr. Alessandro Gross. The colostomy is functioning- greenish brown effluent in pouch. The stoma does have a discolored appearance- greenish yellow- some of which sloughs away with cleansing. There is some pink tissue noted just inside the os. The mucocutaneous junction is  is several areas and is draining seropurulence. Attempted to allow room for these areas to drain by cutting the pouch just larger. A two piece pouching system (yellow coupling) was used to pouch as the stoma size is large 40mm x 60mm. Response to treatment:  Well tolerated by patient. Plan:     Plan of Care:     -Midline abdominal wound cre: continue Dakin's solution. Recommend NPWT with Veraflo Dakin's irrigation.    -Colostomy functioning albeit discoloration and mucocutaneous separation.    -Will cont to follow for wound and ostomy care and education.    -Turn every 2 hours    -Float heels off of bed with pillows under calves    -Sacral foam dressing to sacrococcygeal area. Peel back dressing, inspect skin beneath, re-secure. Change every 72 hours and prn wrinkles, soilage. Discontinue if repeatedly soiled by incontinence.     -Routine incontinence care with foaming cleanser and zinc oxide cream. Apply zinc oxide cream BID and prn incontinence. Use moisture wicking under pads vs cloth backed briefs    -Static air overlay. Check inflation each shift by sliding hand under the air overlay. Feel for the patient's heaviest/ most dependant body part. Ideally 1/2 to 1\" of air will be between your hand and the patient's body. Add air prn.     Specialty Bed Required : Yes   [] Low Air Loss   [x] Pressure Redistribution  [] Fluid Immersion  [] Bariatric  [] Total Pressure Relief  [] Other:     Current Diet: Diet NPO Exceptions are: Sips of Water with Meds, Ice Chips  PN-Adult 2-in-1 [facility] [employer] (Standard)  Dietician consult:  Yes    Discharge Plan:  Placement for patient upon discharge: skilled nursing   Patient appropriate for Outpatient [address]: Yes    Patient/Caregiver Teaching:  Level of patientunderstanding able to:     [x] Indicates understanding       [] Needs reinforcement  [] Unsuccessful      [x] Verbal Understanding  [] Demonstrated understanding       [] No evidence of learning  [] Refused teaching         [] N/A       Electronically signed by Wan Dalton RN on  12/6/2021 at 9:56 AM

## 2021-12-06 NOTE — PROGRESS NOTES
Nutrition Assessment     Type and Reason for Visit: Reassess    Nutrition Recommendations/Plan:   1. Continue NPO status  2. Continue central standard 5/20 TPN: 65 mL/hr (1560 mL total volume) with 100 mL lipids. Added 15 units insulin and MVI and trace elements to TPN  3. Monitor TPN rate/ tolerance, vent status, weights and labs    Nutrition Assessment:  Remains intubated with minimal response on sedation holiday. Patient received 1uPRBC overnight. NGT set to LIWS. Continue TPN at 65 mL/hr (1560 mL total volume) with 100 mL lipids. Added 15 units insulin to TPN and added MVI and TE. TPN meets 92% total kcal and 88% protein needs. Monitor TPN rate/ tolerance, vent status, weights and labs. Malnutrition Assessment:  Malnutrition Status: At risk for malnutrition (Comment)    Estimated Daily Nutrient Needs:  Energy (kcal): 1714 kcal ([facility] 2010); Weight Used for Energy Requirements:  Current     Protein (g): 84-95 gm of protein (1.5-1.7 gm/kg); Weight Used for Protein Requirements:  Ideal        Weight Used for Fluid Requirements:  1 ml/kcal      Nutrition Related Findings: Colostomy. Hypoactive bowel sounds. Edema: BUE +2 non-pitting; BLE +1 non-pitting. 1uPRBC transfused overnight. NGT to LIWS.       Current Nutrition Therapies:    Diet NPO Exceptions are: Sips of Water with Meds, Ice Chips  PN-Adult 2-in-1 Central Line (Standard)    Anthropometric Measures:  · Height: 5' 4.5\" (163.8 cm)  · Current Body Wt: 187 lb (84.8 kg)   · BMI: 31.6    Nutrition Diagnosis:   · Inadequate protein-energy intake related to inadequate protein-energy intake as evidenced by NPO or clear liquid status due to medical condition, poor intake prior to admission, intubation, nutrition support - parenteral nutrition    · Altered GI function related to altered GI structure as evidenced by GI abnormality, nausea (status post small bowel surgery)      Nutrition Interventions:   Food and/or Nutrient Delivery:  Continue NPO, Modify Parenteral Nutrition  Nutrition Education/Counseling:  Education not indicated   Coordination of Nutrition Care:  Continue to monitor while inpatient    Goals:  PN will meet greater than 75% of estimated nutrition needs       Nutrition Monitoring and Evaluation:   Behavioral-Environmental Outcomes:  None Identified   Food/Nutrient Intake Outcomes:  Parenteral Nutrition Intake/Tolerance  Physical Signs/Symptoms Outcomes:  Biochemical Data, Fluid Status or Edema, Skin, Weight, GI Status, Constipation     Discharge Planning:     Too soon to determine     Ramona Sears, [address] [place] [address], [address] [place] [address]  Office Number: [phone]

## 2021-12-07 ENCOUNTER — APPOINTMENT (OUTPATIENT)
Dept: GENERAL RADIOLOGY | Age: 71
DRG: 329 | End: 2021-12-07
Attending: COLON & RECTAL SURGERY
Payer: MEDICARE

## 2021-12-07 LAB
ABSOLUTE EOS #: 0.1 K/UL (ref 0–0.44)
ABSOLUTE IMMATURE GRANULOCYTE: 0.76 K/UL (ref 0–0.3)
ABSOLUTE LYMPH #: 0.95 K/UL (ref 1.1–3.7)
ABSOLUTE MONO #: 0.67 K/UL (ref 0.1–1.2)
ANION GAP SERPL CALCULATED.3IONS-SCNC: 9 MMOL/L (ref 9–17)
BASOPHILS # BLD: 1 % (ref 0–2)
BASOPHILS ABSOLUTE: 0.1 K/UL (ref 0–0.2)
BUN BLDV-MCNC: 24 MG/DL (ref 8–23)
BUN/CREAT BLD: 55 (ref 9–20)
CALCIUM SERPL-MCNC: 8.1 MG/DL (ref 8.6–10.4)
CHLORIDE BLD-SCNC: 103 MMOL/L (ref 98–107)
CO2: 27 MMOL/L (ref 20–31)
CREAT SERPL-MCNC: 0.44 MG/DL (ref 0.5–0.9)
CULTURE: NORMAL
CULTURE: NORMAL
DIFFERENTIAL TYPE: ABNORMAL
EOSINOPHILS RELATIVE PERCENT: 1 % (ref 1–4)
GFR AFRICAN AMERICAN: >60 ML/MIN
GFR NON-AFRICAN AMERICAN: >60 ML/MIN
GFR SERPL CREATININE-BSD FRML MDRD: ABNORMAL ML/MIN/{1.73_M2}
GFR SERPL CREATININE-BSD FRML MDRD: ABNORMAL ML/MIN/{1.73_M2}
GLUCOSE BLD-MCNC: 200 MG/DL (ref 65–105)
GLUCOSE BLD-MCNC: 202 MG/DL (ref 65–105)
GLUCOSE BLD-MCNC: 233 MG/DL (ref 65–105)
GLUCOSE BLD-MCNC: 236 MG/DL (ref 65–105)
GLUCOSE BLD-MCNC: 239 MG/DL (ref 65–105)
GLUCOSE BLD-MCNC: 253 MG/DL (ref 65–105)
GLUCOSE BLD-MCNC: 274 MG/DL (ref 70–99)
HCT VFR BLD CALC: 22.2 % (ref 36.3–47.1)
HCT VFR BLD CALC: 22.5 % (ref 36.3–47.1)
HCT VFR BLD CALC: 22.7 % (ref 36.3–47.1)
HCT VFR BLD CALC: 22.9 % (ref 36.3–47.1)
HEMOGLOBIN: 7.1 G/DL (ref 11.9–15.1)
HEMOGLOBIN: 7.2 G/DL (ref 11.9–15.1)
HEMOGLOBIN: 7.3 G/DL (ref 11.9–15.1)
HEMOGLOBIN: 7.3 G/DL (ref 11.9–15.1)
IMMATURE GRANULOCYTES: 8 %
LYMPHOCYTES # BLD: 10 % (ref 24–43)
Lab: NORMAL
Lab: NORMAL
MAGNESIUM: 2 MG/DL (ref 1.6–2.6)
MCH RBC QN AUTO: 29.2 PG (ref 25.2–33.5)
MCHC RBC AUTO-ENTMCNC: 31.9 G/DL (ref 28.4–34.8)
MCV RBC AUTO: 91.6 FL (ref 82.6–102.9)
MONOCYTES # BLD: 7 % (ref 3–12)
MORPHOLOGY: ABNORMAL
MORPHOLOGY: ABNORMAL
NRBC AUTOMATED: 0 PER 100 WBC
PDW BLD-RTO: 15.4 % (ref 11.8–14.4)
PHOSPHORUS: 3.6 MG/DL (ref 2.6–4.5)
PLATELET # BLD: 366 K/UL (ref 138–453)
PLATELET ESTIMATE: ABNORMAL
PMV BLD AUTO: 12 FL (ref 8.1–13.5)
POTASSIUM SERPL-SCNC: 3.5 MMOL/L (ref 3.7–5.3)
POTASSIUM SERPL-SCNC: 3.8 MMOL/L (ref 3.7–5.3)
RBC # BLD: 2.5 M/UL (ref 3.95–5.11)
RBC # BLD: ABNORMAL 10*6/UL
SEG NEUTROPHILS: 73 % (ref 36–65)
SEGMENTED NEUTROPHILS ABSOLUTE COUNT: 6.92 K/UL (ref 1.5–8.1)
SODIUM BLD-SCNC: 139 MMOL/L (ref 135–144)
SPECIMEN DESCRIPTION: NORMAL
SPECIMEN DESCRIPTION: NORMAL
WBC # BLD: 9.5 K/UL (ref 3.5–11.3)
WBC # BLD: ABNORMAL 10*3/UL

## 2021-12-07 PROCEDURE — 2500000003 HC RX 250 WO HCPCS: Performed by: COLON & RECTAL SURGERY

## 2021-12-07 PROCEDURE — 94761 N-INVAS EAR/PLS OXIMETRY MLT: CPT

## 2021-12-07 PROCEDURE — 6370000000 HC RX 637 (ALT 250 FOR IP): Performed by: COLON & RECTAL SURGERY

## 2021-12-07 PROCEDURE — 82947 ASSAY GLUCOSE BLOOD QUANT: CPT

## 2021-12-07 PROCEDURE — 6360000002 HC RX W HCPCS: Performed by: STUDENT IN AN ORGANIZED HEALTH CARE EDUCATION/TRAINING PROGRAM

## 2021-12-07 PROCEDURE — 84132 ASSAY OF SERUM POTASSIUM: CPT

## 2021-12-07 PROCEDURE — C9113 INJ PANTOPRAZOLE SODIUM, VIA: HCPCS | Performed by: INTERNAL MEDICINE

## 2021-12-07 PROCEDURE — 71045 X-RAY EXAM CHEST 1 VIEW: CPT

## 2021-12-07 PROCEDURE — 6360000002 HC RX W HCPCS: Performed by: INTERNAL MEDICINE

## 2021-12-07 PROCEDURE — 36415 COLL VENOUS BLD VENIPUNCTURE: CPT

## 2021-12-07 PROCEDURE — 2700000000 HC OXYGEN THERAPY PER DAY

## 2021-12-07 PROCEDURE — 2500000003 HC RX 250 WO HCPCS: Performed by: INTERNAL MEDICINE

## 2021-12-07 PROCEDURE — 2580000003 HC RX 258: Performed by: STUDENT IN AN ORGANIZED HEALTH CARE EDUCATION/TRAINING PROGRAM

## 2021-12-07 PROCEDURE — 83735 ASSAY OF MAGNESIUM: CPT

## 2021-12-07 PROCEDURE — 80048 BASIC METABOLIC PNL TOTAL CA: CPT

## 2021-12-07 PROCEDURE — 6370000000 HC RX 637 (ALT 250 FOR IP): Performed by: INTERNAL MEDICINE

## 2021-12-07 PROCEDURE — 2000000000 HC ICU R&B

## 2021-12-07 PROCEDURE — 2580000003 HC RX 258: Performed by: INTERNAL MEDICINE

## 2021-12-07 PROCEDURE — 97530 THERAPEUTIC ACTIVITIES: CPT

## 2021-12-07 PROCEDURE — 85025 COMPLETE CBC W/AUTO DIFF WBC: CPT

## 2021-12-07 PROCEDURE — 94003 VENT MGMT INPAT SUBQ DAY: CPT

## 2021-12-07 PROCEDURE — 85014 HEMATOCRIT: CPT

## 2021-12-07 PROCEDURE — 84100 ASSAY OF PHOSPHORUS: CPT

## 2021-12-07 PROCEDURE — 85018 HEMOGLOBIN: CPT

## 2021-12-07 PROCEDURE — 97110 THERAPEUTIC EXERCISES: CPT

## 2021-12-07 RX ORDER — HEPARIN SODIUM 5000 [USP'U]/ML
5000 INJECTION, SOLUTION INTRAVENOUS; SUBCUTANEOUS 2 TIMES DAILY
Status: DISCONTINUED | OUTPATIENT
Start: 2021-12-07 | End: 2021-12-22

## 2021-12-07 RX ORDER — QUETIAPINE FUMARATE 25 MG/1
25 TABLET, FILM COATED ORAL 2 TIMES DAILY
Status: DISCONTINUED | OUTPATIENT
Start: 2021-12-07 | End: 2021-12-24 | Stop reason: HOSPADM

## 2021-12-07 RX ADMIN — SODIUM CHLORIDE, PRESERVATIVE FREE 10 ML: 5 INJECTION INTRAVENOUS at 07:52

## 2021-12-07 RX ADMIN — POTASSIUM CHLORIDE: 2 INJECTION, SOLUTION, CONCENTRATE INTRAVENOUS at 17:52

## 2021-12-07 RX ADMIN — PANTOPRAZOLE SODIUM 40 MG: 40 INJECTION, POWDER, FOR SOLUTION INTRAVENOUS at 07:50

## 2021-12-07 RX ADMIN — INSULIN LISPRO 2 UNITS: 100 INJECTION, SOLUTION INTRAVENOUS; SUBCUTANEOUS at 12:28

## 2021-12-07 RX ADMIN — PROPOFOL 15 MCG/KG/MIN: 10 INJECTION, EMULSION INTRAVENOUS at 16:25

## 2021-12-07 RX ADMIN — POTASSIUM CHLORIDE 10 MEQ: 10 INJECTION, SOLUTION INTRAVENOUS at 12:40

## 2021-12-07 RX ADMIN — FENTANYL CITRATE 25 MCG: 50 INJECTION, SOLUTION INTRAMUSCULAR; INTRAVENOUS at 08:21

## 2021-12-07 RX ADMIN — QUETIAPINE FUMARATE 25 MG: 25 TABLET ORAL at 20:47

## 2021-12-07 RX ADMIN — SODIUM CHLORIDE 0.4 MCG/KG/HR: 9 INJECTION, SOLUTION INTRAVENOUS at 08:16

## 2021-12-07 RX ADMIN — POTASSIUM CHLORIDE 10 MEQ: 10 INJECTION, SOLUTION INTRAVENOUS at 08:28

## 2021-12-07 RX ADMIN — FENTANYL CITRATE 25 MCG: 50 INJECTION, SOLUTION INTRAMUSCULAR; INTRAVENOUS at 15:17

## 2021-12-07 RX ADMIN — PIPERACILLIN AND TAZOBACTAM 3375 MG: 3; .375 INJECTION, POWDER, LYOPHILIZED, FOR SOLUTION INTRAVENOUS at 01:03

## 2021-12-07 RX ADMIN — HEPARIN SODIUM 5000 UNITS: 5000 INJECTION INTRAVENOUS; SUBCUTANEOUS at 20:47

## 2021-12-07 RX ADMIN — PIPERACILLIN AND TAZOBACTAM 3375 MG: 3; .375 INJECTION, POWDER, LYOPHILIZED, FOR SOLUTION INTRAVENOUS at 07:43

## 2021-12-07 RX ADMIN — PROPOFOL 30 MCG/KG/MIN: 10 INJECTION, EMULSION INTRAVENOUS at 04:37

## 2021-12-07 RX ADMIN — INSULIN LISPRO 2 UNITS: 100 INJECTION, SOLUTION INTRAVENOUS; SUBCUTANEOUS at 17:47

## 2021-12-07 RX ADMIN — INSULIN LISPRO 2 UNITS: 100 INJECTION, SOLUTION INTRAVENOUS; SUBCUTANEOUS at 01:02

## 2021-12-07 RX ADMIN — POTASSIUM CHLORIDE 10 MEQ: 10 INJECTION, SOLUTION INTRAVENOUS at 15:13

## 2021-12-07 RX ADMIN — INSULIN LISPRO 2 UNITS: 100 INJECTION, SOLUTION INTRAVENOUS; SUBCUTANEOUS at 06:35

## 2021-12-07 RX ADMIN — SODIUM CHLORIDE 0.4 MCG/KG/HR: 9 INJECTION, SOLUTION INTRAVENOUS at 20:47

## 2021-12-07 RX ADMIN — FUROSEMIDE 40 MG: 10 INJECTION, SOLUTION INTRAMUSCULAR; INTRAVENOUS at 17:47

## 2021-12-07 RX ADMIN — FUROSEMIDE 40 MG: 10 INJECTION, SOLUTION INTRAMUSCULAR; INTRAVENOUS at 07:51

## 2021-12-07 RX ADMIN — I.V. FAT EMULSION 100 ML: 20 EMULSION INTRAVENOUS at 17:48

## 2021-12-07 RX ADMIN — SODIUM HYPOCHLORITE: 1.25 SOLUTION TOPICAL at 08:18

## 2021-12-07 RX ADMIN — PIPERACILLIN AND TAZOBACTAM 3375 MG: 3; .375 INJECTION, POWDER, LYOPHILIZED, FOR SOLUTION INTRAVENOUS at 15:21

## 2021-12-07 ASSESSMENT — PULMONARY FUNCTION TESTS
PIF_VALUE: 24
PIF_VALUE: 26
PIF_VALUE: 29
PIF_VALUE: 25
PIF_VALUE: 27
PIF_VALUE: 29
PIF_VALUE: 26
PIF_VALUE: 26
PIF_VALUE: 27
PIF_VALUE: 29
PIF_VALUE: 32
PIF_VALUE: 26
PIF_VALUE: 26
PIF_VALUE: 25
PIF_VALUE: 22
PIF_VALUE: 26

## 2021-12-07 ASSESSMENT — PAIN SCALES - GENERAL
PAINLEVEL_OUTOF10: 0
PAINLEVEL_OUTOF10: 4
PAINLEVEL_OUTOF10: 0
PAINLEVEL_OUTOF10: 2
PAINLEVEL_OUTOF10: 0
PAINLEVEL_OUTOF10: 4
PAINLEVEL_OUTOF10: 3
PAINLEVEL_OUTOF10: 0

## 2021-12-07 NOTE — PROGRESS NOTES
Carey [facility]   Urology Progress Note            Subjective:  Follow-up urinary retention indwelling Rose    Patient Vitals for the past 24 hrs:   BP Temp Temp src Pulse Resp SpO2 Height   12/07/21 0700 (!) 93/52 -- -- 63 29 100 % --   12/07/21 0600 94/78 -- -- 65 26 99 % --   12/07/21 0500 -- -- -- 72 24 99 % --   12/07/21 0400 (!) 96/49 98.6 °F (37 °C) Oral 71 24 99 % --   12/07/21 0346 -- -- -- 71 27 99 % --   12/07/21 0300 (!) 92/51 -- -- 68 24 99 % --   12/07/21 0200 (!) 100/49 -- -- 71 25 99 % --   12/07/21 0100 (!) 100/51 -- -- 71 23 98 % --   12/07/21 0031 -- -- -- 75 20 98 % --   12/07/21 0000 (!) 94/51 98.8 °F (37.1 °C) Oral 75 17 97 % --   12/06/21 2300 (!) 101/54 -- -- 72 28 99 % --   12/06/21 2200 (!) 100/52 -- -- 69 24 100 % --   12/06/21 2100 (!) 101/53 -- -- 70 29 99 % --   12/06/21 2000 (!) 101/53 98.6 °F (37 °C) Oral 70 22 99 % --   12/06/21 1936 -- -- -- 70 22 99 % --   12/06/21 1900 (!) 100/54 -- -- 70 24 99 % --   12/06/21 1800 (!) 99/57 -- -- 69 20 98 % --   12/06/21 1700 102/66 -- -- 79 20 98 % --   12/06/21 1630 (!) 97/52 -- -- 80 21 99 % --   12/06/21 1600 (!) 107/54 98.1 °F (36.7 °C) Temporal 80 20 99 % --   12/06/21 1530 (!) 108/57 -- -- 78 24 99 % --   12/06/21 1514 -- -- -- 89 27 97 % --   12/06/21 1500 (!) 91/49 -- -- 66 19 99 % --   12/06/21 1430 (!) 98/53 -- -- 71 20 100 % --   12/06/21 1400 (!) 85/55 -- -- 66 21 99 % --   12/06/21 1307 -- -- -- -- -- -- 5' 4.5\" (1.638 m)   12/06/21 1300 (!) 90/50 -- -- 68 25 97 % --   12/06/21 1200 (!) 99/50 97.9 °F (36.6 °C) Temporal 73 22 97 % --   12/06/21 1118 120/62 -- -- 94 19 96 % --   12/06/21 1100 (!) 90/50 -- -- 66 27 99 % --   12/06/21 1057 -- -- -- 64 (!) 34 99 % --   12/06/21 0800 (!) 109/59 97.5 °F (36.4 °C) Temporal 65 25 99 % --   12/06/21 0741 -- -- -- 65 22 99 % --       Intake/Output Summary (Last 24 hours) at 12/7/2021 0727  Last data filed at 12/7/2021 0600  Gross per 24 hour   Intake 2116.14 ml Output 2370 ml   Net -253.86 ml       Recent Labs     12/05/21  0515 12/05/21  1254 12/06/21  0512 12/06/21  1254 12/06/21  1834 12/07/21  0108 12/07/21  0626   WBC 8.0  --  9.7  --   --   --  9.5   HGB 7.1*   < > 7.7*   < > 8.0* 7.3* 7.3*   HCT 22.5*   < > 24.4*   < > 25.1* 22.7* 22.9*   MCV 93.8  --  92.4  --   --   --  91.6     --  311  --   --   --  366    < > = values in this interval not displayed. Recent Labs     12/05/21  0515 12/06/21  0512 12/07/21  0626    137 139   K 3.8 3.9 3.5*    101 103   CO2 24 25 27   PHOS 1.9* 3.1 3.6   BUN 16 26* 24*   CREATININE <0.40* 0.44* 0.44*       No results for input(s): COLORU, PHUR, LABCAST, WBCUA, RBCUA, MUCUS, TRICHOMONAS, YEAST, BACTERIA, CLARITYU, SPECGRAV, LEUKOCYTESUR, UROBILINOGEN, BILIRUBINUR, BLOODU in the last 72 hours. Invalid input(s): NITRATE, GLUCOSEUKETONESUAMORPHOUS    Additional Lab/culture results:    Physical Exam: Patient still intubated on the vent with urinary output: Stable and normal serum creatinine no urologic complications    Interval Imaging Findings:    Impression:    Patient Active Problem List   Diagnosis    Intestinal adhesions    Obesity (BMI 30-39. 9)    Urinary incontinence    Constipation    Ventral incisional hernia    Smoker    Stricture of sigmoid colon ([facility] [address]       Plan: Maintain indwelling Rose    Herb Thornton MD  7:27 AM 12/7/2021

## 2021-12-07 NOTE — FLOWSHEET NOTE
Pt intubated/sedated. Writer says a prayer at the door. Chaplains will remain available to offer spiritual and emotional support as needed.        12/07/21 1709   Encounter Summary   Services provided to: Patient not available   Referral/Consult From: Khari   Continue Visiting   (12/7/21 intubated/sedated)   Complexity of Encounter Low   Routine   Type Follow up   Assessment Unable to respond   Intervention Prayer     Electronically signed by Dia Judge, on 12/7/2021 at 5:10 PM.  Jarod  [phone]

## 2021-12-07 NOTE — PROGRESS NOTES
Reason for Consult:  Acute kidney injury. Requesting Physician:  Peña Drake MD    Interval history:   Creatinine continues to remain below 1  On TPN  Remains on vent support. On one pressor. Not following commands. HISTORY OF PRESENT ILLNESS:    The patient is a 80 y.o. female admitted for elective scheduled sigmoidectomy that was performed on 11/24. On previous labs her serum creatinines have been between 0.4 to 0.5 with eGFR of >60s ml/min. Her lowest recorded BP was 86/52 mmHg. On admission her creatinine was 0.5 that bill to 1.74 yesterday and today after iv hydration her creatinine is better at 0.8. She had to go back to the OR today for bowel leak and now has a colostomy in place. She is intubated so not able to get any history from the patient. Most of the history is taken from patient's chart. No recent use iv contrast. She was on NSAIDs at home. Review Of Systems:   Unable to obtain as she is intubated. Prior to Admission medications    Medication Sig Start Date End Date Taking? Authorizing Provider   spironolactone (ALDACTONE) 25 MG tablet Take 25 mg by mouth daily   Yes Historical Provider, MD   rosuvastatin (CRESTOR) 5 MG tablet Take 5 mg by mouth daily   Yes Historical Provider, MD   esomeprazole (NEXIUM) 40 MG delayed release capsule Take 40 mg by mouth daily    Yes Historical Provider, MD   famotidine (PEPCID) 40 MG tablet Take 40 mg by mouth daily as needed (heartburn)    Yes Historical Provider, MD   amLODIPine (NORVASC) 10 MG tablet Take 10 mg by mouth daily  3/17/15  Yes Historical Provider, MD   citalopram (CELEXA) 40 MG tablet Take 60 mg by mouth daily Takes 1.5 tabs daily 3/5/15  Yes Historical Provider, MD   diphenoxylate-atropine (LOMOTIL) 2.5-0.025 MG per tablet Take 1 tablet by mouth 4 times daily as needed.   12/22/14  Yes Historical Provider, MD   levothyroxine (SYNTHROID) 100 MCG tablet Take 100 mcg by mouth Daily  3/5/15  Yes Historical Provider, MD   celecoxib (CELEBREX) 200 MG capsule Take 200 mg by mouth daily     Historical Provider, MD       Scheduled Meds:   pantoprazole  40 mg IntraVENous Daily    furosemide  40 mg IntraVENous BID    sodium hypochlorite   Irrigation Daily    fat emulsion  100 mL IntraVENous Daily    insulin lispro  0-6 Units SubCUTAneous Q6H    piperacillin-tazobactam  3,375 mg IntraVENous Q8H    metroNIDAZOLE  500 mg IntraVENous Once    citalopram  60 mg Oral Daily    docusate sodium  100 mg Oral BID    influenza virus vaccine  0.5 mL IntraMUSCular Prior to discharge    cyclobenzaprine  5 mg Oral TID    levothyroxine  100 mcg Oral Daily    rosuvastatin  5 mg Oral Nightly    sodium chloride flush  5-40 mL IntraVENous 2 times per day    acetaminophen  1,000 mg Oral 3 times per day    gabapentin  300 mg Oral Q8H     Continuous Infusions:   PN-Adult 2 in 1 Central(5/20 Standard Electrolytes)      PN-Adult 2 in 1 Central(5/20 Standard Electrolytes) 0 mL/hr at 12/06/21 2358    propofol 10 mcg/kg/min (12/07/21 1137)    dexmedetomidine (PRECEDEX) IV infusion 0.4 mcg/kg/hr (12/07/21 0816)    sodium chloride      sodium chloride      phenylephrine (BHARATI-SYNEPHRINE) 50mg/250mL infusion 15 mcg/min (12/07/21 1230)    dextrose      midazolam (VERSED) 1 mg/mL in D5W infusion Stopped (12/05/21 0856)    fentaNYL Stopped (12/06/21 1510)    sodium chloride       PRN Meds:fentanNYL, sodium chloride, ipratropium-albuterol, sodium chloride, acetaminophen, glucose, dextrose, glucagon (rDNA), dextrose, potassium chloride **OR** potassium alternative oral replacement **OR** potassium chloride, oxyCODONE, phenol, sodium chloride flush, sodium chloride, ondansetron **OR** ondansetron    Physical Exam:  Vitals:    12/07/21 1116 12/07/21 1200 12/07/21 1230 12/07/21 1453   BP:  (!) 96/48 (!) 101/55    Pulse: 90 77 74    Resp: 18 21 18    Temp:  97.9 °F (36.6 °C)     TempSrc:  Temporal     SpO2: 97% 98% 99%    Weight:       Height:    5' 4.5\" (1.638 m) I/O last 3 completed shifts: In: 2116.1 [I.V.:693.8; IV Piggyback:100]  Out: 6273 [RZUTM:2595; Emesis/NG output:350; Drains:65; Stool:325]    General: not in distress. Appears to be stated age. HEENT: Atraumatic, normocephalic. Anicteric sclera. Pink and moist oral mucosa. No carotid bruit. No JVD. Chest: Bilateral air entry, clear to auscultation, no wheezing, rhonchi or rales. Cardiovascular: RRR, S1S2, no murmur, rub or gallop. Has lower extremity edema. Abdomen: Soft, non tender to palpation. Musculoskeletal: No cyanosis or clubbing. Integumentary: Pink, warm and dry. Free from rash or lesions. Skin turgor normal.  CNS: Face symmetrical. No tremor.      Data:  CBC:   Lab Results   Component Value Date    WBC 9.5 12/07/2021    HGB 7.2 (L) 12/07/2021    HCT 22.5 (L) 12/07/2021    MCV 91.6 12/07/2021     12/07/2021     BMP:    Lab Results   Component Value Date     12/07/2021     12/06/2021     12/05/2021    K 3.5 (L) 12/07/2021    K 3.9 12/06/2021    K 3.8 12/05/2021     12/07/2021     12/06/2021     12/05/2021    CO2 27 12/07/2021    CO2 25 12/06/2021    CO2 24 12/05/2021    BUN 24 (H) 12/07/2021    BUN 26 (H) 12/06/2021    BUN 16 12/05/2021    CREATININE 0.44 (L) 12/07/2021    CREATININE 0.44 (L) 12/06/2021    CREATININE <0.40 (L) 12/05/2021    GLUCOSE 274 (H) 12/07/2021    GLUCOSE 237 (H) 12/06/2021    GLUCOSE 214 (H) 12/05/2021     CMP:   Lab Results   Component Value Date     12/07/2021    K 3.5 12/07/2021     12/07/2021    CO2 27 12/07/2021    BUN 24 12/07/2021    CREATININE 0.44 12/07/2021    GLUCOSE 274 12/07/2021    CALCIUM 8.1 12/07/2021    PROT 4.4 12/05/2021    LABALBU 1.6 12/05/2021    BILITOT 0.44 12/05/2021    ALKPHOS 65 12/05/2021    AST 15 12/05/2021    ALT 6 12/05/2021      Hepatic:   Lab Results   Component Value Date    AST 15 12/05/2021    AST 8 12/03/2021    AST 16 12/01/2021    ALT 6 12/05/2021    ALT <5 (L) 12/03/2021 ALT 6 12/01/2021    BILITOT 0.44 12/05/2021    BILITOT 0.40 12/03/2021    BILITOT 0.55 12/01/2021    ALKPHOS 65 12/05/2021    ALKPHOS 68 12/03/2021    ALKPHOS 79 12/01/2021     BNP: No results found for: BNP  Lipids: No results found for: CHOL, HDL  INR: No results found for: INR  PTH: No results found for: PTH  Phosphorus:    Lab Results   Component Value Date    PHOS 3.6 12/07/2021     Ionized Calcium: No results found for: IONCA  Magnesium:   Lab Results   Component Value Date    MG 2.0 12/07/2021     Albumin:   Lab Results   Component Value Date    LABALBU 1.6 12/05/2021     Last 3 CK, CKMB, Troponin: @LABRCNT(CKTOTAL:3,CKMB:3,TROPONINI:3)       URINE:)No results found for: Shwetha Bloodgood     Radiology:   Reviewed. Assessment:  Acute kidney injury, FeNa was 0.24%, appears to be hemodynamically related. Creatinine has improved. Hyponatremia. Serum sodium improved to normal range. Hypokalemia. Anemia. S/p sigmoidectomy and now has colostomy. Plan:  Off NSAIDs, Amlodipine and Aldactone. Continue TPN. Dietary service managing. On Lasix 40 mg iv BID. Monitor BP. Avoid hypotension, nephrotoxic drugs, Lovenox, Fleets enema and IV contrast exposure. Follow up chemistries in the AM.    Thank you for the consultation. Please do not hesitate to contact us for any further questions/concerns. We will continue to follow along with you. Electronically signed by Maria Guadalupe Ramos MD  on 12/7/2021 at 3:17 PM   [facility].   Ph: [phone]

## 2021-12-07 NOTE — PROGRESS NOTES
Colorectal Surgery:  Daily Progress Note               PATIENT NAME: Francesca Ramirez   TODAY'S DATE: 12/7/2021, 8:38 AM    SUBJECTIVE:     Patient seen and evaluated. Remains intubated and sedated, and on 10 of phenylephrine in ICU. No acute events overnight. 1800 cc urine output over past 24 hours, 350 cc bilious NG output over past 24 hours, 155 cc colostomy output over past 24 hours. OBJECTIVE:   VITALS:  BP (!) 109/56   Pulse 73   Temp 98.6 °F (37 °C) (Oral)   Resp 21   Ht 5' 4.5\" (1.638 m)   Wt 187 lb (84.8 kg)   LMP  (LMP Unknown)   SpO2 100%   BMI 31.60 kg/m²      INTAKE/OUTPUT:      Intake/Output Summary (Last 24 hours) at 12/7/2021 5120  Last data filed at 12/7/2021 0600  Gross per 24 hour   Intake 2116.14 ml   Output 2370 ml   Net -253.86 ml       PHYSICAL EXAM:  General Appearance: Unresponsive, sedated, critically ill  HEENT:  Normocephalic, atraumatic, mucus membranes moist   Heart: Heart regular rate, requiring pressors for blood pressure support. Lungs: Equal chest rise bilaterally, mechanically ventilated. Abdomen: Soft, midline incision covered with dressings, napoleon-incisional skin clean and free of erythema. Ostomy appliance in place containing stool. Extremities: No cyanosis, pitting edema, rashes noted. Skin: Skin color, texture, turgor normal. No rashes or lesions.     Data:  CBC with Differential:    Lab Results   Component Value Date    WBC 9.5 12/07/2021    RBC 2.50 12/07/2021    HGB 7.3 12/07/2021    HCT 22.9 12/07/2021     12/07/2021    MCV 91.6 12/07/2021    MCH 29.2 12/07/2021    MCHC 31.9 12/07/2021    RDW 15.4 12/07/2021    LYMPHOPCT 10 12/07/2021    MONOPCT 7 12/07/2021    BASOPCT 1 12/07/2021    MONOSABS 0.67 12/07/2021    LYMPHSABS 0.95 12/07/2021    EOSABS 0.10 12/07/2021    BASOSABS 0.10 12/07/2021    DIFFTYPE NOT REPORTED 12/07/2021     BMP:    Lab Results   Component Value Date     12/07/2021    K 3.5 12/07/2021     12/07/2021    CO2 27

## 2021-12-07 NOTE — PROGRESS NOTES
Pulmonary Critical Care Progress Note       Patient seen for the follow up of hypoxic respiratory failure, metabolic acidosis    Subjective:  She had increased RR on CPAP trials again today. She is moving around thrashing in bed but did not follow commands on decreasing sedation. . She is on Precedex and low-dose Versed drip for sedation. She has fair urine output. She is on TPN. Examination:  Vitals: /87   Pulse 72   Temp 97.9 °F (36.6 °C) (Temporal)   Resp 22   Ht 5' 4.5\" (1.638 m)   Wt 187 lb (84.8 kg)   LMP  (LMP Unknown)   SpO2 99%   BMI 31.60 kg/m²   General appearance: Sedated, intubated on ventilator  Neck: No JVD  Lungs: Decreased breath sounds no crackles or wheezing  Heart: regular rate and rhythm, S1, S2 normal, no gallop  Abdomen: Soft, non tender, + BS positive surgical dressing positive colostomy  Extremities: no cyanosis or clubbing. Positive edema    LABs:  CBC:   Recent Labs     12/06/21  0512 12/06/21  1254 12/07/21  0626 12/07/21  1249   WBC 9.7  --  9.5  --    HGB 7.7*   < > 7.3* 7.2*   HCT 24.4*   < > 22.9* 22.5*     --  366  --     < > = values in this interval not displayed.      BMP:   Recent Labs     12/06/21  0512 12/07/21  0626    139   K 3.9 3.5*   CO2 25 27   BUN 26* 24*   CREATININE 0.44* 0.44*   LABGLOM >60 >60   GLUCOSE 237* 274*     LIVER PROFILE:  Recent Labs     12/05/21  0515   AST 15   ALT 6   LABALBU 1.6*     ABG:  Lab Results   Component Value Date    IYK6LVG NOT REPORTED 12/04/2021    FIO2 60.0 12/04/2021       Lab Results   Component Value Date    POCPH 7.382 12/04/2021    POCPCO2 48.0 12/04/2021    POCPO2 121.4 12/04/2021    POCHCO3 28.5 12/04/2021    PBEA 3 12/04/2021    CFF7LAJ NOT REPORTED 12/04/2021    CEWA2YJV 99 12/04/2021    FIO2 60.0 12/04/2021     Radiology:  Chest x-ray 12/7  Interval improved aeration of the right lung base, otherwise no significant   change             Impression:  · Acute respiratory insufficiency requiring ventilator support  · Feculent peritonitis s/p exploratory laparotomy with end colostomy/pelvic drain placement  · COPD  · History of GERD/dyslipidemia/hypothyroidism/hypertension  · Metabolic acidosis  · Bilateral pleural infiltrate/pulmonary edema    Recommendations:  · Vent support, FiO2 50%  · CPAP trial  as tolerated/sedation vacation  · Precedex/IV Versed for sedation if needed, RASS goal -1 to -2  · Seroquel 25 twice daily/monitor QTC  · IV fentanyl 25mics IV every hour as needed   · DuoNeb by nebulizer  · Wean off  Cesar-Synephrine  ·  IV Lasix 40 BID   · TPN/ monitor liver function/ off TF  · Monitor hemoglobin repeat transfuse for hemoglobin less than 7  ·  Zosyn/ off Flagyl  · X-ray chest in am  · Discussed with RN and RT   · Peptic ulcer disease prophylaxis  · DVT prophylaxis o resume heparin 5000 every 8 monitor hemoglobin      Haily Salvador MD, [facility]  Pulmonary Critical Care and Sleep Medicine,  [facility]: [phone]

## 2021-12-07 NOTE — PROGRESS NOTES
[facility]   [facility]            Subjective:  Follow-up urinary retention    Patient Vitals for the past 24 hrs:   BP Temp Temp src Pulse Resp SpO2 Height   12/07/21 0700 (!) 93/52 -- -- 63 29 100 % --   12/07/21 0600 94/78 -- -- 65 26 99 % --   12/07/21 0500 -- -- -- 72 24 99 % --   12/07/21 0400 (!) 96/49 98.6 °F (37 °C) Oral 71 24 99 % --   12/07/21 0346 -- -- -- 71 27 99 % --   12/07/21 0300 (!) 92/51 -- -- 68 24 99 % --   12/07/21 0200 (!) 100/49 -- -- 71 25 99 % --   12/07/21 0100 (!) 100/51 -- -- 71 23 98 % --   12/07/21 0031 -- -- -- 75 20 98 % --   12/07/21 0000 (!) 94/51 98.8 °F (37.1 °C) Oral 75 17 97 % --   12/06/21 2300 (!) 101/54 -- -- 72 28 99 % --   12/06/21 2200 (!) 100/52 -- -- 69 24 100 % --   12/06/21 2100 (!) 101/53 -- -- 70 29 99 % --   12/06/21 2000 (!) 101/53 98.6 °F (37 °C) Oral 70 22 99 % --   12/06/21 1936 -- -- -- 70 22 99 % --   12/06/21 1900 (!) 100/54 -- -- 70 24 99 % --   12/06/21 1800 (!) 99/57 -- -- 69 20 98 % --   12/06/21 1700 102/66 -- -- 79 20 98 % --   12/06/21 1630 (!) 97/52 -- -- 80 21 99 % --   12/06/21 1600 (!) 107/54 98.1 °F (36.7 °C) Temporal 80 20 99 % --   12/06/21 1530 (!) 108/57 -- -- 78 24 99 % --   12/06/21 1514 -- -- -- 89 27 97 % --   12/06/21 1500 (!) 91/49 -- -- 66 19 99 % --   12/06/21 1430 (!) 98/53 -- -- 71 20 100 % --   12/06/21 1400 (!) 85/55 -- -- 66 21 99 % --   12/06/21 1307 -- -- -- -- -- -- 5' 4.5\" (1.638 m)   12/06/21 1300 (!) 90/50 -- -- 68 25 97 % --   12/06/21 1200 (!) 99/50 97.9 °F (36.6 °C) Temporal 73 22 97 % --   12/06/21 1118 120/62 -- -- 94 19 96 % --   12/06/21 1100 (!) 90/50 -- -- 66 27 99 % --   12/06/21 1057 -- -- -- 64 (!) 34 99 % --   12/06/21 0800 (!) 109/59 97.5 °F (36.4 °C) Temporal 65 25 99 % --   12/06/21 0741 -- -- -- 65 22 99 % --       Intake/Output Summary (Last 24 hours) at 12/7/2021 0728  Last data filed at 12/7/2021 0600  Gross per 24 hour   Intake 2116.14 ml   Output 2370 ml Net -253.86 ml       Recent Labs     12/05/21  0515 12/05/21  1254 12/06/21  0512 12/06/21  1254 12/06/21  1834 12/07/21  0108 12/07/21  0626   WBC 8.0  --  9.7  --   --   --  9.5   HGB 7.1*   < > 7.7*   < > 8.0* 7.3* 7.3*   HCT 22.5*   < > 24.4*   < > 25.1* 22.7* 22.9*   MCV 93.8  --  92.4  --   --   --  91.6     --  311  --   --   --  366    < > = values in this interval not displayed. Recent Labs     12/05/21  0515 12/06/21  0512 12/07/21  0626    137 139   K 3.8 3.9 3.5*    101 103   CO2 24 25 27   PHOS 1.9* 3.1 3.6   BUN 16 26* 24*   CREATININE <0.40* 0.44* 0.44*       No results for input(s): COLORU, PHUR, LABCAST, WBCUA, RBCUA, MUCUS, TRICHOMONAS, YEAST, BACTERIA, CLARITYU, SPECGRAV, LEUKOCYTESUR, UROBILINOGEN, BILIRUBINUR, BLOODU in the last 72 hours. Invalid input(s): NITRATE, GLUCOSEUKETONESUAMORPHOUS    Additional Lab/culture results:    Physical Exam: Patient intubated on the vent, Rose catheter in place urine clear good urinary output renal function stable    Interval Imaging Findings:    Impression:    Patient Active Problem List   Diagnosis    Intestinal adhesions    Obesity (BMI 30-39. 9)    Urinary incontinence    Constipation    Ventral incisional hernia    Smoker    Stricture of sigmoid colon ([facility] [address]       Plan: Maintain indwelling Rose    John Fernandez MD  7:28 AM 12/7/2021

## 2021-12-07 NOTE — PLAN OF CARE
Problem: HEMODYNAMIC STATUS  Goal: Patient has stable vital signs and fluid balance  12/7/2021 0246 by Saroj Laird RN  Outcome: Ongoing  12/6/2021 1539 by Jaleel Arboleda RN  Outcome: Ongoing     Problem: OXYGENATION/RESPIRATORY FUNCTION  Goal: Patient will achieve/maintain normal respiratory rate/effort  12/7/2021 0246 by Saroj Laird RN  Outcome: Ongoing  12/6/2021 1539 by Jaleel Arboleda RN  Outcome: Ongoing     Problem: MOBILITY  Goal: Early mobilization is achieved  12/7/2021 0246 by Saroj Laird RN  Outcome: Ongoing  12/6/2021 1539 by Jaleel Arboleda RN  Outcome: Ongoing     Problem: ELIMINATION  Goal: Elimination patterns are normal or improving  Description: Elimination patterns return to pre-surgery normal patterns  12/7/2021 0246 by Saroj Laird RN  Outcome: Ongoing  12/6/2021 1539 by Jaleel Arboleda RN  Outcome: Ongoing     Problem: SKIN INTEGRITY  Goal: Skin integrity is maintained or improved  12/7/2021 0246 by Saroj Laird RN  Outcome: Ongoing  12/6/2021 1539 by Jaleel Arboleda RN  Outcome: Ongoing     Problem: Pain:  Goal: Pain level will decrease  Description: Pain level will decrease  Outcome: Ongoing  Goal: Control of acute pain  Description: Control of acute pain  12/7/2021 0246 by Saroj Laird RN  Outcome: Ongoing  12/6/2021 1539 by Jaleel Arboleda RN  Outcome: Ongoing  Goal: Control of chronic pain  Description: Control of chronic pain  Outcome: Ongoing     Problem: Falls - Risk of:  Goal: Will remain free from falls  Description: Will remain free from falls  12/7/2021 0246 by Saroj Laird RN  Outcome: Ongoing  12/6/2021 1539 by Jaleel Arboleda RN  Outcome: Ongoing  Goal: Absence of physical injury  Description: Absence of physical injury  Outcome: Ongoing     Problem: Skin Integrity:  Goal: Will show no infection signs and symptoms  Description: Will show no infection signs and symptoms  12/7/2021 0246 by Saroj Laird RN  Outcome: Ongoing  12/6/2021 1539 by Jaleel Arboleda RN  Outcome: Ongoing  Goal: Absence of new skin breakdown  Description: Absence of new skin breakdown  Outcome: Ongoing     Problem: Nutrition  Goal: Optimal nutrition therapy  12/7/2021 0246 by Dillon Tiwari RN  Outcome: Ongoing  12/6/2021 1539 by Frank Rawls RN  Outcome: Ongoing     Problem: Non-Violent Restraints  Goal: Removal from restraints as soon as assessed to be safe  12/7/2021 0246 by Dillon Tiwari RN  Outcome: Ongoing  12/6/2021 1541 by Frank Rawls RN  Outcome: Ongoing  Goal: No harm/injury to patient while restraints in use  Outcome: Ongoing  Goal: Patient's dignity will be maintained  Outcome: Ongoing

## 2021-12-07 NOTE — PROGRESS NOTES
Nutrition Assessment     Type and Reason for Visit: Reassess    Nutrition Recommendations/Plan:    1. Continue NPO status  2. Continue central standard 5/20 TPN: 65 mL/hr (1560 mL total volume) with 100 mL lipids. Added 25 units insulin. MVI and trace elements removed. Propofol at 5.1 mL/hr. 3. Monitor TPN rate/ tolerance, vent status, propofol rate, weights and labs    Nutrition Assessment:  Patient remains intubated and sedated. NGT to LIWS - 350 cc bilious NG output. CPAP trial. Patient started on propofol 5.1 mL/hr. Increased vent settings- pt seems more fluid edematous today (BUE +4)? Due to this- kept TPN at 65 mL/hr (1560 mL total volume) with 100 mL lipids. TPN providing 95% total kcal. Removed MVI and trace elements. Increased insulin to 25 units (). Monitor TPN rate/ tolerance, vent status, weights and labs. Malnutrition Assessment:  Malnutrition Status: At risk for malnutrition (Comment)    Estimated Daily Nutrient Needs:  Energy (kcal): 1802 kcal ([facility] 2010); Weight Used for Energy Requirements:  Current     Protein (g): 84-95 gm of protein (1.5-1.7 gm/kg); Weight Used for Protein Requirements:  Ideal        Weight Used for Fluid Requirements:  1 ml/kcal      Nutrition Related Findings: Colostomy. Hypoactive bowel sounds. Edema: BUE +2 non-pitting; BLE +1 non-pitting. 1uPRBC transfused overnight. NGT to LI.       Current Nutrition Therapies:    Diet NPO Exceptions are: Sips of Water with Meds, Ice Chips  PN-Adult 2-in-1 [facility] (Standard)  PN-Adult 2-in-1 [facility] (Standard)    Anthropometric Measures:  · Height: 5' 4.5\" (163.8 cm)  · Current Body Wt: 187 lb (84.8 kg)   · BMI: 31.6    Nutrition Diagnosis:   · Inadequate protein-energy intake related to inadequate protein-energy intake as evidenced by NPO or clear liquid status due to medical condition, poor intake prior to admission, intubation, nutrition support - parenteral nutrition    · Altered GI function related to altered GI structure as evidenced by GI abnormality, nausea (status post small bowel surgery)      Nutrition Interventions:   Food and/or Nutrient Delivery:  Continue NPO, Modify Parenteral Nutrition  Nutrition Education/Counseling:  Education not indicated   Coordination of Nutrition Care:  Continue to monitor while inpatient    Goals:  PN will meet greater than 75% of estimated nutrition needs       Nutrition Monitoring and Evaluation:   Behavioral-Environmental Outcomes:  None Identified   Food/Nutrient Intake Outcomes:  Parenteral Nutrition Intake/Tolerance  Physical Signs/Symptoms Outcomes:  Biochemical Data, Fluid Status or Edema, Skin, Weight, GI Status, Constipation     Discharge Planning:     Too soon to determine     Jeannette Gonzalez, [address], [address] [place] [address]  Office Number: [phone]

## 2021-12-07 NOTE — PROGRESS NOTES
Physical Therapy  Facility/Department: [facility]  Daily Treatment Note  NAME: Julia Nguyen  : 1950  MRN: [id number]    Date of Service: 2021    Discharge Recommendations:  Patient would benefit from continued therapy after discharge   Pt currently functioning below baseline. Would suggest additional therapy at time of discharge to maximize long term outcomes and prevent re-admission. Please refer to AM-PAC score for current level of function. PT Equipment Recommendations  Equipment Needed: No    Assessment   Assessment: Patient currently intubated and sedated, able to perfrom PROM with good tolerance. Prognosis: Good  Decision Making: Medium Complexity  Patient Education: Sensory stimualtion  REQUIRES PT FOLLOW UP: Yes     Patient Diagnosis(es): The encounter diagnosis was Chest pain, unspecified type. has a past medical history of Abdominal pain, Arthritis, Constipation, COPD (chronic obstructive pulmonary disease) ([facility] [address] Depressed, GERD (gastroesophageal reflux disease), HLD (hyperlipidemia), HTN (hypertension), Hypothyroid, IBS (irritable bowel syndrome), Lumbar spondylolysis, Myofascial pain, Poor appetite, RLS (restless legs syndrome), and Wears glasses. has a past surgical history that includes Bladder surgery; Rectal surgery; Cystocele repair; Enterocele repair; Abdominal adhesion surgery (3/9/2015); Hysterectomy; Appendectomy; Colonoscopy; Endoscopy, colon, diagnostic; eye surgery; Dilatation, esophagus; hernia repair; Tonsillectomy; Abdomen surgery (2021); Cystocopy (2021); colectomy (N/A, 2021); Cystoscopy (2021); sigmoidoscopy (2021); and laparotomy (N/A, 2021).     Restrictions  Restrictions/Precautions  Restrictions/Precautions: General Precautions, Fall Risk  Required Braces or Orthoses?: No  Required Braces or Orthoses  Other: Abdominal Binder  Position Activity Restriction  Other position/activity restrictions: Intubated and sedated  Subjective   General  Chart Reviewed: Yes  Response To Previous Treatment: Patient with no complaints from previous session. Family / Caregiver Present: No     Objective         Ambulation  Ambulation?: No  More Ambulation?: No        Exercises  Comments: Supine PROM x 10 reps x 4 extermities with limited range      AM-PAC Score  AM-PAC Inpatient Mobility Raw Score : 11 (11/26/21 1604)  AM-PAC Inpatient T-Scale Score : 33.86 (11/26/21 1604)  Mobility Inpatient CMS 0-100% Score: 72.57 (11/26/21 1604)  Mobility Inpatient CMS G-Code Modifier : CL (11/26/21 1604)          Goals  Short term goals  Time Frame for Short term goals: 14 visits  Short term goal 1: Pt to demonstrate bed mobility using log-roll technique Claus  Short term goal 2: Pt to perform STS transfers w/ least restrictive AD Claus  Short term goal 3: Pt to ambulate at least 300ft w/ least restrictive AD Claus  Short term goal 4: Pt to ascend/descend 2 steps w/ handrails Loulou  Short term goal 5: Pt to actively participate in at least 30 minutes of physical therapy for ther act, ther ex, balance, gait, and endurance training  Patient Goals   Patient goals :  To go home    Plan    Plan  Times per week: 1-2x/day, 6-7x/week  Current Treatment Recommendations: Strengthening, Balance Training, Functional Mobility Training, Stair training, Gait Training, Transfer Training, Endurance Training, Neuromuscular Re-education, Safety Education & Training, Home Exercise Program, Equipment Evaluation, Education, & procurement, Patient/Caregiver Education & Training  Safety Devices  Type of devices: Call light within reach, Chair alarm in place, Gait belt, Nurse notified, Left in chair, All fall risk precautions in place  Restraints  Initially in place: No     Therapy Time   Individual Concurrent Group Co-treatment   Time In 1210         Time Out 1235         Minutes [address]

## 2021-12-08 ENCOUNTER — ANESTHESIA EVENT (OUTPATIENT)
Dept: OPERATING ROOM | Age: 71
DRG: 329 | End: 2021-12-08
Payer: MEDICARE

## 2021-12-08 ENCOUNTER — APPOINTMENT (OUTPATIENT)
Dept: GENERAL RADIOLOGY | Age: 71
DRG: 329 | End: 2021-12-08
Attending: COLON & RECTAL SURGERY
Payer: MEDICARE

## 2021-12-08 ENCOUNTER — APPOINTMENT (OUTPATIENT)
Dept: CT IMAGING | Age: 71
DRG: 329 | End: 2021-12-08
Attending: COLON & RECTAL SURGERY
Payer: MEDICARE

## 2021-12-08 ENCOUNTER — ANESTHESIA (OUTPATIENT)
Dept: OPERATING ROOM | Age: 71
DRG: 329 | End: 2021-12-08
Payer: MEDICARE

## 2021-12-08 LAB
ABSOLUTE EOS #: 0.08 K/UL (ref 0–0.4)
ABSOLUTE IMMATURE GRANULOCYTE: 0.47 K/UL (ref 0–0.3)
ABSOLUTE LYMPH #: 0.94 K/UL (ref 1–4.8)
ABSOLUTE MONO #: 0.78 K/UL (ref 0.2–0.8)
ALLEN TEST: ABNORMAL
ANION GAP SERPL CALCULATED.3IONS-SCNC: 10 MMOL/L (ref 9–17)
BASOPHILS # BLD: 2 %
BASOPHILS ABSOLUTE: 0.16 K/UL (ref 0–0.2)
BUN BLDV-MCNC: 18 MG/DL (ref 8–23)
BUN/CREAT BLD: ABNORMAL (ref 9–20)
CALCIUM SERPL-MCNC: 8.2 MG/DL (ref 8.6–10.4)
CHLORIDE BLD-SCNC: 100 MMOL/L (ref 98–107)
CO2: 28 MMOL/L (ref 20–31)
CREAT SERPL-MCNC: <0.4 MG/DL (ref 0.5–0.9)
DIFFERENTIAL TYPE: ABNORMAL
EOSINOPHILS RELATIVE PERCENT: 1 % (ref 1–4)
FIO2: 50
GFR AFRICAN AMERICAN: ABNORMAL ML/MIN
GFR NON-AFRICAN AMERICAN: ABNORMAL ML/MIN
GFR SERPL CREATININE-BSD FRML MDRD: ABNORMAL ML/MIN/{1.73_M2}
GFR SERPL CREATININE-BSD FRML MDRD: ABNORMAL ML/MIN/{1.73_M2}
GLUCOSE BLD-MCNC: 151 MG/DL (ref 65–105)
GLUCOSE BLD-MCNC: 183 MG/DL (ref 65–105)
GLUCOSE BLD-MCNC: 195 MG/DL (ref 65–105)
GLUCOSE BLD-MCNC: 206 MG/DL (ref 65–105)
GLUCOSE BLD-MCNC: 213 MG/DL (ref 70–99)
HCT VFR BLD CALC: 21.7 % (ref 36.3–47.1)
HCT VFR BLD CALC: 24.7 % (ref 36.3–47.1)
HCT VFR BLD CALC: 25.5 % (ref 36.3–47.1)
HEMOGLOBIN: 7 G/DL (ref 11.9–15.1)
HEMOGLOBIN: 8 G/DL (ref 11.9–15.1)
HEMOGLOBIN: 8.2 G/DL (ref 11.9–15.1)
IMMATURE GRANULOCYTES: 6 %
LYMPHOCYTES # BLD: 12 % (ref 24–44)
MAGNESIUM: 1.9 MG/DL (ref 1.6–2.6)
MCH RBC QN AUTO: 29.5 PG (ref 25.2–33.5)
MCHC RBC AUTO-ENTMCNC: 32.4 G/DL (ref 28.4–34.8)
MCV RBC AUTO: 91.1 FL (ref 82.6–102.9)
MODE: ABNORMAL
MONOCYTES # BLD: 10 % (ref 1–7)
MORPHOLOGY: ABNORMAL
NEGATIVE BASE EXCESS, ART: ABNORMAL (ref 0–2)
NRBC AUTOMATED: 0.3 PER 100 WBC
O2 DEVICE/FLOW/%: ABNORMAL
PATIENT TEMP: ABNORMAL
PDW BLD-RTO: 15.4 % (ref 11.8–14.4)
PHOSPHORUS: 3.1 MG/DL (ref 2.6–4.5)
PLATELET # BLD: 394 K/UL (ref 138–453)
PLATELET ESTIMATE: ABNORMAL
PMV BLD AUTO: 12.5 FL (ref 8.1–13.5)
POC HCO3: 33.4 MMOL/L (ref 21–28)
POC O2 SATURATION: 99 % (ref 94–98)
POC PCO2 TEMP: ABNORMAL MM HG
POC PCO2: 43 MM HG (ref 35–48)
POC PH TEMP: ABNORMAL
POC PH: 7.5 (ref 7.35–7.45)
POC PO2 TEMP: ABNORMAL MM HG
POC PO2: 117.9 MM HG (ref 83–108)
POSITIVE BASE EXCESS, ART: 9 (ref 0–3)
POTASSIUM SERPL-SCNC: 3.4 MMOL/L (ref 3.7–5.3)
RBC # BLD: 2.71 M/UL (ref 3.95–5.11)
RBC # BLD: ABNORMAL 10*6/UL
SAMPLE SITE: ABNORMAL
SEG NEUTROPHILS: 69 % (ref 36–66)
SEGMENTED NEUTROPHILS ABSOLUTE COUNT: 5.37 K/UL (ref 1.8–7.7)
SODIUM BLD-SCNC: 138 MMOL/L (ref 135–144)
TCO2 (CALC), ART: ABNORMAL MMOL/L (ref 22–29)
WBC # BLD: 7.8 K/UL (ref 3.5–11.3)
WBC # BLD: ABNORMAL 10*3/UL

## 2021-12-08 PROCEDURE — 71045 X-RAY EXAM CHEST 1 VIEW: CPT

## 2021-12-08 PROCEDURE — 2580000003 HC RX 258: Performed by: STUDENT IN AN ORGANIZED HEALTH CARE EDUCATION/TRAINING PROGRAM

## 2021-12-08 PROCEDURE — P9041 ALBUMIN (HUMAN),5%, 50ML: HCPCS | Performed by: ANESTHESIOLOGY

## 2021-12-08 PROCEDURE — C9113 INJ PANTOPRAZOLE SODIUM, VIA: HCPCS | Performed by: INTERNAL MEDICINE

## 2021-12-08 PROCEDURE — 2580000003 HC RX 258: Performed by: INTERNAL MEDICINE

## 2021-12-08 PROCEDURE — 6360000002 HC RX W HCPCS: Performed by: STUDENT IN AN ORGANIZED HEALTH CARE EDUCATION/TRAINING PROGRAM

## 2021-12-08 PROCEDURE — 02HV33Z INSERTION OF INFUSION DEVICE INTO SUPERIOR VENA CAVA, PERCUTANEOUS APPROACH: ICD-10-PCS | Performed by: COLON & RECTAL SURGERY

## 2021-12-08 PROCEDURE — 0DQB0ZZ REPAIR ILEUM, OPEN APPROACH: ICD-10-PCS | Performed by: COLON & RECTAL SURGERY

## 2021-12-08 PROCEDURE — 85018 HEMOGLOBIN: CPT

## 2021-12-08 PROCEDURE — C1781 MESH (IMPLANTABLE): HCPCS | Performed by: COLON & RECTAL SURGERY

## 2021-12-08 PROCEDURE — 6370000000 HC RX 637 (ALT 250 FOR IP): Performed by: COLON & RECTAL SURGERY

## 2021-12-08 PROCEDURE — 86900 BLOOD TYPING SEROLOGIC ABO: CPT

## 2021-12-08 PROCEDURE — 97110 THERAPEUTIC EXERCISES: CPT

## 2021-12-08 PROCEDURE — 97530 THERAPEUTIC ACTIVITIES: CPT

## 2021-12-08 PROCEDURE — 2700000000 HC OXYGEN THERAPY PER DAY

## 2021-12-08 PROCEDURE — 0DQL0ZZ REPAIR TRANSVERSE COLON, OPEN APPROACH: ICD-10-PCS | Performed by: COLON & RECTAL SURGERY

## 2021-12-08 PROCEDURE — 2500000003 HC RX 250 WO HCPCS: Performed by: INTERNAL MEDICINE

## 2021-12-08 PROCEDURE — 6360000002 HC RX W HCPCS: Performed by: ANESTHESIOLOGY

## 2021-12-08 PROCEDURE — 84100 ASSAY OF PHOSPHORUS: CPT

## 2021-12-08 PROCEDURE — 0D1B0Z4 BYPASS ILEUM TO CUTANEOUS, OPEN APPROACH: ICD-10-PCS | Performed by: COLON & RECTAL SURGERY

## 2021-12-08 PROCEDURE — 83735 ASSAY OF MAGNESIUM: CPT

## 2021-12-08 PROCEDURE — 36430 TRANSFUSION BLD/BLD COMPNT: CPT

## 2021-12-08 PROCEDURE — 74177 CT ABD & PELVIS W/CONTRAST: CPT

## 2021-12-08 PROCEDURE — 2500000003 HC RX 250 WO HCPCS: Performed by: COLON & RECTAL SURGERY

## 2021-12-08 PROCEDURE — 6360000002 HC RX W HCPCS: Performed by: INTERNAL MEDICINE

## 2021-12-08 PROCEDURE — 82803 BLOOD GASES ANY COMBINATION: CPT

## 2021-12-08 PROCEDURE — 36600 WITHDRAWAL OF ARTERIAL BLOOD: CPT

## 2021-12-08 PROCEDURE — 94761 N-INVAS EAR/PLS OXIMETRY MLT: CPT

## 2021-12-08 PROCEDURE — 94003 VENT MGMT INPAT SUBQ DAY: CPT

## 2021-12-08 PROCEDURE — 3700000000 HC ANESTHESIA ATTENDED CARE: Performed by: COLON & RECTAL SURGERY

## 2021-12-08 PROCEDURE — 2580000003 HC RX 258: Performed by: ANESTHESIOLOGY

## 2021-12-08 PROCEDURE — 82947 ASSAY GLUCOSE BLOOD QUANT: CPT

## 2021-12-08 PROCEDURE — 2720000010 HC SURG SUPPLY STERILE: Performed by: COLON & RECTAL SURGERY

## 2021-12-08 PROCEDURE — 3600000002 HC SURGERY LEVEL 2 BASE: Performed by: COLON & RECTAL SURGERY

## 2021-12-08 PROCEDURE — 85025 COMPLETE CBC W/AUTO DIFF WBC: CPT

## 2021-12-08 PROCEDURE — 80048 BASIC METABOLIC PNL TOTAL CA: CPT

## 2021-12-08 PROCEDURE — 88307 TISSUE EXAM BY PATHOLOGIST: CPT

## 2021-12-08 PROCEDURE — 85014 HEMATOCRIT: CPT

## 2021-12-08 PROCEDURE — 2000000000 HC ICU R&B

## 2021-12-08 PROCEDURE — P9016 RBC LEUKOCYTES REDUCED: HCPCS

## 2021-12-08 PROCEDURE — 36415 COLL VENOUS BLD VENIPUNCTURE: CPT

## 2021-12-08 PROCEDURE — 2709999900 HC NON-CHARGEABLE SUPPLY: Performed by: COLON & RECTAL SURGERY

## 2021-12-08 PROCEDURE — 3700000001 HC ADD 15 MINUTES (ANESTHESIA): Performed by: COLON & RECTAL SURGERY

## 2021-12-08 PROCEDURE — 3600000012 HC SURGERY LEVEL 2 ADDTL 15MIN: Performed by: COLON & RECTAL SURGERY

## 2021-12-08 PROCEDURE — 2500000003 HC RX 250 WO HCPCS: Performed by: ANESTHESIOLOGY

## 2021-12-08 PROCEDURE — 6360000004 HC RX CONTRAST MEDICATION: Performed by: STUDENT IN AN ORGANIZED HEALTH CARE EDUCATION/TRAINING PROGRAM

## 2021-12-08 DEVICE — IMPLANTABLE DEVICE: Type: IMPLANTABLE DEVICE | Site: ABDOMEN | Status: FUNCTIONAL

## 2021-12-08 RX ORDER — ROCURONIUM BROMIDE 10 MG/ML
INJECTION, SOLUTION INTRAVENOUS PRN
Status: DISCONTINUED | OUTPATIENT
Start: 2021-12-08 | End: 2021-12-09 | Stop reason: SDUPTHER

## 2021-12-08 RX ORDER — SODIUM CHLORIDE, SODIUM LACTATE, POTASSIUM CHLORIDE, CALCIUM CHLORIDE 600; 310; 30; 20 MG/100ML; MG/100ML; MG/100ML; MG/100ML
INJECTION, SOLUTION INTRAVENOUS CONTINUOUS PRN
Status: DISCONTINUED | OUTPATIENT
Start: 2021-12-08 | End: 2021-12-09 | Stop reason: SDUPTHER

## 2021-12-08 RX ORDER — CEFAZOLIN SODIUM 1 G/3ML
INJECTION, POWDER, FOR SOLUTION INTRAMUSCULAR; INTRAVENOUS PRN
Status: DISCONTINUED | OUTPATIENT
Start: 2021-12-08 | End: 2021-12-09 | Stop reason: SDUPTHER

## 2021-12-08 RX ORDER — ALBUMIN, HUMAN INJ 5% 5 %
SOLUTION INTRAVENOUS PRN
Status: DISCONTINUED | OUTPATIENT
Start: 2021-12-08 | End: 2021-12-09 | Stop reason: SDUPTHER

## 2021-12-08 RX ORDER — SODIUM CHLORIDE 9 MG/ML
INJECTION, SOLUTION INTRAVENOUS PRN
Status: DISCONTINUED | OUTPATIENT
Start: 2021-12-08 | End: 2021-12-24 | Stop reason: HOSPADM

## 2021-12-08 RX ORDER — SODIUM CHLORIDE 0.9 % (FLUSH) 0.9 %
10 SYRINGE (ML) INJECTION ONCE
Status: COMPLETED | OUTPATIENT
Start: 2021-12-08 | End: 2021-12-08

## 2021-12-08 RX ORDER — POTASSIUM CHLORIDE 7.45 MG/ML
10 INJECTION INTRAVENOUS PRN
Status: CANCELLED | OUTPATIENT
Start: 2021-12-08

## 2021-12-08 RX ORDER — HYDROMORPHONE HCL 110MG/55ML
PATIENT CONTROLLED ANALGESIA SYRINGE INTRAVENOUS PRN
Status: DISCONTINUED | OUTPATIENT
Start: 2021-12-08 | End: 2021-12-09 | Stop reason: SDUPTHER

## 2021-12-08 RX ORDER — POTASSIUM CHLORIDE 7.45 MG/ML
40 INJECTION INTRAVENOUS ONCE
Status: COMPLETED | OUTPATIENT
Start: 2021-12-08 | End: 2021-12-08

## 2021-12-08 RX ADMIN — SODIUM CHLORIDE 0.6 MCG/KG/HR: 9 INJECTION, SOLUTION INTRAVENOUS at 16:05

## 2021-12-08 RX ADMIN — ALBUMIN (HUMAN) 25 G: 12.5 INJECTION, SOLUTION INTRAVENOUS at 21:22

## 2021-12-08 RX ADMIN — FENTANYL CITRATE 25 MCG: 50 INJECTION, SOLUTION INTRAMUSCULAR; INTRAVENOUS at 13:55

## 2021-12-08 RX ADMIN — FUROSEMIDE 40 MG: 10 INJECTION, SOLUTION INTRAMUSCULAR; INTRAVENOUS at 11:19

## 2021-12-08 RX ADMIN — INSULIN LISPRO 1 UNITS: 100 INJECTION, SOLUTION INTRAVENOUS; SUBCUTANEOUS at 00:39

## 2021-12-08 RX ADMIN — FENTANYL CITRATE 25 MCG: 50 INJECTION, SOLUTION INTRAMUSCULAR; INTRAVENOUS at 15:01

## 2021-12-08 RX ADMIN — POTASSIUM CHLORIDE: 2 INJECTION, SOLUTION, CONCENTRATE INTRAVENOUS at 18:25

## 2021-12-08 RX ADMIN — ROCURONIUM BROMIDE 50 MG: 10 INJECTION, SOLUTION INTRAVENOUS at 21:50

## 2021-12-08 RX ADMIN — PIPERACILLIN AND TAZOBACTAM 3375 MG: 3; .375 INJECTION, POWDER, LYOPHILIZED, FOR SOLUTION INTRAVENOUS at 00:12

## 2021-12-08 RX ADMIN — POTASSIUM CHLORIDE 40 MEQ: 7.46 INJECTION, SOLUTION INTRAVENOUS at 13:48

## 2021-12-08 RX ADMIN — FENTANYL CITRATE 25 MCG: 50 INJECTION, SOLUTION INTRAMUSCULAR; INTRAVENOUS at 18:36

## 2021-12-08 RX ADMIN — ROCURONIUM BROMIDE 50 MG: 10 INJECTION, SOLUTION INTRAVENOUS at 20:57

## 2021-12-08 RX ADMIN — INSULIN LISPRO 1 UNITS: 100 INJECTION, SOLUTION INTRAVENOUS; SUBCUTANEOUS at 06:39

## 2021-12-08 RX ADMIN — SODIUM CHLORIDE, POTASSIUM CHLORIDE, SODIUM LACTATE AND CALCIUM CHLORIDE: 600; 310; 30; 20 INJECTION, SOLUTION INTRAVENOUS at 19:55

## 2021-12-08 RX ADMIN — PIPERACILLIN AND TAZOBACTAM 3375 MG: 3; .375 INJECTION, POWDER, LYOPHILIZED, FOR SOLUTION INTRAVENOUS at 07:56

## 2021-12-08 RX ADMIN — SODIUM CHLORIDE 0.7 MCG/KG/HR: 9 INJECTION, SOLUTION INTRAVENOUS at 09:49

## 2021-12-08 RX ADMIN — PANTOPRAZOLE SODIUM 40 MG: 40 INJECTION, POWDER, FOR SOLUTION INTRAVENOUS at 11:19

## 2021-12-08 RX ADMIN — SODIUM CHLORIDE, PRESERVATIVE FREE 10 ML: 5 INJECTION INTRAVENOUS at 13:31

## 2021-12-08 RX ADMIN — SODIUM HYPOCHLORITE: 1.25 SOLUTION TOPICAL at 11:29

## 2021-12-08 RX ADMIN — IOPAMIDOL 100 ML: 755 INJECTION, SOLUTION INTRAVENOUS at 12:57

## 2021-12-08 RX ADMIN — HEPARIN SODIUM 5000 UNITS: 5000 INJECTION INTRAVENOUS; SUBCUTANEOUS at 11:19

## 2021-12-08 RX ADMIN — PROPOFOL 20 MCG/KG/MIN: 10 INJECTION, EMULSION INTRAVENOUS at 07:44

## 2021-12-08 RX ADMIN — INSULIN LISPRO 2 UNITS: 100 INJECTION, SOLUTION INTRAVENOUS; SUBCUTANEOUS at 13:48

## 2021-12-08 RX ADMIN — I.V. FAT EMULSION 100 ML: 20 EMULSION INTRAVENOUS at 18:25

## 2021-12-08 RX ADMIN — CEFAZOLIN 2000 MG: 1 INJECTION, POWDER, FOR SOLUTION INTRAMUSCULAR; INTRAVENOUS at 20:05

## 2021-12-08 RX ADMIN — PIPERACILLIN AND TAZOBACTAM 3375 MG: 3; .375 INJECTION, POWDER, LYOPHILIZED, FOR SOLUTION INTRAVENOUS at 15:39

## 2021-12-08 RX ADMIN — ROCURONIUM BROMIDE 50 MG: 10 INJECTION, SOLUTION INTRAVENOUS at 19:49

## 2021-12-08 RX ADMIN — HYDROMORPHONE HYDROCHLORIDE 0.4 MG: 2 INJECTION INTRAMUSCULAR; INTRAVENOUS; SUBCUTANEOUS at 20:33

## 2021-12-08 RX ADMIN — IOHEXOL 30 ML: 300 INJECTION, SOLUTION INTRAVENOUS at 13:31

## 2021-12-08 RX ADMIN — HYDROMORPHONE HYDROCHLORIDE 0.2 MG: 2 INJECTION INTRAMUSCULAR; INTRAVENOUS; SUBCUTANEOUS at 20:30

## 2021-12-08 RX ADMIN — PROPOFOL 20 MCG/KG/MIN: 10 INJECTION, EMULSION INTRAVENOUS at 15:24

## 2021-12-08 RX ADMIN — HYDROMORPHONE HYDROCHLORIDE 0.4 MG: 2 INJECTION INTRAMUSCULAR; INTRAVENOUS; SUBCUTANEOUS at 20:12

## 2021-12-08 ASSESSMENT — PULMONARY FUNCTION TESTS
PIF_VALUE: 33
PIF_VALUE: 31
PIF_VALUE: 35
PIF_VALUE: 34
PIF_VALUE: 34
PIF_VALUE: 33
PIF_VALUE: 0
PIF_VALUE: 35
PIF_VALUE: 33
PIF_VALUE: 35
PIF_VALUE: 29
PIF_VALUE: 30
PIF_VALUE: 31
PIF_VALUE: 32
PIF_VALUE: 30
PIF_VALUE: 35
PIF_VALUE: 34
PIF_VALUE: 28
PIF_VALUE: 30
PIF_VALUE: 30
PIF_VALUE: 32
PIF_VALUE: 29
PIF_VALUE: 32
PIF_VALUE: 30
PIF_VALUE: 10
PIF_VALUE: 33
PIF_VALUE: 35
PIF_VALUE: 32
PIF_VALUE: 29
PIF_VALUE: 33
PIF_VALUE: 29
PIF_VALUE: 30
PIF_VALUE: 30
PIF_VALUE: 32
PIF_VALUE: 30
PIF_VALUE: 28
PIF_VALUE: 31
PIF_VALUE: 27
PIF_VALUE: 27
PIF_VALUE: 30
PIF_VALUE: 33
PIF_VALUE: 31
PIF_VALUE: 35
PIF_VALUE: 28
PIF_VALUE: 26
PIF_VALUE: 32
PIF_VALUE: 33
PIF_VALUE: 29
PIF_VALUE: 35
PIF_VALUE: 34
PIF_VALUE: 27
PIF_VALUE: 30
PIF_VALUE: 32
PIF_VALUE: 35
PIF_VALUE: 33
PIF_VALUE: 33
PIF_VALUE: 31
PIF_VALUE: 34
PIF_VALUE: 35
PIF_VALUE: 27
PIF_VALUE: 33
PIF_VALUE: 34
PIF_VALUE: 34
PIF_VALUE: 33
PIF_VALUE: 35
PIF_VALUE: 34
PIF_VALUE: 34
PIF_VALUE: 19
PIF_VALUE: 30
PIF_VALUE: 30
PIF_VALUE: 35
PIF_VALUE: 34
PIF_VALUE: 34
PIF_VALUE: 25
PIF_VALUE: 34
PIF_VALUE: 35
PIF_VALUE: 27
PIF_VALUE: 33
PIF_VALUE: 32
PIF_VALUE: 32
PIF_VALUE: 31
PIF_VALUE: 34
PIF_VALUE: 34
PIF_VALUE: 33
PIF_VALUE: 23
PIF_VALUE: 33
PIF_VALUE: 33
PIF_VALUE: 34
PIF_VALUE: 5
PIF_VALUE: 30
PIF_VALUE: 35
PIF_VALUE: 29
PIF_VALUE: 31
PIF_VALUE: 31
PIF_VALUE: 27
PIF_VALUE: 29
PIF_VALUE: 34
PIF_VALUE: 31
PIF_VALUE: 34
PIF_VALUE: 35
PIF_VALUE: 34
PIF_VALUE: 34
PIF_VALUE: 27
PIF_VALUE: 33
PIF_VALUE: 26
PIF_VALUE: 29
PIF_VALUE: 35
PIF_VALUE: 30
PIF_VALUE: 33
PIF_VALUE: 34
PIF_VALUE: 33
PIF_VALUE: 35
PIF_VALUE: 27
PIF_VALUE: 26
PIF_VALUE: 33
PIF_VALUE: 31
PIF_VALUE: 31
PIF_VALUE: 33
PIF_VALUE: 32
PIF_VALUE: 30
PIF_VALUE: 31
PIF_VALUE: 31
PIF_VALUE: 33
PIF_VALUE: 31
PIF_VALUE: 35
PIF_VALUE: 29
PIF_VALUE: 32
PIF_VALUE: 28
PIF_VALUE: 29
PIF_VALUE: 34
PIF_VALUE: 35
PIF_VALUE: 35
PIF_VALUE: 31
PIF_VALUE: 28
PIF_VALUE: 30
PIF_VALUE: 34
PIF_VALUE: 34
PIF_VALUE: 31
PIF_VALUE: 32
PIF_VALUE: 31
PIF_VALUE: 27
PIF_VALUE: 31
PIF_VALUE: 29
PIF_VALUE: 27
PIF_VALUE: 33
PIF_VALUE: 34
PIF_VALUE: 31
PIF_VALUE: 34
PIF_VALUE: 29
PIF_VALUE: 33
PIF_VALUE: 30
PIF_VALUE: 34
PIF_VALUE: 30
PIF_VALUE: 29
PIF_VALUE: 34
PIF_VALUE: 34
PIF_VALUE: 35
PIF_VALUE: 27
PIF_VALUE: 26
PIF_VALUE: 35
PIF_VALUE: 34
PIF_VALUE: 26
PIF_VALUE: 35
PIF_VALUE: 34
PIF_VALUE: 31
PIF_VALUE: 35
PIF_VALUE: 34
PIF_VALUE: 34
PIF_VALUE: 29
PIF_VALUE: 30
PIF_VALUE: 34
PIF_VALUE: 31
PIF_VALUE: 6
PIF_VALUE: 34
PIF_VALUE: 33
PIF_VALUE: 33
PIF_VALUE: 24
PIF_VALUE: 35
PIF_VALUE: 32
PIF_VALUE: 34
PIF_VALUE: 33
PIF_VALUE: 34
PIF_VALUE: 35
PIF_VALUE: 28
PIF_VALUE: 29
PIF_VALUE: 35
PIF_VALUE: 29
PIF_VALUE: 30
PIF_VALUE: 34
PIF_VALUE: 31
PIF_VALUE: 31
PIF_VALUE: 34
PIF_VALUE: 29
PIF_VALUE: 35
PIF_VALUE: 31
PIF_VALUE: 32
PIF_VALUE: 32
PIF_VALUE: 34
PIF_VALUE: 31
PIF_VALUE: 30
PIF_VALUE: 33
PIF_VALUE: 32
PIF_VALUE: 31
PIF_VALUE: 33
PIF_VALUE: 30
PIF_VALUE: 31
PIF_VALUE: 31
PIF_VALUE: 27
PIF_VALUE: 33
PIF_VALUE: 28
PIF_VALUE: 34
PIF_VALUE: 31
PIF_VALUE: 34
PIF_VALUE: 30
PIF_VALUE: 31
PIF_VALUE: 28
PIF_VALUE: 39
PIF_VALUE: 32
PIF_VALUE: 34
PIF_VALUE: 34
PIF_VALUE: 31
PIF_VALUE: 33
PIF_VALUE: 34
PIF_VALUE: 30
PIF_VALUE: 31
PIF_VALUE: 34
PIF_VALUE: 34
PIF_VALUE: 35
PIF_VALUE: 30
PIF_VALUE: 33
PIF_VALUE: 35
PIF_VALUE: 28
PIF_VALUE: 31
PIF_VALUE: 35
PIF_VALUE: 34
PIF_VALUE: 35
PIF_VALUE: 30
PIF_VALUE: 34
PIF_VALUE: 35
PIF_VALUE: 33
PIF_VALUE: 34
PIF_VALUE: 31
PIF_VALUE: 34
PIF_VALUE: 33
PIF_VALUE: 32
PIF_VALUE: 32
PIF_VALUE: 30
PIF_VALUE: 24
PIF_VALUE: 35

## 2021-12-08 ASSESSMENT — PAIN SCALES - GENERAL
PAINLEVEL_OUTOF10: 7
PAINLEVEL_OUTOF10: 9

## 2021-12-08 NOTE — PROGRESS NOTES
Pt was ordered 1u PRBC for Hgb of 7. Pt was attended to for first 15 minutes. No transfusion reaction was noted. VSS. Will continue to monitor.

## 2021-12-08 NOTE — PROGRESS NOTES
Val[facility] Orford   Urology Progress Note            Subjective: follow-up urinary retention    Patient Vitals for the past 24 hrs:   BP Temp Temp src Pulse Resp SpO2 Height   12/08/21 1434 -- -- -- 69 (!) 33 98 % --   12/08/21 1200 (!) 102/58 98.9 °F (37.2 °C) Oral 58 19 99 % --   12/08/21 1152 -- -- -- 53 18 99 % --   12/08/21 1110 -- -- -- -- -- -- 5' 4.5\" (1.638 m)   12/08/21 1100 (!) 101/57 -- -- 57 17 99 % --   12/08/21 1000 (!) 112/57 -- -- 59 22 -- --   12/08/21 0900 112/61 99.2 °F (37.3 °C) Oral 60 19 -- --   12/08/21 0830 (!) 105/55 -- -- 59 18 -- --   12/08/21 0809 -- -- -- 63 19 98 % --   12/08/21 0800 (!) 109/57 97.5 °F (36.4 °C) Axillary 61 21 99 % --   12/08/21 0730 (!) 111/57 -- -- 66 23 99 % --   12/08/21 0700 115/62 -- -- 67 23 100 % --   12/08/21 0630 119/61 -- -- 76 17 99 % --   12/08/21 0600 -- -- -- 74 20 100 % --   12/08/21 0530 (!) 106/50 -- -- 64 17 100 % --   12/08/21 0500 (!) 103/50 -- -- 68 18 100 % --   12/08/21 0456 (!) 100/52 99.5 °F (37.5 °C) Axillary 68 21 100 % --   12/08/21 0434 (!) 103/59 99.1 °F (37.3 °C) Axillary 73 21 99 % --   12/08/21 0430 -- -- -- 80 20 99 % --   12/08/21 0404 -- -- -- 68 22 100 % --   12/08/21 0403 -- -- -- -- 22 100 % --   12/08/21 0400 (!) 103/56 100.2 °F (37.9 °C) Axillary 66 22 100 % --   12/08/21 0330 (!) 101/54 -- -- 64 22 99 % --   12/08/21 0300 (!) 105/54 -- -- 65 21 99 % --   12/08/21 0230 (!) 106/59 -- -- 71 21 100 % --   12/08/21 0200 (!) 114/57 -- -- 71 21 100 % --   12/08/21 0130 (!) 107/53 -- -- 70 23 100 % --   12/08/21 0100 (!) 107/55 -- -- 69 23 99 % --   12/08/21 0030 109/60 -- -- 68 15 99 % --   12/08/21 0000 110/61 100.2 °F (37.9 °C) Axillary 72 22 99 % --   12/07/21 2330 112/62 -- -- 70 19 100 % --   12/07/21 2300 (!) 107/56 -- -- 70 22 99 % --   12/07/21 2255 -- -- -- 67 18 100 % --   12/07/21 2230 (!) 109/56 -- -- 73 21 100 % --   12/07/21 2200 (!) 97/56 -- -- 74 18 99 % --   12/07/21 2130 89/60 -- -- 70 21 99 % --   12/07/21 2100 (!) 100/50 -- -- 76 23 100 % --   12/07/21 2030 (!) 102/50 -- -- 75 24 98 % --   12/07/21 2000 (!) 102/51 98.4 °F (36.9 °C) Axillary 76 23 100 % --   12/07/21 1952 -- -- -- 74 23 100 % --   12/07/21 1930 (!) 114/59 -- -- 82 27 99 % --   12/07/21 1900 (!) 106/52 -- -- 74 21 100 % --   12/07/21 1800 (!) 102/52 -- -- 69 17 100 % --   12/07/21 1700 (!) 100/55 -- -- 69 22 99 % --   12/07/21 1600 (!) 97/52 97.9 °F (36.6 °C) Axillary 73 20 99 % --       Intake/Output Summary (Last 24 hours) at 12/8/2021 1545  Last data filed at 12/8/2021 1539  Gross per 24 hour   Intake 3722.59 ml   Output 4970 ml   Net -1247.41 ml       Recent Labs     12/06/21  0512 12/06/21  1254 12/07/21  0626 12/07/21  1249 12/08/21  0129 12/08/21  0913 12/08/21  1525   WBC 9.7  --  9.5  --   --  7.8  --    HGB 7.7*   < > 7.3*   < > 7.0* 8.0* 8.2*   HCT 24.4*   < > 22.9*   < > 21.7* 24.7* 25.5*   MCV 92.4  --  91.6  --   --  91.1  --      --  366  --   --  394  --     < > = values in this interval not displayed. Recent Labs     12/06/21  0512 12/06/21  0512 12/07/21  0626 12/07/21  1828 12/08/21  0913     --  139  --  138   K 3.9   < > 3.5* 3.8 3.4*     --  103  --  100   CO2 25  --  27  --  28   PHOS 3.1  --  3.6  --  3.1   BUN 26*  --  24*  --  18   CREATININE 0.44*  --  0.44*  --  <0.40*    < > = values in this interval not displayed. No results for input(s): COLORU, PHUR, LABCAST, WBCUA, RBCUA, MUCUS, TRICHOMONAS, YEAST, BACTERIA, CLARITYU, SPECGRAV, LEUKOCYTESUR, UROBILINOGEN, Milagro Copas in the last 72 hours. Invalid input(s): NITRATE, GLUCOSEUKETONESUAMORPHOUS    Additional Lab/culture results:    Physical Exam: Patient intubated on the vent status post surgical exploration Rose catheter in place bladder decompressed renal function stable    Interval Imaging Findings:    Impression:    Patient Active Problem List   Diagnosis    Intestinal adhesions    Obesity (BMI 30-39. 9)  Urinary incontinence    Constipation    Ventral incisional hernia    Smoker    Stricture of sigmoid colon ([facility] [address]       Plan: Maintain indwelling Rose    Fawad Tinajero MD  3:45 PM 12/8/2021

## 2021-12-08 NOTE — FLOWSHEET NOTE
Pt intubated/sedated. Writer says a prayer at the door. Chaplains will remain available to offer spiritual and emotional support as needed.        12/08/21 5306   Encounter Summary   Services provided to: Patient not available   Referral/Consult From: Khari   Continue Visiting   (12/8/21 intubated/sedated)   Complexity of Encounter Low   Routine   Type Follow up   Assessment Unable to respond   Intervention Prayer     Electronically signed by Yolanda Bruno on 12/8/2021 at 4:54 PM.  Jarod  [phone]

## 2021-12-08 NOTE — PROGRESS NOTES
12/08/2021    LMBI3UII 99 12/08/2021    FIO2 50.0 12/08/2021     Radiology:  Chest x-ray 12/8/21         Impression:  · Acute respiratory insufficiency requiring ventilator support  · Feculent peritonitis s/p exploratory laparotomy with end colostomy/pelvic drain placement  · COPD  · History of GERD/dyslipidemia/hypothyroidism/hypertension  · Metabolic acidosis  · Bilateral pleural infiltrate/pulmonary edema    Recommendations:  · Vent support, FiO2 50%/PEEP 8. We will leave on ventilator for now as surgery is taking her back to the OR for anastomotic leak.   · CPAP trial as tolerated  · Precedex/propofol for sedation, RASS goal -1 to -2  · Seroquel 25 twice daily/monitor QTC  · IV fentanyl 25 mics IV every hour as needed for pain  · DuoNeb by nebulizer  · Wean off  Cesar-Synephrine currently at 20 mics  · Continue diuresis IV Lasix 40 mg twice daily  · TPN/ monitor liver function/ off TF  · Monitor hemoglobin, transfuse for hemoglobin less than 7  · Zosyn/ off Flagyl  · X-ray chest in am  · Discussed with RN   · Peptic ulcer disease prophylaxis  · DVT prophylaxis, subcu heparin   · We will follow with you      Electronically signed by   LESLY Boyle-CNP  Patient seen under the supervision of Estrellita Ashraf MD, [facility]  Office: [phone]

## 2021-12-08 NOTE — PROGRESS NOTES
Reason for Consult:  Acute kidney injury. Requesting Physician:  Ramya Galo MD    Interval history:   Creatinine continues to remain below 1  On TPN  Had a CT scan with IV and oral contrast and showed an evidence for ileal colic anastomotic leakage     HISTORY OF PRESENT ILLNESS:    The patient is a 80 y.o. female admitted for elective scheduled sigmoidectomy that was performed on 11/24. On previous labs her serum creatinines have been between 0.4 to 0.5 with eGFR of >60s ml/min. Her lowest recorded BP was 86/52 mmHg. On admission her creatinine was 0.5 that bill to 1.74 yesterday and today after iv hydration her creatinine is better at 0.8. She had to go back to the OR today for bowel leak and now has a colostomy in place. She is intubated so not able to get any history from the patient. Most of the history is taken from patient's chart. No recent use iv contrast. She was on NSAIDs at home. Review Of Systems:   Unable to obtain as she is intubated. Prior to Admission medications    Medication Sig Start Date End Date Taking? Authorizing Provider   spironolactone (ALDACTONE) 25 MG tablet Take 25 mg by mouth daily   Yes Historical Provider, MD   rosuvastatin (CRESTOR) 5 MG tablet Take 5 mg by mouth daily   Yes Historical Provider, MD   esomeprazole (NEXIUM) 40 MG delayed release capsule Take 40 mg by mouth daily    Yes Historical Provider, MD   famotidine (PEPCID) 40 MG tablet Take 40 mg by mouth daily as needed (heartburn)    Yes Historical Provider, MD   amLODIPine (NORVASC) 10 MG tablet Take 10 mg by mouth daily  3/17/15  Yes Historical Provider, MD   citalopram (CELEXA) 40 MG tablet Take 60 mg by mouth daily Takes 1.5 tabs daily 3/5/15  Yes Historical Provider, MD   diphenoxylate-atropine (LOMOTIL) 2.5-0.025 MG per tablet Take 1 tablet by mouth 4 times daily as needed.   12/22/14  Yes Historical Provider, MD   levothyroxine (SYNTHROID) 100 MCG tablet Take 100 mcg by mouth Daily  3/5/15  Yes Historical Provider, MD   celecoxib (CELEBREX) 200 MG capsule Take 200 mg by mouth daily     Historical Provider, MD       Scheduled Meds:   QUEtiapine  25 mg Per NG tube BID    heparin (porcine)  5,000 Units SubCUTAneous BID    pantoprazole  40 mg IntraVENous Daily    furosemide  40 mg IntraVENous BID    sodium hypochlorite   Irrigation Daily    fat emulsion  100 mL IntraVENous Daily    insulin lispro  0-6 Units SubCUTAneous Q6H    piperacillin-tazobactam  3,375 mg IntraVENous Q8H    metroNIDAZOLE  500 mg IntraVENous Once    citalopram  60 mg Oral Daily    docusate sodium  100 mg Oral BID    influenza virus vaccine  0.5 mL IntraMUSCular Prior to discharge    cyclobenzaprine  5 mg Oral TID    levothyroxine  100 mcg Oral Daily    rosuvastatin  5 mg Oral Nightly    sodium chloride flush  5-40 mL IntraVENous 2 times per day    acetaminophen  1,000 mg Oral 3 times per day    gabapentin  300 mg Oral Q8H     Continuous Infusions:   sodium chloride      PN-Adult 2 in 1 Central(5/20 Standard Electrolytes)      PN-Adult 2 in 1 Central(5/20 Standard Electrolytes) 65 mL/hr at 12/08/21 0818    propofol 20 mcg/kg/min (12/08/21 1524)    dexmedetomidine (PRECEDEX) IV infusion 0.7 mcg/kg/hr (12/08/21 0949)    phenylephrine (BHARATI-SYNEPHRINE) 50mg/250mL infusion 20 mcg/min (12/08/21 0818)    dextrose      midazolam (VERSED) 1 mg/mL in D5W infusion Stopped (12/05/21 0856)    fentaNYL Stopped (12/06/21 1510)    sodium chloride       PRN Meds:sodium chloride, fentanNYL, ipratropium-albuterol, acetaminophen, glucose, dextrose, glucagon (rDNA), dextrose, potassium chloride **OR** potassium alternative oral replacement **OR** potassium chloride, oxyCODONE, phenol, sodium chloride flush, sodium chloride, ondansetron **OR** ondansetron    Physical Exam:  Vitals:    12/08/21 1110 12/08/21 1152 12/08/21 1200 12/08/21 1434   BP:   (!) 102/58    Pulse:  53 58 69   Resp:  18 19 (!) 33   Temp:   98.9 °F (37.2 °C)    TempSrc:   Oral SpO2:  99% 99% 98%   Weight:       Height: 5' 4.5\" (1.638 m)        I/O last 3 completed shifts: In: 3722.6 [P.O.:700; I.V.:608.4; Blood:339; IV Piggyback:170.1]  Out: 9373 [Urine:3325; Emesis/NG output:200; Drains:55; Stool:640]    General: not in distress. Appears to be stated age. HEENT: Atraumatic, normocephalic. Anicteric sclera. Pink and moist oral mucosa. No carotid bruit. No JVD. Chest: Bilateral air entry, clear to auscultation, no wheezing, rhonchi or rales. Cardiovascular: RRR, S1S2, no murmur, rub or gallop. Has lower extremity edema. Abdomen: Soft, non tender to palpation. Musculoskeletal: No cyanosis or clubbing. Integumentary: Pink, warm and dry. Free from rash or lesions. Skin turgor normal.  CNS: Face symmetrical. No tremor.      Data:  CBC:   Lab Results   Component Value Date    WBC 7.8 12/08/2021    HGB 8.0 (L) 12/08/2021    HCT 24.7 (L) 12/08/2021    MCV 91.1 12/08/2021     12/08/2021     BMP:    Lab Results   Component Value Date     12/08/2021     12/07/2021     12/06/2021    K 3.4 (L) 12/08/2021    K 3.8 12/07/2021    K 3.5 (L) 12/07/2021     12/08/2021     12/07/2021     12/06/2021    CO2 28 12/08/2021    CO2 27 12/07/2021    CO2 25 12/06/2021    BUN 18 12/08/2021    BUN 24 (H) 12/07/2021    BUN 26 (H) 12/06/2021    CREATININE <0.40 (L) 12/08/2021    CREATININE 0.44 (L) 12/07/2021    CREATININE 0.44 (L) 12/06/2021    GLUCOSE 213 (H) 12/08/2021    GLUCOSE 274 (H) 12/07/2021    GLUCOSE 237 (H) 12/06/2021     CMP:   Lab Results   Component Value Date     12/08/2021    K 3.4 12/08/2021     12/08/2021    CO2 28 12/08/2021    BUN 18 12/08/2021    CREATININE <0.40 12/08/2021    GLUCOSE 213 12/08/2021    CALCIUM 8.2 12/08/2021    PROT 4.4 12/05/2021    LABALBU 1.6 12/05/2021    BILITOT 0.44 12/05/2021    ALKPHOS 65 12/05/2021    AST 15 12/05/2021    ALT 6 12/05/2021      Hepatic:   Lab Results   Component Value Date    AST 15 12/05/2021    AST 8 12/03/2021    AST 16 12/01/2021    ALT 6 12/05/2021    ALT <5 (L) 12/03/2021    ALT 6 12/01/2021    BILITOT 0.44 12/05/2021    BILITOT 0.40 12/03/2021    BILITOT 0.55 12/01/2021    ALKPHOS 65 12/05/2021    ALKPHOS 68 12/03/2021    ALKPHOS 79 12/01/2021     BNP: No results found for: BNP  Lipids: No results found for: CHOL, HDL  INR: No results found for: INR  PTH: No results found for: PTH  Phosphorus:    Lab Results   Component Value Date    PHOS 3.1 12/08/2021     Ionized Calcium: No results found for: IONCA  Magnesium:   Lab Results   Component Value Date    MG 1.9 12/08/2021     Albumin:   Lab Results   Component Value Date    LABALBU 1.6 12/05/2021     Last 3 CK, CKMB, Troponin: @LABRCNT(CKTOTAL:3,CKMB:3,TROPONINI:3)       URINE:)No results found for: Urszula Zheng     Radiology:   Reviewed. Assessment:  Acute kidney injury, FeNa was 0.24%, appears to be hemodynamically related. Creatinine has improved. Hyponatremia. Serum sodium improved to normal range. Hypokalemia. Anemia. S/p sigmoidectomy and now has colostomy. Plan:  Replacing potassium IV   off NSAIDs, Amlodipine and Aldactone. Continue TPN. Dietary service managing. Electrolyte replacement protocol  On Lasix 40 mg iv BID. Monitor BP. Nephrology will sign off   Please call if you have any questions or concerns     thank you for the consultation. Please do not hesitate to contact us for any further questions/concerns. We will continue to follow along with you. Electronically signed by Maria Guadalupe Gomez MD  on 12/8/2021 at 3:34 PM   [facility].   Ph: [phone]

## 2021-12-08 NOTE — PROGRESS NOTES
Physical Therapy  Facility/Department: [facility] CVU  Daily Treatment Note  NAME: Sofiya Berrios  : 1950  MRN: [id number]    Date of Service: 2021    Discharge Recommendations:  Patient would benefit from continued therapy after discharge   Pt currently functioning below baseline. Would suggest additional therapy at time of discharge to maximize long term outcomes and prevent re-admission. Please refer to AM-PAC score for current level of function. PT Equipment Recommendations  Equipment Needed: No    Assessment   Body structures, Functions, Activity limitations: Decreased functional mobility ; Decreased strength; Decreased safe awareness; Decreased balance; Decreased endurance; Increased pain  Assessment: Patient currently intubated and with decreased sedation, Patient did not tolerate exercises well as she seemed somewhat agitated by ROM and RT present to start Cpap trial. Patient's RR increased and only perfromed minimal PROM/AAROM. Prognosis: Good  Decision Making: Medium Complexity  REQUIRES PT FOLLOW UP: Yes  Activity Tolerance  Activity Tolerance: Patient limited by endurance; Patient limited by fatigue     Patient Diagnosis(es): The encounter diagnosis was Chest pain, unspecified type. has a past medical history of Abdominal pain, Arthritis, Constipation, COPD (chronic obstructive pulmonary disease) ([address] Depressed, GERD (gastroesophageal reflux disease), HLD (hyperlipidemia), HTN (hypertension), Hypothyroid, IBS (irritable bowel syndrome), Lumbar spondylolysis, Myofascial pain, Poor appetite, RLS (restless legs syndrome), and Wears glasses. has a past surgical history that includes Bladder surgery; Rectal surgery; Cystocele repair; Enterocele repair; Abdominal adhesion surgery (3/9/2015); Hysterectomy; Appendectomy; Colonoscopy; Endoscopy, colon, diagnostic; eye surgery; Dilatation, esophagus; hernia repair; Tonsillectomy; Abdomen surgery (2021);  Cystocopy (2021); colectomy (N/A, 11/24/2021); Cystoscopy (11/24/2021); sigmoidoscopy (11/24/2021); and laparotomy (N/A, 12/1/2021). Restrictions  Restrictions/Precautions  Restrictions/Precautions: General Precautions, Fall Risk  Required Braces or Orthoses?: No  Required Braces or Orthoses  Other: Abdominal Binder  Position Activity Restriction  Other position/activity restrictions: Intubated and sedated  Subjective   General  Chart Reviewed: Yes  Response To Previous Treatment: Patient with no complaints from previous session. Family / Caregiver Present: Yes ( present)  Subjective  Subjective: Patient on the vent but alert and following some commands, and sometime shaking her head \"yes\" or \"no\". General Comment  Comments: RN, Noris Alexander reports patient medically stable for PT treatment. Cognition   Cognition  Overall Cognitive Status: Exceptions  Arousal/Alertness: Inconsistent responses to stimuli  Following Commands: Follows one step commands with increased time; Follows one step commands with repetition  Objective   Bed mobility  Comment: No repositioning at this time. Upon arrival patient moving israel LE and UE too. UE restraints for safety since patient is able to mobilize her UEs. Attempted gentle israel LE AAROM/PROM able to perform heel slides and AP. Patient appears to be agitated and pulling away. RT then present begging CPap trial. Attempting Rt UE AAROM/PROM but patient began thrashing her arms/resistive and pulling away. RR were increased to 40bpm and patient not relaxing, shaking her head no. Patient Rt UE put back in restraints and educated her  on simple supination/pronation, opening and closing hands that he or patient can perfrom within the restraints to promoted blood floor and joint ROM.   Transfers  Sit to Stand: Unable to assess  Stand to sit: Unable to assess  Bed to Chair: Unable to assess  Stand Pivot Transfers: Unable to assess  Squat Pivot Transfers: Unable to assess  Lateral Transfers: Unable to assess  Car Transfer: Unable to assess  Ambulation  Ambulation?: No  More Ambulation?: No    AM-PAC Score  AM-PAC Inpatient Mobility Raw Score : 11 (11/26/21 1604)  AM-PAC Inpatient T-Scale Score : 33.86 (11/26/21 1604)  Mobility Inpatient CMS 0-100% Score: 72.57 (11/26/21 1604)  Mobility Inpatient CMS G-Code Modifier : CL (11/26/21 1604)          Goals  Short term goals  Time Frame for Short term goals: 14 visits  Short term goal 1: Pt to demonstrate bed mobility using log-roll technique Claus  Short term goal 2: Pt to perform STS transfers w/ least restrictive AD Claus  Short term goal 3: Pt to ambulate at least 300ft w/ least restrictive AD Claus  Short term goal 4: Pt to ascend/descend 2 steps w/ handrails Loulou  Short term goal 5: Pt to actively participate in at least 30 minutes of physical therapy for ther act, ther ex, balance, gait, and endurance training  Patient Goals   Patient goals :  To go home    Plan    Plan  Times per week: 1-2x/day, 6-7x/week  Current Treatment Recommendations: Strengthening, Balance Training, Functional Mobility Training, Stair training, Gait Training, Transfer Training, Endurance Training, Neuromuscular Re-education, Safety Education & Training, Home Exercise Program, Equipment Evaluation, Education, & procurement, Patient/Caregiver Education & Training  Safety Devices  Type of devices: Call light within reach, Chair alarm in place, Gait belt, Nurse notified, Left in chair, All fall risk precautions in place  Restraints  Initially in place: No     Therapy Time   Individual Concurrent Group Co-treatment   Time In 1411         Time Out 1434         Minutes [address] [place] [address]

## 2021-12-08 NOTE — FLOWSHEET NOTE
12/08/21 1000   Provider Notification   Reason for Communication Review case   Provider Name Dr Samantha Mcgill   Provider Notification Physician   Method of Communication Call     MD contacted to clarify CT orders -  Okay to give PO contrast through NG; hold oral meds until CT is read

## 2021-12-08 NOTE — PROGRESS NOTES
Nutrition Assessment     Type and Reason for Visit: Reassess    Nutrition Recommendations/Plan:   1. Continue NPO status  2. Continue TPN at 65 mL/hr (1560 mL total volume) and 100 mL of lipids. TPN  Lipids and Propofol provides (1908 kcal and 78 gm of protein)    Nutrition Assessment:  Patient remains intubated and sedated. Patient is receiving TPN at goal rate which is tolerated. Patient has a large gapping abdominal incision wound that is not improving. CT scan showed concern so patient will be going back to OR tonight. Continue TPN at 65 mL/hr (1560 mL total volume) and 100 mL lipids. Will monitor TPN rate/ tolerance, vent status, weight and labs. Malnutrition Assessment:  Malnutrition Status: At risk for malnutrition (Comment)    Estimated Daily Nutrient Needs:  Energy (kcal): 1739 kcal [facility] 2010); Weight Used for Energy Requirements:  Current     Protein (g): 84-95 gm of protein (1.5-1.7 gm/kg); Weight Used for Protein Requirements:  Ideal        Fluid (ml/day):  ; Weight Used for Fluid Requirements:  1 ml/kcal      Nutrition Related Findings: Edema: +2 pitting BUe, +1 pitting BLE. Colostomy (minimal output). NG tube LIWS. Multiple bowel surgeries since admission.  Laxis for fluid overload      Current Nutrition Therapies:    Diet NPO Exceptions are: Sips of Water with Meds, Ice Chips  PN-Adult 2-in-1 [facility] [employer] (Standard)    Anthropometric Measures:  · Height: 5' 4.5\" (163.8 cm)  · Current Body Wt: 187 lb (84.8 kg)   · BMI: 31.6    Nutrition Diagnosis:   · Inadequate protein-energy intake related to inadequate protein-energy intake as evidenced by NPO or clear liquid status due to medical condition, poor intake prior to admission, intubation, nutrition support - parenteral nutrition      Nutrition Interventions:   Food and/or Nutrient Delivery:  Continue NPO, Modify Parenteral Nutrition  Nutrition Education/Counseling:  Education not indicated   Coordination of Nutrition Care:  Continue to monitor while inpatient    Goals:  PN will meet greater than 75% of estimated nutrition needs       Nutrition Monitoring and Evaluation:   Behavioral-Environmental Outcomes:  None Identified   Food/Nutrient Intake Outcomes:  Parenteral Nutrition Intake/Tolerance  Physical Signs/Symptoms Outcomes:  Biochemical Data, Fluid Status or Edema, Skin, Weight, GI Status     Discharge Planning:     Too soon to determine         Margoth Joya  MFN, RDN, LDN  Lead Clinical Dietitian  RD Office Phone [phone]

## 2021-12-08 NOTE — FLOWSHEET NOTE
12/08/21 1430   Provider Notification   Reason for Communication Review case   Provider Name Dr Zoltan Leong   Provider Notification Physician   Method of Communication Page   Response Other (Comment)  (see note)   Notification Time 1430        Impression   1. There is evidence for ileocolic anastomotic leakage with GI tract contrast   extravasation extending from the at location and collecting along the right   flank. 2. Scattered thin wall rim enhancing fluid collections in the left flank,   largest about 8 cm. 3. Gaping laparotomy wound. The findings were sent to the Radiology Results [address] at 2:22   pm on 12/8/2021to be communicated to a licensed caregiver.               Dr Zoltan Leong paged for notification of CT results called to RN from radiology. MD returned page; RN updated with CT impression as above.

## 2021-12-08 NOTE — CARE COORDINATION
Patient going back to the OR tonight as CT of abd showed concern. Results as follow:    Impression   1. There is evidence for ileocolic anastomotic leakage with GI tract contrast   extravasation extending from the at location and collecting along the right   flank. 2. Scattered thin wall rim enhancing fluid collections in the left flank,   largest about 8 cm. 3. Gaping laparotomy wound.    The findings were sent to the Radiology Results [address] at 2:22   pm on 12/8/2021to be communicated to a licensed caregiver.

## 2021-12-08 NOTE — PLAN OF CARE
Problem: HEMODYNAMIC STATUS  Goal: Patient has stable vital signs and fluid balance  12/7/2021 2359 by Jay Pitts RN  Outcome: Ongoing     Problem: OXYGENATION/RESPIRATORY FUNCTION  Goal: Patient will achieve/maintain normal respiratory rate/effort  12/7/2021 2359 by Jay Pitts RN  Outcome: Ongoing     Problem: ELIMINATION  Goal: Elimination patterns are normal or improving  Description: Elimination patterns return to pre-surgery normal patterns  12/7/2021 2359 by Jay Pitts RN  Outcome: Ongoing     Problem: SKIN INTEGRITY  Goal: Skin integrity is maintained or improved  12/7/2021 2359 by Jay Pitts RN  Outcome: Ongoing     Problem: Pain:  Goal: Control of acute pain  Description: Control of acute pain  Outcome: Ongoing     Problem: Skin Integrity:  Goal: Absence of new skin breakdown  Description: Absence of new skin breakdown  Outcome: Ongoing     Problem: Nutrition  Goal: Optimal nutrition therapy  12/7/2021 2359 by Jay Pitts RN  Outcome: Ongoing     Problem: Non-Violent Restraints  Goal: No harm/injury to patient while restraints in use  12/7/2021 2359 by Jay Pitts RN  Outcome: Ongoing

## 2021-12-08 NOTE — PROGRESS NOTES
Colorectal Surgery:  Daily Progress Note               PATIENT NAME: Librado Paci   TODAY'S DATE: 12/8/2021, 8:42 AM    SUBJECTIVE:     Patient seen and evaluated. Remains intubated, sedated, on pressor for BP support. SHANA drain with ~25cc milky/purulent fluid draining. NGT to LIWS with bilious output. Awakens and moves all extremities. Received 1 U prbc for hgb 7.0 this morning. OBJECTIVE:   VITALS:  BP (!) 109/57   Pulse 63   Temp 97.5 °F (36.4 °C) (Axillary)   Resp 19   Ht 5' 4.5\" (1.638 m)   Wt 187 lb (84.8 kg)   LMP  (LMP Unknown)   SpO2 98%   BMI 31.60 kg/m²      INTAKE/OUTPUT:      Intake/Output Summary (Last 24 hours) at 12/8/2021 0842  Last data filed at 12/8/2021 0818  Gross per 24 hour   Intake 2683.59 ml   Output 4195 ml   Net -1511.41 ml       PHYSICAL EXAM:  General Appearance: Unresponsive, sedated, critically ill  HEENT:  Normocephalic, atraumatic, mucus membranes moist   Heart: Heart regular rate, requiring pressors for blood pressure support. Lungs: Equal chest rise bilaterally, mechanically ventilated. Abdomen: Soft, midline incision covered with dressings, upper midline incision open and packed with wet to dry gauze, ostomy appliance in place containing stool. SHANA drain with milky/purulent fluid draining. Extremities: No cyanosis, pitting edema, rashes noted. Skin: Skin color, texture, turgor normal. No rashes or lesions.     Data:  CBC with Differential:    Lab Results   Component Value Date    WBC 9.5 12/07/2021    RBC 2.50 12/07/2021    HGB 7.0 12/08/2021    HCT 21.7 12/08/2021     12/07/2021    MCV 91.6 12/07/2021    MCH 29.2 12/07/2021    MCHC 31.9 12/07/2021    RDW 15.4 12/07/2021    LYMPHOPCT 10 12/07/2021    MONOPCT 7 12/07/2021    BASOPCT 1 12/07/2021    MONOSABS 0.67 12/07/2021    LYMPHSABS 0.95 12/07/2021    EOSABS 0.10 12/07/2021    BASOSABS 0.10 12/07/2021    DIFFTYPE NOT REPORTED 12/07/2021     BMP:    Lab Results   Component Value Date     12/07/2021 K 3.8 12/07/2021     12/07/2021    CO2 27 12/07/2021    BUN 24 12/07/2021    LABALBU 1.6 12/05/2021    CREATININE 0.44 12/07/2021    CALCIUM 8.1 12/07/2021    GFRAA >60 12/07/2021    LABGLOM >60 12/07/2021    GLUCOSE 274 12/07/2021     Magnesium:    Lab Results   Component Value Date    MG 2.0 12/07/2021     Phosphorus:    Lab Results   Component Value Date    PHOS 3.6 12/07/2021     Radiology Review:     ASSESSMENT:  Active Hospital Problems    Diagnosis Date Noted    Stricture of sigmoid colon [facility]) [Q41.881] 11/24/2021     80 y.o. female with sigmoid colon stricture. 11/24/21: Status post exploratory laparotomy with explantation pelvic mesh and mobilization sigmoid and proximal rectum, status post ileocecectomy with ileocolonic anastomosis. 12/1/21: Status post ex lap, creation of end colostomy, pelvic drain placement    Plan:  -Repeat CT abdomen/pelvis with PO contrast today  -Appreciate critical care management  -Continue NGT to LIWS; Monitor drain output and consistency; awaiting ostomy output   -Appreciate wound ostomy and RN for midline dressing changes and stoma care   -Multimodal pain therapy, Resuscitate as needed. -OK for VTE ppx, monitor labs   -Appreciate nephrology input     Electronically signed by María Galvez MD  on 12/8/2021 at 8:42 AM    I Dr. Norman Loomis saw and examined the patient. I have edited the above and agree with the above. Ashley Chavez  Colorectal Surgery

## 2021-12-09 ENCOUNTER — APPOINTMENT (OUTPATIENT)
Dept: GENERAL RADIOLOGY | Age: 71
DRG: 329 | End: 2021-12-09
Attending: COLON & RECTAL SURGERY
Payer: MEDICARE

## 2021-12-09 VITALS — DIASTOLIC BLOOD PRESSURE: 57 MMHG | OXYGEN SATURATION: 97 % | SYSTOLIC BLOOD PRESSURE: 102 MMHG | TEMPERATURE: 71.8 F

## 2021-12-09 LAB
ABSOLUTE EOS #: 0 K/UL (ref 0–0.4)
ABSOLUTE IMMATURE GRANULOCYTE: 0.76 K/UL (ref 0–0.3)
ABSOLUTE LYMPH #: 1.78 K/UL (ref 1–4.8)
ABSOLUTE MONO #: 0.64 K/UL (ref 0.2–0.8)
ALLEN TEST: ABNORMAL
ANION GAP SERPL CALCULATED.3IONS-SCNC: 9 MMOL/L (ref 9–17)
BASOPHILS # BLD: 0 %
BASOPHILS ABSOLUTE: 0 K/UL (ref 0–0.2)
BUN BLDV-MCNC: 15 MG/DL (ref 8–23)
BUN/CREAT BLD: ABNORMAL (ref 9–20)
CALCIUM SERPL-MCNC: 7.6 MG/DL (ref 8.6–10.4)
CHLORIDE BLD-SCNC: 99 MMOL/L (ref 98–107)
CO2: 25 MMOL/L (ref 20–31)
CREAT SERPL-MCNC: <0.4 MG/DL (ref 0.5–0.9)
DIFFERENTIAL TYPE: ABNORMAL
EOSINOPHILS RELATIVE PERCENT: 0 % (ref 1–4)
FIO2: 50
GFR AFRICAN AMERICAN: ABNORMAL ML/MIN
GFR NON-AFRICAN AMERICAN: ABNORMAL ML/MIN
GFR SERPL CREATININE-BSD FRML MDRD: ABNORMAL ML/MIN/{1.73_M2}
GFR SERPL CREATININE-BSD FRML MDRD: ABNORMAL ML/MIN/{1.73_M2}
GLUCOSE BLD-MCNC: 175 MG/DL (ref 65–105)
GLUCOSE BLD-MCNC: 186 MG/DL (ref 65–105)
GLUCOSE BLD-MCNC: 188 MG/DL (ref 65–105)
GLUCOSE BLD-MCNC: 188 MG/DL (ref 70–99)
HCT VFR BLD CALC: 28.4 % (ref 36.3–47.1)
HCT VFR BLD CALC: 33.2 % (ref 36.3–47.1)
HCT VFR BLD CALC: 33.5 % (ref 36.3–47.1)
HCT VFR BLD CALC: 33.7 % (ref 36.3–47.1)
HEMOGLOBIN: 10.8 G/DL (ref 11.9–15.1)
HEMOGLOBIN: 10.9 G/DL (ref 11.9–15.1)
HEMOGLOBIN: 11.1 G/DL (ref 11.9–15.1)
HEMOGLOBIN: 9 G/DL (ref 11.9–15.1)
IMMATURE GRANULOCYTES: 6 %
LYMPHOCYTES # BLD: 14 % (ref 24–44)
MAGNESIUM: 1.5 MG/DL (ref 1.6–2.6)
MCH RBC QN AUTO: 29.5 PG (ref 25.2–33.5)
MCHC RBC AUTO-ENTMCNC: 32.9 G/DL (ref 28.4–34.8)
MCV RBC AUTO: 89.6 FL (ref 82.6–102.9)
MODE: ABNORMAL
MONOCYTES # BLD: 5 % (ref 1–7)
MORPHOLOGY: ABNORMAL
NEGATIVE BASE EXCESS, ART: ABNORMAL (ref 0–2)
NRBC AUTOMATED: 0.2 PER 100 WBC
O2 DEVICE/FLOW/%: ABNORMAL
PATIENT TEMP: ABNORMAL
PDW BLD-RTO: 15.1 % (ref 11.8–14.4)
PHOSPHORUS: 2.9 MG/DL (ref 2.6–4.5)
PLATELET # BLD: 577 K/UL (ref 138–453)
PLATELET ESTIMATE: ABNORMAL
PMV BLD AUTO: 11.8 FL (ref 8.1–13.5)
POC HCO3: 25.7 MMOL/L (ref 21–28)
POC O2 SATURATION: 95 % (ref 94–98)
POC PCO2 TEMP: ABNORMAL MM HG
POC PCO2: 35.9 MM HG (ref 35–48)
POC PH TEMP: ABNORMAL
POC PH: 7.46 (ref 7.35–7.45)
POC PO2 TEMP: ABNORMAL MM HG
POC PO2: 71.8 MM HG (ref 83–108)
POSITIVE BASE EXCESS, ART: 2 (ref 0–3)
POTASSIUM SERPL-SCNC: 4.2 MMOL/L (ref 3.7–5.3)
RBC # BLD: 3.76 M/UL (ref 3.95–5.11)
RBC # BLD: ABNORMAL 10*6/UL
SAMPLE SITE: ABNORMAL
SEG NEUTROPHILS: 75 % (ref 36–66)
SEGMENTED NEUTROPHILS ABSOLUTE COUNT: 9.52 K/UL (ref 1.8–7.7)
SODIUM BLD-SCNC: 133 MMOL/L (ref 135–144)
TCO2 (CALC), ART: ABNORMAL MMOL/L (ref 22–29)
WBC # BLD: 12.7 K/UL (ref 3.5–11.3)
WBC # BLD: ABNORMAL 10*3/UL

## 2021-12-09 PROCEDURE — 6360000002 HC RX W HCPCS: Performed by: INTERNAL MEDICINE

## 2021-12-09 PROCEDURE — 2000000000 HC ICU R&B

## 2021-12-09 PROCEDURE — 80048 BASIC METABOLIC PNL TOTAL CA: CPT

## 2021-12-09 PROCEDURE — 97530 THERAPEUTIC ACTIVITIES: CPT

## 2021-12-09 PROCEDURE — 94761 N-INVAS EAR/PLS OXIMETRY MLT: CPT

## 2021-12-09 PROCEDURE — 02HV33Z INSERTION OF INFUSION DEVICE INTO SUPERIOR VENA CAVA, PERCUTANEOUS APPROACH: ICD-10-PCS | Performed by: COLON & RECTAL SURGERY

## 2021-12-09 PROCEDURE — 71045 X-RAY EXAM CHEST 1 VIEW: CPT

## 2021-12-09 PROCEDURE — 2580000003 HC RX 258: Performed by: STUDENT IN AN ORGANIZED HEALTH CARE EDUCATION/TRAINING PROGRAM

## 2021-12-09 PROCEDURE — 83735 ASSAY OF MAGNESIUM: CPT

## 2021-12-09 PROCEDURE — 82947 ASSAY GLUCOSE BLOOD QUANT: CPT

## 2021-12-09 PROCEDURE — 85018 HEMOGLOBIN: CPT

## 2021-12-09 PROCEDURE — 6360000002 HC RX W HCPCS: Performed by: STUDENT IN AN ORGANIZED HEALTH CARE EDUCATION/TRAINING PROGRAM

## 2021-12-09 PROCEDURE — 2500000003 HC RX 250 WO HCPCS: Performed by: STUDENT IN AN ORGANIZED HEALTH CARE EDUCATION/TRAINING PROGRAM

## 2021-12-09 PROCEDURE — 2500000003 HC RX 250 WO HCPCS: Performed by: COLON & RECTAL SURGERY

## 2021-12-09 PROCEDURE — 94003 VENT MGMT INPAT SUBQ DAY: CPT

## 2021-12-09 PROCEDURE — C9113 INJ PANTOPRAZOLE SODIUM, VIA: HCPCS | Performed by: STUDENT IN AN ORGANIZED HEALTH CARE EDUCATION/TRAINING PROGRAM

## 2021-12-09 PROCEDURE — 6370000000 HC RX 637 (ALT 250 FOR IP): Performed by: STUDENT IN AN ORGANIZED HEALTH CARE EDUCATION/TRAINING PROGRAM

## 2021-12-09 PROCEDURE — 37799 UNLISTED PX VASCULAR SURGERY: CPT

## 2021-12-09 PROCEDURE — 84100 ASSAY OF PHOSPHORUS: CPT

## 2021-12-09 PROCEDURE — 82803 BLOOD GASES ANY COMBINATION: CPT

## 2021-12-09 PROCEDURE — 85014 HEMATOCRIT: CPT

## 2021-12-09 PROCEDURE — 99213 OFFICE O/P EST LOW 20 MIN: CPT

## 2021-12-09 PROCEDURE — 85025 COMPLETE CBC W/AUTO DIFF WBC: CPT

## 2021-12-09 PROCEDURE — 2700000000 HC OXYGEN THERAPY PER DAY

## 2021-12-09 PROCEDURE — 6360000002 HC RX W HCPCS: Performed by: NURSE PRACTITIONER

## 2021-12-09 RX ORDER — FENTANYL CITRATE 50 UG/ML
50 INJECTION, SOLUTION INTRAMUSCULAR; INTRAVENOUS
Status: DISCONTINUED | OUTPATIENT
Start: 2021-12-09 | End: 2021-12-10

## 2021-12-09 RX ORDER — MAGNESIUM SULFATE 1 G/100ML
1000 INJECTION INTRAVENOUS PRN
Status: DISCONTINUED | OUTPATIENT
Start: 2021-12-09 | End: 2021-12-24 | Stop reason: HOSPADM

## 2021-12-09 RX ADMIN — FENTANYL CITRATE 25 MCG: 50 INJECTION, SOLUTION INTRAMUSCULAR; INTRAVENOUS at 01:12

## 2021-12-09 RX ADMIN — PROPOFOL 50 MCG/KG/MIN: 10 INJECTION, EMULSION INTRAVENOUS at 06:16

## 2021-12-09 RX ADMIN — MAGNESIUM SULFATE HEPTAHYDRATE 1000 MG: 1 INJECTION, SOLUTION INTRAVENOUS at 14:00

## 2021-12-09 RX ADMIN — FENTANYL CITRATE 50 MCG: 50 INJECTION, SOLUTION INTRAMUSCULAR; INTRAVENOUS at 14:48

## 2021-12-09 RX ADMIN — PANTOPRAZOLE SODIUM 40 MG: 40 INJECTION, POWDER, FOR SOLUTION INTRAVENOUS at 08:58

## 2021-12-09 RX ADMIN — SODIUM CHLORIDE, PRESERVATIVE FREE 10 ML: 5 INJECTION INTRAVENOUS at 20:56

## 2021-12-09 RX ADMIN — PIPERACILLIN AND TAZOBACTAM 3375 MG: 3; .375 INJECTION, POWDER, LYOPHILIZED, FOR SOLUTION INTRAVENOUS at 07:16

## 2021-12-09 RX ADMIN — PROPOFOL 50 MCG/KG/MIN: 10 INJECTION, EMULSION INTRAVENOUS at 02:03

## 2021-12-09 RX ADMIN — SODIUM CHLORIDE 0.6 MCG/KG/HR: 9 INJECTION, SOLUTION INTRAVENOUS at 17:12

## 2021-12-09 RX ADMIN — PROPOFOL 40 MCG/KG/MIN: 10 INJECTION, EMULSION INTRAVENOUS at 17:55

## 2021-12-09 RX ADMIN — PHENYLEPHRINE HYDROCHLORIDE 130 MCG/MIN: 10 INJECTION INTRAVENOUS at 09:34

## 2021-12-09 RX ADMIN — I.V. FAT EMULSION 100 ML: 20 EMULSION INTRAVENOUS at 17:31

## 2021-12-09 RX ADMIN — INSULIN LISPRO 1 UNITS: 100 INJECTION, SOLUTION INTRAVENOUS; SUBCUTANEOUS at 05:51

## 2021-12-09 RX ADMIN — PROPOFOL 35 MCG/KG/MIN: 10 INJECTION, EMULSION INTRAVENOUS at 14:03

## 2021-12-09 RX ADMIN — HEPARIN SODIUM 5000 UNITS: 5000 INJECTION INTRAVENOUS; SUBCUTANEOUS at 20:46

## 2021-12-09 RX ADMIN — ACETAMINOPHEN 650 MG: 650 SUPPOSITORY RECTAL at 12:24

## 2021-12-09 RX ADMIN — FENTANYL CITRATE 25 MCG: 50 INJECTION, SOLUTION INTRAMUSCULAR; INTRAVENOUS at 02:45

## 2021-12-09 RX ADMIN — FENTANYL CITRATE 50 MCG: 50 INJECTION, SOLUTION INTRAMUSCULAR; INTRAVENOUS at 12:21

## 2021-12-09 RX ADMIN — FUROSEMIDE 40 MG: 10 INJECTION, SOLUTION INTRAMUSCULAR; INTRAVENOUS at 20:46

## 2021-12-09 RX ADMIN — POTASSIUM CHLORIDE: 2 INJECTION, SOLUTION, CONCENTRATE INTRAVENOUS at 17:35

## 2021-12-09 RX ADMIN — SODIUM CHLORIDE 0.6 MCG/KG/HR: 9 INJECTION, SOLUTION INTRAVENOUS at 09:39

## 2021-12-09 RX ADMIN — HEPARIN SODIUM 5000 UNITS: 5000 INJECTION INTRAVENOUS; SUBCUTANEOUS at 08:58

## 2021-12-09 RX ADMIN — PROPOFOL 50 MCG/KG/MIN: 10 INJECTION, EMULSION INTRAVENOUS at 09:54

## 2021-12-09 RX ADMIN — INSULIN LISPRO 1 UNITS: 100 INJECTION, SOLUTION INTRAVENOUS; SUBCUTANEOUS at 12:12

## 2021-12-09 RX ADMIN — MAGNESIUM SULFATE HEPTAHYDRATE 1000 MG: 1 INJECTION, SOLUTION INTRAVENOUS at 12:54

## 2021-12-09 RX ADMIN — PIPERACILLIN AND TAZOBACTAM 3375 MG: 3; .375 INJECTION, POWDER, LYOPHILIZED, FOR SOLUTION INTRAVENOUS at 15:15

## 2021-12-09 RX ADMIN — PIPERACILLIN AND TAZOBACTAM 3375 MG: 3; .375 INJECTION, POWDER, LYOPHILIZED, FOR SOLUTION INTRAVENOUS at 23:32

## 2021-12-09 RX ADMIN — FENTANYL CITRATE 25 MCG: 50 INJECTION, SOLUTION INTRAMUSCULAR; INTRAVENOUS at 04:25

## 2021-12-09 RX ADMIN — SODIUM CHLORIDE, PRESERVATIVE FREE 10 ML: 5 INJECTION INTRAVENOUS at 08:57

## 2021-12-09 RX ADMIN — FUROSEMIDE 40 MG: 10 INJECTION, SOLUTION INTRAMUSCULAR; INTRAVENOUS at 08:57

## 2021-12-09 RX ADMIN — PROPOFOL 40 MCG/KG/MIN: 10 INJECTION, EMULSION INTRAVENOUS at 22:54

## 2021-12-09 RX ADMIN — PHENYLEPHRINE HYDROCHLORIDE 115 MCG/MIN: 10 INJECTION INTRAVENOUS at 16:12

## 2021-12-09 RX ADMIN — INSULIN LISPRO 1 UNITS: 100 INJECTION, SOLUTION INTRAVENOUS; SUBCUTANEOUS at 18:01

## 2021-12-09 ASSESSMENT — PULMONARY FUNCTION TESTS
PIF_VALUE: 31
PIF_VALUE: 26
PIF_VALUE: 24
PIF_VALUE: 30
PIF_VALUE: 23
PIF_VALUE: 25
PIF_VALUE: 30
PIF_VALUE: 28
PIF_VALUE: 0
PIF_VALUE: 23
PIF_VALUE: 24

## 2021-12-09 ASSESSMENT — PAIN SCALES - GENERAL: PAINLEVEL_OUTOF10: 0

## 2021-12-09 NOTE — PLAN OF CARE
Problem: HEMODYNAMIC STATUS  Goal: Patient has stable vital signs and fluid balance  Outcome: Ongoing     Problem: OXYGENATION/RESPIRATORY FUNCTION  Goal: Patient will achieve/maintain normal respiratory rate/effort  Outcome: Ongoing     Problem: MOBILITY  Goal: Early mobilization is achieved  Outcome: Ongoing     Problem: ELIMINATION  Goal: Elimination patterns are normal or improving  Description: Elimination patterns return to pre-surgery normal patterns  Outcome: Ongoing     Problem: SKIN INTEGRITY  Goal: Skin integrity is maintained or improved  Outcome: Ongoing     Problem: Pain:  Description: Pain management should include both nonpharmacologic and pharmacologic interventions.   Goal: Pain level will decrease  Description: Pain level will decrease  Outcome: Ongoing  Goal: Control of acute pain  Description: Control of acute pain  Outcome: Ongoing  Goal: Control of chronic pain  Description: Control of chronic pain  Outcome: Ongoing     Problem: Falls - Risk of:  Goal: Will remain free from falls  Description: Will remain free from falls  Outcome: Ongoing  Goal: Absence of physical injury  Description: Absence of physical injury  Outcome: Ongoing     Problem: Skin Integrity:  Goal: Will show no infection signs and symptoms  Description: Will show no infection signs and symptoms  Outcome: Ongoing  Goal: Absence of new skin breakdown  Description: Absence of new skin breakdown  Outcome: Ongoing     Problem: Nutrition  Goal: Optimal nutrition therapy  Outcome: Ongoing     Problem: Non-Violent Restraints  Goal: Removal from restraints as soon as assessed to be safe  Outcome: Ongoing  Goal: No harm/injury to patient while restraints in use  Outcome: Ongoing  Goal: Patient's dignity will be maintained  Outcome: Ongoing

## 2021-12-09 NOTE — ANESTHESIA POSTPROCEDURE EVALUATION
Department of Anesthesiology  Postprocedure Note    Patient: Ryan Delaney  MRN: [id number]  YOB: 1950  Date of evaluation: 12/9/2021  Time:  12:10 AM     Procedure Summary     Date: 12/08/21 Room / Location: [facility] 01 / [place] [address]    Anesthesia Start: 1938 Anesthesia Stop: 12/09/21 0010    Procedure: LAPAROTOMY EXPLORATORY, POSSIBLE STOMA CREATION (N/A Abdomen) Diagnosis: (FREE AIR IN ABDOMEN)    Surgeons: Kavon Resendez MD Responsible Provider: Chantel Morales MD    Anesthesia Type: general ASA Status: 5 - Emergent          Anesthesia Type: general    Tamara Phase I: Tamara Score: 8    Tamara Phase II:      Last vitals: Reviewed and per EMR flowsheets. Anesthesia Post Evaluation    Patient location during evaluation: ICU  Patient participation: complete - patient cannot participate  Level of consciousness: sedated and ventilated  Pain score: 0  Airway patency: patent  Nausea & Vomiting: no nausea and no vomiting  Complications: no  Cardiovascular status: vasoactive/inotropes  Respiratory status: acceptable and intubated  Hydration status: stable  There was medical reason for not using a multimodal analgesia pain management approach.

## 2021-12-09 NOTE — PROGRESS NOTES
Physical Therapy  Facility/Department: [facility] CVICU  Daily Treatment Note  NAME: Tre Brenner  : 1950  MRN: [id number]    Date of Service: 2021   RN Iqra Rosenbaum reports patient is medically stable for therapy treatment this date. Chart reviewed prior to treatment and patient is agreeable for therapy. All lines intact and patient positioned comfortably at end of treatment. All patient needs addressed prior to ending therapy session. Discharge Recommendations: CTA   Patient would benefit from continued therapy after discharge      Assessment   Body structures, Functions, Activity limitations: Decreased functional mobility ; Decreased strength; Decreased safe awareness; Decreased balance; Decreased endurance; Increased pain  Assessment: Pt intubated/sedated this date. PROM performed to BLE this date for maintenance of joint mobility and ROM. Pt will require reassessment for mobility following extubation. Prognosis: Good  Decision Making: Medium Complexity  REQUIRES PT FOLLOW UP: Yes  Activity Tolerance  Activity Tolerance: Treatment limited secondary to medical complications (free text)     Patient Diagnosis(es): The encounter diagnosis was Chest pain, unspecified type. has a past medical history of Abdominal pain, Arthritis, Constipation, COPD (chronic obstructive pulmonary disease) ([address] Depressed, GERD (gastroesophageal reflux disease), HLD (hyperlipidemia), HTN (hypertension), Hypothyroid, IBS (irritable bowel syndrome), Lumbar spondylolysis, Myofascial pain, Poor appetite, RLS (restless legs syndrome), and Wears glasses. has a past surgical history that includes Bladder surgery; Rectal surgery; Cystocele repair; Enterocele repair; Abdominal adhesion surgery (3/9/2015); Hysterectomy; Appendectomy; Colonoscopy; Endoscopy, colon, diagnostic; eye surgery; Dilatation, esophagus; hernia repair; Tonsillectomy; Abdomen surgery (2021);  Cystocopy (2021); colectomy (N/A, 2021); Cystoscopy (11/24/2021); sigmoidoscopy (11/24/2021); laparotomy (N/A, 12/1/2021); and laparotomy (N/A, 12/8/2021). Restrictions  Restrictions/Precautions  Restrictions/Precautions: General Precautions, Fall Risk  Required Braces or Orthoses?: No  Required Braces or Orthoses  Other: Abdominal Binder  Position Activity Restriction  Other position/activity restrictions: Intubated and sedated  Subjective   General  Response To Previous Treatment: Patient unable to report, no changes reported from family or staff  Family / Caregiver Present: No  Subjective  Subjective: Pt intubated/sedated, not alert this date. General Comment  Comments: RN reporting pt appropriate for BLE PROM, states she had done some BUE PROM earlier and pt tolerated well. Cognition   Cognition  Overall Cognitive Status: Exceptions  Arousal/Alertness: Unresponsive to stimuli  Following Commands: Does not follow commands  Cognition Comment: Pt intubated/sedated this date. Not alert. Objective   Bed mobility  Comment: Not assessed. PROM BLE only this date. Pt intubated/sedated. Transfers  Comment: Not assessed. PROM BLE only this date. Pt intubated/sedated. Ambulation  Ambulation?: No     Balance  Comments: Not assessed. PROM BLE only this date. Pt intubated/sedated. Exercises  Comments: Supine PROM x 15 reps BLE with limited range.   PROM WFL except ankle DF to neutral      AM-PAC Score  AM-PAC Inpatient Mobility Raw Score : 6 (12/09/21 1154)  AM-PAC Inpatient T-Scale Score : 23.55 (12/09/21 1154)  Mobility Inpatient CMS 0-100% Score: 100 (12/09/21 1154)  Mobility Inpatient CMS G-Code Modifier : CN (12/09/21 1154)        Goals  Short term goals  Time Frame for Short term goals: 14 visits  Short term goal 1: PROM to maintain ROM and mobility in all joints x 4 extremities  Short term goal 2: REASSESS MOBILITY FOLLOWING EXTUBATION  Short term goal 3: Pt to actively participate in at least 30 minutes of physical therapy for ther act, ther ex, balance, gait, and endurance training  Short term goal 4: Pt to ascend/descend 2 steps w/ handrails Loulou  Short term goal 5: Pt to actively participate in at least 30 minutes of physical therapy for ther act, ther ex, balance, gait, and endurance training  Patient Goals   Patient goals :  To go home    Plan    Plan  Times per week: 1-2x/day, 6-7x/week  Current Treatment Recommendations: Strengthening, Balance Training, Functional Mobility Training, Stair training, Gait Training, Transfer Training, Endurance Training, Neuromuscular Re-education, Safety Education & Training, Home Exercise Program, Equipment Evaluation, Education, & procurement, Patient/Caregiver Education & Training  Safety Devices  Type of devices: Bed alarm in place, Call light within reach, Nurse notified, Left in bed  Restraints  Initially in place: Yes  Restraints: B soft wrist restraints     Therapy Time   Individual Concurrent Group Co-treatment   Time In 1132         Time Out 1144         [facility] [place] [place]

## 2021-12-09 NOTE — PROGRESS NOTES
[facility] Wound Ostomy Continence Nursing  Follow Up      NAME:  Franklin Allred RECORD NUMBER:  [id number]  AGE: 80 y.o. GENDER: female  : 1950  TODAY'S DATE:  2021    Called by RN for concerns at site of previous colstomy site draining bowel contents. On evaluation, the stoma appears to have a large mucocutaneous separation with exposure of peritoneal tissue. A picture was taken and general surgery Dr. Shana Dudley notified. The site was pouched to manage potential drainage as the peristomal skin is erythemic to prevent worsening. A two piece system was used due to pouch due to large size of the stoma/opening and to allow access to the site for observation if needed (pouch can be removed without taking the barrier off).        Vannessa FLORES RN, CWS, [facility] and [address]  Wound, Ostomy, and Continence Nursing  [phone]

## 2021-12-09 NOTE — ANESTHESIA PRE PROCEDURE
Department of Anesthesiology  Preprocedure Note       Name:  Shnae Fernández   Age:  80 y.o.  :  1950                                          MRN:  [id number]         Date:  2021      Surgeon: Saroj Spangler):  Rekha Onofre MD    Procedure: Procedure(s):  LAPAROTOMY EXPLORATORY, POSSIBLE STOMA CREATION    Medications prior to admission:   Prior to Admission medications    Medication Sig Start Date End Date Taking? Authorizing Provider   celecoxib (CELEBREX) 200 MG capsule Take 200 mg by mouth daily     Historical Provider, MD   spironolactone (ALDACTONE) 25 MG tablet Take 25 mg by mouth daily    Historical Provider, MD   rosuvastatin (CRESTOR) 5 MG tablet Take 5 mg by mouth daily    Historical Provider, MD   esomeprazole (NEXIUM) 40 MG delayed release capsule Take 40 mg by mouth daily     Historical Provider, MD   famotidine (PEPCID) 40 MG tablet Take 40 mg by mouth daily as needed (heartburn)     Historical Provider, MD   amLODIPine (NORVASC) 10 MG tablet Take 10 mg by mouth daily  3/17/15   Historical Provider, MD   citalopram (CELEXA) 40 MG tablet Take 60 mg by mouth daily Takes 1.5 tabs daily 3/5/15   Historical Provider, MD   diphenoxylate-atropine (LOMOTIL) 2.5-0.025 MG per tablet Take 1 tablet by mouth 4 times daily as needed. 14   Historical Provider, MD   levothyroxine (SYNTHROID) 100 MCG tablet Take 100 mcg by mouth Daily  3/5/15   Historical Provider, MD       Current medications:    No current facility-administered medications for this visit. No current outpatient medications on file.      Facility-Administered Medications Ordered in Other Visits   Medication Dose Route Frequency Provider Last Rate Last Admin    [MAR Hold] 0.9 % sodium chloride infusion   IntraVENous PRN Laura Gandhi MD        [facility] Hold] PN-Adult 2-in-1 Central Line (Standard)   IntraVENous Continuous TPN Rekha Onofre MD 65 mL/hr at 21 New Bag at 21    [MAR Hold] 0.9 % sodium chloride infusion IntraVENous PRN Siria Beltran MD        [facility] Hold] 0.9 % sodium chloride infusion   IntraVENous PRN Siria Beltran MD        ceFAZolin (ANCEF) injection   IntraVENous PRN Siria Beltran MD   2,000 mg at 12/08/21 2005    rocuronium [facility]) injection   IntraVENous PRN Siria Beltran MD   50 mg at 12/08/21 1949    HYDROmorphone (DILAUDID) injection   IntraVENous PRN Siria Beltran MD   0.4 mg at 12/08/21 2033    [MAR Hold] QUEtiapine (SEROQUEL) tablet 25 mg  25 mg Per NG tube BID Clary Garza MD   25 mg at 12/07/21 2047    [MAR Hold] heparin (porcine) injection 5,000 Units  5,000 Units SubCUTAneous BID Clary Garza MD   5,000 Units at 12/08/21 1119    [MAR Hold] pantoprazole (PROTONIX) injection 40 mg  40 mg IntraVENous Daily Gretta Johnson MD   40 mg at 12/08/21 1119    [MAR Hold] furosemide (LASIX) injection 40 mg  40 mg IntraVENous BID Clary Garza MD   40 mg at 12/08/21 1119    [MAR Hold] propofol injection  5-50 mcg/kg/min IntraVENous Titrated Gretta Johnson MD 22.9 mL/hr at 12/08/21 1820 45 mcg/kg/min at 12/08/21 1820    [MAR Hold] dexmedetomidine (PRECEDEX) 400 mcg in sodium chloride 0.9 % 100 mL infusion  0.2-1.4 mcg/kg/hr IntraVENous Continuous Clary Garza MD 12.72 mL/hr at 12/08/21 1605 0.6 mcg/kg/hr at 12/08/21 1605    [MAR Hold] fentaNYL (SUBLIMAZE) injection 25 mcg  25 mcg IntraVENous Q1H PRN Clary Garza MD   25 mcg at 12/08/21 1836    [MAR Hold] sodium hypochlorite (DAKINS) 0.125 % external solution   Irrigation Daily Ashley Chavez MD   Given at 12/08/21 1129    [MAR Hold] ipratropium-albuterol (DUONEB) nebulizer solution 1 ampule  1 ampule Inhalation Q4H PRN Gretta Johnson MD        [facility] Hold] acetaminophen (TYLENOL) suppository 650 mg  650 mg Rectal Q4H PRN Clary Garza MD   650 mg at 12/05/21 1513    [MAR Hold] phenylephrine (BHARATI-SYNEPHRINE) 50 mg in dextrose 5 % 250 mL infusion   mcg/min IntraVENous Continuous Clary Garza MD 6 mL/hr at 12/08/21 0818 20 mcg/min at 12/08/21 0818    [MAR Hold] fat emulsion 20 % infusion 100 mL  100 mL IntraVENous Daily Ashley Chavez MD 8 mL/hr at 12/08/21 1825 100 mL at 12/08/21 1825    [MAR Hold] insulin lispro (HUMALOG) injection vial 0-6 Units  0-6 Units SubCUTAneous Q6H Ashley Chavez MD   2 Units at 12/08/21 1348    [MAR Hold] piperacillin-tazobactam (ZOSYN) 3,375 mg in dextrose 5 % 50 mL IVPB extended infusion (mini-bag)  3,375 mg IntraVENous Q8H Shannon Cotton DO   Stopped at 12/08/21 1954    [MAR Hold] metronidazole (FLAGYL) 500 mg in NaCl 100 mL IVPB premix  500 mg IntraVENous Once Ashley Chavez MD        [MAR Hold] glucose (GLUTOSE) 40 % oral gel 15 g  15 g Oral PRN Jj Rocha MD        [facility] Hold] dextrose 50 % IV solution  12.5 g IntraVENous PRN Jj Rocha MD   12.5 g at 12/01/21 2049    [MAR Hold] glucagon (rDNA) injection 1 mg  1 mg IntraMUSCular PRN Jj Rocha MD        [facility] Hold] dextrose 5 % solution  100 mL/hr IntraVENous PRN Jj Rocha MD        [facility] Hold] midazolam (VERSED) 1 mg/mL in D5W infusion  1-10 mg/hr IntraVENous Continuous Jj Rocha MD   Paused at 12/05/21 0856    [MAR Hold] fentaNYL 20 mcg/mL Infusion  12.5-200 mcg/hr IntraVENous Continuous Jj Rocha MD   Stopped at 12/06/21 1510    [MAR Hold] potassium chloride (KLOR-CON M) extended release tablet 40 mEq  40 mEq Oral PRN Jj Rocha MD        Or   [facility] Hold] potassium bicarb-citric acid (EFFER-K) effervescent tablet 40 mEq  40 mEq Oral PRN Jj Rocha MD        Or   [facility] Hold] potassium chloride 10 mEq/100 mL IVPB (Peripheral Line)  10 mEq IntraVENous PRN Jj Rocha  mL/hr at 12/07/21 1513 10 mEq at 12/07/21 1513    [MAR Hold] citalopram (CELEXA) tablet 60 mg  60 mg Oral Daily Shannon Cotton DO        [facility] Hold] docusate sodium (COLACE) capsule 100 mg  100 mg Oral BID Shannon Cotton DO   100 mg at 11/30/21 2104    [MAR Hold] influenza quadrivalent split vaccine (FLUZONE;FLUARIX;FLULAVAL;AFLURIA) injection 0.5 mL  0.5 mL IntraMUSCular Prior to discharge Iram Bees, DO        [facility] Hold] cyclobenzaprine (FLEXERIL) tablet 5 mg  5 mg Oral TID Iram Bees, DO   5 mg at 11/30/21 2104    [MAR Hold] oxyCODONE (ROXICODONE) immediate release tablet 5 mg  5 mg Oral Q8H PRN Iram Bees, DO   5 mg at 12/01/21 1020    [MAR Hold] phenol 1.4 % mouth spray 1 spray  1 spray Mouth/Throat Q2H PRN Iram Bees, DO   1 spray at 11/27/21 1250    [MAR Hold] levothyroxine (SYNTHROID) tablet 100 mcg  100 mcg Oral Daily Iram Bees, DO   100 mcg at 12/02/21 0622    [MAR Hold] rosuvastatin (CRESTOR) tablet 5 mg  5 mg Oral Nightly Iram Bees, DO   5 mg at 11/30/21 2104    [MAR Hold] sodium chloride flush 0.9 % injection 5-40 mL  5-40 mL IntraVENous 2 times per day Iram Bees, DO   10 mL at 12/07/21 0752    [MAR Hold] sodium chloride flush 0.9 % injection 5-40 mL  5-40 mL IntraVENous PRN Iram Bees, DO        [facility] Hold] 0.9 % sodium chloride infusion  25 mL IntraVENous PRN Iram Bees, DO        [facility] Hold] ondansetron (ZOFRAN-ODT) disintegrating tablet 4 mg  4 mg Oral Q8H PRN Iram Bees, DO        Or    [facility] Hold] ondansetron [facility]) injection 4 mg  4 mg IntraVENous Q6H PRN Iram Bees, DO   4 mg at 11/30/21 1249    [MAR Hold] acetaminophen (TYLENOL) tablet 1,000 mg  1,000 mg Oral 3 times per day Iram Bees, DO   1,000 mg at 12/02/21 0620    [MAR Hold] gabapentin (NEURONTIN) capsule 300 mg  300 mg Oral Q8H Iram Bees, DO   300 mg at 11/28/21 1457       Allergies: Allergies   Allergen Reactions    Morphine Itching    Sulfa Antibiotics        Problem List:    Patient Active Problem List   Diagnosis Code    Intestinal adhesions K66.0    Obesity (BMI 30-39. 9) E66.9    Urinary incontinence R32    Constipation K59.00    Ventral incisional hernia K43.2    Smoker F17.200    Stricture of sigmoid colon [facility] K56.695       Past Medical History: Diagnosis Date    Abdominal pain     Arthritis     Constipation     COPD (chronic obstructive pulmonary disease) (HCC)     Depressed     GERD (gastroesophageal reflux disease)     HLD (hyperlipidemia)     HTN (hypertension)     Hypothyroid     IBS (irritable bowel syndrome)     Lumbar spondylolysis     Myofascial pain     Poor appetite     RLS (restless legs syndrome)     Wears glasses        Past Surgical History:        Procedure Laterality Date    ABDOMEN SURGERY  11/24/2021    exploration with lysis of adhesions, small bowel resection with ileocecetomy and pelvic mesh removal    ABDOMINAL ADHESION SURGERY  3/9/2015    APPENDECTOMY      BLADDER SURGERY      COLECTOMY N/A 11/24/2021    ABDOMINAL EXPLORATION LYSIS OF EXTENSIVE ADHESIONS SMALL BOWEL RESECTION WITH ILEOCECECTOMY  EXPLANTATIOIN OF PELVIC MESH  ILEOCLONIC ANASTAMOSIS MOBILIZATION OF RECTUM AND SIGMOID performed by Elinor Haywood MD at [address]  11/24/2021    with bilateral ureteral stent insertion    CYSTOSCOPY  11/24/2021    CYSTOSCOPY EXPLORATION OF URETER BILATERAL URETERAL STENT INSERTION performed by Elinor Haywood MD at [address] [facility], [facility]      ENDOSCOPY, COLON, DIAGNOSTIC      ENTEROCELE REPAIR      EYE SURGERY      HERNIA REPAIR      HYSTERECTOMY      LAPAROTOMY N/A 12/1/2021    LAPAROTOMY EXPLORATORY CREATION OF END COLOSTOMY, LAVAGE, PELVIC DRAIN PLACEMENT performed by Elinor Haywood MD at [address] [facility]  11/24/2021    SIGMOIDOSCOPY FLEXIBLE X 2 performed by Elinor Haywood MD at [address] [facility]         Social History:    Social History     Tobacco Use    Smoking status: Current Every Day Smoker     Packs/day: 0.50     Years: 52.00     Pack years: 26.00     Types: Cigarettes    Smokeless tobacco: Never Used   Substance Use Topics    Alcohol use: Yes     Comment: every other week Ready to quit: Not Answered  Counseling given: Not Answered      Vital Signs (Current): There were no vitals filed for this visit.                                            BP Readings from Last 3 Encounters:   12/08/21 (!) 113/57   12/08/21 135/70   12/01/21 139/64       NPO Status:                                                                                 BMI:   Wt Readings from Last 3 Encounters:   12/06/21 187 lb (84.8 kg)   11/10/21 184 lb 11.2 oz (83.8 kg)   06/29/20 212 lb (96.2 kg)     There is no height or weight on file to calculate BMI.    CBC:   Lab Results   Component Value Date    WBC 7.8 12/08/2021    RBC 2.71 12/08/2021    HGB 8.2 12/08/2021    HCT 25.5 12/08/2021    MCV 91.1 12/08/2021    RDW 15.4 12/08/2021     12/08/2021       CMP:   Lab Results   Component Value Date     12/08/2021    K 3.4 12/08/2021     12/08/2021    CO2 28 12/08/2021    BUN 18 12/08/2021    CREATININE <0.40 12/08/2021    GFRAA Can not be calculated 12/08/2021    LABGLOM Can not be calculated 12/08/2021    GLUCOSE 213 12/08/2021    PROT 4.4 12/05/2021    CALCIUM 8.2 12/08/2021    BILITOT 0.44 12/05/2021    ALKPHOS 65 12/05/2021    AST 15 12/05/2021    ALT 6 12/05/2021       POC Tests:   Recent Labs     12/08/21  1633   POCGLU 151*       Coags: No results found for: PROTIME, INR, APTT    HCG (If Applicable): No results found for: PREGTESTUR, PREGSERUM, HCG, HCGQUANT     ABGs: No results found for: PHART, PO2ART, VGV9YYY, KTA6HYN, BEART, U8PDQUNQ     Type & Screen (If Applicable):  No results found for: LABABO, LABRH    Drug/Infectious Status (If Applicable):  No results found for: HIV, HEPCAB    COVID-19 Screening (If Applicable):   Lab Results   Component Value Date    COVID19 Not Detected 12/01/2021           Anesthesia Evaluation  Patient summary reviewed and Nursing notes reviewed no history of anesthetic complications:   Airway: Mallampati: Unable to assess / NA  TM distance: >3 FB   Neck ROM: full  Mouth opening: > = 3 FB Dental:          Pulmonary:   (+) pneumonia:  COPD:  rhonchi,                             Cardiovascular:  Exercise tolerance: poor (<4 METS),   (+) hypertension:,           Rate: normal                    Neuro/Psych:   (+) psychiatric history:            GI/Hepatic/Renal:   (+) GERD:,           Endo/Other:    (+) hypothyroidism::., .                 Abdominal:             Vascular: Other Findings:               Anesthesia Plan      general     ASA 5 - emergent         arterial line, central line and CVP  MIPS: Prophylactic antiemetics administered and Postoperative ventilation. Anesthetic plan and risks discussed with spouse. Plan discussed with CRNA. Attending anesthesiologist reviewed and agrees with Preprocedure content      patient with consolidation and fluid on lungs, sob, sat in 80s on O2.  High likelihood will require remain intubated after case         Anshu Grigsby MD   12/8/2021

## 2021-12-09 NOTE — OP NOTE
I Dr. Mike Fajardo was present throughout and performed the entire procedure. Ashley Chavez  Colorectal Surgery  OPERATIVE NOTE    PATIENT: Francesca Ramirez    MRN: [id number]    DATE OF PROCEDURE: 12/9/2021     SURGEON: Kateryna Higgins MD    ASSISTANT: Navneet Lake MD    PREOPERATIVE DIAGNOSIS: Ileocolonic anastomotic leak    POSTOPERATIVE DIAGNOSIS: Ileocolonic anastomotic leak, distal transverse colon staple line leak    OPERATION: Exploratory laparotomy, repair of ileocolonic anastomosis, abdominal washout, lysis of adhesions, revision of colostomy, creation of ileostomy with insertion of red rubber catheter, placement of Stratus biologic mesh, wound VAC application. FINDINGS: Ileocolonic anastomotic leak, transverse colon staple line leak, necrotic colostomy stoma, gross intra-abdominal spillage. ANESTHESIA: General    ESTIMATED BLOOD LOSS:  100 cc  URINE OUTPUT: 1050 cc  FLUIDS: 0876 cc    COMPLICATIONS: None     IMPLANTS:  Implant Name Type Inv. Item Serial No.  Lot No. LRB No. Used Action   GRAFT BLGCL TSSUE [id number] [facility] MS MTRX RCNSTRCTVE PLBLE  GRAFT BLGCL TSSUE [id number] [facility] [id number] N/A 1 Implanted       SPECIMENS:   ID Type Source Tests Collected by Time Destination   A : SMALL BOWEL Tissue Abdomen SURGICAL PATHOLOGY Lady Oumar MD 12/8/2021 2127       INDICATION: Patient is a 29-year-old female with history of sigmoid colon stricture who presented for elective lysis of adhesions and explantation of pelvic mesh. Surgery was complicated by small bowel injury requiring ileocecectomy with ileocolonic anastomosis. She was found to have enteric contents coming from her midline on 12/1/2021 prompting reexploration which identified an injury to the transverse colon which was treated with creation of end colostomy and pelvic drain placement.   Postoperatively, she was doing well and nearing extubation, however she was noted to have enteric contents in her SHANA drain so CT with p.o. contrast was ordered which showed leak at the ileocolonic anastomosis. Therefore, the decision was made to bring her back to the operating room for 3rd exploratory laparotomy. DETAILS OF OPERATION: The patient was seen and evaluated in the preoperative holding area. Risks, benefits, and alternatives of the procedure were discussed with the patient and they agreed with treatment plan. The patient was transferred to the operating theater and transferred to the operating table. Patient was placed in the supine position and all appropriate hemodynamic monitoring and intravenous access was obtained. Appropriate preoperative antibiotics were given. Endotracheal intubation was then performed for general anesthesia. All pressure points were appropriately padded and the abdomen was prepped and draped in the standard sterile fashion. A surgical timeout was performed confirming the patient, procedure to be performed, laterality, and all other pertinent information. We began by opening the previous laparotomy incision. The Prolene sutures were cut out and the fascia was opened with a combination of blunt dissection and electrocautery. Significant inflammatory change and adhesions were noted requiring meticulous separation of the segments of bowel. We focused our attention on the right lower quadrant which was the site of the known ileocolonic anastomosis. Enteric contents were noted around this area and were suctioned. A leak near the staple line was found and repaired with 3-0 silk suture. The existing SHANA drain was left in place to drain this area of the abdomen. We then turned our attention to attempting to bring up an end ileostomy. About 4 cm proximal to the ileocolonic anastomosis, we used a linear stapler to divide the ileum and attempted to mobilize this to the skin surface to form our ileostomy.   However, we were limited in how much bowel we could mobilize due to the proximity of the vascular pedicle and condition of the bowel. As we mobilized more proximally, we encountered the distal staple line from the previously resected transverse colon was also leaking. This was oversewn with 3-0 Prolene in a running continuous fashion. Our options at this point were limited due to the significant inflammatory change of the bowel, length of bowel available, bowel edema, and condition of fascia. We therefore decided we would close the abdominal fascia with a biologic mesh and place a red rubber catheter into the distal ileum to function as a conduit for end ileostomy. The abdomen was irrigated with warm saline followed by Irricept solution. A Stratus biologic mesh was cut to size and sewn in interrupted fashion to the fascial edges with 2-0 Prolene. A slit was made in the mesh to allow for passage of a red rubber catheter. A fifteen blade scalpel was used to create an enterotomy and this was dilated with a tonsil and the red rubber catheter was inserted. A 3-0 Vicryl suture was used to fix this to the bowel in a pursestring fashion. Atop of the biologic mesh, we placed white foam followed by black foam and wound VAC tape and the wound VAC was set to -25 mmHg pressure. We then revised the existing stoma due to appearance of necrotic mucosa. This was trimmed with Bovie electrocautery down to bleeding tissue and was sewn back to the skin with 3-0 Vicryl. The patient was then transferred off the table and to the postoperative care unit. The patient tolerated the procedure well and there were no immediate complications. Counts were correct at case conclusion.

## 2021-12-09 NOTE — PROGRESS NOTES
Nutrition Assessment     Type and Reason for Visit: Reassess    Nutrition Recommendations/Plan:   1. Continue NPO status  2. Continue TPN at 65 mL/hr (1560 mL total volume) and 100 mL of lipids. TPN  Lipids and Propofol provides (1908 kcal and 78 gm of protein)  3. Monitor TPN rate/ tolerance, labs, GI status, vent status and weights    Nutrition Assessment:  Patient remains intubated and sedated. Patient is status post emergent colostomy revision and wound vac placement due to enteric leak from ileocolonic anastomosis (12/8). This is the patient's third surgery since admission. Will Continue TPN at 65 mL/hr (1560 mL total volume) and 100 mL of lipids. Will monitor TPN rate/ tolearnce, labs, GI status, vent status and weights. Malnutrition Assessment:  Malnutrition Status: At risk for malnutrition (Comment)    Estimated Daily Nutrient Needs:  Energy (kcal): 1933 kcal [facility] 2010); Weight Used for Energy Requirements:  Current     Protein (g): 84-95 gm of protein (1.5-1.7 gm/kg); Weight Used for Protein Requirements:  Ideal        Fluid (ml/day):  ; Weight Used for Fluid Requirements:  1 ml/kcal      Nutrition Related Findings: Edema: +2 pitting BUE, +1 pitting BLE. Hypoactive bowel sounds. NG tube LIWS.  S/P emergent colostomy revision and wound vac placement due to enteric leak from ileocolonic anastomosis (12/8)      Current Nutrition Therapies:    Diet NPO Exceptions are: Sips of Water with Meds, Ice Chips  PN-Adult 2-in-1 [facility] (Standard)  PN-Adult 2-in-1 [facility] [employer] (Standard)    Anthropometric Measures:  · Height: 5' 4.5\" (163.8 cm)  · Current Body Wt: 187 lb (84.8 kg)   · BMI: 31.6    Nutrition Diagnosis:   · Inadequate protein-energy intake related to inadequate protein-energy intake as evidenced by NPO or clear liquid status due to medical condition, poor intake prior to admission, intubation, nutrition support - parenteral nutrition    · Altered GI function related to altered GI structure as evidenced by GI abnormality, nausea (status post small bowel surgery)      Nutrition Interventions:   Food and/or Nutrient Delivery:  Continue NPO, Continue Current Parenteral Nutrition  Nutrition Education/Counseling:  Education not indicated   Coordination of Nutrition Care:  Continue to monitor while inpatient    Goals:  PN will meet greater than 75% of estimated nutrition needs       Nutrition Monitoring and Evaluation:   Behavioral-Environmental Outcomes:  None Identified   Food/Nutrient Intake Outcomes:  Parenteral Nutrition Intake/Tolerance  Physical Signs/Symptoms Outcomes:  Biochemical Data, Fluid Status or Edema, Skin, Weight, GI Status     Discharge Planning:     Too soon to determine         Rosendo PETERSENN, RDN, LDN  Lead Clinical Dietitian  RD Office Phone [phone]

## 2021-12-09 NOTE — PROGRESS NOTES
Patient returned to room 2029 with anesthesiologist Dr. Damian Meyer and RN, Aleks Foster from surgery at 0000. Bedside report completed. Pt was reconnected to vent, vitals monitoring, EPC cuffs, and NG was hooked back up to low-intermittent suction. New R radial A line was connected to monitor and zeroed. Vitals stable (see flowsheet). Dr. Ines Hayes also spoke to RN at bedside shortly after patient's return.  Mame Kuo and son brought to room at 0100. All questions answered. Will continue to monitor closely.

## 2021-12-09 NOTE — PROGRESS NOTES
Colorectal Surgery:  Daily Progress Note               PATIENT NAME: Jena Mayorga   TODAY'S DATE: 12/9/2021, 10:20 AM    SUBJECTIVE:     Patient seen and chart reviewed. Found to have enteric leak from ileocolonic anastomosis was taken for exploratory laparotomy last night. She has done well postoperatively. SHANA drain and wound VAC output have been modest and serosanguineous. Red rubber catheter which was inserted into ileum is functioning and outputting succus into bag. Remains intubated, ventilated, on 125 of phenylephrine. OBJECTIVE:   VITALS:  BP (!) 148/57   Pulse 64   Temp 101.4 °F (38.6 °C) (Axillary)   Resp 25   Ht 5' 4.5\" (1.638 m)   Wt 187 lb (84.8 kg)   LMP  (LMP Unknown)   SpO2 98%   BMI 31.60 kg/m²      INTAKE/OUTPUT:      Intake/Output Summary (Last 24 hours) at 12/9/2021 1020  Last data filed at 12/9/2021 0400  Gross per 24 hour   Intake 2689 ml   Output 3490 ml   Net -801 ml       PHYSICAL EXAM:  General Appearance: Unresponsive, sedated, critically ill  HEENT:  Normocephalic, atraumatic, mucus membranes moist   Heart: Heart regular rate, requiring pressors for blood pressure support. Lungs: Equal chest rise bilaterally, mechanically ventilated. Abdomen: Soft, wound VAC to -25 mmHg in place with serosanguineous output, SHANA drain serosanguineous, red rubber catheter draining succus in the bag. LLQ mucus fistula covered with dressings. Extremities: No cyanosis, pitting edema, rashes noted. Skin: Skin color, texture, turgor normal. No rashes or lesions.     Data:  CBC with Differential:    Lab Results   Component Value Date    WBC 12.7 12/09/2021    RBC 3.76 12/09/2021    HGB 11.1 12/09/2021    HCT 33.7 12/09/2021     12/09/2021    MCV 89.6 12/09/2021    MCH 29.5 12/09/2021    MCHC 32.9 12/09/2021    RDW 15.1 12/09/2021    LYMPHOPCT 14 12/09/2021    MONOPCT 5 12/09/2021    BASOPCT 0 12/09/2021    MONOSABS 0.64 12/09/2021    LYMPHSABS 1.78 12/09/2021    EOSABS 0.00 12/09/2021 BASOSABS 0.00 12/09/2021    DIFFTYPE NOT REPORTED 12/09/2021     BMP:    Lab Results   Component Value Date     12/09/2021    K 4.2 12/09/2021    CL 99 12/09/2021    CO2 25 12/09/2021    BUN 15 12/09/2021    LABALBU 1.6 12/05/2021    CREATININE <0.40 12/09/2021    CALCIUM 7.6 12/09/2021    GFRAA Can not be calculated 12/09/2021    LABGLOM Can not be calculated 12/09/2021    GLUCOSE 188 12/09/2021     Magnesium:    Lab Results   Component Value Date    MG 1.5 12/09/2021     Phosphorus:    Lab Results   Component Value Date    PHOS 2.9 12/09/2021     Radiology Review:     ASSESSMENT:  Active Hospital Problems    Diagnosis Date Noted    Stricture of sigmoid colon [facility]) [C54.899] 11/24/2021     80 y.o. female with sigmoid colon stricture. 11/24/21: Status post exploratory laparotomy with explantation pelvic mesh and mobilization sigmoid and proximal rectum, status post ileocecectomy with ileocolonic anastomosis. 12/1/21: Status post ex lap, creation of end colostomy, pelvic drain placement    12/8/21: Exploratory laparotomy, repair of ileocolonic anastomosis, abdominal washout, lysis of adhesions, revision of colostomy, creation of ileostomy with insertion of red rubber catheter, placement of Stratus biologic mesh, wound VAC application. Plan:  -Appreciate critical care management  -Continue strict n.p.o.  -Monitor I's and O's, replete electrolytes as needed  -Continue TPN  -Continue NGT to LIWS  -Continue red rubber catheter to gravity. This is functioning as a conduit end ileostomy.  -Continue to monitor SHANA drain.  -Dry dressing to his fistula stoma.   -OK for VTE ppx from surgery standpoint    Electronically signed by Viola Steen MD  on 12/9/2021 at 10:20 AM     .michael

## 2021-12-09 NOTE — ANESTHESIA PROCEDURE NOTES
Central Venous Line:    A central venous line was placed using surface landmarks, in the OR for the following indication(s): central venous access. 12/8/2021 7:47 PM12/8/2021 7:57 PM    Sterility preparation included the following: hand hygiene performed prior to procedure, maximum sterile barriers used and sterile technique used to drape from head to toe. The patient was placed in Trendelenburg position. The    The site was prepped with Chloraprep. introducer single lumen was placed. During the procedure, the following specific steps were taken: target vein identified, needle advanced into vein and blood aspirated and guidewire advanced into vein. Intravenous verification was obtained by venous blood return. Post insertion care included: all ports aspirated, all ports flushed easily, guidewire removed intact, Biopatch applied, line sutured in place and dressing applied. During the procedure the patient experienced: patient tolerated procedure well with no complications.       Insertion site scrubbed per usage guidelines?: Yes  Skin prep agent dried for 3 minutes prior to procedure?:yes  Anesthesia type: general..No  Staffing  Performed: Anesthesiologist   Anesthesiologist: Nikole Head MD  Preanesthetic Checklist  Completed: patient identified, IV checked, site marked, risks and benefits discussed, surgical consent, monitors and equipment checked, pre-op evaluation, timeout performed, anesthesia consent given, oxygen available and patient being monitored

## 2021-12-09 NOTE — PROGRESS NOTES
Pulmonary Critical Care Progress Note  Marina Talley M.D. Patient seen for the follow up of hypoxic respiratory failure, metabolic acidosis    Subjective:  She was taken back to the OR yesterday for third exploratory laparotomy with repair of ileocolonic anastomotic leak, abdominal washout, lysis of adhesions, revision of colostomy creation of colostomy mesh placement and wound VAC application. She remains on small amount of Cesar-Synephrine drip, currently at 135 mics. She is also on Precedex at 0.6 and propofol at 35 mics for sedation. She is on TPN. Diuresis is fair. Examination:  Vitals: BP (!) 148/57   Pulse 64   Temp 101.4 °F (38.6 °C) (Axillary)   Resp 25   Ht 5' 4.5\" (1.638 m)   Wt 187 lb (84.8 kg)   LMP  (LMP Unknown)   SpO2 98%   BMI 31.60 kg/m²   General appearance: sedated, intubated on ventilator  Neck: No JVD  Lungs: Decreased breath sounds no crackles or wheezing  Heart: regular rate and rhythm, S1, S2 normal, no gallop  Abdomen: Soft, non tender, positive wound VAC  Extremities: no cyanosis or clubbing.  +++ edema    LABs:  CBC:   Recent Labs     12/08/21  0913 12/08/21  1525 12/09/21  0029 12/09/21  0515   WBC 7.8  --   --  12.7*   HGB 8.0*   < > 9.0* 11.1*   HCT 24.7*   < > 28.4* 33.7*     --   --  577*    < > = values in this interval not displayed.      BMP:   Recent Labs     12/08/21  0913 12/09/21  0515    133*   K 3.4* 4.2   CO2 28 25   BUN 18 15   CREATININE <0.40* <0.40*   LABGLOM Can not be calculated Can not be calculated   GLUCOSE 213* 188*     ABG:  Lab Results   Component Value Date    UNM7FWT NOT REPORTED 12/09/2021    FIO2 50.0 12/09/2021       Lab Results   Component Value Date    POCPH 7.462 12/09/2021    POCPCO2 35.9 12/09/2021    POCPO2 71.8 12/09/2021    POCHCO3 25.7 12/09/2021    PBEA 2 12/09/2021    CIL4WXF NOT REPORTED 12/09/2021    ISHP0IQF 95 12/09/2021    FIO2 50.0 12/09/2021     Radiology:  Chest x-ray 12/9/21        Impression:  · Acute respiratory insufficiency requiring ventilator support  · Feculent peritonitis s/p exploratory laparotomy with end colostomy/pelvic drain placement  · COPD  · History of GERD/dyslipidemia/hypothyroidism/hypertension  · Metabolic acidosis  · Bilateral pleural infiltrate/pulmonary edema    Recommendations:  · Vent support, FiO2 50%/PEEP 8.    · CPAP trial as tolerated  · Propofol for sedation, RASS goal -1 to -2  · Stop Precedex drip  · Increase fentanyl to  mics every hour as needed for pain  · DuoNeb by nebulizer  · Titrate Cesar-Synephrine for map 65 or greater  · Continue diuresis IV Lasix 40 mg twice daily  · TPN/ monitor liver function/ off TF  · Monitor hemoglobin, transfuse for hemoglobin less than 7  · Zosyn/ off Flagyl  · Magnesium sliding scale  · Wound care/ wound VAC  · X-ray chest in am  · Discussed with RN   · Peptic ulcer disease prophylaxis  · DVT prophylaxis, subcu heparin   · We will follow with you      Electronically signed by     Satnam Boucher MD on 12/9/2021 at 12:40 PM  Pulmonary Critical Care and Sleep Medicine,  [facility]  Cell: [phone]  Office: [phone]  CC: 35 minutes

## 2021-12-09 NOTE — BRIEF OP NOTE
Brief Postoperative Note      Patient: Luz Fields  YOB: 1950  MRN: [id number]    Date of Procedure: 12/8/2021    Pre-Op Diagnosis: Ileocolonic anastomotic leak    Post-Op Diagnosis: Same       Procedure(s):  LAPAROTOMY EXPLORATORY, REPAIR OF ILEOCOLONIC ANASTAMOSIS, ABDOMINAL WASH OUT, LYSIS OF ADHESIONS, REVISION OF COLOSTOMY, CREATION OF ILEOSTOMY WITH INSERTION OF RED RUBBER CATHETER AND WOUND VAC PLACEMENT, PLACEMENT OF STRATTICE BIOLOGIC MESH    Surgeon(s):  Arabella Banda MD    Assistant:  Resident: Rod Mejias MD    Anesthesia: General    Estimated Blood Loss (mL): 888    Complications: None    Specimens:   ID Type Source Tests Collected by Time Destination   A : SMALL BOWEL Tissue Abdomen SURGICAL PATHOLOGY Arabella Banda MD 12/8/2021 2127        Implants:  Implant Name Type Inv. Item Serial No.  Lot No. LRB No. Used Action   GRAFT BLGCL TSSUE [id number] [facility] MS MTRX RCNSTRCTVE PLBLE  GRAFT BLGCL TSSUE [id number] [facility] [place] [address] [address] [id number] N/A 1 Implanted         Drains:   Closed/Suction Drain Inferior; Midline Abdomen Bulb 19 Chinese (Active)   Site Description Reddened 12/08/21 1915   Dressing Status Clean; Dry; Intact 12/08/21 1915   Drainage Appearance Purulent; Araceli Huerfano 12/08/21 1915   Status To bulb suction 12/08/21 1915   Output (ml) 30 ml 12/08/21 1915       Open Drain Superior; Midline Abdomen 16 Chinese (Active)       NG/OG/NJ/NE Tube Nasogastric 8 fr Left nostril (Active)   Surrounding Skin Dry;  Intact 12/08/21 1600   Securement device Yes 12/08/21 1600   Status Suction-low intermittent 12/08/21 1600   Placement Verified by External Catheter Length; by Gastric Contents 12/08/21 1600   NG/OG/NJ/NE External Measurement (cm) 73 cm 12/08/21 1600   Drainage Appearance Green 12/08/21 1915   Residual Volume (ml) 50 ml 12/03/21 0000   Output (mL) 300 ml 12/08/21 1915       Colostomy RUQ  (Active)   Stomal Appliance 2 piece; Clean; Dry; Pt called and informed of final lab INR result at  2.1. Instructed pt to remain on same TWD of 12.5 mg per AAC flowsheet. Appmt. was made to be have INR re-checked in 1 week, in [place] lab on 8/16. Standing orders into [facility] for PT/INR.FYI to Dr. Espinal's office. Pt stated she will be starting  Colchicine on 8/8. Per micromedex, Drug-Drug Interactions (None found).      Findings: Ileocolonic anastomotic leak, transverse colon staple line leak, necrotic colostomy stoma, gross intra-abdominal spillage.     Electronically signed by Jose J Delgado MD on 12/9/2021 at 1:10 AM

## 2021-12-09 NOTE — ANESTHESIA PROCEDURE NOTES
Arterial Line:    An arterial line was placed using surface landmarks, in the OR for the following indication(s): continuous blood pressure monitoring and blood sampling needed. A 20 gauge (size), (length), Arrow (type) catheter was placed, Seldinger technique used, into the right radial artery, secured by tape and Tegaderm. Events:  patient tolerated procedure well with no complications and EBL 0mL.   12/8/2021 8:17 PM12/8/2021 8:19 PM  Anesthesiologist: Niesha Calvo MD  Performed: Anesthesiologist   Preanesthetic Checklist  Completed: patient identified, IV checked, site marked, risks and benefits discussed, surgical consent, monitors and equipment checked, pre-op evaluation, timeout performed, anesthesia consent given, oxygen available and patient being monitored

## 2021-12-10 ENCOUNTER — APPOINTMENT (OUTPATIENT)
Dept: GENERAL RADIOLOGY | Age: 71
DRG: 329 | End: 2021-12-10
Attending: COLON & RECTAL SURGERY
Payer: MEDICARE

## 2021-12-10 LAB
ABO/RH: NORMAL
ABSOLUTE EOS #: 0 K/UL (ref 0–0.4)
ABSOLUTE IMMATURE GRANULOCYTE: 0.8 K/UL (ref 0–0.3)
ABSOLUTE LYMPH #: 0.67 K/UL (ref 1–4.8)
ABSOLUTE MONO #: 1.46 K/UL (ref 0.2–0.8)
ALBUMIN SERPL-MCNC: 1.8 G/DL (ref 3.5–5.2)
ALBUMIN/GLOBULIN RATIO: ABNORMAL (ref 1–2.5)
ALLEN TEST: ABNORMAL
ALP BLD-CCNC: 77 U/L (ref 35–104)
ALT SERPL-CCNC: 5 U/L (ref 5–33)
ANION GAP SERPL CALCULATED.3IONS-SCNC: 13 MMOL/L (ref 9–17)
ANTIBODY SCREEN: NEGATIVE
ARM BAND NUMBER: NORMAL
AST SERPL-CCNC: 14 U/L
BASOPHILS # BLD: 1 %
BASOPHILS ABSOLUTE: 0.13 K/UL (ref 0–0.2)
BILIRUB SERPL-MCNC: 2.22 MG/DL (ref 0.3–1.2)
BLD PROD TYP BPU: NORMAL
BUN BLDV-MCNC: 15 MG/DL (ref 8–23)
BUN/CREAT BLD: ABNORMAL (ref 9–20)
CALCIUM SERPL-MCNC: 7.6 MG/DL (ref 8.6–10.4)
CHLORIDE BLD-SCNC: 100 MMOL/L (ref 98–107)
CO2: 23 MMOL/L (ref 20–31)
CREAT SERPL-MCNC: <0.4 MG/DL (ref 0.5–0.9)
CROSSMATCH RESULT: NORMAL
DIFFERENTIAL TYPE: ABNORMAL
DISPENSE STATUS BLOOD BANK: NORMAL
EOSINOPHILS RELATIVE PERCENT: 0 % (ref 1–4)
EXPIRATION DATE: NORMAL
FIO2: 30
GFR AFRICAN AMERICAN: ABNORMAL ML/MIN
GFR NON-AFRICAN AMERICAN: ABNORMAL ML/MIN
GFR SERPL CREATININE-BSD FRML MDRD: ABNORMAL ML/MIN/{1.73_M2}
GFR SERPL CREATININE-BSD FRML MDRD: ABNORMAL ML/MIN/{1.73_M2}
GLUCOSE BLD-MCNC: 146 MG/DL (ref 65–105)
GLUCOSE BLD-MCNC: 186 MG/DL (ref 65–105)
GLUCOSE BLD-MCNC: 195 MG/DL (ref 65–105)
GLUCOSE BLD-MCNC: 227 MG/DL (ref 65–105)
GLUCOSE BLD-MCNC: 255 MG/DL (ref 70–99)
HCT VFR BLD CALC: 29.7 % (ref 36.3–47.1)
HEMOGLOBIN: 9.7 G/DL (ref 11.9–15.1)
IMMATURE GRANULOCYTES: 6 %
LYMPHOCYTES # BLD: 5 % (ref 24–44)
MAGNESIUM: 2 MG/DL (ref 1.6–2.6)
MCH RBC QN AUTO: 29.2 PG (ref 25.2–33.5)
MCHC RBC AUTO-ENTMCNC: 32.7 G/DL (ref 28.4–34.8)
MCV RBC AUTO: 89.5 FL (ref 82.6–102.9)
MODE: ABNORMAL
MONOCYTES # BLD: 11 % (ref 1–7)
MORPHOLOGY: ABNORMAL
NEGATIVE BASE EXCESS, ART: ABNORMAL (ref 0–2)
NRBC AUTOMATED: 0 PER 100 WBC
O2 DEVICE/FLOW/%: ABNORMAL
PATIENT TEMP: ABNORMAL
PDW BLD-RTO: 14.9 % (ref 11.8–14.4)
PHOSPHORUS: 3 MG/DL (ref 2.6–4.5)
PLATELET # BLD: 542 K/UL (ref 138–453)
PLATELET ESTIMATE: ABNORMAL
PMV BLD AUTO: 11.5 FL (ref 8.1–13.5)
POC HCO3: 27.4 MMOL/L (ref 21–28)
POC O2 SATURATION: 95 % (ref 94–98)
POC PCO2 TEMP: ABNORMAL MM HG
POC PCO2: 37.7 MM HG (ref 35–48)
POC PH TEMP: ABNORMAL
POC PH: 7.47 (ref 7.35–7.45)
POC PO2 TEMP: ABNORMAL MM HG
POC PO2: 71.8 MM HG (ref 83–108)
POSITIVE BASE EXCESS, ART: 4 (ref 0–3)
POTASSIUM SERPL-SCNC: 3.6 MMOL/L (ref 3.7–5.3)
RBC # BLD: 3.32 M/UL (ref 3.95–5.11)
RBC # BLD: ABNORMAL 10*6/UL
SAMPLE SITE: ABNORMAL
SEG NEUTROPHILS: 77 % (ref 36–66)
SEGMENTED NEUTROPHILS ABSOLUTE COUNT: 10.24 K/UL (ref 1.8–7.7)
SODIUM BLD-SCNC: 136 MMOL/L (ref 135–144)
TCO2 (CALC), ART: ABNORMAL MMOL/L (ref 22–29)
TOTAL PROTEIN: 4.5 G/DL (ref 6.4–8.3)
TRANSFUSION STATUS: NORMAL
UNIT DIVISION: 0
UNIT NUMBER: NORMAL
WBC # BLD: 13.3 K/UL (ref 3.5–11.3)
WBC # BLD: ABNORMAL 10*3/UL

## 2021-12-10 PROCEDURE — 6360000002 HC RX W HCPCS: Performed by: STUDENT IN AN ORGANIZED HEALTH CARE EDUCATION/TRAINING PROGRAM

## 2021-12-10 PROCEDURE — 6370000000 HC RX 637 (ALT 250 FOR IP): Performed by: STUDENT IN AN ORGANIZED HEALTH CARE EDUCATION/TRAINING PROGRAM

## 2021-12-10 PROCEDURE — 94761 N-INVAS EAR/PLS OXIMETRY MLT: CPT

## 2021-12-10 PROCEDURE — 71045 X-RAY EXAM CHEST 1 VIEW: CPT

## 2021-12-10 PROCEDURE — 82947 ASSAY GLUCOSE BLOOD QUANT: CPT

## 2021-12-10 PROCEDURE — 2500000003 HC RX 250 WO HCPCS: Performed by: COLON & RECTAL SURGERY

## 2021-12-10 PROCEDURE — 97530 THERAPEUTIC ACTIVITIES: CPT

## 2021-12-10 PROCEDURE — 37799 UNLISTED PX VASCULAR SURGERY: CPT

## 2021-12-10 PROCEDURE — 99212 OFFICE O/P EST SF 10 MIN: CPT

## 2021-12-10 PROCEDURE — 6360000002 HC RX W HCPCS: Performed by: NURSE PRACTITIONER

## 2021-12-10 PROCEDURE — 2500000003 HC RX 250 WO HCPCS: Performed by: STUDENT IN AN ORGANIZED HEALTH CARE EDUCATION/TRAINING PROGRAM

## 2021-12-10 PROCEDURE — 2580000003 HC RX 258: Performed by: STUDENT IN AN ORGANIZED HEALTH CARE EDUCATION/TRAINING PROGRAM

## 2021-12-10 PROCEDURE — C9113 INJ PANTOPRAZOLE SODIUM, VIA: HCPCS | Performed by: STUDENT IN AN ORGANIZED HEALTH CARE EDUCATION/TRAINING PROGRAM

## 2021-12-10 PROCEDURE — 82803 BLOOD GASES ANY COMBINATION: CPT

## 2021-12-10 PROCEDURE — 83735 ASSAY OF MAGNESIUM: CPT

## 2021-12-10 PROCEDURE — 2700000000 HC OXYGEN THERAPY PER DAY

## 2021-12-10 PROCEDURE — 80053 COMPREHEN METABOLIC PANEL: CPT

## 2021-12-10 PROCEDURE — 85025 COMPLETE CBC W/AUTO DIFF WBC: CPT

## 2021-12-10 PROCEDURE — 6360000002 HC RX W HCPCS: Performed by: INTERNAL MEDICINE

## 2021-12-10 PROCEDURE — 2000000000 HC ICU R&B

## 2021-12-10 PROCEDURE — 94003 VENT MGMT INPAT SUBQ DAY: CPT

## 2021-12-10 PROCEDURE — 84100 ASSAY OF PHOSPHORUS: CPT

## 2021-12-10 RX ORDER — FENTANYL CITRATE 50 UG/ML
50 INJECTION, SOLUTION INTRAMUSCULAR; INTRAVENOUS
Status: DISCONTINUED | OUTPATIENT
Start: 2021-12-10 | End: 2021-12-22

## 2021-12-10 RX ORDER — FENTANYL CITRATE 50 UG/ML
100 INJECTION, SOLUTION INTRAMUSCULAR; INTRAVENOUS
Status: DISCONTINUED | OUTPATIENT
Start: 2021-12-10 | End: 2021-12-22

## 2021-12-10 RX ADMIN — INSULIN LISPRO 1 UNITS: 100 INJECTION, SOLUTION INTRAVENOUS; SUBCUTANEOUS at 17:32

## 2021-12-10 RX ADMIN — SODIUM CHLORIDE 0.6 MCG/KG/HR: 9 INJECTION, SOLUTION INTRAVENOUS at 10:16

## 2021-12-10 RX ADMIN — INSULIN LISPRO 3 UNITS: 100 INJECTION, SOLUTION INTRAVENOUS; SUBCUTANEOUS at 06:52

## 2021-12-10 RX ADMIN — HEPARIN SODIUM 5000 UNITS: 5000 INJECTION INTRAVENOUS; SUBCUTANEOUS at 21:38

## 2021-12-10 RX ADMIN — PIPERACILLIN AND TAZOBACTAM 3375 MG: 3; .375 INJECTION, POWDER, LYOPHILIZED, FOR SOLUTION INTRAVENOUS at 15:04

## 2021-12-10 RX ADMIN — SODIUM CHLORIDE, PRESERVATIVE FREE 10 ML: 5 INJECTION INTRAVENOUS at 22:08

## 2021-12-10 RX ADMIN — PIPERACILLIN AND TAZOBACTAM 3375 MG: 3; .375 INJECTION, POWDER, LYOPHILIZED, FOR SOLUTION INTRAVENOUS at 07:55

## 2021-12-10 RX ADMIN — FENTANYL CITRATE 100 MCG: 50 INJECTION INTRAMUSCULAR; INTRAVENOUS at 19:21

## 2021-12-10 RX ADMIN — PHENYLEPHRINE HYDROCHLORIDE 60 MCG/MIN: 10 INJECTION INTRAVENOUS at 10:16

## 2021-12-10 RX ADMIN — SODIUM CHLORIDE, PRESERVATIVE FREE 10 ML: 5 INJECTION INTRAVENOUS at 08:21

## 2021-12-10 RX ADMIN — Medication 1 MG/HR: at 20:00

## 2021-12-10 RX ADMIN — FENTANYL CITRATE 50 MCG: 50 INJECTION INTRAMUSCULAR; INTRAVENOUS at 12:34

## 2021-12-10 RX ADMIN — PROPOFOL 40 MCG/KG/MIN: 10 INJECTION, EMULSION INTRAVENOUS at 17:38

## 2021-12-10 RX ADMIN — FENTANYL CITRATE 50 MCG: 50 INJECTION, SOLUTION INTRAMUSCULAR; INTRAVENOUS at 02:52

## 2021-12-10 RX ADMIN — FUROSEMIDE 40 MG: 10 INJECTION, SOLUTION INTRAMUSCULAR; INTRAVENOUS at 08:20

## 2021-12-10 RX ADMIN — FENTANYL CITRATE 50 MCG: 50 INJECTION, SOLUTION INTRAMUSCULAR; INTRAVENOUS at 08:20

## 2021-12-10 RX ADMIN — INSULIN LISPRO 2 UNITS: 100 INJECTION, SOLUTION INTRAVENOUS; SUBCUTANEOUS at 00:15

## 2021-12-10 RX ADMIN — FUROSEMIDE 40 MG: 10 INJECTION, SOLUTION INTRAMUSCULAR; INTRAVENOUS at 21:39

## 2021-12-10 RX ADMIN — PROPOFOL 50 MCG/KG/MIN: 10 INJECTION, EMULSION INTRAVENOUS at 21:34

## 2021-12-10 RX ADMIN — FENTANYL CITRATE 100 MCG: 50 INJECTION INTRAMUSCULAR; INTRAVENOUS at 17:32

## 2021-12-10 RX ADMIN — PROPOFOL 40 MCG/KG/MIN: 10 INJECTION, EMULSION INTRAVENOUS at 12:44

## 2021-12-10 RX ADMIN — FENTANYL CITRATE 100 MCG: 50 INJECTION INTRAMUSCULAR; INTRAVENOUS at 16:04

## 2021-12-10 RX ADMIN — PROPOFOL 40 MCG/KG/MIN: 10 INJECTION, EMULSION INTRAVENOUS at 03:54

## 2021-12-10 RX ADMIN — PANTOPRAZOLE SODIUM 40 MG: 40 INJECTION, POWDER, FOR SOLUTION INTRAVENOUS at 08:21

## 2021-12-10 RX ADMIN — PHENYLEPHRINE HYDROCHLORIDE 100 MCG/MIN: 10 INJECTION INTRAVENOUS at 00:17

## 2021-12-10 RX ADMIN — INSULIN LISPRO 1 UNITS: 100 INJECTION, SOLUTION INTRAVENOUS; SUBCUTANEOUS at 12:31

## 2021-12-10 RX ADMIN — HEPARIN SODIUM 5000 UNITS: 5000 INJECTION INTRAVENOUS; SUBCUTANEOUS at 08:20

## 2021-12-10 RX ADMIN — PIPERACILLIN AND TAZOBACTAM 3375 MG: 3; .375 INJECTION, POWDER, LYOPHILIZED, FOR SOLUTION INTRAVENOUS at 21:56

## 2021-12-10 RX ADMIN — POTASSIUM CHLORIDE: 2 INJECTION, SOLUTION, CONCENTRATE INTRAVENOUS at 17:42

## 2021-12-10 RX ADMIN — SODIUM CHLORIDE 0.6 MCG/KG/HR: 9 INJECTION, SOLUTION INTRAVENOUS at 02:48

## 2021-12-10 RX ADMIN — PROPOFOL 40 MCG/KG/MIN: 10 INJECTION, EMULSION INTRAVENOUS at 08:16

## 2021-12-10 ASSESSMENT — PULMONARY FUNCTION TESTS
PIF_VALUE: 26
PIF_VALUE: 24
PIF_VALUE: 25
PIF_VALUE: 25
PIF_VALUE: 23

## 2021-12-10 NOTE — PROGRESS NOTES
[facility] Colorectal Surgery:  Daily Progress Note               PATIENT NAME: Tre Brenner   TODAY'S DATE: 12/10/2021, 10:12 AM    SUBJECTIVE:     Patient seen and evaluated. No acute events overnight. Phenylephrine down to 60 mcg/min, urine output good. 500 cc bilious output from NG yesterday. 722 cc from wound VAC, 545 cc SHANA drain serosanguineous. Mucous fistula/colostomy with 550 cc stool. White count of 13.3 this morning, hemoglobin 9.7 from 10.8. Bilirubin 2.22. OBJECTIVE:   VITALS:  BP (!) 110/51   Pulse 65   Temp 97.5 °F (36.4 °C) (Temporal)   Resp 19   Ht 5' 4.5\" (1.638 m)   Wt 187 lb (84.8 kg)   LMP  (LMP Unknown)   SpO2 98%   BMI 31.60 kg/m²      INTAKE/OUTPUT:      Intake/Output Summary (Last 24 hours) at 12/10/2021 1012  Last data filed at 12/10/2021 0800  Gross per 24 hour   Intake 6100.06 ml   Output 4192 ml   Net 1908.06 ml       PHYSICAL EXAM:  General Appearance: Unresponsive, sedated, critically ill  HEENT:  Normocephalic, atraumatic, mucus membranes moist   Heart: Heart regular rate, requiring pressors for blood pressure support. Lungs: Equal chest rise bilaterally, mechanically ventilated. Abdomen: Soft, wound VAC to -25 mmHg in place with serosanguineous output, SHANA drain serosanguineous, red rubber catheter draining succus in the bag. LLQ mucus fistula with ostomy appliance in place. Extremities: No cyanosis, pitting edema, rashes noted. Skin: Skin color, texture, turgor normal. No rashes or lesions.     Data:  CBC with Differential:    Lab Results   Component Value Date    WBC 13.3 12/10/2021    RBC 3.32 12/10/2021    HGB 9.7 12/10/2021    HCT 29.7 12/10/2021     12/10/2021    MCV 89.5 12/10/2021    MCH 29.2 12/10/2021    MCHC 32.7 12/10/2021    RDW 14.9 12/10/2021    LYMPHOPCT 5 12/10/2021    MONOPCT 11 12/10/2021    BASOPCT 1 12/10/2021    MONOSABS 1.46 12/10/2021    LYMPHSABS 0.67 12/10/2021    EOSABS 0.00 12/10/2021    BASOSABS 0.13 12/10/2021    DIFFTYPE NOT REPORTED 12/10/2021     BMP:    Lab Results   Component Value Date     12/10/2021    K 3.6 12/10/2021     12/10/2021    CO2 23 12/10/2021    BUN 15 12/10/2021    LABALBU 1.8 12/10/2021    CREATININE <0.40 12/10/2021    CALCIUM 7.6 12/10/2021    GFRAA Can not be calculated 12/10/2021    LABGLOM Can not be calculated 12/10/2021    GLUCOSE 255 12/10/2021     Magnesium:    Lab Results   Component Value Date    MG 2.0 12/10/2021     Phosphorus:    Lab Results   Component Value Date    PHOS 3.0 12/10/2021     Radiology Review:     ASSESSMENT:  Active Hospital Problems    Diagnosis Date Noted    Stricture of sigmoid colon [facility]) [F15.089] 11/24/2021     80 y.o. female with sigmoid colon stricture. 11/24/21: Status post exploratory laparotomy with explantation pelvic mesh and mobilization sigmoid and proximal rectum, status post ileocecectomy with ileocolonic anastomosis. 12/1/21: Status post ex lap, creation of end colostomy, pelvic drain placement    12/8/21: Exploratory laparotomy, repair of ileocolonic anastomosis, abdominal washout, lysis of adhesions, revision of colostomy, creation of ileostomy with insertion of red rubber catheter, placement of Stratus biologic mesh, wound VAC application. Plan:  -Appreciate critical care management  -Continue strict n.p.o.  -Monitor I's and O's, replete electrolytes as needed  -Continue TPN  -Continue NGT to LIWS  -Continue red rubber catheter to gravity. This is functioning as a conduit end ileostomy.  -Please [employer] on call resident tomorrow to assist with wound VAC exchange.  -Continue to monitor SHANA drain.  -Stoma appliance to colostomy/mucus fistula. -Continue IV abx  -OK for VTE ppx from surgery standpoint    Electronically signed by Fatmata Olsen MD  on 12/10/2021 at 10:12 AM         I Dr. Richie Foster saw and examined the patient. I have edited the above and agree with the above. Ashley Chavez  Colorectal Surgery

## 2021-12-10 NOTE — PLAN OF CARE
Problem: HEMODYNAMIC STATUS  Goal: Patient has stable vital signs and fluid balance  12/10/2021 1515 by Mariusz Steele RN  Outcome: Ongoing  12/10/2021 0548 by Chelsea Chang RN  Outcome: Ongoing     Problem: OXYGENATION/RESPIRATORY FUNCTION  Goal: Patient will achieve/maintain normal respiratory rate/effort  12/10/2021 1515 by Mariusz Steele RN  Outcome: Ongoing  12/10/2021 0548 by Chelsea Chang RN  Outcome: Ongoing     Problem: MOBILITY  Goal: Early mobilization is achieved  12/10/2021 1515 by Mariusz Steele RN  Outcome: Ongoing  12/10/2021 0548 by Chelsea Chang RN  Outcome: Ongoing     Problem: ELIMINATION  Goal: Elimination patterns are normal or improving  Description: Elimination patterns return to pre-surgery normal patterns  12/10/2021 1515 by Mariusz Steele RN  Outcome: Ongoing  12/10/2021 0548 by Chelsea Chang RN  Outcome: Ongoing     Problem: SKIN INTEGRITY  Goal: Skin integrity is maintained or improved  12/10/2021 1515 by Mariusz Steele RN  Outcome: Ongoing  12/10/2021 0548 by Chelsea Chang RN  Outcome: Ongoing     Problem: Pain:  Description: Pain management should include both nonpharmacologic and pharmacologic interventions.   Goal: Pain level will decrease  Description: Pain level will decrease  12/10/2021 1515 by Mariusz Steele RN  Outcome: Ongoing  12/10/2021 0548 by Chelsea Chang RN  Outcome: Ongoing  Goal: Control of acute pain  Description: Control of acute pain  12/10/2021 1515 by Mariusz Steele RN  Outcome: Ongoing  12/10/2021 0548 by Chelsea Chang RN  Outcome: Ongoing  Goal: Control of chronic pain  Description: Control of chronic pain  12/10/2021 1515 by Mariusz Steele RN  Outcome: Ongoing  12/10/2021 0548 by Chelsea Chang RN  Outcome: Ongoing     Problem: Falls - Risk of:  Goal: Will remain free from falls  Description: Will remain free from falls  12/10/2021 1515 by Mariusz Steele RN  Outcome: Ongoing  12/10/2021 0548 by Chelsea Chang RN  Outcome: Ongoing  Goal: Absence of physical injury  Description: Absence of physical injury  12/10/2021 1515 by Rigoberto Chacko RN  Outcome: Ongoing  12/10/2021 0548 by Willa Hernández RN  Outcome: Ongoing     Problem: Skin Integrity:  Goal: Will show no infection signs and symptoms  Description: Will show no infection signs and symptoms  12/10/2021 1515 by Rigoberto Chacko RN  Outcome: Ongoing  12/10/2021 0548 by Willa Hernández RN  Outcome: Ongoing  Goal: Absence of new skin breakdown  Description: Absence of new skin breakdown  12/10/2021 1515 by Rigoberto Chacko RN  Outcome: Ongoing  12/10/2021 0548 by Willa Hernández RN  Outcome: Ongoing     Problem: Nutrition  Goal: Optimal nutrition therapy  12/10/2021 1515 by Rigoberto Chacko RN  Outcome: Ongoing  12/10/2021 0548 by Willa Hernández RN  Outcome: Ongoing     Problem: Non-Violent Restraints  Goal: Removal from restraints as soon as assessed to be safe  12/10/2021 1515 by Rigoberto Chacko RN  Outcome: Ongoing  12/10/2021 0548 by Willa Hernández RN  Outcome: Ongoing  Goal: No harm/injury to patient while restraints in use  12/10/2021 1515 by Rigoberto Chacko RN  Outcome: Ongoing  12/10/2021 0548 by Willa Hernández RN  Outcome: Ongoing  Goal: Patient's dignity will be maintained  12/10/2021 1515 by Rigoberto Chacko RN  Outcome: Ongoing  12/10/2021 0548 by Willa Hernández RN  Outcome: Ongoing

## 2021-12-10 NOTE — PROGRESS NOTES
[facility]   Urology Progress Note            Subjective: follow-up urinary retention indwelling Rose        Patient Vitals for the past 24 hrs:   BP Temp Temp src Pulse Resp SpO2 Height   12/10/21 1600 (!) 131/55 97.7 °F (36.5 °C) Temporal 79 22 -- --   12/10/21 1445 (!) 136/56 -- -- 71 21 -- --   12/10/21 1441 -- -- -- 75 28 97 % --   12/10/21 1430 (!) 123/52 -- -- 74 26 -- --   12/10/21 1400 (!) 125/54 -- -- 75 23 95 % --   12/10/21 1345 (!) 111/56 -- -- 72 25 -- --   12/10/21 1330 (!) 106/52 -- -- 70 22 -- --   12/10/21 1300 (!) 114/55 -- -- 68 25 97 % --   12/10/21 1200 (!) 106/51 97.3 °F (36.3 °C) Temporal 66 23 97 % --   12/10/21 1124 -- -- -- 65 21 98 % --   12/10/21 1032 -- -- -- -- -- -- 5' 4.5\" (1.638 m)   12/10/21 1000 (!) 110/51 -- -- 65 19 -- --   12/10/21 0945 (!) 101/47 -- -- 62 23 -- --   12/10/21 0800 (!) 122/57 97.5 °F (36.4 °C) Temporal 64 24 -- --   12/10/21 0741 -- -- -- 64 24 98 % --   12/10/21 0600 (!) 124/57 -- -- 64 22 97 % --   12/10/21 0500 (!) 112/50 -- -- 65 21 97 % --   12/10/21 0418 -- -- -- -- 25 97 % --   12/10/21 0400 119/60 98.1 °F (36.7 °C) Axillary 65 23 97 % --   12/10/21 0356 -- -- -- 65 27 97 % --   12/10/21 0300 (!) 117/54 -- -- 68 25 96 % --   12/10/21 0200 (!) 123/56 -- -- 66 24 97 % --   12/10/21 0100 (!) 123/58 -- -- 67 25 96 % --   12/10/21 0003 -- -- -- 68 27 96 % --   12/10/21 0000 (!) 116/55 98.7 °F (37.1 °C) Axillary 71 29 94 % --   12/09/21 2300 (!) 119/56 -- -- 69 26 96 % --   12/09/21 2200 (!) 106/55 -- -- 69 27 96 % --   12/09/21 2100 (!) 118/55 -- -- 68 27 95 % --   12/09/21 2000 (!) 120/53 98.2 °F (36.8 °C) Axillary 64 26 97 % --   12/09/21 1900 (!) 122/53 -- -- 64 18 96 % --   12/09/21 1804 -- -- -- 64 24 96 % --       Intake/Output Summary (Last 24 hours) at 12/10/2021 1653  Last data filed at 12/10/2021 1600  Gross per 24 hour   Intake 5036.26 ml   Output 3622 ml   Net 1414.26 ml       Recent Labs     12/08/21  0913 12/08/21  1525 12/09/21  0515 12/09/21  0515 12/09/21  1330 12/09/21  1805 12/10/21  0530   WBC 7.8  --  12.7*  --   --   --  13.3*   HGB 8.0*   < > 11.1*   < > 10.9* 10.8* 9.7*   HCT 24.7*   < > 33.7*   < > 33.5* 33.2* 29.7*   MCV 91.1  --  89.6  --   --   --  89.5     --  577*  --   --   --  542*    < > = values in this interval not displayed. Recent Labs     12/08/21  0913 12/09/21  0515 12/10/21  0530    133* 136   K 3.4* 4.2 3.6*    99 100   CO2 28 25 23   PHOS 3.1 2.9 3.0   BUN 18 15 15   CREATININE <0.40* <0.40* <0.40*       No results for input(s): COLORU, PHUR, LABCAST, WBCUA, RBCUA, MUCUS, TRICHOMONAS, YEAST, BACTERIA, CLARITYU, SPECGRAV, LEUKOCYTESUR, UROBILINOGEN, BILIRUBINUR, BLOODU in the last 72 hours. Invalid input(s): NITRATE, GLUCOSEUKETONESUAMORPHOUS    Additional Lab/culture results:    Physical Exam: Reviewed status with nursing staff, patient still on vent support surgery notes reviewed    Interval Imaging Findings:    Impression:    Patient Active Problem List   Diagnosis    Intestinal adhesions    Obesity (BMI 30-39. 9)    Urinary incontinence    Constipation    Ventral incisional hernia    Smoker    Stricture of sigmoid colon ([facility] [address]       Plan: we will maintain an indwelling Rose with follow-up by urology as needed    Mirza Hernandez MD  4:53 PM 12/10/2021

## 2021-12-10 NOTE — CARE COORDINATION
Patient is still intubated. Spoke with  at the bedside. He would like a SNF around [facility]. Will make choices. Writer also discussed LTACH. Cont' to follow.

## 2021-12-10 NOTE — PLAN OF CARE
Nutrition Problem #1: Inadequate protein-energy intake  Intervention: Food and/or Nutrient Delivery: Continue NPO, Continue Current Parenteral Nutrition  Nutritional Goals: PN will meet greater than 75% of estimated nutrition needs

## 2021-12-10 NOTE — PROGRESS NOTES
Nutrition  Nutrition Education/Counseling:  Education not indicated   Coordination of Nutrition Care:  Continue to monitor while inpatient    Goals:  PN will meet greater than 75% of estimated nutrition needs       Nutrition Monitoring and Evaluation:   Behavioral-Environmental Outcomes:  None Identified   Food/Nutrient Intake Outcomes:  Parenteral Nutrition Intake/Tolerance  Physical Signs/Symptoms Outcomes:  Biochemical Data, Fluid Status or Edema, Skin, Weight, GI Status     Discharge Planning:     Too soon to determine         Fuentes PETERSENN, RDN, LDN  Lead Clinical Dietitian  RD Office Phone [phone]

## 2021-12-10 NOTE — PROGRESS NOTES
Physical Therapy  [facility]  Daily Treatment Note  NAME: Alejo Santacruz  : 1950  MRN: [id number]    Date of Service: 12/10/2021   NACHO Webb reports patient is medically stable for therapy treatment this date. Chart reviewed prior to treatment and patient is agreeable for therapy. All lines intact and patient positioned comfortably at end of treatment. All patient needs addressed prior to ending therapy session. Discharge Recommendations:  Patient would benefit from continued therapy after discharge      Assessment   Body structures, Functions, Activity limitations: Decreased functional mobility ; Decreased strength; Decreased safe awareness; Decreased balance; Decreased endurance; Increased pain  Assessment: Pt intubated/sedated this date. PROM performed to BLE this date for maintenance of joint mobility and ROM. Pt will require reassessment for mobility following extubation. Prognosis: Good  Decision Making: Medium Complexity  REQUIRES PT FOLLOW UP: Yes  Activity Tolerance  Activity Tolerance: Treatment limited secondary to medical complications (free text)  Activity Tolerance: Pt intubated/sedated. Pt w/ no changes in vitals throughout session, tolerated PROM well. Patient Diagnosis(es): The encounter diagnosis was Chest pain, unspecified type. has a past medical history of Abdominal pain, Arthritis, Constipation, COPD (chronic obstructive pulmonary disease) ([facility] [address] Depressed, GERD (gastroesophageal reflux disease), HLD (hyperlipidemia), HTN (hypertension), Hypothyroid, IBS (irritable bowel syndrome), Lumbar spondylolysis, Myofascial pain, Poor appetite, RLS (restless legs syndrome), and Wears glasses. has a past surgical history that includes Bladder surgery; Rectal surgery; Cystocele repair; Enterocele repair; Abdominal adhesion surgery (3/9/2015); Hysterectomy;  Appendectomy; Colonoscopy; Endoscopy, colon, diagnostic; eye surgery; Dilatation, esophagus; hernia repair; Tonsillectomy; Abdomen surgery (11/24/2021); Cystocopy (11/24/2021); colectomy (N/A, 11/24/2021); Cystoscopy (11/24/2021); sigmoidoscopy (11/24/2021); laparotomy (N/A, 12/1/2021); and laparotomy (N/A, 12/8/2021). Restrictions  Restrictions/Precautions  Restrictions/Precautions: General Precautions, Fall Risk  Required Braces or Orthoses?: No  Required Braces or Orthoses  Other: Abdominal Binder  Position Activity Restriction  Other position/activity restrictions: Intubated and sedated  Subjective   General  Response To Previous Treatment: Patient unable to report, no changes reported from family or staff  Family / Caregiver Present: No  Subjective  Subjective: Pt intubated/sedated, not alert this date. General Comment  Comments: RN reporting pt appropriate for BLE PROM, states she had done some BUE PROM earlier and pt tolerated well. RN reporting no current plans for extubation soon. Cognition   Cognition  Overall Cognitive Status: Exceptions  Arousal/Alertness: Unresponsive to stimuli  Following Commands: Does not follow commands  Cognition Comment: Pt intubated/sedated this date. Not alert. Objective   Bed mobility  Comment: Not assessed this date. PROM BLE only this date. Pt intubated/sedated. Transfers  Comment: Not assessed. PROM BLE only this date. Pt intubated/sedated. Ambulation  Ambulation?: No     Balance  Comments: Not assessed. PROM BLE only this date. Pt intubated/sedated. Exercises  Comments: Supine PROM x 15 reps BLE with limited range.   PROM WFL except ankle DF to neutral.      AM-PAC Score  AM-PAC Inpatient Mobility Raw Score : 6 (12/10/21 1206)  AM-PAC Inpatient T-Scale Score : 23.55 (12/10/21 1206)  Mobility Inpatient CMS 0-100% Score: 100 (12/10/21 1206)  Mobility Inpatient CMS G-Code Modifier : CN (12/10/21 1206)        Goals  Short term goals  Time Frame for Short term goals: 14 visits  Short term goal 1: PROM to maintain ROM and mobility in all joints x 4 extremities  Short term goal 2: REASSESS MOBILITY FOLLOWING EXTUBATION  Short term goal 3: Pt to actively participate in at least 30 minutes of physical therapy for ther act, ther ex, balance, gait, and endurance training  Short term goal 4: Pt to ascend/descend 2 steps w/ handrails [place]  Short term goal 5: Pt to actively participate in at least 30 minutes of physical therapy for ther act, ther ex, balance, gait, and endurance training  Patient Goals   Patient goals :  To go home    Plan    Plan  Times per week: 1-2x/day, 6-7x/week  Current Treatment Recommendations: Strengthening, Balance Training, Functional Mobility Training, Stair training, Gait Training, Transfer Training, Endurance Training, Neuromuscular Re-education, Safety Education & Training, Home Exercise Program, Equipment Evaluation, Education, & procurement, Patient/Caregiver Education & Training  Safety Devices  Type of devices: Bed alarm in place, Call light within reach, Nurse notified, Left in bed  Restraints  Initially in place: Yes  Restraints: B soft wrist restraints     Therapy Time   Individual Concurrent Group Co-treatment   Time In 0950         Time Out 1004         Minutes [address] [place], [place]

## 2021-12-10 NOTE — PROGRESS NOTES
Pulmonary Critical Care Progress Note  Rhonda Ortiz M.D. Patient seen for the follow up of hypoxic respiratory failure, metabolic acidosis    Subjective:  She remains sedated, intubated on ventilator. She is on propofol at 40 mics and Precedex at 0.6 currently. She remains on Cesar-Synephrine at 60 mics. CVP has been running anywhere from 3-7. She does open her eyes and move her legs. She is on TPN. She is diuresing very well with IV Lasix. Examination:  Vitals: BP (!) 110/51   Pulse 65   Temp 97.5 °F (36.4 °C) (Temporal)   Resp 21   Ht 5' 4.5\" (1.638 m)   Wt 187 lb (84.8 kg)   LMP  (LMP Unknown)   SpO2 98%   BMI 31.60 kg/m²   General appearance: sedated, intubated on ventilator  Neck: No JVD  Lungs: Decreased breath sounds no crackles or wheezing  Heart: regular rate and rhythm, S1, S2 normal, no gallop  Abdomen: Soft, non tender, positive wound VAC  Extremities: no cyanosis or clubbing.  ++ edema    LABs:  CBC:   Recent Labs     12/09/21  0515 12/09/21  1330 12/09/21  1805 12/10/21  0530   WBC 12.7*  --   --  13.3*   HGB 11.1*   < > 10.8* 9.7*   HCT 33.7*   < > 33.2* 29.7*   *  --   --  542*    < > = values in this interval not displayed.      BMP:   Recent Labs     12/09/21  0515 12/10/21  0530   * 136   K 4.2 3.6*   CO2 25 23   BUN 15 15   CREATININE <0.40* <0.40*   LABGLOM Can not be calculated Can not be calculated   GLUCOSE 188* 255*     ABG:  Lab Results   Component Value Date    XYQ2FWI NOT REPORTED 12/10/2021    FIO2 30.0 12/10/2021       Lab Results   Component Value Date    POCPH 7.470 12/10/2021    POCPCO2 37.7 12/10/2021    POCPO2 71.8 12/10/2021    POCHCO3 27.4 12/10/2021    PBEA 4 12/10/2021    BGU8WTS NOT REPORTED 12/10/2021    VEDZ8CNA 95 12/10/2021    FIO2 30.0 12/10/2021     Radiology:  Chest x-ray 12/10/21        Impression:  · Acute respiratory insufficiency requiring ventilator support  · Feculent peritonitis s/p exploratory laparotomy with end colostomy/pelvic drain placement  · COPD  · History of GERD/dyslipidemia/hypothyroidism/hypertension  · Metabolic acidosis  · Bilateral pleural infiltrate/pulmonary edema    Recommendations:  · Vent support, FiO2 30%/PEEP 8.   We will start CPAP trials after first dressing change, Sunday  · Propofol for sedation, RASS goal -1 to -2  · Stop Precedex drip  · Fentanyl to  mics every hour as needed for pain  · DuoNeb by nebulizer  · Titrate Cesar-Synephrine for map 65 or greater, currently on 60 mics  · Continue diuresis IV Lasix 40 mg twice daily  · TPN/ monitor liver function/ off TF  · Monitor hemoglobin, transfuse for hemoglobin less than 7  · Zosyn/ off Flagyl  · Wound care/ wound VAC  · X-ray chest in am  · Discussed with RN   · Peptic ulcer disease prophylaxis  · DVT prophylaxis, subcu heparin   · We will follow with you    Electronically signed by     Beecher Kehr, MD on 12/10/2021 at 2:35 PM  Pulmonary Critical Care and Sleep Medicine,  [facility]  Cell: [phone]  Office: [phone]

## 2021-12-10 NOTE — PROGRESS NOTES
[facility] Wound Ostomy Continence Nursing  Follow Up      NAME:  Franklin Allred RECORD NUMBER:  [id number]  AGE: 80 y.o. GENDER: female  : 1950  TODAY'S DATE:  12/10/2021      [id number] [facility] nursing visit today. Case discussed with general surgery- requesting to change wound vac dressing this weekend while keeping the red rubber catheter that has catheterized the new ileostomy within the wound bed. This is being planned for . The previous ostomy site was packed with saline moistened gauze on the section where the stoma is  at junction with exposed peritoneal tissue. Will plan to visit 21 to perform midline wound vac dressing change.     Raymundo Jordan MSN RN, CWS, [facility] [address]  Wound, Ostomy, and Continence Nursing  [phone]

## 2021-12-10 NOTE — PLAN OF CARE
Problem: HEMODYNAMIC STATUS  Goal: Patient has stable vital signs and fluid balance  12/10/2021 0548 by Chelsea Chang RN  Outcome: Ongoing  12/9/2021 1846 by Mariusz Steele RN  Outcome: Ongoing     Problem: OXYGENATION/RESPIRATORY FUNCTION  Goal: Patient will achieve/maintain normal respiratory rate/effort  12/10/2021 0548 by Chelsea Chang RN  Outcome: Ongoing  12/9/2021 1846 by Mariusz Steele RN  Outcome: Ongoing     Problem: MOBILITY  Goal: Early mobilization is achieved  12/10/2021 0548 by Chelsea Chang RN  Outcome: Ongoing  12/9/2021 1846 by Mariusz Steele RN  Outcome: Ongoing     Problem: ELIMINATION  Goal: Elimination patterns are normal or improving  Description: Elimination patterns return to pre-surgery normal patterns  12/10/2021 0548 by Chelsea Chang RN  Outcome: Ongoing  12/9/2021 1846 by Mariusz Steele RN  Outcome: Ongoing     Problem: SKIN INTEGRITY  Goal: Skin integrity is maintained or improved  12/10/2021 0548 by Chelsea Chang RN  Outcome: Ongoing  12/9/2021 1846 by Mariusz Steele RN  Outcome: Ongoing     Problem: Pain:  Goal: Pain level will decrease  Description: Pain level will decrease  12/10/2021 0548 by Chelsea Chang RN  Outcome: Ongoing  12/9/2021 1846 by Mariusz Steele RN  Outcome: Ongoing  Goal: Control of acute pain  Description: Control of acute pain  12/10/2021 0548 by Chelsea Chang RN  Outcome: Ongoing  12/9/2021 1846 by Mariusz Steele RN  Outcome: Ongoing  Goal: Control of chronic pain  Description: Control of chronic pain  12/10/2021 0548 by Chelsea Chang RN  Outcome: Ongoing  12/9/2021 1846 by Mariusz Steele RN  Outcome: Ongoing     Problem: Falls - Risk of:  Goal: Will remain free from falls  Description: Will remain free from falls  12/10/2021 0548 by Chelsea Chang RN  Outcome: Ongoing  12/9/2021 1846 by Mariusz Steele RN  Outcome: Ongoing  Goal: Absence of physical injury  Description: Absence of physical injury  12/10/2021 0548 by Chelsea Chang RN  Outcome: Ongoing  12/9/2021 1846 by Lisandra Maddox RN  Outcome: Ongoing     Problem: Skin Integrity:  Goal: Will show no infection signs and symptoms  Description: Will show no infection signs and symptoms  12/10/2021 0548 by Tony Montana RN  Outcome: Ongoing  12/9/2021 1846 by Lisandra Maddox RN  Outcome: Ongoing  Goal: Absence of new skin breakdown  Description: Absence of new skin breakdown  12/10/2021 0548 by Tony Montana RN  Outcome: Ongoing  12/9/2021 1846 by Lisandra Maddox RN  Outcome: Ongoing     Problem: Nutrition  Goal: Optimal nutrition therapy  12/10/2021 0548 by Tony Montana RN  Outcome: Ongoing  12/9/2021 1846 by Lisandra Maddox RN  Outcome: Ongoing     Problem: Non-Violent Restraints  Goal: Removal from restraints as soon as assessed to be safe  12/10/2021 0548 by Tony Montana RN  Outcome: Ongoing  12/9/2021 1846 by Lisandra Maddox RN  Outcome: Ongoing  Goal: No harm/injury to patient while restraints in use  12/10/2021 0548 by Tony Montana RN  Outcome: Ongoing  12/9/2021 1846 by Lisandra Maddox RN  Outcome: Ongoing  Goal: Patient's dignity will be maintained  12/10/2021 0548 by Tony Montana RN  Outcome: Ongoing  12/9/2021 1846 by Lisandra Maddox RN  Outcome: Ongoing

## 2021-12-11 ENCOUNTER — APPOINTMENT (OUTPATIENT)
Dept: GENERAL RADIOLOGY | Age: 71
DRG: 329 | End: 2021-12-11
Attending: COLON & RECTAL SURGERY
Payer: MEDICARE

## 2021-12-11 LAB
ABSOLUTE EOS #: 0.1 K/UL (ref 0–0.4)
ABSOLUTE IMMATURE GRANULOCYTE: 1.49 K/UL (ref 0–0.3)
ABSOLUTE LYMPH #: 0.89 K/UL (ref 1–4.8)
ABSOLUTE MONO #: 0.99 K/UL (ref 0.2–0.8)
ALLEN TEST: ABNORMAL
ANION GAP SERPL CALCULATED.3IONS-SCNC: 11 MMOL/L (ref 9–17)
BASOPHILS # BLD: 1 %
BASOPHILS ABSOLUTE: 0.1 K/UL (ref 0–0.2)
BUN BLDV-MCNC: 14 MG/DL (ref 8–23)
BUN/CREAT BLD: ABNORMAL (ref 9–20)
CALCIUM SERPL-MCNC: 7.7 MG/DL (ref 8.6–10.4)
CHLORIDE BLD-SCNC: 100 MMOL/L (ref 98–107)
CO2: 27 MMOL/L (ref 20–31)
CREAT SERPL-MCNC: <0.4 MG/DL (ref 0.5–0.9)
DIFFERENTIAL TYPE: ABNORMAL
EOSINOPHILS RELATIVE PERCENT: 1 % (ref 1–4)
FIO2: 30
GFR AFRICAN AMERICAN: ABNORMAL ML/MIN
GFR NON-AFRICAN AMERICAN: ABNORMAL ML/MIN
GFR SERPL CREATININE-BSD FRML MDRD: ABNORMAL ML/MIN/{1.73_M2}
GFR SERPL CREATININE-BSD FRML MDRD: ABNORMAL ML/MIN/{1.73_M2}
GLUCOSE BLD-MCNC: 130 MG/DL (ref 65–105)
GLUCOSE BLD-MCNC: 134 MG/DL (ref 65–105)
GLUCOSE BLD-MCNC: 142 MG/DL (ref 65–105)
GLUCOSE BLD-MCNC: 143 MG/DL (ref 65–105)
GLUCOSE BLD-MCNC: 154 MG/DL (ref 65–105)
GLUCOSE BLD-MCNC: 154 MG/DL (ref 70–99)
HCT VFR BLD CALC: 27.2 % (ref 36.3–47.1)
HEMOGLOBIN: 8.9 G/DL (ref 11.9–15.1)
IMMATURE GRANULOCYTES: 15 %
LYMPHOCYTES # BLD: 9 % (ref 24–44)
MAGNESIUM: 1.8 MG/DL (ref 1.6–2.6)
MCH RBC QN AUTO: 29.4 PG (ref 25.2–33.5)
MCHC RBC AUTO-ENTMCNC: 32.7 G/DL (ref 28.4–34.8)
MCV RBC AUTO: 89.8 FL (ref 82.6–102.9)
MODE: ABNORMAL
MONOCYTES # BLD: 10 % (ref 1–7)
MORPHOLOGY: ABNORMAL
MORPHOLOGY: ABNORMAL
NEGATIVE BASE EXCESS, ART: ABNORMAL (ref 0–2)
NRBC AUTOMATED: 0 PER 100 WBC
O2 DEVICE/FLOW/%: ABNORMAL
PATIENT TEMP: ABNORMAL
PDW BLD-RTO: 14.9 % (ref 11.8–14.4)
PHOSPHORUS: 2.7 MG/DL (ref 2.6–4.5)
PLATELET # BLD: 446 K/UL (ref 138–453)
PLATELET ESTIMATE: ABNORMAL
PMV BLD AUTO: 11.4 FL (ref 8.1–13.5)
POC HCO3: 29.9 MMOL/L (ref 21–28)
POC O2 SATURATION: 98 % (ref 94–98)
POC PCO2 TEMP: ABNORMAL MM HG
POC PCO2: 36.6 MM HG (ref 35–48)
POC PH TEMP: ABNORMAL
POC PH: 7.52 (ref 7.35–7.45)
POC PO2 TEMP: ABNORMAL MM HG
POC PO2: 85.8 MM HG (ref 83–108)
POSITIVE BASE EXCESS, ART: 7 (ref 0–3)
POTASSIUM SERPL-SCNC: 3.1 MMOL/L (ref 3.7–5.3)
RBC # BLD: 3.03 M/UL (ref 3.95–5.11)
RBC # BLD: ABNORMAL 10*6/UL
SAMPLE SITE: ABNORMAL
SEG NEUTROPHILS: 64 % (ref 36–66)
SEGMENTED NEUTROPHILS ABSOLUTE COUNT: 6.33 K/UL (ref 1.8–7.7)
SODIUM BLD-SCNC: 138 MMOL/L (ref 135–144)
TCO2 (CALC), ART: ABNORMAL MMOL/L (ref 22–29)
WBC # BLD: 9.9 K/UL (ref 3.5–11.3)
WBC # BLD: ABNORMAL 10*3/UL

## 2021-12-11 PROCEDURE — 6360000002 HC RX W HCPCS: Performed by: STUDENT IN AN ORGANIZED HEALTH CARE EDUCATION/TRAINING PROGRAM

## 2021-12-11 PROCEDURE — 2000000000 HC ICU R&B

## 2021-12-11 PROCEDURE — 2580000003 HC RX 258: Performed by: STUDENT IN AN ORGANIZED HEALTH CARE EDUCATION/TRAINING PROGRAM

## 2021-12-11 PROCEDURE — 82947 ASSAY GLUCOSE BLOOD QUANT: CPT

## 2021-12-11 PROCEDURE — 6360000002 HC RX W HCPCS: Performed by: NURSE PRACTITIONER

## 2021-12-11 PROCEDURE — C9113 INJ PANTOPRAZOLE SODIUM, VIA: HCPCS | Performed by: STUDENT IN AN ORGANIZED HEALTH CARE EDUCATION/TRAINING PROGRAM

## 2021-12-11 PROCEDURE — 37799 UNLISTED PX VASCULAR SURGERY: CPT

## 2021-12-11 PROCEDURE — 94003 VENT MGMT INPAT SUBQ DAY: CPT

## 2021-12-11 PROCEDURE — 71045 X-RAY EXAM CHEST 1 VIEW: CPT

## 2021-12-11 PROCEDURE — 2500000003 HC RX 250 WO HCPCS: Performed by: COLON & RECTAL SURGERY

## 2021-12-11 PROCEDURE — 82803 BLOOD GASES ANY COMBINATION: CPT

## 2021-12-11 PROCEDURE — 84100 ASSAY OF PHOSPHORUS: CPT

## 2021-12-11 PROCEDURE — 85025 COMPLETE CBC W/AUTO DIFF WBC: CPT

## 2021-12-11 PROCEDURE — 80048 BASIC METABOLIC PNL TOTAL CA: CPT

## 2021-12-11 PROCEDURE — 94761 N-INVAS EAR/PLS OXIMETRY MLT: CPT

## 2021-12-11 PROCEDURE — 6360000002 HC RX W HCPCS: Performed by: INTERNAL MEDICINE

## 2021-12-11 PROCEDURE — 2700000000 HC OXYGEN THERAPY PER DAY

## 2021-12-11 PROCEDURE — 83735 ASSAY OF MAGNESIUM: CPT

## 2021-12-11 PROCEDURE — 6370000000 HC RX 637 (ALT 250 FOR IP): Performed by: STUDENT IN AN ORGANIZED HEALTH CARE EDUCATION/TRAINING PROGRAM

## 2021-12-11 RX ORDER — POTASSIUM CHLORIDE 29.8 MG/ML
20 INJECTION INTRAVENOUS PRN
Status: DISCONTINUED | OUTPATIENT
Start: 2021-12-11 | End: 2021-12-24 | Stop reason: HOSPADM

## 2021-12-11 RX ORDER — HYDRALAZINE HYDROCHLORIDE 20 MG/ML
10 INJECTION INTRAMUSCULAR; INTRAVENOUS EVERY 6 HOURS PRN
Status: DISCONTINUED | OUTPATIENT
Start: 2021-12-11 | End: 2021-12-24 | Stop reason: HOSPADM

## 2021-12-11 RX ADMIN — HEPARIN SODIUM 5000 UNITS: 5000 INJECTION INTRAVENOUS; SUBCUTANEOUS at 07:29

## 2021-12-11 RX ADMIN — POTASSIUM CHLORIDE 10 MEQ: 10 INJECTION, SOLUTION INTRAVENOUS at 08:14

## 2021-12-11 RX ADMIN — FUROSEMIDE 40 MG: 10 INJECTION, SOLUTION INTRAMUSCULAR; INTRAVENOUS at 07:29

## 2021-12-11 RX ADMIN — PIPERACILLIN AND TAZOBACTAM 3375 MG: 3; .375 INJECTION, POWDER, LYOPHILIZED, FOR SOLUTION INTRAVENOUS at 07:08

## 2021-12-11 RX ADMIN — FENTANYL CITRATE 100 MCG: 50 INJECTION INTRAMUSCULAR; INTRAVENOUS at 08:12

## 2021-12-11 RX ADMIN — POTASSIUM CHLORIDE 20 MEQ: 29.8 INJECTION INTRAVENOUS at 09:30

## 2021-12-11 RX ADMIN — PROPOFOL 50 MCG/KG/MIN: 10 INJECTION, EMULSION INTRAVENOUS at 16:22

## 2021-12-11 RX ADMIN — FENTANYL CITRATE 100 MCG: 50 INJECTION INTRAMUSCULAR; INTRAVENOUS at 16:25

## 2021-12-11 RX ADMIN — OCTREOTIDE ACETATE 25 MCG/HR: 200 INJECTION, SOLUTION INTRAVENOUS; SUBCUTANEOUS at 10:17

## 2021-12-11 RX ADMIN — FENTANYL CITRATE 100 MCG: 50 INJECTION INTRAMUSCULAR; INTRAVENOUS at 18:37

## 2021-12-11 RX ADMIN — POTASSIUM CHLORIDE: 2 INJECTION, SOLUTION, CONCENTRATE INTRAVENOUS at 17:37

## 2021-12-11 RX ADMIN — HEPARIN SODIUM 5000 UNITS: 5000 INJECTION INTRAVENOUS; SUBCUTANEOUS at 21:09

## 2021-12-11 RX ADMIN — PIPERACILLIN AND TAZOBACTAM 3375 MG: 3; .375 INJECTION, POWDER, LYOPHILIZED, FOR SOLUTION INTRAVENOUS at 23:32

## 2021-12-11 RX ADMIN — PROPOFOL 50 MCG/KG/MIN: 10 INJECTION, EMULSION INTRAVENOUS at 11:55

## 2021-12-11 RX ADMIN — PROPOFOL 50 MCG/KG/MIN: 10 INJECTION, EMULSION INTRAVENOUS at 08:15

## 2021-12-11 RX ADMIN — HYDRALAZINE HYDROCHLORIDE 10 MG: 20 INJECTION INTRAMUSCULAR; INTRAVENOUS at 15:05

## 2021-12-11 RX ADMIN — PIPERACILLIN AND TAZOBACTAM 3375 MG: 3; .375 INJECTION, POWDER, LYOPHILIZED, FOR SOLUTION INTRAVENOUS at 15:05

## 2021-12-11 RX ADMIN — PANTOPRAZOLE SODIUM 40 MG: 40 INJECTION, POWDER, FOR SOLUTION INTRAVENOUS at 07:28

## 2021-12-11 RX ADMIN — POTASSIUM CHLORIDE 10 MEQ: 10 INJECTION, SOLUTION INTRAVENOUS at 06:42

## 2021-12-11 RX ADMIN — FUROSEMIDE 40 MG: 10 INJECTION, SOLUTION INTRAMUSCULAR; INTRAVENOUS at 21:09

## 2021-12-11 RX ADMIN — PROPOFOL 50 MCG/KG/MIN: 10 INJECTION, EMULSION INTRAVENOUS at 00:36

## 2021-12-11 RX ADMIN — SODIUM CHLORIDE, PRESERVATIVE FREE 10 ML: 5 INJECTION INTRAVENOUS at 07:09

## 2021-12-11 RX ADMIN — INSULIN LISPRO 1 UNITS: 100 INJECTION, SOLUTION INTRAVENOUS; SUBCUTANEOUS at 06:30

## 2021-12-11 RX ADMIN — INSULIN LISPRO 1 UNITS: 100 INJECTION, SOLUTION INTRAVENOUS; SUBCUTANEOUS at 18:16

## 2021-12-11 ASSESSMENT — PULMONARY FUNCTION TESTS
PIF_VALUE: 23
PIF_VALUE: 24
PIF_VALUE: 23

## 2021-12-11 NOTE — PROGRESS NOTES
Nutrition Assessment     Type and Reason for Visit: Reassess    Nutrition Recommendations/Plan:   1. Continue NPO status  2. Continue TPN at 65 mL/hr (1560 mL total volume). TPN and Propofol provides (2044 kcal and 78 gm of protein)  3. Monitor TPN rate/ tolerance, labs, GI status, vent status and weights    Nutrition Assessment:  Patient remains intubated and sedated status post third GI surgery on 12/8. RN reports patient will have dressing change tomorrow (12/12). Will continue TPN at 65 mL/hr (1560 mL total volume). Monitor TPN rate/ tolerance, labs, GI status, vent status and weights. Malnutrition Assessment:  Malnutrition Status: At risk for malnutrition (Comment)    Estimated Daily Nutrient Needs:  Energy (kcal): 1758 kcal [facility] 2010); Weight Used for Energy Requirements:  Current     Protein (g): 84-95 gm of protein (1.5-1.7 gm/kg); Weight Used for Protein Requirements:  Ideal        Fluid (ml/day):  ; Weight Used for Fluid Requirements:  1 ml/kcal      Nutrition Related Findings: Edema: +1 pitting BUE. Absent bowel sounds. NG tube LIWS.  S/P emergent colostomy revision and wound vac placement due to enteric leak from ileocolonic anastomosis (12/8)      Current Nutrition Therapies:    Diet NPO Exceptions are: Sips of Water with Meds, Ice Chips  PN-Adult 2-in-1 [facility] (Standard)  PN-Adult 2-in-1 [facility] (Standard)    Anthropometric Measures:  · Height: 5' 4.5\" (163.8 cm)  · Current Body Wt: 187 lb (84.8 kg)   · BMI: 31.6    Nutrition Diagnosis:   · Inadequate protein-energy intake related to inadequate protein-energy intake as evidenced by NPO or clear liquid status due to medical condition, poor intake prior to admission, intubation, nutrition support - parenteral nutrition    · Altered GI function related to altered GI structure as evidenced by GI abnormality, nausea (status post small bowel surgery)      Nutrition Interventions:   Food and/or Nutrient Delivery:  Continue NPO, Continue Current Parenteral Nutrition  Nutrition Education/Counseling:  Education not indicated   Coordination of Nutrition Care:  Continue to monitor while inpatient    Goals:  PN will meet greater than 75% of estimated nutrition needs       Nutrition Monitoring and Evaluation:   Behavioral-Environmental Outcomes:  None Identified   Food/Nutrient Intake Outcomes:  Parenteral Nutrition Intake/Tolerance  Physical Signs/Symptoms Outcomes:  Biochemical Data, Fluid Status or Edema, Skin, Weight, GI Status     Discharge Planning:     Too soon to determine         Anna PETERSENN, RDN, LDN  Lead Clinical Dietitian  RD Office Phone [phone]

## 2021-12-11 NOTE — PROGRESS NOTES
Pulmonary Critical Care Progress Note  Winter Sanchez M.D. Patient seen for the follow up of hypoxic respiratory failure, metabolic acidosis    Subjective:  She remains sedated, intubated on ventilator. She has been weaned off of Cesar-Synephrine. She is on propofol and Versed which had to be added last night secondary to agitation. She is on TPN. She is diuresing very well with IV Lasix.     Examination:  Vitals: BP (!) 143/64   Pulse 96   Temp 98.1 °F (36.7 °C) (Temporal)   Resp 26   Ht 5' 4.5\" (1.638 m)   Wt 187 lb (84.8 kg)   LMP  (LMP Unknown)   SpO2 98%   BMI 31.60 kg/m²   General appearance: sedated, intubated on ventilator  Neck: No JVD  Lungs: Decreased breath sounds no crackles or wheezing  Heart: regular rate and rhythm, S1, S2 normal, no gallop  Abdomen: Soft, non tender, positive wound VAC  Extremities: no cyanosis or clubbing.  + edema    LABs:  CBC:   Recent Labs     12/10/21  0530 12/11/21  0445   WBC 13.3* 9.9   HGB 9.7* 8.9*   HCT 29.7* 27.2*   * 446     BMP:   Recent Labs     12/10/21  0530 12/11/21  0445    138   K 3.6* 3.1*   CO2 23 27   BUN 15 14   CREATININE <0.40* <0.40*   LABGLOM Can not be calculated Can not be calculated   GLUCOSE 255* 154*     ABG:  Lab Results   Component Value Date    FIE3IHA NOT REPORTED 12/11/2021    FIO2 30.0 12/11/2021       Lab Results   Component Value Date    POCPH 7.521 12/11/2021    POCPCO2 36.6 12/11/2021    POCPO2 85.8 12/11/2021    POCHCO3 29.9 12/11/2021    PBEA 7 12/11/2021    RFJ7XXG NOT REPORTED 12/11/2021    KJSY9MAN 98 12/11/2021    FIO2 30.0 12/11/2021     Radiology:  Chest x-ray 12/11/21        Impression:  · Acute respiratory insufficiency requiring ventilator support  · Feculent peritonitis s/p exploratory laparotomy with end colostomy/pelvic drain placement  · COPD  · History of GERD/dyslipidemia/hypothyroidism/hypertension  · Metabolic acidosis  · Bilateral pleural infiltrate/pulmonary edema    Recommendations:  · Vent support, FiO2 30%/PEEP 8.   We will start CPAP trials after first dressing change, Sunday  · Propofol for sedation, RASS goal -1 to -2  · Versed, currently at 2 mg  · Fentanyl to  mics every hour as needed for pain  · DuoNeb by nebulizer  · Watch blood pressure off of Cesar-Synephrine  · Continue diuresis IV Lasix 40 mg twice daily  · TPN/ monitor liver function/ off TF  · Monitor hemoglobin, transfuse for hemoglobin less than 7  · Zosyn/ off Flagyl  · Wound care/ wound VAC  · X-ray chest in am  · Discussed with RN, start IV hydralazine for hypertension control  · Peptic ulcer disease prophylaxis  · DVT prophylaxis, subcu heparin   · We will follow with you    Electronically signed by     Katarzyna Freed MD on 12/11/2021 at 1:32 PM  Pulmonary Critical Care and Sleep Medicine,  [facility]  Cell: [phone]  Office: [phone] swing-through gait

## 2021-12-11 NOTE — PROGRESS NOTES
Colorectal Surgery:  Daily Progress Note               PATIENT NAME: Sofiya Berrios   TODAY'S DATE: 12/11/2021, 7:30 AM    SUBJECTIVE:     Patient seen and evaluated. Versed added overnight due to increased agitation. Off pressors. Remains intubated and ventilated. SHANA drain and wound vac remain serosanguineous. Red rubber catheter putting out succus. OBJECTIVE:   VITALS:  /63   Pulse 90   Temp 98.6 °F (37 °C) (Axillary)   Resp 24   Ht 5' 4.5\" (1.638 m)   Wt 187 lb (84.8 kg)   LMP  (LMP Unknown)   SpO2 98%   BMI 31.60 kg/m²      INTAKE/OUTPUT:      Intake/Output Summary (Last 24 hours) at 12/11/2021 0730  Last data filed at 12/11/2021 0700  Gross per 24 hour   Intake 4618.33 ml   Output 4650 ml   Net -31.67 ml       PHYSICAL EXAM:  General Appearance: Unresponsive, sedated  HEENT:  Normocephalic, atraumatic, mucus membranes moist   Heart: Heart regular rate, normotensive  Lungs: Equal chest rise bilaterally, mechanically ventilated. Abdomen: Soft, wound VAC to -25 mmHg in place with serosanguineous output, SHANA drain serosanguineous, red rubber catheter draining succus in the bag. LLQ mucus fistula with ostomy appliance in place. Extremities: No cyanosis, pitting edema, rashes noted. Skin: Skin color, texture, turgor normal. No rashes or lesions.     Data:  CBC with Differential:    Lab Results   Component Value Date    WBC 9.9 12/11/2021    RBC 3.03 12/11/2021    HGB 8.9 12/11/2021    HCT 27.2 12/11/2021     12/11/2021    MCV 89.8 12/11/2021    MCH 29.4 12/11/2021    MCHC 32.7 12/11/2021    RDW 14.9 12/11/2021    LYMPHOPCT 9 12/11/2021    MONOPCT 10 12/11/2021    BASOPCT 1 12/11/2021    MONOSABS 0.99 12/11/2021    LYMPHSABS 0.89 12/11/2021    EOSABS 0.10 12/11/2021    BASOSABS 0.10 12/11/2021    DIFFTYPE NOT REPORTED 12/11/2021     BMP:    Lab Results   Component Value Date     12/11/2021    K 3.1 12/11/2021     12/11/2021    CO2 27 12/11/2021    BUN 14 12/11/2021    LABALBU 1.8 12/10/2021    CREATININE <0.40 12/11/2021    CALCIUM 7.7 12/11/2021    GFRAA Can not be calculated 12/11/2021    LABGLOM Can not be calculated 12/11/2021    GLUCOSE 154 12/11/2021     Magnesium:    Lab Results   Component Value Date    MG 1.8 12/11/2021     Phosphorus:    Lab Results   Component Value Date    PHOS 2.7 12/11/2021     Radiology Review:     ASSESSMENT:  Active Hospital Problems    Diagnosis Date Noted    Stricture of sigmoid colon [facility]) [M11.680] 11/24/2021     80 y.o. female with sigmoid colon stricture. 11/24/21: Status post exploratory laparotomy with explantation pelvic mesh and mobilization sigmoid and proximal rectum, status post ileocecectomy with ileocolonic anastomosis. 12/1/21: Status post ex lap, creation of end colostomy, pelvic drain placement    12/8/21: Exploratory laparotomy, repair of ileocolonic anastomosis, abdominal washout, lysis of adhesions, revision of colostomy, creation of ileostomy with insertion of red rubber catheter, placement of Stratus biologic mesh, wound VAC application. Plan:  -Appreciate critical care management  -Continue strict n.p.o.  -Monitor I's and O's, replete electrolytes as needed  -Continue TPN  -Will start octreotide today  -Continue NGT to LIWS  -Continue red rubber catheter to gravity. This is functioning as a conduit end ileostomy.  -Please [employer] on call resident tomorrow to assist with wound VAC exchange.  -Continue to monitor SHANA drain.  -Stoma appliance to colostomy/mucus fistula. -Continue IV abx  -OK for VTE ppx from surgery standpoint    Electronically signed by María Galvez MD  on 12/11/2021 at 7:30 AM       I Dr. Norman Loomis saw and examined the patient. I have edited the above and agree with the above. Ashley Chavez  Colorectal Surgery

## 2021-12-11 NOTE — PLAN OF CARE
Problem: HEMODYNAMIC STATUS  Goal: Patient has stable vital signs and fluid balance  12/11/2021 1404 by Jalen Lopes RN  Outcome: Ongoing  12/11/2021 0517 by Jeremías Kam RN  Outcome: Ongoing     Problem: OXYGENATION/RESPIRATORY FUNCTION  Goal: Patient will achieve/maintain normal respiratory rate/effort  12/11/2021 1404 by Jalen Lopes RN  Outcome: Ongoing  12/11/2021 0517 by Jeremías Kam RN  Outcome: Ongoing     Problem: MOBILITY  Goal: Early mobilization is achieved  12/11/2021 1404 by Jalen Lopes RN  Outcome: Ongoing  12/11/2021 0517 by Jeremías Kam RN  Outcome: Ongoing     Problem: ELIMINATION  Goal: Elimination patterns are normal or improving  Description: Elimination patterns return to pre-surgery normal patterns  12/11/2021 1404 by Jalen Lopes RN  Outcome: Ongoing  12/11/2021 0517 by Jeremías Kam RN  Outcome: Ongoing     Problem: SKIN INTEGRITY  Goal: Skin integrity is maintained or improved  12/11/2021 1404 by Jalen Lopes RN  Outcome: Ongoing  12/11/2021 0517 by Jeremías Kam RN  Outcome: Ongoing     Problem: Pain:  Description: Pain management should include both nonpharmacologic and pharmacologic interventions.   Goal: Pain level will decrease  Description: Pain level will decrease  12/11/2021 1404 by Jalen Lopes RN  Outcome: Ongoing  12/11/2021 0517 by Jeremías Kam RN  Outcome: Ongoing  Goal: Control of acute pain  Description: Control of acute pain  12/11/2021 1404 by Jalen Lopes RN  Outcome: Ongoing  12/11/2021 0517 by Jeremías Kam RN  Outcome: Ongoing  Goal: Control of chronic pain  Description: Control of chronic pain  12/11/2021 1404 by Jalen Lopes RN  Outcome: Ongoing  12/11/2021 0517 by Jeremías Kam RN  Outcome: Ongoing     Problem: Falls - Risk of:  Goal: Will remain free from falls  Description: Will remain free from falls  12/11/2021 1404 by Jalen Lopes RN  Outcome: Ongoing  12/11/2021 0517 by Jeremías Kam RN  Outcome: Ongoing  Goal:

## 2021-12-12 ENCOUNTER — APPOINTMENT (OUTPATIENT)
Dept: GENERAL RADIOLOGY | Age: 71
DRG: 329 | End: 2021-12-12
Attending: COLON & RECTAL SURGERY
Payer: MEDICARE

## 2021-12-12 LAB
ABSOLUTE EOS #: 0.12 K/UL (ref 0–0.4)
ABSOLUTE IMMATURE GRANULOCYTE: 2.22 K/UL (ref 0–0.3)
ABSOLUTE LYMPH #: 1.05 K/UL (ref 1–4.8)
ABSOLUTE MONO #: 1.17 K/UL (ref 0.2–0.8)
ALLEN TEST: ABNORMAL
ANION GAP SERPL CALCULATED.3IONS-SCNC: 14 MMOL/L (ref 9–17)
BASOPHILS # BLD: 1 %
BASOPHILS ABSOLUTE: 0.12 K/UL (ref 0–0.2)
BUN BLDV-MCNC: 13 MG/DL (ref 8–23)
BUN/CREAT BLD: ABNORMAL (ref 9–20)
CALCIUM SERPL-MCNC: 7.8 MG/DL (ref 8.6–10.4)
CHLORIDE BLD-SCNC: 97 MMOL/L (ref 98–107)
CO2: 27 MMOL/L (ref 20–31)
CREAT SERPL-MCNC: <0.4 MG/DL (ref 0.5–0.9)
DIFFERENTIAL TYPE: ABNORMAL
EOSINOPHILS RELATIVE PERCENT: 1 % (ref 1–4)
FIO2: 30
GFR AFRICAN AMERICAN: ABNORMAL ML/MIN
GFR NON-AFRICAN AMERICAN: ABNORMAL ML/MIN
GFR SERPL CREATININE-BSD FRML MDRD: ABNORMAL ML/MIN/{1.73_M2}
GFR SERPL CREATININE-BSD FRML MDRD: ABNORMAL ML/MIN/{1.73_M2}
GLUCOSE BLD-MCNC: 135 MG/DL (ref 65–105)
GLUCOSE BLD-MCNC: 152 MG/DL (ref 65–105)
GLUCOSE BLD-MCNC: 158 MG/DL (ref 65–105)
GLUCOSE BLD-MCNC: 160 MG/DL (ref 70–99)
GLUCOSE BLD-MCNC: 168 MG/DL (ref 65–105)
GLUCOSE BLD-MCNC: 171 MG/DL (ref 65–105)
HCT VFR BLD CALC: 29.7 % (ref 36.3–47.1)
HEMOGLOBIN: 9.4 G/DL (ref 11.9–15.1)
IMMATURE GRANULOCYTES: 19 %
LYMPHOCYTES # BLD: 9 % (ref 24–44)
MAGNESIUM: 1.9 MG/DL (ref 1.6–2.6)
MCH RBC QN AUTO: 28.9 PG (ref 25.2–33.5)
MCHC RBC AUTO-ENTMCNC: 31.6 G/DL (ref 28.4–34.8)
MCV RBC AUTO: 91.4 FL (ref 82.6–102.9)
MODE: ABNORMAL
MONOCYTES # BLD: 10 % (ref 1–7)
MORPHOLOGY: ABNORMAL
NEGATIVE BASE EXCESS, ART: ABNORMAL (ref 0–2)
NRBC AUTOMATED: 0 PER 100 WBC
O2 DEVICE/FLOW/%: ABNORMAL
PATIENT TEMP: ABNORMAL
PDW BLD-RTO: 15.2 % (ref 11.8–14.4)
PHOSPHORUS: 3.3 MG/DL (ref 2.6–4.5)
PLATELET # BLD: 618 K/UL (ref 138–453)
PLATELET ESTIMATE: ABNORMAL
PMV BLD AUTO: 12.1 FL (ref 8.1–13.5)
POC HCO3: 31.9 MMOL/L (ref 21–28)
POC O2 SATURATION: 98 % (ref 94–98)
POC PCO2 TEMP: ABNORMAL MM HG
POC PCO2: 39.4 MM HG (ref 35–48)
POC PH TEMP: ABNORMAL
POC PH: 7.52 (ref 7.35–7.45)
POC PO2 TEMP: ABNORMAL MM HG
POC PO2: 89.2 MM HG (ref 83–108)
POSITIVE BASE EXCESS, ART: 8 (ref 0–3)
POTASSIUM SERPL-SCNC: 3.4 MMOL/L (ref 3.7–5.3)
RBC # BLD: 3.25 M/UL (ref 3.95–5.11)
RBC # BLD: ABNORMAL 10*6/UL
SAMPLE SITE: ABNORMAL
SEG NEUTROPHILS: 60 % (ref 36–66)
SEGMENTED NEUTROPHILS ABSOLUTE COUNT: 7.02 K/UL (ref 1.8–7.7)
SODIUM BLD-SCNC: 138 MMOL/L (ref 135–144)
TCO2 (CALC), ART: ABNORMAL MMOL/L (ref 22–29)
WBC # BLD: 11.7 K/UL (ref 3.5–11.3)
WBC # BLD: ABNORMAL 10*3/UL

## 2021-12-12 PROCEDURE — 2580000003 HC RX 258: Performed by: STUDENT IN AN ORGANIZED HEALTH CARE EDUCATION/TRAINING PROGRAM

## 2021-12-12 PROCEDURE — 71045 X-RAY EXAM CHEST 1 VIEW: CPT

## 2021-12-12 PROCEDURE — 80048 BASIC METABOLIC PNL TOTAL CA: CPT

## 2021-12-12 PROCEDURE — 6370000000 HC RX 637 (ALT 250 FOR IP): Performed by: STUDENT IN AN ORGANIZED HEALTH CARE EDUCATION/TRAINING PROGRAM

## 2021-12-12 PROCEDURE — 6360000002 HC RX W HCPCS: Performed by: INTERNAL MEDICINE

## 2021-12-12 PROCEDURE — 85025 COMPLETE CBC W/AUTO DIFF WBC: CPT

## 2021-12-12 PROCEDURE — 37799 UNLISTED PX VASCULAR SURGERY: CPT

## 2021-12-12 PROCEDURE — 94003 VENT MGMT INPAT SUBQ DAY: CPT

## 2021-12-12 PROCEDURE — 2500000003 HC RX 250 WO HCPCS: Performed by: COLON & RECTAL SURGERY

## 2021-12-12 PROCEDURE — 6360000002 HC RX W HCPCS: Performed by: STUDENT IN AN ORGANIZED HEALTH CARE EDUCATION/TRAINING PROGRAM

## 2021-12-12 PROCEDURE — 82803 BLOOD GASES ANY COMBINATION: CPT

## 2021-12-12 PROCEDURE — 2000000000 HC ICU R&B

## 2021-12-12 PROCEDURE — 84100 ASSAY OF PHOSPHORUS: CPT

## 2021-12-12 PROCEDURE — C9113 INJ PANTOPRAZOLE SODIUM, VIA: HCPCS | Performed by: STUDENT IN AN ORGANIZED HEALTH CARE EDUCATION/TRAINING PROGRAM

## 2021-12-12 PROCEDURE — 6360000002 HC RX W HCPCS: Performed by: NURSE PRACTITIONER

## 2021-12-12 PROCEDURE — 94761 N-INVAS EAR/PLS OXIMETRY MLT: CPT

## 2021-12-12 PROCEDURE — 83735 ASSAY OF MAGNESIUM: CPT

## 2021-12-12 PROCEDURE — 2700000000 HC OXYGEN THERAPY PER DAY

## 2021-12-12 RX ADMIN — PROPOFOL 50 MCG/KG/MIN: 10 INJECTION, EMULSION INTRAVENOUS at 22:02

## 2021-12-12 RX ADMIN — Medication 3 MG/HR: at 05:04

## 2021-12-12 RX ADMIN — FUROSEMIDE 40 MG: 10 INJECTION, SOLUTION INTRAMUSCULAR; INTRAVENOUS at 21:39

## 2021-12-12 RX ADMIN — PROPOFOL 50 MCG/KG/MIN: 10 INJECTION, EMULSION INTRAVENOUS at 11:03

## 2021-12-12 RX ADMIN — FUROSEMIDE 40 MG: 10 INJECTION, SOLUTION INTRAMUSCULAR; INTRAVENOUS at 08:29

## 2021-12-12 RX ADMIN — PIPERACILLIN AND TAZOBACTAM 3375 MG: 3; .375 INJECTION, POWDER, LYOPHILIZED, FOR SOLUTION INTRAVENOUS at 15:31

## 2021-12-12 RX ADMIN — SODIUM CHLORIDE, PRESERVATIVE FREE 10 ML: 5 INJECTION INTRAVENOUS at 08:30

## 2021-12-12 RX ADMIN — POTASSIUM CHLORIDE 20 MEQ: 29.8 INJECTION INTRAVENOUS at 10:03

## 2021-12-12 RX ADMIN — POTASSIUM CHLORIDE: 2 INJECTION, SOLUTION, CONCENTRATE INTRAVENOUS at 18:12

## 2021-12-12 RX ADMIN — INSULIN LISPRO 1 UNITS: 100 INJECTION, SOLUTION INTRAVENOUS; SUBCUTANEOUS at 01:19

## 2021-12-12 RX ADMIN — HEPARIN SODIUM 5000 UNITS: 5000 INJECTION INTRAVENOUS; SUBCUTANEOUS at 21:39

## 2021-12-12 RX ADMIN — PROPOFOL 50 MCG/KG/MIN: 10 INJECTION, EMULSION INTRAVENOUS at 18:13

## 2021-12-12 RX ADMIN — PROPOFOL 50 MCG/KG/MIN: 10 INJECTION, EMULSION INTRAVENOUS at 06:26

## 2021-12-12 RX ADMIN — ACETAMINOPHEN 650 MG: 650 SUPPOSITORY RECTAL at 21:46

## 2021-12-12 RX ADMIN — INSULIN LISPRO 1 UNITS: 100 INJECTION, SOLUTION INTRAVENOUS; SUBCUTANEOUS at 12:32

## 2021-12-12 RX ADMIN — FENTANYL CITRATE 100 MCG: 50 INJECTION INTRAMUSCULAR; INTRAVENOUS at 13:49

## 2021-12-12 RX ADMIN — OCTREOTIDE ACETATE 25 MCG/HR: 200 INJECTION, SOLUTION INTRAVENOUS; SUBCUTANEOUS at 20:38

## 2021-12-12 RX ADMIN — SODIUM CHLORIDE, PRESERVATIVE FREE 10 ML: 5 INJECTION INTRAVENOUS at 21:40

## 2021-12-12 RX ADMIN — POTASSIUM CHLORIDE 20 MEQ: 29.8 INJECTION INTRAVENOUS at 08:29

## 2021-12-12 RX ADMIN — PIPERACILLIN AND TAZOBACTAM 3375 MG: 3; .375 INJECTION, POWDER, LYOPHILIZED, FOR SOLUTION INTRAVENOUS at 07:51

## 2021-12-12 RX ADMIN — OCTREOTIDE ACETATE 25 MCG/HR: 200 INJECTION, SOLUTION INTRAVENOUS; SUBCUTANEOUS at 01:46

## 2021-12-12 RX ADMIN — INSULIN LISPRO 1 UNITS: 100 INJECTION, SOLUTION INTRAVENOUS; SUBCUTANEOUS at 06:34

## 2021-12-12 RX ADMIN — HEPARIN SODIUM 5000 UNITS: 5000 INJECTION INTRAVENOUS; SUBCUTANEOUS at 08:29

## 2021-12-12 RX ADMIN — PROPOFOL 50 MCG/KG/MIN: 10 INJECTION, EMULSION INTRAVENOUS at 14:31

## 2021-12-12 RX ADMIN — PHENYLEPHRINE HYDROCHLORIDE 30 MCG/MIN: 10 INJECTION INTRAVENOUS at 20:43

## 2021-12-12 RX ADMIN — PANTOPRAZOLE SODIUM 40 MG: 40 INJECTION, POWDER, FOR SOLUTION INTRAVENOUS at 08:29

## 2021-12-12 ASSESSMENT — PULMONARY FUNCTION TESTS
PIF_VALUE: 27
PIF_VALUE: 21
PIF_VALUE: 25
PIF_VALUE: 21
PIF_VALUE: 25
PIF_VALUE: 23
PIF_VALUE: 25

## 2021-12-12 ASSESSMENT — PAIN SCALES - GENERAL
PAINLEVEL_OUTOF10: 0

## 2021-12-12 NOTE — PROGRESS NOTES
Patient put on wean trial 2 hours post ostomy care. Patient did not tolerate wean trial 10/8 due to tachypnea. Patient placed back on PRVC support 10 minutes later.   RN notified  Will continue to follow

## 2021-12-12 NOTE — PROGRESS NOTES
Colorectal Surgery:  Daily Progress Note               PATIENT NAME: Yunior Lau DATE: 12/12/2021, 6:28 AM    SUBJECTIVE:     Patient seen and evaluated. No acute events overnight. Vitals stable off pressors. Remains intubated, sedated, ventilated. UoP adequate via Rose. 250 cc succus from red rubber catheter, 35 cc serosanguineous output from SHANA. Wound vac holding suction with serosanguineous output in vac canister. OBJECTIVE:   VITALS:  BP (!) 124/57   Pulse 84   Temp 97.9 °F (36.6 °C) (Temporal)   Resp 21   Ht 5' 4.5\" (1.638 m)   Wt 187 lb (84.8 kg)   LMP  (LMP Unknown)   SpO2 97%   BMI 31.60 kg/m²      INTAKE/OUTPUT:      Intake/Output Summary (Last 24 hours) at 12/12/2021 9309  Last data filed at 12/12/2021 0600  Gross per 24 hour   Intake 3995.08 ml   Output 6675 ml   Net -2679.92 ml       PHYSICAL EXAM:  General Appearance: No acute distress, GCS 7T  HEENT:  Normocephalic, atraumatic, mucus membranes moist   Heart: Heart regular rate, normotensive  Lungs: Equal chest rise bilaterally, mechanically ventilated. Abdomen: Soft, wound VAC to -25 mmHg in place with serosanguineous output, SHANA drain serosanguineous, red rubber catheter draining succus in the bag. LLQ mucus fistula with ostomy appliance in place. Extremities: No cyanosis, pitting edema, rashes noted. Skin: Skin color, texture, turgor normal. No rashes or lesions.     Data:  CBC with Differential:    Lab Results   Component Value Date    WBC 9.9 12/11/2021    RBC 3.03 12/11/2021    HGB 8.9 12/11/2021    HCT 27.2 12/11/2021     12/11/2021    MCV 89.8 12/11/2021    MCH 29.4 12/11/2021    MCHC 32.7 12/11/2021    RDW 14.9 12/11/2021    LYMPHOPCT 9 12/11/2021    MONOPCT 10 12/11/2021    BASOPCT 1 12/11/2021    MONOSABS 0.99 12/11/2021    LYMPHSABS 0.89 12/11/2021    EOSABS 0.10 12/11/2021    BASOSABS 0.10 12/11/2021    DIFFTYPE NOT REPORTED 12/11/2021     BMP:    Lab Results   Component Value Date     12/11/2021 K 3.1 12/11/2021     12/11/2021    CO2 27 12/11/2021    BUN 14 12/11/2021    LABALBU 1.8 12/10/2021    CREATININE <0.40 12/11/2021    CALCIUM 7.7 12/11/2021    GFRAA Can not be calculated 12/11/2021    LABGLOM Can not be calculated 12/11/2021    GLUCOSE 154 12/11/2021     Magnesium:    Lab Results   Component Value Date    MG 1.8 12/11/2021     Phosphorus:    Lab Results   Component Value Date    PHOS 2.7 12/11/2021     Radiology Review:     ASSESSMENT:  Active Hospital Problems    Diagnosis Date Noted    Stricture of sigmoid colon [facility]) [K73.028] 11/24/2021     80 y.o. female with sigmoid colon stricture. 11/24/21: Status post exploratory laparotomy with explantation pelvic mesh and mobilization sigmoid and proximal rectum, status post ileocecectomy with ileocolonic anastomosis. 12/1/21: Status post ex lap, creation of end colostomy, pelvic drain placement    12/8/21: Exploratory laparotomy, repair of ileocolonic anastomosis, abdominal washout, lysis of adhesions, revision of colostomy, creation of ileostomy with insertion of red rubber catheter, placement of Stratus biologic mesh, wound VAC application. Plan:  -Appreciate critical care management  -Continue strict n.p.o.  -Monitor I's and O's, replete electrolytes as needed  -Continue TPN  -Continue octreotide infusion  -Continue NGT to LIWS  -Continue red rubber catheter to gravity. This is functioning as a conduit end ileostomy.  -Plan for wound vac exchange today.  -Continue to monitor SHANA drain.  -Stoma appliance to colostomy/mucus fistula. -Continue IV abx  -OK for VTE ppx from surgery standpoint    Electronically signed by Sadie Brown MD  on 12/12/2021 at 6:28 AM     I Dr. Donald Spine saw and examined the patient. I have edited the above and agree with the above. Ashley Chavez  Colorectal Surgery

## 2021-12-12 NOTE — PROGRESS NOTES
Nutrition Assessment     Type and Reason for Visit: Reassess    Nutrition Recommendations/Plan:   1. Continue NPO status  2. Continue TPN at 65 mL/hr (1560 mL total volume). TPN and Propofol provides (2044 kcal and 78 gm of protein)  3. Monitor TPN rate/ tolerance, labs, GI status, vent status and weights    Nutrition Assessment:  Patient remains intubated and sedated status post third GI surgery on 12/8. Will continue TPN at 65 mL/hr (1560 mL total volume). Monitor TPN rate/ tolerance, labs, GI status, vent status and weights    Malnutrition Assessment:  Malnutrition Status: At risk for malnutrition (Comment)    Estimated Daily Nutrient Needs:  Energy (kcal): 1598 kcal [facility] 2010); Weight Used for Energy Requirements:  Current     Protein (g): 84-95 gm of protein (1.5-1.7 gm/kg); Weight Used for Protein Requirements:  Ideal        Fluid (ml/day):  ; Weight Used for Fluid Requirements:  1 ml/kcal      Nutrition Related Findings: Edema: +2 pitting RUE, +3 pitting LUE, +1 non-pitting BLE. NG tube LIWS.  S/P Emergent colostomy revision and wound vac placement due to enteric leak from ileocolonic anastomosis (12/8)      Current Nutrition Therapies:    Diet NPO Exceptions are: Sips of Water with Meds, Ice Chips  PN-Adult 2-in-1 [facility] [employer] (Standard)  PN-Adult 2-in-1 [facility] [employer] (Standard)    Anthropometric Measures:  · Height: 5' 4.5\" (163.8 cm)  · Current Body Wt: 187 lb (84.8 kg)   · BMI: 31.6    Nutrition Diagnosis:   · Inadequate protein-energy intake related to inadequate protein-energy intake as evidenced by NPO or clear liquid status due to medical condition, poor intake prior to admission, intubation, nutrition support - parenteral nutrition    · Altered GI function related to altered GI structure as evidenced by GI abnormality, nausea (status post small bowel surgery)      Nutrition Interventions:   Food and/or Nutrient Delivery:  Continue NPO, Continue Current Parenteral Nutrition  Nutrition Education/Counseling:  Education not indicated   Coordination of Nutrition Care:  Continue to monitor while inpatient    Goals:  PN will meet greater than 75% of estimated nutrition needs       Nutrition Monitoring and Evaluation:   Behavioral-Environmental Outcomes:  None Identified   Food/Nutrient Intake Outcomes:  Parenteral Nutrition Intake/Tolerance  Physical Signs/Symptoms Outcomes:  Biochemical Data, Fluid Status or Edema, Skin, Weight, GI Status     Discharge Planning:     Too soon to determine           Shayy PETERSENN, RDN, LDN  Lead Clinical Dietitian  RD Office Phone [phone]

## 2021-12-12 NOTE — PROGRESS NOTES
Pulmonary Critical Care Progress Note  Jesus Gonzalez M.D. Patient seen for the follow up of hypoxic respiratory failure, metabolic acidosis    Subjective:  She remains sedated, intubated on ventilator. She remains on propofol at 50 mics and Versed at 3 mg. She has been weaned off of Cesar-Synephrine. Yesterday she was hypertensive requiring as needed hydralazine. She is on Sandostatin drip as well. She is also receiving TPN. She is diuresing well.     Examination:  Vitals: BP (!) 96/47   Pulse 81   Temp 97.7 °F (36.5 °C) (Temporal)   Resp 19   Ht 5' 4.5\" (1.638 m)   Wt 187 lb (84.8 kg)   LMP  (LMP Unknown)   SpO2 98%   BMI 31.60 kg/m²   General appearance: sedated, intubated on ventilator  Neck: No JVD  Lungs: Decreased breath sounds no crackles or wheezing  Heart: regular rate and rhythm, S1, S2 normal, no gallop  Abdomen: Soft, non tender, positive wound VAC  Extremities: no cyanosis or clubbing.  + edema    LABs:  CBC:   Recent Labs     12/11/21  0445 12/12/21  0500   WBC 9.9 11.7*   HGB 8.9* 9.4*   HCT 27.2* 29.7*    618*     BMP:   Recent Labs     12/11/21  0445 12/12/21  0500    138   K 3.1* 3.4*   CO2 27 27   BUN 14 13   CREATININE <0.40* <0.40*   LABGLOM Can not be calculated Can not be calculated   GLUCOSE 154* 160*     ABG:  Lab Results   Component Value Date    XDF7EBH NOT REPORTED 12/12/2021    FIO2 30.0 12/12/2021       Lab Results   Component Value Date    POCPH 7.516 12/12/2021    POCPCO2 39.4 12/12/2021    POCPO2 89.2 12/12/2021    POCHCO3 31.9 12/12/2021    PBEA 8 12/12/2021    GHQ5RXO NOT REPORTED 12/12/2021    ODFG5GJO 98 12/12/2021    FIO2 30.0 12/12/2021     Radiology:  Chest x-ray 12/12/21  Pending    Impression:  · Acute respiratory insufficiency requiring ventilator support  · Feculent peritonitis s/p exploratory laparotomy with end colostomy/pelvic drain placement  · COPD  · History of GERD/dyslipidemia/hypothyroidism/hypertension  · Metabolic acidosis  · Bilateral pleural infiltrate/pulmonary edema    Recommendations:  · Vent support, FiO2 30%/PEEP 8.   We will start CPAP trials after first dressing change later today  · Propofol for sedation, RASS goal -1 to -2  · Versed, currently at 2 mg  · Fentanyl to  mics every hour as needed for pain  · DuoNeb by nebulizer  · Watch blood pressure off of Cesar-Synephrine  · As needed hydralazine for control of hypertension  · Continue diuresis IV Lasix 40 mg twice daily  · TPN/ monitor liver function/ off TF  · Monitor hemoglobin, transfuse for hemoglobin less than 7  · Sandostatin drip  · Zosyn/ off Flagyl  · Wound care/ wound VAC  · X-ray chest in am  · Discussed with RN  · Peptic ulcer disease prophylaxis  · DVT prophylaxis, subcu heparin   · We will follow with you    Electronically signed by     Nayla Caceres MD on 12/12/2021 at 12:30 PM  Pulmonary Critical Care and Sleep Medicine,  [facility]  Cell: [phone]  Office: [phone]

## 2021-12-13 ENCOUNTER — APPOINTMENT (OUTPATIENT)
Dept: GENERAL RADIOLOGY | Age: 71
DRG: 329 | End: 2021-12-13
Attending: COLON & RECTAL SURGERY
Payer: MEDICARE

## 2021-12-13 LAB
ABSOLUTE EOS #: 0.14 K/UL (ref 0–0.44)
ABSOLUTE IMMATURE GRANULOCYTE: 3.67 K/UL (ref 0–0.3)
ABSOLUTE LYMPH #: 1.41 K/UL (ref 1.1–3.7)
ABSOLUTE MONO #: 1.41 K/UL (ref 0.1–1.2)
ALLEN TEST: ABNORMAL
ALLEN TEST: ABNORMAL
ANION GAP SERPL CALCULATED.3IONS-SCNC: 11 MMOL/L (ref 9–17)
BASOPHILS # BLD: 1 % (ref 0–2)
BASOPHILS ABSOLUTE: 0.14 K/UL (ref 0–0.2)
BUN BLDV-MCNC: 14 MG/DL (ref 8–23)
BUN/CREAT BLD: ABNORMAL (ref 9–20)
CALCIUM SERPL-MCNC: 7.3 MG/DL (ref 8.6–10.4)
CHLORIDE BLD-SCNC: 101 MMOL/L (ref 98–107)
CO2: 27 MMOL/L (ref 20–31)
CREAT SERPL-MCNC: <0.4 MG/DL (ref 0.5–0.9)
DIFFERENTIAL TYPE: ABNORMAL
EOSINOPHILS RELATIVE PERCENT: 1 % (ref 1–4)
FIO2: 30
FIO2: ABNORMAL
GFR AFRICAN AMERICAN: ABNORMAL ML/MIN
GFR NON-AFRICAN AMERICAN: ABNORMAL ML/MIN
GFR SERPL CREATININE-BSD FRML MDRD: ABNORMAL ML/MIN/{1.73_M2}
GFR SERPL CREATININE-BSD FRML MDRD: ABNORMAL ML/MIN/{1.73_M2}
GLUCOSE BLD-MCNC: 127 MG/DL (ref 65–105)
GLUCOSE BLD-MCNC: 136 MG/DL (ref 65–105)
GLUCOSE BLD-MCNC: 138 MG/DL (ref 65–105)
GLUCOSE BLD-MCNC: 138 MG/DL (ref 70–99)
GLUCOSE BLD-MCNC: 145 MG/DL (ref 65–105)
HCT VFR BLD CALC: 28.6 % (ref 36.3–47.1)
HEMOGLOBIN: 9 G/DL (ref 11.9–15.1)
IMMATURE GRANULOCYTES: 26 %
LYMPHOCYTES # BLD: 10 % (ref 24–43)
MAGNESIUM: 1.8 MG/DL (ref 1.6–2.6)
MCH RBC QN AUTO: 29.4 PG (ref 25.2–33.5)
MCHC RBC AUTO-ENTMCNC: 31.5 G/DL (ref 28.4–34.8)
MCV RBC AUTO: 93.5 FL (ref 82.6–102.9)
MODE: ABNORMAL
MODE: ABNORMAL
MONOCYTES # BLD: 10 % (ref 3–12)
MORPHOLOGY: ABNORMAL
MORPHOLOGY: ABNORMAL
NEGATIVE BASE EXCESS, ART: ABNORMAL (ref 0–2)
NEGATIVE BASE EXCESS, ART: ABNORMAL (ref 0–2)
NRBC AUTOMATED: 0.1 PER 100 WBC
O2 DEVICE/FLOW/%: ABNORMAL
O2 DEVICE/FLOW/%: ABNORMAL
PATIENT TEMP: 37
PATIENT TEMP: ABNORMAL
PDW BLD-RTO: 15.2 % (ref 11.8–14.4)
PHOSPHORUS: 3.5 MG/DL (ref 2.6–4.5)
PLATELET # BLD: 597 K/UL (ref 138–453)
PLATELET ESTIMATE: ABNORMAL
PMV BLD AUTO: 11.2 FL (ref 8.1–13.5)
POC HCO3: 32.9 MMOL/L (ref 21–28)
POC HCO3: 37.7 MMOL/L (ref 21–28)
POC O2 SATURATION: 96 % (ref 94–98)
POC O2 SATURATION: 97 % (ref 94–98)
POC PCO2 TEMP: ABNORMAL MM HG
POC PCO2 TEMP: ABNORMAL MM HG
POC PCO2: 47.2 MM HG (ref 35–48)
POC PCO2: 47.6 MM HG (ref 35–48)
POC PH TEMP: ABNORMAL
POC PH TEMP: ABNORMAL
POC PH: 7.45 (ref 7.35–7.45)
POC PH: 7.51 (ref 7.35–7.45)
POC PO2 TEMP: ABNORMAL MM HG
POC PO2 TEMP: ABNORMAL MM HG
POC PO2: 79.2 MM HG (ref 83–108)
POC PO2: 85.2 MM HG (ref 83–108)
POC TCO2: 38 MMOL/L (ref 22–30)
POSITIVE BASE EXCESS, ART: 13 (ref 0–3)
POSITIVE BASE EXCESS, ART: 8 (ref 0–3)
POTASSIUM SERPL-SCNC: 3.2 MMOL/L (ref 3.7–5.3)
RBC # BLD: 3.06 M/UL (ref 3.95–5.11)
RBC # BLD: ABNORMAL 10*6/UL
SAMPLE SITE: ABNORMAL
SAMPLE SITE: ABNORMAL
SEG NEUTROPHILS: 52 % (ref 36–65)
SEGMENTED NEUTROPHILS ABSOLUTE COUNT: 7.33 K/UL (ref 1.5–8.1)
SODIUM BLD-SCNC: 139 MMOL/L (ref 135–144)
SURGICAL PATHOLOGY REPORT: NORMAL
TCO2 (CALC), ART: ABNORMAL MMOL/L (ref 22–29)
TCO2 (CALC), ART: ABNORMAL MMOL/L (ref 22–29)
TRIGL SERPL-MCNC: 221 MG/DL
WBC # BLD: 14.1 K/UL (ref 3.5–11.3)
WBC # BLD: ABNORMAL 10*3/UL

## 2021-12-13 PROCEDURE — 2580000003 HC RX 258: Performed by: STUDENT IN AN ORGANIZED HEALTH CARE EDUCATION/TRAINING PROGRAM

## 2021-12-13 PROCEDURE — 2700000000 HC OXYGEN THERAPY PER DAY

## 2021-12-13 PROCEDURE — 82947 ASSAY GLUCOSE BLOOD QUANT: CPT

## 2021-12-13 PROCEDURE — 85025 COMPLETE CBC W/AUTO DIFF WBC: CPT

## 2021-12-13 PROCEDURE — 2500000003 HC RX 250 WO HCPCS: Performed by: STUDENT IN AN ORGANIZED HEALTH CARE EDUCATION/TRAINING PROGRAM

## 2021-12-13 PROCEDURE — 6360000002 HC RX W HCPCS: Performed by: STUDENT IN AN ORGANIZED HEALTH CARE EDUCATION/TRAINING PROGRAM

## 2021-12-13 PROCEDURE — 71045 X-RAY EXAM CHEST 1 VIEW: CPT

## 2021-12-13 PROCEDURE — 82803 BLOOD GASES ANY COMBINATION: CPT

## 2021-12-13 PROCEDURE — 2500000003 HC RX 250 WO HCPCS: Performed by: COLON & RECTAL SURGERY

## 2021-12-13 PROCEDURE — 94003 VENT MGMT INPAT SUBQ DAY: CPT

## 2021-12-13 PROCEDURE — 6370000000 HC RX 637 (ALT 250 FOR IP): Performed by: STUDENT IN AN ORGANIZED HEALTH CARE EDUCATION/TRAINING PROGRAM

## 2021-12-13 PROCEDURE — 97110 THERAPEUTIC EXERCISES: CPT

## 2021-12-13 PROCEDURE — 82374 ASSAY BLOOD CARBON DIOXIDE: CPT

## 2021-12-13 PROCEDURE — 6360000002 HC RX W HCPCS: Performed by: INTERNAL MEDICINE

## 2021-12-13 PROCEDURE — 84100 ASSAY OF PHOSPHORUS: CPT

## 2021-12-13 PROCEDURE — 80048 BASIC METABOLIC PNL TOTAL CA: CPT

## 2021-12-13 PROCEDURE — 36600 WITHDRAWAL OF ARTERIAL BLOOD: CPT

## 2021-12-13 PROCEDURE — C9113 INJ PANTOPRAZOLE SODIUM, VIA: HCPCS | Performed by: STUDENT IN AN ORGANIZED HEALTH CARE EDUCATION/TRAINING PROGRAM

## 2021-12-13 PROCEDURE — 94761 N-INVAS EAR/PLS OXIMETRY MLT: CPT

## 2021-12-13 PROCEDURE — 2000000000 HC ICU R&B

## 2021-12-13 PROCEDURE — 6360000002 HC RX W HCPCS: Performed by: NURSE PRACTITIONER

## 2021-12-13 PROCEDURE — 99213 OFFICE O/P EST LOW 20 MIN: CPT

## 2021-12-13 PROCEDURE — 84478 ASSAY OF TRIGLYCERIDES: CPT

## 2021-12-13 PROCEDURE — 83735 ASSAY OF MAGNESIUM: CPT

## 2021-12-13 RX ADMIN — FUROSEMIDE 40 MG: 10 INJECTION, SOLUTION INTRAMUSCULAR; INTRAVENOUS at 12:31

## 2021-12-13 RX ADMIN — POTASSIUM CHLORIDE 20 MEQ: 29.8 INJECTION INTRAVENOUS at 08:52

## 2021-12-13 RX ADMIN — INSULIN LISPRO 1 UNITS: 100 INJECTION, SOLUTION INTRAVENOUS; SUBCUTANEOUS at 01:39

## 2021-12-13 RX ADMIN — SODIUM CHLORIDE, PRESERVATIVE FREE 10 ML: 5 INJECTION INTRAVENOUS at 09:06

## 2021-12-13 RX ADMIN — PROPOFOL 50 MCG/KG/MIN: 10 INJECTION, EMULSION INTRAVENOUS at 02:02

## 2021-12-13 RX ADMIN — PANTOPRAZOLE SODIUM 40 MG: 40 INJECTION, POWDER, FOR SOLUTION INTRAVENOUS at 08:51

## 2021-12-13 RX ADMIN — HEPARIN SODIUM 5000 UNITS: 5000 INJECTION INTRAVENOUS; SUBCUTANEOUS at 20:53

## 2021-12-13 RX ADMIN — POTASSIUM CHLORIDE 20 MEQ: 29.8 INJECTION INTRAVENOUS at 11:08

## 2021-12-13 RX ADMIN — PROPOFOL 20 MCG/KG/MIN: 10 INJECTION, EMULSION INTRAVENOUS at 11:05

## 2021-12-13 RX ADMIN — PIPERACILLIN AND TAZOBACTAM 3375 MG: 3; .375 INJECTION, POWDER, LYOPHILIZED, FOR SOLUTION INTRAVENOUS at 16:55

## 2021-12-13 RX ADMIN — INSULIN LISPRO 1 UNITS: 100 INJECTION, SOLUTION INTRAVENOUS; SUBCUTANEOUS at 13:01

## 2021-12-13 RX ADMIN — FENTANYL CITRATE 50 MCG: 50 INJECTION INTRAMUSCULAR; INTRAVENOUS at 12:48

## 2021-12-13 RX ADMIN — HEPARIN SODIUM 5000 UNITS: 5000 INJECTION INTRAVENOUS; SUBCUTANEOUS at 09:05

## 2021-12-13 RX ADMIN — FUROSEMIDE 40 MG: 10 INJECTION, SOLUTION INTRAMUSCULAR; INTRAVENOUS at 20:53

## 2021-12-13 RX ADMIN — ONDANSETRON 4 MG: 2 INJECTION INTRAMUSCULAR; INTRAVENOUS at 20:52

## 2021-12-13 RX ADMIN — POTASSIUM CHLORIDE: 2 INJECTION, SOLUTION, CONCENTRATE INTRAVENOUS at 18:24

## 2021-12-13 RX ADMIN — SODIUM CHLORIDE, PRESERVATIVE FREE 10 ML: 5 INJECTION INTRAVENOUS at 20:53

## 2021-12-13 RX ADMIN — OCTREOTIDE ACETATE 25 MCG/HR: 200 INJECTION, SOLUTION INTRAVENOUS; SUBCUTANEOUS at 16:50

## 2021-12-13 RX ADMIN — SODIUM CHLORIDE 0.2 MCG/KG/HR: 9 INJECTION, SOLUTION INTRAVENOUS at 20:35

## 2021-12-13 RX ADMIN — FUROSEMIDE 40 MG: 10 INJECTION, SOLUTION INTRAMUSCULAR; INTRAVENOUS at 08:51

## 2021-12-13 RX ADMIN — PIPERACILLIN AND TAZOBACTAM 3375 MG: 3; .375 INJECTION, POWDER, LYOPHILIZED, FOR SOLUTION INTRAVENOUS at 01:42

## 2021-12-13 RX ADMIN — PIPERACILLIN AND TAZOBACTAM 3375 MG: 3; .375 INJECTION, POWDER, LYOPHILIZED, FOR SOLUTION INTRAVENOUS at 09:05

## 2021-12-13 ASSESSMENT — PAIN SCALES - GENERAL
PAINLEVEL_OUTOF10: 0
PAINLEVEL_OUTOF10: 5
PAINLEVEL_OUTOF10: 0
PAINLEVEL_OUTOF10: 3
PAINLEVEL_OUTOF10: 0

## 2021-12-13 ASSESSMENT — PULMONARY FUNCTION TESTS
PIF_VALUE: 19
PIF_VALUE: 25
PIF_VALUE: 23
PIF_VALUE: 18
PIF_VALUE: 23
PIF_VALUE: 18
PIF_VALUE: 23
PIF_VALUE: 25
PIF_VALUE: 26
PIF_VALUE: 27

## 2021-12-13 NOTE — FLOWSHEET NOTE
Pt resting in bed. No current visitors. Writer says a prayer at the door. Chaplains will remain available to offer spiritual and emotional support as needed.        12/13/21 1651   Encounter Summary   Services provided to: Patient not available   Referral/Consult From: Khari   Continue Visiting   (12/13/21 )   Complexity of Encounter Low   Routine   Type Follow up   Assessment Sleeping   Intervention Prayer     Electronically signed by Tiffanie Davis, on 12/13/2021 at 4:52 PM.  Jarod  [phone]

## 2021-12-13 NOTE — PROGRESS NOTES
Pulmonary Critical Care Progress Note  Kiah Goncalves M.D. Patient seen for the follow up of hypoxic respiratory failure, metabolic acidosis    Subjective:  She remains intubated on ventilator. She is awake and is following commands. She is on Versed at 1 and propofol at 10. She had to be started on Cesar-Synephrine overnight but is currently being weaned off at this time. She is on Sandostatin drip as well. She is also receiving TPN. She is diuresing well. She had her wound VAC dressing changed yesterday. She was also seen by wound care this morning.     Examination:  Vitals: BP (!) 97/59   Pulse 73   Temp 98.7 °F (37.1 °C) (Axillary)   Resp 19   Ht 5' 4.5\" (1.638 m)   Wt 187 lb (84.8 kg)   LMP  (LMP Unknown)   SpO2 97%   BMI 31.60 kg/m²   General appearance: Intubated on ventilator, awake, following commands  Neck: No JVD  Lungs: Decreased breath sounds no crackles or wheezing  Heart: regular rate and rhythm, S1, S2 normal, no gallop  Abdomen: Soft, non tender, positive wound VAC  Extremities: no cyanosis or clubbing.  + edema    LABs:  CBC:   Recent Labs     12/12/21  0500 12/13/21  0733   WBC 11.7* 14.1*   HGB 9.4* 9.0*   HCT 29.7* 28.6*   * 597*     BMP:   Recent Labs     12/12/21  0500 12/13/21  0733    139   K 3.4* 3.2*   CO2 27 27   BUN 13 14   CREATININE <0.40* <0.40*   LABGLOM Can not be calculated Can not be calculated   GLUCOSE 160* 138*     ABG:  Lab Results   Component Value Date    RED4FHR NOT REPORTED 12/13/2021    FIO2 NOT REPORTED 12/13/2021       Lab Results   Component Value Date    POCPH 7.451 12/13/2021    POCPCO2 47.2 12/13/2021    POCPO2 85.2 12/13/2021    POCHCO3 32.9 12/13/2021    PBEA 8 12/13/2021    OJA7OOD NOT REPORTED 12/13/2021    YTUG5GPQ 97 12/13/2021    FIO2 NOT REPORTED 12/13/2021     Radiology:  Chest x-ray 12/13/21      Impression:  · Acute respiratory insufficiency requiring ventilator support  · Feculent peritonitis s/p exploratory laparotomy with end colostomy/pelvic drain placement  · COPD  · History of GERD/dyslipidemia/hypothyroidism/hypertension  · Metabolic acidosis  · Bilateral pleural infiltrate/pulmonary edema    Recommendations:  · Vent support, FiO2 30%/PEEP 8, CPAP trial, if tolerates and weaning parameter met will proceed with extubation   · Wean propofol and versed  · Fentanyl to  mics every hour as needed for pain  · DuoNeb by nebulizer  · Wean off Cesar-Synephrine  · As needed hydralazine for control of hypertension  · Continue diuresis IV Lasix 40 mg twice daily  · TPN/ monitor liver function/ off TF  · Monitor hemoglobin, transfuse for hemoglobin less than 7  · Sandostatin drip  · Zosyn/ off Flagyl  · Wound care/ wound VAC  · X-ray chest in am  · Discussed with RN/RT  · Peptic ulcer disease prophylaxis  · DVT prophylaxis, subcu heparin   · We will follow with you    Electronically signed by     Laura Gandhi MD on 12/13/2021 at 12:43 PM  Pulmonary Critical Care and Sleep Medicine,  [facility]  Cell: [phone]  Office: [phone]

## 2021-12-13 NOTE — PROGRESS NOTES
[facility]   Urology Progress Note            Subjective: follow-up urinary retention indwelling Rose    Patient Vitals for the past 24 hrs:   BP Temp Temp src Pulse Resp SpO2 Height Weight   12/13/21 0413 -- -- -- 67 20 98 % -- --   12/13/21 0400 -- 98.1 °F (36.7 °C) Oral -- -- -- -- 187 lb (84.8 kg)   12/13/21 0300 119/63 -- -- 70 15 95 % -- --   12/13/21 0200 107/62 -- -- 70 15 97 % -- --   12/13/21 0100 (!) 107/52 -- -- 70 20 97 % -- --   12/13/21 0000 (!) 120/59 98.8 °F (37.1 °C) Oral 73 17 97 % -- --   12/12/21 2346 -- -- -- 74 22 97 % -- --   12/12/21 2300 (!) 106/50 -- -- 77 17 96 % -- --   12/12/21 2200 (!) 115/59 -- -- 79 20 94 % -- --   12/12/21 2100 (!) 101/51 -- -- 77 19 94 % -- --   12/12/21 2000 (!) 89/49 101.3 °F (38.5 °C) Oral 85 22 97 % -- --   12/12/21 1955 -- -- -- 84 22 98 % -- --   12/12/21 1900 (!) 102/52 -- -- 91 22 98 % -- --   12/12/21 1800 102/61 -- -- 90 22 97 % -- --   12/12/21 1700 (!) 97/52 -- -- 86 20 97 % -- --   12/12/21 1600 (!) 124/59 97 °F (36.1 °C) Temporal 87 23 96 % -- --   12/12/21 1557 -- -- -- 88 20 97 % -- --   12/12/21 1548 -- -- -- 87 28 93 % -- --   12/12/21 1500 125/66 -- -- 85 22 98 % -- --   12/12/21 1418 -- -- -- -- -- -- 5' 4.5\" (1.638 m) --   12/12/21 1400 123/60 -- -- 89 19 94 % -- --   12/12/21 1300 123/64 -- -- 89 23 97 % -- --   12/12/21 1200 (!) 106/55 97.2 °F (36.2 °C) Temporal 87 25 97 % -- --   12/12/21 1145 -- -- -- 86 22 100 % -- --   12/12/21 1130 -- -- -- 85 24 98 % -- --   12/12/21 1115 -- -- -- 83 25 99 % -- --   12/12/21 1100 (!) 107/46 -- -- 84 23 98 % -- --   12/12/21 1045 -- -- -- 81 17 98 % -- --   12/12/21 1030 -- -- -- 81 23 97 % -- --   12/12/21 1015 (!) 90/49 -- -- 82 22 98 % -- --   12/12/21 1000 (!) 101/49 -- -- 81 21 99 % -- --   12/12/21 0945 (!) 99/49 -- -- 81 21 99 % -- --   12/12/21 0930 (!) 96/47 -- -- 81 19 98 % -- --   12/12/21 0918 -- -- -- 82 21 97 % -- --   12/12/21 0915 (!) 107/52 -- -- 81 16 96 % -- --   12/12/21 0900 (!) 91/45 -- -- 81 19 97 % -- --   12/12/21 0845 (!) 113/54 -- -- 82 17 97 % -- --   12/12/21 0830 (!) 97/46 -- -- 81 21 97 % -- --   12/12/21 0815 87/66 -- -- 83 18 98 % -- --   12/12/21 0800 116/62 97.7 °F (36.5 °C) Temporal 83 19 98 % -- --   12/12/21 0745 (!) 140/65 -- -- 85 19 98 % -- --   12/12/21 0730 -- -- -- 84 21 99 % -- --   12/12/21 0715 -- -- -- 84 22 98 % -- --   12/12/21 0700 (!) 142/65 -- -- 86 22 98 % -- --   12/12/21 0600 (!) 124/57 -- -- 84 21 97 % -- --       Intake/Output Summary (Last 24 hours) at 12/13/2021 0529  Last data filed at 12/13/2021 0200  Gross per 24 hour   Intake 4271.93 ml   Output 5020 ml   Net -748.07 ml       Recent Labs     12/10/21  0530 12/11/21  0445 12/12/21  0500   WBC 13.3* 9.9 11.7*   HGB 9.7* 8.9* 9.4*   HCT 29.7* 27.2* 29.7*   MCV 89.5 89.8 91.4   * 446 618*     Recent Labs     12/10/21  0530 12/11/21  0445 12/12/21  0500    138 138   K 3.6* 3.1* 3.4*    100 97*   CO2 23 27 27   PHOS 3.0 2.7 3.3   BUN 15 14 13   CREATININE <0.40* <0.40* <0.40*       No results for input(s): COLORU, PHUR, LABCAST, WBCUA, RBCUA, MUCUS, TRICHOMONAS, YEAST, BACTERIA, CLARITYU, SPECGRAV, LEUKOCYTESUR, UROBILINOGEN, BILIRUBINUR, BLOODU in the last 72 hours. Invalid input(s): NITRATE, GLUCOSEUKETONESUAMORPHOUS    Additional Lab/culture results:    Physical Exam: No urology changes noted, bladder decompressed Rose catheter in place no gross hematuria    Interval Imaging Findings:    Impression:    Patient Active Problem List   Diagnosis    Intestinal adhesions    Obesity (BMI 30-39. 9)    Urinary incontinence    Constipation    Ventral incisional hernia    Smoker    Stricture of sigmoid colon [facility])       Plan: Urology will monitor for any risk of catheter associated UTI    Tamym Lambert MD  5:29 AM 12/13/2021

## 2021-12-13 NOTE — PROGRESS NOTES
Physical Therapy  [facility]  Daily Treatment Note  NAME: Chris Mccullough  : 1950  MRN: [id number]    Date of Service: 2021    Discharge Recommendations:  Patient would benefit from continued therapy after discharge        Assessment   Body structures, Functions, Activity limitations: Decreased functional mobility ; Decreased strength; Decreased safe awareness; Decreased balance; Decreased endurance; Increased pain  Assessment: pt somewhat resistive to ROM ex that date   Activity Tolerance  Activity Tolerance: Treatment limited secondary to medical complications (free text)     Patient Diagnosis(es): The encounter diagnosis was Chest pain, unspecified type. has a past medical history of Abdominal pain, Arthritis, Constipation, COPD (chronic obstructive pulmonary disease) ([facility] [address] Depressed, GERD (gastroesophageal reflux disease), HLD (hyperlipidemia), HTN (hypertension), Hypothyroid, IBS (irritable bowel syndrome), Lumbar spondylolysis, Myofascial pain, Poor appetite, RLS (restless legs syndrome), and Wears glasses. has a past surgical history that includes Bladder surgery; Rectal surgery; Cystocele repair; Enterocele repair; Abdominal adhesion surgery (3/9/2015); Hysterectomy; Appendectomy; Colonoscopy; Endoscopy, colon, diagnostic; eye surgery; Dilatation, esophagus; hernia repair; Tonsillectomy; Abdomen surgery (2021); Cystocopy (2021); colectomy (N/A, 2021); Cystoscopy (2021); sigmoidoscopy (2021); laparotomy (N/A, 2021); and laparotomy (N/A, 2021).     Restrictions  Restrictions/Precautions  Restrictions/Precautions: General Precautions, Fall Risk  Required Braces or Orthoses?: No  Required Braces or Orthoses  Other: Abdominal Binder  Position Activity Restriction  Other position/activity restrictions: Intubated and sedated  Subjective   General  Chart Reviewed: Yes  Subjective  Subjective: pt recently extubated RN ok's ROM   Pain Screening  Patient Currently in Pain: No  Vital Signs  Patient Currently in Pain: No       Orientation     Cognition      Objective         Ambulation  Ambulation?: No        Exercises  Comments: AAROM x 4 ext x 10 reps, used saskia lift to reposition in bed                        G-Code     OutComes Score                                                     AM-PAC Score  AM-PAC Inpatient Mobility Raw Score : 6 (12/13/21 1540)  AM-PAC Inpatient T-Scale Score : 23.55 (12/13/21 1540)  Mobility Inpatient CMS 0-100% Score: 100 (12/13/21 1540)  Mobility Inpatient CMS G-Code Modifier : CN (12/13/21 1540)          Goals  Short term goals  Time Frame for Short term goals: 14 visits  Short term goal 1: PROM to maintain ROM and mobility in all joints x 4 extremities  Short term goal 2: REASSESS MOBILITY FOLLOWING EXTUBATION  Short term goal 3: Pt to actively participate in at least 30 minutes of physical therapy for ther act, ther ex, balance, gait, and endurance training  Short term goal 4: Pt to ascend/descend 2 steps w/ handrails [place]  Short term goal 5: Pt to actively participate in at least 30 minutes of physical therapy for ther act, ther ex, balance, gait, and endurance training  Patient Goals   Patient goals :  To go home    Plan    Plan  Times per week: 1-2x/day, 6-7x/week  Current Treatment Recommendations: Strengthening, Balance Training, Functional Mobility Training, Stair training, Gait Training, Transfer Training, Endurance Training, Neuromuscular Re-education, Safety Education & Training, Home Exercise Program, Equipment Evaluation, Education, & procurement, Patient/Caregiver Education & Training  Safety Devices  Type of devices: Bed alarm in place, Call light within reach, Nurse notified, Left in bed  Restraints  Initially in place: Yes  Restraints: B soft wrist restraints     Therapy Time   Individual Concurrent Group Co-treatment   Time In  1455         Time Out  1506         Minutes  11                 [address] MICHA, PTA

## 2021-12-13 NOTE — PROGRESS NOTES
Colorectal Surgery:  Daily Progress Note               PATIENT NAME: Tre Brenner   TODAY'S DATE: 12/13/2021, 10:11 AM    SUBJECTIVE:     Patient seen and evaluated. Temperature of 101.3 recorded yesterday. Restarted on phenylephrine, currently on 20 mcg/min. Remains intubated, ventilated, and sedated. Wound vac changed yesterday. Red rubber catheter remains in place with 150 cc succus out yesterday. SHANA drain with 10 cc serosanguineous output. Wound vac serosanguineous. WBC 14.1 this morning. Hgb 9. OBJECTIVE:   VITALS:  BP (!) 97/59   Pulse 73   Temp 98.7 °F (37.1 °C) (Axillary)   Resp 19   Ht 5' 4.5\" (1.638 m)   Wt 187 lb (84.8 kg)   LMP  (LMP Unknown)   SpO2 97%   BMI 31.60 kg/m²      INTAKE/OUTPUT:      Intake/Output Summary (Last 24 hours) at 12/13/2021 1011  Last data filed at 12/13/2021 0200  Gross per 24 hour   Intake 2614.93 ml   Output 3210 ml   Net -595.07 ml       PHYSICAL EXAM:  General Appearance: No acute distress, GCS 7T  HEENT:  Normocephalic, atraumatic, mucus membranes moist   Heart: Heart regular rate, normotensive  Lungs: Equal chest rise bilaterally, mechanically ventilated. Abdomen: Soft, wound VAC to -25 mmHg in place with serosanguineous output, SHANA drain serosanguineous, red rubber catheter draining succus in the bag. LLQ mucus fistula with ostomy appliance in place. Extremities: No cyanosis, pitting edema, rashes noted. Skin: Skin color, texture, turgor normal. No rashes or lesions.     Data:  CBC with Differential:    Lab Results   Component Value Date    WBC 14.1 12/13/2021    RBC 3.06 12/13/2021    HGB 9.0 12/13/2021    HCT 28.6 12/13/2021     12/13/2021    MCV 93.5 12/13/2021    MCH 29.4 12/13/2021    MCHC 31.5 12/13/2021    RDW 15.2 12/13/2021    LYMPHOPCT 10 12/13/2021    MONOPCT 10 12/13/2021    BASOPCT 1 12/13/2021    MONOSABS 1.41 12/13/2021    LYMPHSABS 1.41 12/13/2021    EOSABS 0.14 12/13/2021    BASOSABS 0.14 12/13/2021    DIFFTYPE NOT REPORTED 12/13/2021     BMP:    Lab Results   Component Value Date     12/13/2021    K 3.2 12/13/2021     12/13/2021    CO2 27 12/13/2021    BUN 14 12/13/2021    LABALBU 1.8 12/10/2021    CREATININE <0.40 12/13/2021    CALCIUM 7.3 12/13/2021    GFRAA Can not be calculated 12/13/2021    LABGLOM Can not be calculated 12/13/2021    GLUCOSE 138 12/13/2021     Magnesium:    Lab Results   Component Value Date    MG 1.8 12/13/2021     Phosphorus:    Lab Results   Component Value Date    PHOS 3.5 12/13/2021     Radiology Review:     ASSESSMENT:  Active Hospital Problems    Diagnosis Date Noted    Stricture of sigmoid colon [facility]) [Q48.486] 11/24/2021     80 y.o. female with sigmoid colon stricture. 11/24/21: Status post exploratory laparotomy with explantation pelvic mesh and mobilization sigmoid and proximal rectum, status post ileocecectomy with ileocolonic anastomosis. 12/1/21: Status post ex lap, creation of end colostomy, pelvic drain placement    12/8/21: Exploratory laparotomy, repair of ileocolonic anastomosis, abdominal washout, lysis of adhesions, revision of colostomy, creation of ileostomy with insertion of red rubber catheter, placement of Stratus biologic mesh, wound VAC application. Plan:  -Appreciate critical care management  -Continue strict n.p.o.  -Monitor I's and O's, replete electrolytes as needed  -Continue TPN  -Continue octreotide infusion  -Continue NGT to LIWS  -Continue red rubber catheter to gravity. This is functioning as a conduit end ileostomy.  -Wound/ostomy for wound vac exchanges.   -Continue to monitor SHANA drain.  -Stoma appliance to colostomy/mucus fistula.   -Continue IV abx  -OK for VTE ppx from surgery standpoint    Electronically signed by Ofe Krishnan MD  on 12/13/2021 at 10:11 AM

## 2021-12-13 NOTE — PROGRESS NOTES
Comprehensive Nutrition Assessment    Type and Reason for Visit:  Reassess    Nutrition Recommendations/Plan:   1. Continue NPO status  2. Increase TPN to 75 mL/hr (1800 mL total volume, 1584 kcal and 90 gm of protein)  3. Monitor TPN rate/ tolerance, labs, GI status, weights and diet advancement    Nutrition Assessment:  Patient has been extubated today but continues to be on TPN. Will increase TPN rate to 75 mL/hr (1800 mL total volume). Will get a new triglyceride lab for 12/14 and if it is normal than lipids will be restarted. Will monitor labs, GI status, weights and TPN rate/ tolerance. Malnutrition Assessment:  Malnutrition Status: At risk for malnutrition (Comment)      Estimated Daily Nutrient Needs:  Energy (kcal):  0471-3713 kcal (15-18 kcal/kg); Weight Used for Energy Requirements:  Current     Protein (g):  84-95 gm of protein (1.5-1.7 gm/kg); Weight Used for Protein Requirements:  Ideal        Fluid (ml/day):   ; Method Used for Fluid Requirements:  1 ml/kcal      Nutrition Related Findings:  Edema: +2 BUE, +2 pitting BLE. Absent bowel sounds. Extubated on 12/13. GI surgeries on 11/24; 12/1; 12/8      Wounds:  Wound Vac, Multiple, Surgical Incision       Current Nutrition Therapies:    Diet NPO Exceptions are: Sips of Water with Meds, Ice Chips  PN-Adult 2-in-1 Central Line (Standard)  PN-Adult 2-in-1 [facility] (Standard)  Current Parenteral Nutrition Orders:  · Type and Formula: Premix Central, 2-in-1 Standard   · Lipids: None  · Duration: Continuous  · Rate/Volume: 65 mL/hr (1560 mL total volume)  · Current PN Order Provides: 1573 kcal and 78 gm of protein  · Goal PN Orders Provides: 1555 kcal and 84-95 gm of protein      Anthropometric Measures:  · Height: 5' 4.5\" (163.8 cm)  · Current Body Weight: 187 lb (84.8 kg)   · Admission Body Weight: 184 lb (83.5 kg)    · Ideal Body Weight: 123 lbs; % Ideal Body Weight 152 %   · BMI: 31.6  · BMI Categories: Obese Class 1 (BMI 30.0-34. 9) Nutrition Diagnosis:   · Inadequate protein-energy intake related to inadequate protein-energy intake as evidenced by NPO or clear liquid status due to medical condition, nutrition support - parenteral nutrition    · Altered GI function related to altered GI structure as evidenced by GI abnormality, nausea (status post small bowel surgery)      Nutrition Interventions:   Food and/or Nutrient Delivery:  Continue NPO, Continue Current Parenteral Nutrition  Nutrition Education/Counseling:  Education not indicated   Coordination of Nutrition Care:  Continue to monitor while inpatient    Goals:  PN will meet greater than 75% of estimated nutrition needs       Nutrition Monitoring and Evaluation:   Behavioral-Environmental Outcomes:  None Identified   Food/Nutrient Intake Outcomes:  Parenteral Nutrition Intake/Tolerance  Physical Signs/Symptoms Outcomes:  Biochemical Data, Fluid Status or Edema, Skin, Weight, GI Status     Discharge Planning:     Too soon to determine           Holly PETERSENN, RDN, LDN  Lead Clinical Dietitian  RD Office Phone [phone]

## 2021-12-13 NOTE — PROGRESS NOTES
Order obtained for extubation. SpO2 of 98 on 30% FiO2. Patient extubated and placed on 3 liters/min via nasal cannula. Post extubation SpO2 is 97% with HR  83 bpm and RR 12 breaths/min. Patient had moderate cough that was non-productive. Extubation Well tolerated by patient. .   Breath Sounds: clear    germán philip RCP   1:24 PM

## 2021-12-14 ENCOUNTER — APPOINTMENT (OUTPATIENT)
Dept: GENERAL RADIOLOGY | Age: 71
DRG: 329 | End: 2021-12-14
Attending: COLON & RECTAL SURGERY
Payer: MEDICARE

## 2021-12-14 LAB
ABSOLUTE EOS #: 0.11 K/UL (ref 0–0.4)
ABSOLUTE IMMATURE GRANULOCYTE: 2.42 K/UL (ref 0–0.3)
ABSOLUTE LYMPH #: 1.32 K/UL (ref 1–4.8)
ABSOLUTE MONO #: 1.21 K/UL (ref 0.2–0.8)
ALBUMIN SERPL-MCNC: 2 G/DL (ref 3.5–5.2)
ALBUMIN/GLOBULIN RATIO: ABNORMAL (ref 1–2.5)
ALP BLD-CCNC: 114 U/L (ref 35–104)
ALT SERPL-CCNC: 8 U/L (ref 5–33)
ANION GAP SERPL CALCULATED.3IONS-SCNC: 9 MMOL/L (ref 9–17)
AST SERPL-CCNC: 15 U/L
BASOPHILS # BLD: 1 %
BASOPHILS ABSOLUTE: 0.11 K/UL (ref 0–0.2)
BILIRUB SERPL-MCNC: 0.72 MG/DL (ref 0.3–1.2)
BUN BLDV-MCNC: 21 MG/DL (ref 8–23)
BUN/CREAT BLD: 47 (ref 9–20)
CALCIUM SERPL-MCNC: 8.2 MG/DL (ref 8.6–10.4)
CHLORIDE BLD-SCNC: 97 MMOL/L (ref 98–107)
CO2: 33 MMOL/L (ref 20–31)
CREAT SERPL-MCNC: 0.45 MG/DL (ref 0.5–0.9)
DIFFERENTIAL TYPE: ABNORMAL
EOSINOPHILS RELATIVE PERCENT: 1 % (ref 1–4)
GFR AFRICAN AMERICAN: >60 ML/MIN
GFR NON-AFRICAN AMERICAN: >60 ML/MIN
GFR SERPL CREATININE-BSD FRML MDRD: ABNORMAL ML/MIN/{1.73_M2}
GFR SERPL CREATININE-BSD FRML MDRD: ABNORMAL ML/MIN/{1.73_M2}
GLUCOSE BLD-MCNC: 127 MG/DL (ref 65–105)
GLUCOSE BLD-MCNC: 130 MG/DL (ref 65–105)
GLUCOSE BLD-MCNC: 140 MG/DL (ref 65–105)
GLUCOSE BLD-MCNC: 150 MG/DL (ref 65–105)
GLUCOSE BLD-MCNC: 153 MG/DL (ref 70–99)
HCT VFR BLD CALC: 29.7 % (ref 36.3–47.1)
HEMOGLOBIN: 9.2 G/DL (ref 11.9–15.1)
IMMATURE GRANULOCYTES: 22 %
LYMPHOCYTES # BLD: 12 % (ref 24–44)
MAGNESIUM: 2.1 MG/DL (ref 1.6–2.6)
MCH RBC QN AUTO: 28.8 PG (ref 25.2–33.5)
MCHC RBC AUTO-ENTMCNC: 31 G/DL (ref 28.4–34.8)
MCV RBC AUTO: 93.1 FL (ref 82.6–102.9)
MONOCYTES # BLD: 11 % (ref 1–7)
MORPHOLOGY: ABNORMAL
MORPHOLOGY: ABNORMAL
NRBC AUTOMATED: 0 PER 100 WBC
PDW BLD-RTO: 14.9 % (ref 11.8–14.4)
PHOSPHORUS: 3.7 MG/DL (ref 2.6–4.5)
PLATELET # BLD: 541 K/UL (ref 138–453)
PLATELET ESTIMATE: ABNORMAL
PMV BLD AUTO: 11.6 FL (ref 8.1–13.5)
POTASSIUM SERPL-SCNC: 3.6 MMOL/L (ref 3.7–5.3)
RBC # BLD: 3.19 M/UL (ref 3.95–5.11)
RBC # BLD: ABNORMAL 10*6/UL
SEG NEUTROPHILS: 53 % (ref 36–66)
SEGMENTED NEUTROPHILS ABSOLUTE COUNT: 5.83 K/UL (ref 1.8–7.7)
SODIUM BLD-SCNC: 139 MMOL/L (ref 135–144)
TOTAL PROTEIN: 5.5 G/DL (ref 6.4–8.3)
WBC # BLD: 11 K/UL (ref 3.5–11.3)
WBC # BLD: ABNORMAL 10*3/UL

## 2021-12-14 PROCEDURE — 6360000002 HC RX W HCPCS: Performed by: STUDENT IN AN ORGANIZED HEALTH CARE EDUCATION/TRAINING PROGRAM

## 2021-12-14 PROCEDURE — 85025 COMPLETE CBC W/AUTO DIFF WBC: CPT

## 2021-12-14 PROCEDURE — 2580000003 HC RX 258: Performed by: STUDENT IN AN ORGANIZED HEALTH CARE EDUCATION/TRAINING PROGRAM

## 2021-12-14 PROCEDURE — 97535 SELF CARE MNGMENT TRAINING: CPT

## 2021-12-14 PROCEDURE — C9113 INJ PANTOPRAZOLE SODIUM, VIA: HCPCS | Performed by: STUDENT IN AN ORGANIZED HEALTH CARE EDUCATION/TRAINING PROGRAM

## 2021-12-14 PROCEDURE — 2500000003 HC RX 250 WO HCPCS: Performed by: STUDENT IN AN ORGANIZED HEALTH CARE EDUCATION/TRAINING PROGRAM

## 2021-12-14 PROCEDURE — 84478 ASSAY OF TRIGLYCERIDES: CPT

## 2021-12-14 PROCEDURE — 80053 COMPREHEN METABOLIC PANEL: CPT

## 2021-12-14 PROCEDURE — 97530 THERAPEUTIC ACTIVITIES: CPT

## 2021-12-14 PROCEDURE — 6360000002 HC RX W HCPCS: Performed by: NURSE PRACTITIONER

## 2021-12-14 PROCEDURE — 83735 ASSAY OF MAGNESIUM: CPT

## 2021-12-14 PROCEDURE — 71045 X-RAY EXAM CHEST 1 VIEW: CPT

## 2021-12-14 PROCEDURE — 2000000000 HC ICU R&B

## 2021-12-14 PROCEDURE — 94761 N-INVAS EAR/PLS OXIMETRY MLT: CPT

## 2021-12-14 PROCEDURE — 82947 ASSAY GLUCOSE BLOOD QUANT: CPT

## 2021-12-14 PROCEDURE — 97164 PT RE-EVAL EST PLAN CARE: CPT

## 2021-12-14 PROCEDURE — 99212 OFFICE O/P EST SF 10 MIN: CPT

## 2021-12-14 PROCEDURE — 84100 ASSAY OF PHOSPHORUS: CPT

## 2021-12-14 PROCEDURE — 6370000000 HC RX 637 (ALT 250 FOR IP): Performed by: STUDENT IN AN ORGANIZED HEALTH CARE EDUCATION/TRAINING PROGRAM

## 2021-12-14 PROCEDURE — 97168 OT RE-EVAL EST PLAN CARE: CPT

## 2021-12-14 PROCEDURE — 2500000003 HC RX 250 WO HCPCS: Performed by: COLON & RECTAL SURGERY

## 2021-12-14 PROCEDURE — 2700000000 HC OXYGEN THERAPY PER DAY

## 2021-12-14 PROCEDURE — 36415 COLL VENOUS BLD VENIPUNCTURE: CPT

## 2021-12-14 RX ADMIN — FENTANYL CITRATE 50 MCG: 50 INJECTION INTRAMUSCULAR; INTRAVENOUS at 12:25

## 2021-12-14 RX ADMIN — FENTANYL CITRATE 50 MCG: 50 INJECTION INTRAMUSCULAR; INTRAVENOUS at 20:17

## 2021-12-14 RX ADMIN — PANTOPRAZOLE SODIUM 40 MG: 40 INJECTION, POWDER, FOR SOLUTION INTRAVENOUS at 09:07

## 2021-12-14 RX ADMIN — POTASSIUM CHLORIDE: 2 INJECTION, SOLUTION, CONCENTRATE INTRAVENOUS at 18:41

## 2021-12-14 RX ADMIN — SODIUM CHLORIDE, PRESERVATIVE FREE 10 ML: 5 INJECTION INTRAVENOUS at 20:18

## 2021-12-14 RX ADMIN — HEPARIN SODIUM 5000 UNITS: 5000 INJECTION INTRAVENOUS; SUBCUTANEOUS at 09:08

## 2021-12-14 RX ADMIN — FENTANYL CITRATE 50 MCG: 50 INJECTION INTRAMUSCULAR; INTRAVENOUS at 00:20

## 2021-12-14 RX ADMIN — FUROSEMIDE 40 MG: 10 INJECTION, SOLUTION INTRAMUSCULAR; INTRAVENOUS at 20:17

## 2021-12-14 RX ADMIN — PIPERACILLIN AND TAZOBACTAM 3375 MG: 3; .375 INJECTION, POWDER, LYOPHILIZED, FOR SOLUTION INTRAVENOUS at 00:17

## 2021-12-14 RX ADMIN — FENTANYL CITRATE 50 MCG: 50 INJECTION INTRAMUSCULAR; INTRAVENOUS at 17:11

## 2021-12-14 RX ADMIN — SODIUM CHLORIDE 0.3 MCG/KG/HR: 9 INJECTION, SOLUTION INTRAVENOUS at 23:13

## 2021-12-14 RX ADMIN — PIPERACILLIN AND TAZOBACTAM 3375 MG: 3; .375 INJECTION, POWDER, LYOPHILIZED, FOR SOLUTION INTRAVENOUS at 08:19

## 2021-12-14 RX ADMIN — FUROSEMIDE 40 MG: 10 INJECTION, SOLUTION INTRAMUSCULAR; INTRAVENOUS at 09:08

## 2021-12-14 RX ADMIN — SODIUM CHLORIDE, PRESERVATIVE FREE 10 ML: 5 INJECTION INTRAVENOUS at 09:07

## 2021-12-14 RX ADMIN — HEPARIN SODIUM 5000 UNITS: 5000 INJECTION INTRAVENOUS; SUBCUTANEOUS at 20:17

## 2021-12-14 RX ADMIN — OCTREOTIDE ACETATE 25 MCG/HR: 200 INJECTION, SOLUTION INTRAVENOUS; SUBCUTANEOUS at 12:26

## 2021-12-14 RX ADMIN — PIPERACILLIN AND TAZOBACTAM 3375 MG: 3; .375 INJECTION, POWDER, LYOPHILIZED, FOR SOLUTION INTRAVENOUS at 16:00

## 2021-12-14 RX ADMIN — INSULIN LISPRO 1 UNITS: 100 INJECTION, SOLUTION INTRAVENOUS; SUBCUTANEOUS at 12:11

## 2021-12-14 RX ADMIN — FENTANYL CITRATE 50 MCG: 50 INJECTION INTRAMUSCULAR; INTRAVENOUS at 09:01

## 2021-12-14 RX ADMIN — SODIUM CHLORIDE 0.3 MCG/KG/HR: 9 INJECTION, SOLUTION INTRAVENOUS at 06:28

## 2021-12-14 RX ADMIN — PIPERACILLIN AND TAZOBACTAM 3375 MG: 3; .375 INJECTION, POWDER, LYOPHILIZED, FOR SOLUTION INTRAVENOUS at 23:12

## 2021-12-14 ASSESSMENT — PAIN DESCRIPTION - PAIN TYPE
TYPE: SURGICAL PAIN

## 2021-12-14 ASSESSMENT — PAIN SCALES - GENERAL
PAINLEVEL_OUTOF10: 4
PAINLEVEL_OUTOF10: 0
PAINLEVEL_OUTOF10: 0
PAINLEVEL_OUTOF10: 5

## 2021-12-14 ASSESSMENT — PAIN DESCRIPTION - LOCATION
LOCATION: ABDOMEN

## 2021-12-14 ASSESSMENT — PAIN DESCRIPTION - ORIENTATION
ORIENTATION: MID

## 2021-12-14 ASSESSMENT — PAIN SCALES - WONG BAKER: WONGBAKER_NUMERICALRESPONSE: 4

## 2021-12-14 NOTE — PROGRESS NOTES
Physical Therapy    Facility/Department: [facility]  Initial Assessment    NAME: Luz Fields  : 1950  MRN: [id number]    Date of Service: 2021   RN Oliver Tang reports patient is medically stable for therapy treatment this date. Chart reviewed prior to treatment and patient is agreeable for therapy. All lines intact and patient positioned comfortably at end of treatment. All patient needs addressed prior to ending therapy session. Per H&P:  21: Status post exploratory laparotomy with explantation pelvic mesh and mobilization sigmoid and proximal rectum, status post ileocecectomy with ileocolonic anastomosis. 21: Status post ex lap, creation of end colostomy, pelvic drain placement  21: Pt intubated  21: Exploratory laparotomy, repair of ileocolonic anastomosis, abdominal washout, lysis of adhesions, revision of colostomy, creation of ileostomy with insertion of red rubber catheter, placement of Stratus biologic mesh, wound VAC application. 21: Pt extubated    Discharge Recommendations:  Pt currently functioning below baseline. Would suggest additional therapy at time of discharge to maximize long term outcomes and prevent re-admission. Please refer to AM-PAC score for current level of function. Patient would benefit from continued therapy after discharge      Assessment   Body structures, Functions, Activity limitations: Decreased functional mobility ; Decreased strength; Decreased safe awareness; Decreased balance; Decreased endurance; Increased pain; Decreased cognition; Decreased posture  Assessment: Reassessment completed s/p extubation 21. Pt tolerated session fair. Pt w/ deficits in strength, mobility, and endurance throughout functional mobility. Pt currently requires skilled 2 person assist for safe functional mobility at this time.   Pt would benefit from continued skilled physical therapy to address these deficits in order to return to PLOF.  Specific instructions for Next Treatment: REASSESS TRANSFERS/GAIT WHEN APPROPRIATE  Prognosis: Good  Decision Making: High Complexity  PT Education: Goals; PT Role; Plan of Care; General Safety; Functional Mobility Training; Orientation  Patient Education: Pt educated on: purpose of acute PT, importance of continued mobility throughout admission, general safety awareness, re-orientation, PT POC, pressure relief benefits from sitting EOB, paced breathing for pain control, and PT goals. Pt w/ poor understanding verbalized. Pt requires continued reinforcement of education. REQUIRES PT FOLLOW UP: Yes  Activity Tolerance  Activity Tolerance: Patient limited by endurance; Patient limited by fatigue       Patient Diagnosis(es): The encounter diagnosis was Chest pain, unspecified type. has a past medical history of Abdominal pain, Arthritis, Constipation, COPD (chronic obstructive pulmonary disease) ([address] Depressed, GERD (gastroesophageal reflux disease), HLD (hyperlipidemia), HTN (hypertension), Hypothyroid, IBS (irritable bowel syndrome), Lumbar spondylolysis, Myofascial pain, Poor appetite, RLS (restless legs syndrome), and Wears glasses. has a past surgical history that includes Bladder surgery; Rectal surgery; Cystocele repair; Enterocele repair; Abdominal adhesion surgery (3/9/2015); Hysterectomy; Appendectomy; Colonoscopy; Endoscopy, colon, diagnostic; eye surgery; Dilatation, esophagus; hernia repair; Tonsillectomy; Abdomen surgery (11/24/2021); Cystocopy (11/24/2021); colectomy (N/A, 11/24/2021); Cystoscopy (11/24/2021); sigmoidoscopy (11/24/2021); laparotomy (N/A, 12/1/2021); and laparotomy (N/A, 12/8/2021).     Restrictions  Restrictions/Precautions  Restrictions/Precautions: General Precautions, Fall Risk  Required Braces or Orthoses?: No  Required Braces or Orthoses  Other: Abdominal Binder  Position Activity Restriction  Other position/activity restrictions: Extubated 12/13/21, SHANA drain, palmer, colostomy, RUE PICC, R jugular central line, wound vac, NG tube, telemetry, 3L O2 NC  Vision/Hearing  Vision: Impaired (denies any recent changes)  Vision Exceptions: Wears glasses at all times  Hearing: Within functional limits     Subjective  General  Patient assessed for rehabilitation services?: Yes  Response To Previous Treatment: Patient unable to report, no changes reported from family or staff  Family / Caregiver Present: Yes ( arrived throughout session, very supportive)  Follows Commands: Within Functional Limits  General Comment  Comments: RN reporting pt appropriate to dangle EOB, not appropriate for OOB activity. Pt supine in bed arrival.  Pt soft spoken and cooperative throughout. Subjective  Subjective: Pt denying any pain throughout session, pleasantly confused throughout.   Pain Screening  Patient Currently in Pain: Denies        Orientation  Orientation  Overall Orientation Status: Impaired  Orientation Level: Oriented to person  Social/Functional History  Social/Functional History  Lives With: Spouse  Type of Home: House  Home Layout: One level  Home Access: Stairs to enter with rails  Entrance Stairs - Number of Steps: 2  Entrance Stairs - Rails: Right  Bathroom Shower/Tub: Walk-in shower  Bathroom Toilet: Handicap height  Bathroom Equipment: Grab bars in shower, Shower chair, Grab bars around toilet  Home Equipment: Wheelchair-manual, Rolling walker (no DME at baseline; spouse says access to device)  Receives Help From:  (reports son is supportive and local)  ADL Assistance: Independent  Homemaking Assistance: Independent  Homemaking Responsibilities: Yes (shares w/ - pt does cleaning/laundry,  cooks)  Ambulation Assistance: Independent  Transfer Assistance: Independent  Active : Yes  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\"  Additional Comments: Pt denies any falls  Cognition Cognition  Overall Cognitive Status: Exceptions  Arousal/Alertness: Delayed responses to stimuli  Following Commands: Follows one step commands with repetition; Follows one step commands with increased time  Attention Span: Attends with cues to redirect; Difficulty attending to directions  Memory: Decreased short term memory; Decreased recall of recent events; Decreased recall of precautions; Decreased long term memory  Safety Judgement: Decreased awareness of need for assistance; Decreased awareness of need for safety  Problem Solving: Decreased awareness of errors; Assistance required to identify errors made; Assistance required to correct errors made; Assistance required to generate solutions; Assistance required to implement solutions  Insights: Not aware of deficits  Initiation: Requires cues for all  Sequencing: Requires cues for all  Cognition Comment: Pt w/ flat affect throughout session, not oriented to situation. Pt provided w/ reassurance throughout session. Upon return to bed, pt repeating \"Shoot me\" several times to therapy staff. Pt was reassured that she did well in therapy and NACHO Diallo was notified. Objective     Observation/Palpation  Posture: Poor  Observation: abdominal dressing in place    AROM RLE (degrees)  RLE AROM: WFL  AROM LLE (degrees)  LLE AROM : WFL  AROM RUE (degrees)  RUE General AROM: See OT assessment for detail  AROM LUE (degrees)  LUE General AROM: See OT assessment for detail  Strength RLE  Strength RLE: Exception  Comment: Movement against gravity noted at hip, knee, and ankle throughout session. Strength LLE  Strength LLE: Exception  Comment: Movement against gravity noted at hip, knee, and ankle throughout session.   Strength RUE  Comment: See OT assessment for detail  Strength LUE  Comment: See OT assessment for detail  Tone RLE  RLE Tone: Normotonic  Tone LLE  LLE Tone: Normotonic  Motor Control  Gross Motor?: WFL  Sensation  Overall Sensation Status: WFL (denies numbness/tingling)  Bed mobility  Rolling to Left: Maximum assistance; 2 Person assistance  Rolling to Right: Maximum assistance; 2 Person assistance  Supine to Sit: Maximum assistance; 2 Person assistance  Sit to Supine: Maximum assistance; 2 Person assistance  Scooting: Maximal assistance; 2 Person assistance; Dependent/Total  Comment: Pt w/ significant difficulty throughout bed mobility this date requiring assist for progression of BLE and trunk throughout. Pt initially able to attempt BLE initiation towards EOB but requiring assist half-way. Pt sat EOB ~10 min this date requiring CGA-MaxA from writer to maintain sitting balance throughout. Pt w/ fair sitting tolerance. Transfers  Comment: Not appropriate to attempt transfers this date. Will continue to progress as able. Ambulation  Ambulation?: No  More Ambulation?: No     Balance  Posture: Poor  Sitting - Static: Fair; -  Sitting - Dynamic: Poor  Comments: Unable to assess standing balance this date. Exercises  Heelslides: x 5  Ankle Pumps: x 15  Comments: Pt w/ decreased activity tolerance this date.      Plan   Plan  Times per week: 1-2x/day, 6-7x/week  Specific instructions for Next Treatment: REASSESS TRANSFERS/GAIT WHEN APPROPRIATE  Current Treatment Recommendations: Strengthening, Balance Training, Functional Mobility Training, Stair training, Gait Training, Transfer Training, Endurance Training, Neuromuscular Re-education, Safety Education & Training, Home Exercise Program, Equipment Evaluation, Education, & procurement, Patient/Caregiver Education & Training  Safety Devices  Type of devices: Bed alarm in place, Call light within reach, Nurse notified, Left in bed  Restraints  Initially in place: Yes  Restraints: 4 bedrails    AM-PAC Score  AM-PAC Inpatient Mobility Raw Score : 8 (12/14/21 1302)  AM-PAC Inpatient T-Scale Score : 28.52 (12/14/21 1302)  Mobility Inpatient CMS 0-100% Score: 86.62 (12/14/21 1302)  Mobility Inpatient CMS G-Code Modifier : CM (12/14/21 1876)       Functional Outcome Measure-   Single Leg Stance Test:  0 sec. (<5 sec.= fall risk)    Goals  Short term goals  Time Frame for Short term goals: 14 visits  Short term goal 1: Pt to demonstrate bed mobility w/ log roll technique Loulou x 2  Short term goal 2: Pt to demonstrate at least fair+ static and dynamic sitting balance  Short term goal 3: Pt to tolerate sitting EOB at least 20 minutes to maximize independence w/ ADLs  Short term goal 4: REASSESS TRANSFERS/GAIT WHEN APPROPRIATE  Short term goal 5: Pt to actively participate in at least 30 minutes of physical therapy for ther act, ther ex, balance, mobility, and endurance training  Patient Goals   Patient goals : To go home       Therapy Time   Individual Concurrent Group Co-treatment   Time In 1130         Time Out 1210         Minutes 40           Treatment time: 30 minutes       Co-treatment with OT warranted secondary to decreased safety and independence requiring 2 skilled therapy professionals to address individual discipline's goals.      Karen Delgado, PT

## 2021-12-14 NOTE — CARE COORDINATION
Pt. Is extubated. Spoke with  about LTACH, He wants advanced. Spoke with Olga at [facility] and made a referral. Cont' to follow.

## 2021-12-14 NOTE — PLAN OF CARE
Problem: Non-Violent Restraints  Goal: Removal from restraints as soon as assessed to be safe  12/14/2021 1417 by Zoey Garcia RN  Outcome: Completed  12/14/2021 1415 by Zoey Garcia RN  Outcome: Ongoing  12/14/2021 0414 by Dima Gutierrez RN  Outcome: Ongoing  Goal: No harm/injury to patient while restraints in use  12/14/2021 1417 by Zoey Garcia RN  Outcome: Completed  12/14/2021 1415 by Zoey Garcia RN  Outcome: Ongoing  12/14/2021 0414 by Dima Gutierrez RN  Outcome: Ongoing  Goal: Patient's dignity will be maintained  12/14/2021 1417 by Zoey Garcia RN  Outcome: Completed  12/14/2021 1415 by Zoey Garcia RN  Outcome: Ongoing  12/14/2021 0414 by Dima Gutierrez RN  Outcome: Ongoing

## 2021-12-14 NOTE — PROGRESS NOTES
[address] [facility] Wound Ostomy Continence Nursing  Follow Up      NAME:  Franklin Allred RECORD NUMBER:  [id number]  AGE: 80 y.o. GENDER: female  : 1950  TODAY'S DATE:  2021        The patient is now extubated. She is awake/alert this morning. She is upset that she cannot have anything to drink. Attempted to explain, she is pleasantly confused. The wound vac dressing remains intact and appears to be functioning properly. The previous colostomy mucous fistula site remains pouched and is draining seropurulent into pouch. Gauze in the wound portion that is pouched appears to have a neon green drainage. Will plan wound vac dressing change for tomorrow. Will cont to follow case closely.     Myrtle FLORES RN, CWS, [facility] [address]  Wound, Ostomy, and Continence Nursing  [phone]

## 2021-12-14 NOTE — CARE COORDINATION
Social Work-Spoke with patient's  regarding a back up plan, if patient is not approved for [facility]. He would like a referral sent to [facility].  Sent referral. Ronna Mohamud

## 2021-12-14 NOTE — PROGRESS NOTES
Diagnosis:   · Inadequate protein-energy intake related to inadequate protein-energy intake as evidenced by NPO or clear liquid status due to medical condition, nutrition support - parenteral nutrition    · Altered GI function related to altered GI structure as evidenced by GI abnormality, nausea (status post small bowel surgery)      Nutrition Interventions:   Food and/or Nutrient Delivery:  Continue NPO, Modify Parenteral Nutrition  Nutrition Education/Counseling:  Education not indicated   Coordination of Nutrition Care:  Continue to monitor while inpatient    Goals:  PN will meet greater than 75% of estimated nutrition needs       Nutrition Monitoring and Evaluation:   Behavioral-Environmental Outcomes:  None Identified   Food/Nutrient Intake Outcomes:  Parenteral Nutrition Intake/Tolerance  Physical Signs/Symptoms Outcomes:  Biochemical Data, Fluid Status or Edema, Skin, Weight, GI Status     Discharge Planning:     Too soon to determine     Lorena PulidoB[facility], [address] [place] [address]  Office Number: [phone]

## 2021-12-14 NOTE — PROGRESS NOTES
Patient limited by pain  Activity Tolerance: poor+  Safety Devices  Safety Devices in place: Yes  Type of devices: All fall risk precautions in place; Bed alarm in place; Call light within reach; Nurse notified; Left in bed; Patient at risk for falls; Gait belt (Pts  in room at end of session)           Patient Diagnosis(es): The encounter diagnosis was Chest pain, unspecified type. has a past medical history of Abdominal pain, Arthritis, Constipation, COPD (chronic obstructive pulmonary disease) ([address] Depressed, GERD (gastroesophageal reflux disease), HLD (hyperlipidemia), HTN (hypertension), Hypothyroid, IBS (irritable bowel syndrome), Lumbar spondylolysis, Myofascial pain, Poor appetite, RLS (restless legs syndrome), and Wears glasses. has a past surgical history that includes Bladder surgery; Rectal surgery; Cystocele repair; Enterocele repair; Abdominal adhesion surgery (3/9/2015); Hysterectomy; Appendectomy; Colonoscopy; Endoscopy, colon, diagnostic; eye surgery; Dilatation, esophagus; hernia repair; Tonsillectomy; Abdomen surgery (11/24/2021); Cystocopy (11/24/2021); colectomy (N/A, 11/24/2021); Cystoscopy (11/24/2021); sigmoidoscopy (11/24/2021); laparotomy (N/A, 12/1/2021); and laparotomy (N/A, 12/8/2021). PER H&P:     11/24/21: Status post exploratory laparotomy with explantation pelvic mesh and mobilization sigmoid and proximal rectum, status post ileocecectomy with ileocolonic anastomosis. 12/1/21: Status post ex lap, creation of end colostomy, pelvic drain placement  12/1/21: Pt intubated  12/8/21: Exploratory laparotomy, repair of ileocolonic anastomosis, abdominal washout, lysis of adhesions, revision of colostomy, creation of ileostomy with insertion of red rubber catheter, placement of Stratus biologic mesh, wound VAC application.   12/13/21: Pt extubated      Restrictions  Restrictions/Precautions  Restrictions/Precautions: General Precautions, Fall Risk  Required Braces or Orthoses?: No  Required Braces or Orthoses  Other: Abdominal Binder  Position Activity Restriction  Other position/activity restrictions: Extubated 12/13/21, SHANA drain, palmer, colostomy, RUE PICC, R jugular central line, wound vac, NG tube, telemetry, 3L O2 NC    Subjective   General  Chart Reviewed: Yes  Patient assessed for rehabilitation services?: Yes  Response to previous treatment: Patient with no complaints from previous session  Family / Caregiver Present: Yes (Pts  arrived mid session)  Subjective  Subjective: Pt denying any pain throughout session, pleasantly confused throughout  General Comment  Comments: RN bobby'john pt for dangle at EOB        Social/Functional History  Social/Functional History  Lives With: Spouse  Type of Home: House  Home Layout: One level  Home Access: Stairs to enter with rails  Entrance Stairs - Number of Steps: 2  Entrance Stairs - Rails: Right  Bathroom Shower/Tub: Walk-in shower  Bathroom Toilet: Handicap height  Bathroom Equipment: Grab bars in shower, Shower chair, Grab bars around toilet  Home Equipment: Wheelchair-manual, Rolling walker (no DME at baseline; spouse says access to device)  Receives Help From:  (reports son is supportive and local)  ADL Assistance: Independent  Homemaking Assistance: Independent  Homemaking Responsibilities: Yes (shares w/ - pt does cleaning/laundry,  cooks)  Ambulation Assistance: Independent  Transfer Assistance: Independent  Active : Yes  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 Pt sat EOB ~ 10 min)  Standing Balance: Unable to assess(comment) (Not appropriate at this time)  Functional Mobility  Functional Mobility Comments: Not safe or appropriate at this time  Toilet Transfers  Toilet Transfers Comments: N/T as pt has palmer       ADL  Feeding: NPO  Grooming: Maximum assistance  UE Bathing: Setup; Maximum assistance  LE Bathing: Dependent/Total  UE Dressing: Maximum assistance; Setup (to tie/adjust hosp gown)  LE Dressing: Dependent/Total (for donning B socks while supine in bed)  Toileting: Dependent/Total (Palmer/colostomy)  Additional Comments: Pt is limited by general weakness, cognition and fatigue       Tone RUE  RUE Tone: Normotonic  Tone LUE  LUE Tone: Normotonic  Coordination  Movements Are Fluid And Coordinated: No  Coordination and Movement description: Fine motor impairments (slowed/delayed B opposition)        Bed mobility  Rolling to Left: Maximum assistance; 2 Person assistance  Rolling to Right: Maximum assistance; 2 Person assistance  Supine to Sit: Maximum assistance; 2 Person assistance  Sit to Supine: Maximum assistance; 2 Person assistance  Scooting: Maximal assistance; 2 Person assistance; Dependent/Total (for boost up in bed for optimal positioning)  Comment: Pt required MAX verbal cues/hand over hand assist for hand placement on bedrail, pacing self, awareness/assist with MULTIPLE lines, pursed lip breathing and proper bed mobility tech. Transfers  Sit to stand: Unable to assess  Stand to sit: Unable to assess  Transfer Comments: Not assessed, not safe or appropriate at this time. Cognition  Overall Cognitive Status: Exceptions  Arousal/Alertness: Delayed responses to stimuli  Following Commands: Follows one step commands with repetition; Follows one step commands with increased time  Attention Span: Attends with cues to redirect;  Difficulty attending to directions  Memory: Decreased short term memory; Decreased recall of recent events; Decreased recall of precautions; Decreased long term memory  Safety Judgement: Decreased awareness of need for assistance; Decreased awareness of need for safety  Problem Solving: Decreased awareness of errors; Assistance required to identify errors made; Assistance required to correct errors made; Assistance required to generate solutions; Assistance required to implement solutions  Insights: Not aware of deficits  Initiation: Requires cues for all  Sequencing: Requires cues for all  Cognition Comment: Pt w/ flat affect throughout session, not oriented to situation. Pt provided w/ reassurance throughout session. Upon return to bed, pt repeating \"Shoot me\" several times to therapy staff. Pt was reassured that she did well in therapy and NACHO Romero was notified.   Perception  Overall Perceptual Status: WFL        Sensation  Overall Sensation Status: WFL (denies numbness/tingling)        LUE AROM (degrees)  LUE AROM : Exceptions  LUE General AROM: shld ~ 40 degrees, Rest WFL  RUE AROM (degrees)  RUE AROM : WFL  RUE General AROM: shld ~ 90 degrees, Rest WFL                 Plan   Plan  Times per week: 4-5x/week 1-2x/day as akila  Times per day: Daily  Current Treatment Recommendations: Strengthening, Balance Training, Functional Mobility Training, Safety Education & Training, Positioning, Endurance Training, Neuromuscular Re-education, Patient/Caregiver Education & Training, Equipment Evaluation, Education, & procurement, Home Management Training, Cognitive/Perceptual Training, Pain Management, Self-Care / ADL  Plan Comment: Cont with stated POC                                     AM-PAC Score        AM-PAC Inpatient Daily Activity Raw Score: 10 (12/14/21 1443)  AM-PAC Inpatient ADL T-Scale Score : 27.31 (12/14/21 1443)  ADL Inpatient CMS 0-100% Score: 74.7 (12/14/21 1443)  ADL Inpatient CMS G-Code Modifier : CL (12/14/21 1443)      Goals  Short term goals  Time Frame for Short term goals: by discharge, pt to demo  Short term goal 1: bed mob tasks with use of rail/lproper log rolling as needed to mod of 1. (modifed at reassessment)  Short term goal 2: I with BUE HEP with use of handouts to maintain functional use as well as abd precautions for pain mgt tech. Short term goal 3: UB ADL to set up and LB ADL to mod assist of 1 and use of AD/AE. Short term goal 4: toileting tasks with use of BSC/AD and grab bar to mod assist of 1. Short term goal 5: ADL transfers and functional mob with AD to min assist of 1. Long term goals  Long term goal 1: Pt to stand with SBA and AD akila > 6 mins to reduce falls in function. Long term goal 2: Pt/caregivers to be I with pressure relief, EC/WS and fall prevention tech as well as DME/AD and AE recommendations with use of handouts. Patient Goals   Patient goals : Get home soon! Therapy Time   Individual Concurrent Group Co-treatment   Time In 1130         Time Out 1210         Minutes 40          tx time: 30 min    Co-treatment with PT warranted secondary to decreased safety and independence requiring 2 skilled therapy professionals to address individual discipline's goals.           Vikram Muse, OT

## 2021-12-14 NOTE — PLAN OF CARE
Problem: HEMODYNAMIC STATUS  Goal: Patient has stable vital signs and fluid balance  12/14/2021 1415 by Chery Barth RN  Outcome: Ongoing  12/14/2021 0414 by Luz Marina Garcia RN  Outcome: Ongoing     Problem: OXYGENATION/RESPIRATORY FUNCTION  Goal: Patient will achieve/maintain normal respiratory rate/effort  12/14/2021 1415 by Chery Barth RN  Outcome: Ongoing  12/14/2021 0414 by Luz Marina Garcia RN  Outcome: Ongoing     Problem: MOBILITY  Goal: Early mobilization is achieved  12/14/2021 1415 by Chery Barth RN  Outcome: Ongoing  12/14/2021 0414 by Luz Marina Garcia RN  Outcome: Ongoing     Problem: ELIMINATION  Goal: Elimination patterns are normal or improving  Description: Elimination patterns return to pre-surgery normal patterns  12/14/2021 1415 by Chery Barth RN  Outcome: Ongoing  12/14/2021 0414 by Luz Marina Garcia RN  Outcome: Ongoing     Problem: SKIN INTEGRITY  Goal: Skin integrity is maintained or improved  12/14/2021 1415 by Chery Barth RN  Outcome: Ongoing  12/14/2021 0414 by Luz Marina Garcia RN  Outcome: Ongoing     Problem: Pain:  Description: Pain management should include both nonpharmacologic and pharmacologic interventions.   Goal: Pain level will decrease  Description: Pain level will decrease  12/14/2021 1415 by Chery Barth RN  Outcome: Ongoing  12/14/2021 0414 by Luz Marina Garcia RN  Outcome: Ongoing  Goal: Control of acute pain  Description: Control of acute pain  12/14/2021 1415 by Chery Barth RN  Outcome: Ongoing  12/14/2021 0414 by Luz Marina Garcia RN  Outcome: Ongoing  Goal: Control of chronic pain  Description: Control of chronic pain  12/14/2021 1415 by Chery Barth RN  Outcome: Ongoing  12/14/2021 0414 by Luz Marina Garcia RN  Outcome: Ongoing     Problem: Falls - Risk of:  Goal: Will remain free from falls  Description: Will remain free from falls  12/14/2021 1415 by Chery Barth RN  Outcome: Ongoing  12/14/2021 0414 by Luz Marina Garcia RN  Outcome: Ongoing  Goal: Absence of physical injury  Description: Absence of physical injury  12/14/2021 1415 by Chiquita Galvan RN  Outcome: Ongoing  12/14/2021 0414 by Alexis Perez RN  Outcome: Ongoing     Problem: Skin Integrity:  Goal: Will show no infection signs and symptoms  Description: Will show no infection signs and symptoms  12/14/2021 1415 by Chiquita Galvan RN  Outcome: Ongoing  12/14/2021 0414 by Alexis Perez RN  Outcome: Ongoing  Goal: Absence of new skin breakdown  Description: Absence of new skin breakdown  12/14/2021 1415 by Chiquita Galvan RN  Outcome: Ongoing  12/14/2021 0414 by Alexis Perez RN  Outcome: Ongoing     Problem: Nutrition  Goal: Optimal nutrition therapy  12/14/2021 1415 by Chiquita Galvan RN  Outcome: Ongoing  12/14/2021 0414 by Alexis Perez RN  Outcome: Ongoing     Problem: Non-Violent Restraints  Goal: Removal from restraints as soon as assessed to be safe  12/14/2021 1415 by Chiquita Galvan RN  Outcome: Ongoing  12/14/2021 0414 by Alexis Perez RN  Outcome: Ongoing  Goal: No harm/injury to patient while restraints in use  12/14/2021 1415 by Chiquita Galvan RN  Outcome: Ongoing  12/14/2021 0414 by Alexis Perez RN  Outcome: Ongoing  Goal: Patient's dignity will be maintained  12/14/2021 1415 by Chiquita Galvan RN  Outcome: Ongoing  12/14/2021 0414 by Alexis Preez RN  Outcome: Ongoing

## 2021-12-14 NOTE — PROGRESS NOTES
K 3.2 12/13/2021     12/13/2021    CO2 27 12/13/2021    BUN 14 12/13/2021    LABALBU 1.8 12/10/2021    CREATININE <0.40 12/13/2021    CALCIUM 7.3 12/13/2021    GFRAA Can not be calculated 12/13/2021    LABGLOM Can not be calculated 12/13/2021    GLUCOSE 138 12/13/2021     Magnesium:    Lab Results   Component Value Date    MG 1.8 12/13/2021     Phosphorus:    Lab Results   Component Value Date    PHOS 3.5 12/13/2021     Radiology Review:     ASSESSMENT:  Active Hospital Problems    Diagnosis Date Noted    Stricture of sigmoid colon [facility]) [K20.685] 11/24/2021     80 y.o. female with sigmoid colon stricture. 11/24/21: Status post exploratory laparotomy with explantation pelvic mesh and mobilization sigmoid and proximal rectum, status post ileocecectomy with ileocolonic anastomosis. 12/1/21: Status post ex lap, creation of end colostomy, pelvic drain placement    12/8/21: Exploratory laparotomy, repair of ileocolonic anastomosis, abdominal washout, lysis of adhesions, revision of colostomy, creation of ileostomy with insertion of red rubber catheter, placement of Stratus biologic mesh, wound VAC application. Plan:  -Appreciate critical care management  -Continue strict n.p.o.  -Monitor I's and O's, replete electrolytes as needed  -Continue TPN  -Continue octreotide infusion  -Continue NGT to LIWS  -Continue red rubber catheter to gravity. This is functioning as a conduit end ileostomy.  -Wound/ostomy for wound vac exchanges 3 times/week. -Continue to monitor SHANA drain.  -Stoma appliance to colostomy/mucus fistula. -Continue IV abx  -OK for VTE ppx from surgery standpoint    Electronically signed by Luke Joseph MD  on 12/14/2021 at 7:35 AM    I Dr. Mago Salazar saw and examined the patient. I have edited the above and agree with the above. Ashley Chavez  Colorectal Surgery

## 2021-12-15 ENCOUNTER — APPOINTMENT (OUTPATIENT)
Dept: GENERAL RADIOLOGY | Age: 71
DRG: 329 | End: 2021-12-15
Attending: COLON & RECTAL SURGERY
Payer: MEDICARE

## 2021-12-15 LAB
ABSOLUTE EOS #: 0.12 K/UL (ref 0–0.44)
ABSOLUTE IMMATURE GRANULOCYTE: 2.4 K/UL (ref 0–0.3)
ABSOLUTE LYMPH #: 1.44 K/UL (ref 1.1–3.7)
ABSOLUTE MONO #: 1.2 K/UL (ref 0.1–1.2)
ANION GAP SERPL CALCULATED.3IONS-SCNC: 10 MMOL/L (ref 9–17)
ANION GAP SERPL CALCULATED.3IONS-SCNC: 7 MMOL/L (ref 9–17)
BASOPHILS # BLD: 1 % (ref 0–2)
BASOPHILS ABSOLUTE: 0.12 K/UL (ref 0–0.2)
BUN BLDV-MCNC: 22 MG/DL (ref 8–23)
BUN BLDV-MCNC: 23 MG/DL (ref 8–23)
BUN/CREAT BLD: 53 (ref 9–20)
BUN/CREAT BLD: ABNORMAL (ref 9–20)
CALCIUM SERPL-MCNC: 8.3 MG/DL (ref 8.6–10.4)
CALCIUM SERPL-MCNC: 8.4 MG/DL (ref 8.6–10.4)
CHLORIDE BLD-SCNC: 94 MMOL/L (ref 98–107)
CHLORIDE BLD-SCNC: 96 MMOL/L (ref 98–107)
CO2: 34 MMOL/L (ref 20–31)
CO2: 34 MMOL/L (ref 20–31)
CREAT SERPL-MCNC: 0.43 MG/DL (ref 0.5–0.9)
CREAT SERPL-MCNC: <0.4 MG/DL (ref 0.5–0.9)
DIFFERENTIAL TYPE: ABNORMAL
EOSINOPHILS RELATIVE PERCENT: 1 % (ref 1–4)
GFR AFRICAN AMERICAN: >60 ML/MIN
GFR AFRICAN AMERICAN: ABNORMAL ML/MIN
GFR NON-AFRICAN AMERICAN: >60 ML/MIN
GFR NON-AFRICAN AMERICAN: ABNORMAL ML/MIN
GFR SERPL CREATININE-BSD FRML MDRD: ABNORMAL ML/MIN/{1.73_M2}
GLUCOSE BLD-MCNC: 126 MG/DL (ref 65–105)
GLUCOSE BLD-MCNC: 140 MG/DL (ref 70–99)
GLUCOSE BLD-MCNC: 153 MG/DL (ref 65–105)
GLUCOSE BLD-MCNC: 164 MG/DL (ref 70–99)
GLUCOSE BLD-MCNC: 171 MG/DL (ref 65–105)
GLUCOSE BLD-MCNC: 173 MG/DL (ref 65–105)
GLUCOSE BLD-MCNC: 177 MG/DL (ref 65–105)
HCT VFR BLD CALC: 32.9 % (ref 36.3–47.1)
HEMOGLOBIN: 10.1 G/DL (ref 11.9–15.1)
IMMATURE GRANULOCYTES: 20 %
LYMPHOCYTES # BLD: 12 % (ref 24–43)
MAGNESIUM: 2.1 MG/DL (ref 1.6–2.6)
MCH RBC QN AUTO: 28.9 PG (ref 25.2–33.5)
MCHC RBC AUTO-ENTMCNC: 30.7 G/DL (ref 28.4–34.8)
MCV RBC AUTO: 94 FL (ref 82.6–102.9)
MONOCYTES # BLD: 10 % (ref 3–12)
MORPHOLOGY: ABNORMAL
MORPHOLOGY: ABNORMAL
NRBC AUTOMATED: 0.2 PER 100 WBC
PDW BLD-RTO: 14.8 % (ref 11.8–14.4)
PHOSPHORUS: 3.1 MG/DL (ref 2.6–4.5)
PLATELET # BLD: 575 K/UL (ref 138–453)
PLATELET ESTIMATE: ABNORMAL
PMV BLD AUTO: 11.3 FL (ref 8.1–13.5)
POTASSIUM SERPL-SCNC: 3.5 MMOL/L (ref 3.7–5.3)
POTASSIUM SERPL-SCNC: 3.9 MMOL/L (ref 3.7–5.3)
RBC # BLD: 3.5 M/UL (ref 3.95–5.11)
RBC # BLD: ABNORMAL 10*6/UL
SEG NEUTROPHILS: 56 % (ref 36–65)
SEGMENTED NEUTROPHILS ABSOLUTE COUNT: 6.72 K/UL (ref 1.5–8.1)
SODIUM BLD-SCNC: 135 MMOL/L (ref 135–144)
SODIUM BLD-SCNC: 140 MMOL/L (ref 135–144)
WBC # BLD: 12 K/UL (ref 3.5–11.3)
WBC # BLD: ABNORMAL 10*3/UL

## 2021-12-15 PROCEDURE — 82947 ASSAY GLUCOSE BLOOD QUANT: CPT

## 2021-12-15 PROCEDURE — 6370000000 HC RX 637 (ALT 250 FOR IP): Performed by: STUDENT IN AN ORGANIZED HEALTH CARE EDUCATION/TRAINING PROGRAM

## 2021-12-15 PROCEDURE — 6360000002 HC RX W HCPCS: Performed by: STUDENT IN AN ORGANIZED HEALTH CARE EDUCATION/TRAINING PROGRAM

## 2021-12-15 PROCEDURE — 97535 SELF CARE MNGMENT TRAINING: CPT

## 2021-12-15 PROCEDURE — 2580000003 HC RX 258: Performed by: STUDENT IN AN ORGANIZED HEALTH CARE EDUCATION/TRAINING PROGRAM

## 2021-12-15 PROCEDURE — 2700000000 HC OXYGEN THERAPY PER DAY

## 2021-12-15 PROCEDURE — 94761 N-INVAS EAR/PLS OXIMETRY MLT: CPT

## 2021-12-15 PROCEDURE — 71045 X-RAY EXAM CHEST 1 VIEW: CPT

## 2021-12-15 PROCEDURE — 2000000000 HC ICU R&B

## 2021-12-15 PROCEDURE — 6360000002 HC RX W HCPCS: Performed by: NURSE PRACTITIONER

## 2021-12-15 PROCEDURE — 2500000003 HC RX 250 WO HCPCS: Performed by: STUDENT IN AN ORGANIZED HEALTH CARE EDUCATION/TRAINING PROGRAM

## 2021-12-15 PROCEDURE — 85025 COMPLETE CBC W/AUTO DIFF WBC: CPT

## 2021-12-15 PROCEDURE — 97530 THERAPEUTIC ACTIVITIES: CPT

## 2021-12-15 PROCEDURE — C9113 INJ PANTOPRAZOLE SODIUM, VIA: HCPCS | Performed by: STUDENT IN AN ORGANIZED HEALTH CARE EDUCATION/TRAINING PROGRAM

## 2021-12-15 PROCEDURE — 2500000003 HC RX 250 WO HCPCS: Performed by: COLON & RECTAL SURGERY

## 2021-12-15 PROCEDURE — 83735 ASSAY OF MAGNESIUM: CPT

## 2021-12-15 PROCEDURE — 84100 ASSAY OF PHOSPHORUS: CPT

## 2021-12-15 PROCEDURE — 36415 COLL VENOUS BLD VENIPUNCTURE: CPT

## 2021-12-15 PROCEDURE — 99212 OFFICE O/P EST SF 10 MIN: CPT

## 2021-12-15 PROCEDURE — 97110 THERAPEUTIC EXERCISES: CPT

## 2021-12-15 PROCEDURE — 80048 BASIC METABOLIC PNL TOTAL CA: CPT

## 2021-12-15 PROCEDURE — 6360000002 HC RX W HCPCS: Performed by: INTERNAL MEDICINE

## 2021-12-15 RX ADMIN — POTASSIUM CHLORIDE 20 MEQ: 29.8 INJECTION INTRAVENOUS at 17:50

## 2021-12-15 RX ADMIN — FENTANYL CITRATE 50 MCG: 50 INJECTION INTRAMUSCULAR; INTRAVENOUS at 12:47

## 2021-12-15 RX ADMIN — INSULIN LISPRO 1 UNITS: 100 INJECTION, SOLUTION INTRAVENOUS; SUBCUTANEOUS at 06:36

## 2021-12-15 RX ADMIN — FUROSEMIDE 40 MG: 10 INJECTION, SOLUTION INTRAMUSCULAR; INTRAVENOUS at 21:04

## 2021-12-15 RX ADMIN — SODIUM CHLORIDE, PRESERVATIVE FREE 10 ML: 5 INJECTION INTRAVENOUS at 10:07

## 2021-12-15 RX ADMIN — HEPARIN SODIUM 5000 UNITS: 5000 INJECTION INTRAVENOUS; SUBCUTANEOUS at 21:04

## 2021-12-15 RX ADMIN — PIPERACILLIN AND TAZOBACTAM 3375 MG: 3; .375 INJECTION, POWDER, LYOPHILIZED, FOR SOLUTION INTRAVENOUS at 16:38

## 2021-12-15 RX ADMIN — FENTANYL CITRATE 50 MCG: 50 INJECTION INTRAMUSCULAR; INTRAVENOUS at 15:14

## 2021-12-15 RX ADMIN — PANTOPRAZOLE SODIUM 40 MG: 40 INJECTION, POWDER, FOR SOLUTION INTRAVENOUS at 08:21

## 2021-12-15 RX ADMIN — POTASSIUM CHLORIDE: 2 INJECTION, SOLUTION, CONCENTRATE INTRAVENOUS at 17:56

## 2021-12-15 RX ADMIN — OCTREOTIDE ACETATE 25 MCG/HR: 200 INJECTION, SOLUTION INTRAVENOUS; SUBCUTANEOUS at 07:56

## 2021-12-15 RX ADMIN — PIPERACILLIN AND TAZOBACTAM 3375 MG: 3; .375 INJECTION, POWDER, LYOPHILIZED, FOR SOLUTION INTRAVENOUS at 23:24

## 2021-12-15 RX ADMIN — INSULIN LISPRO 1 UNITS: 100 INJECTION, SOLUTION INTRAVENOUS; SUBCUTANEOUS at 00:50

## 2021-12-15 RX ADMIN — POTASSIUM CHLORIDE 20 MEQ: 29.8 INJECTION INTRAVENOUS at 16:54

## 2021-12-15 RX ADMIN — INSULIN LISPRO 1 UNITS: 100 INJECTION, SOLUTION INTRAVENOUS; SUBCUTANEOUS at 12:59

## 2021-12-15 RX ADMIN — INSULIN LISPRO 1 UNITS: 100 INJECTION, SOLUTION INTRAVENOUS; SUBCUTANEOUS at 18:38

## 2021-12-15 RX ADMIN — SODIUM CHLORIDE, PRESERVATIVE FREE 10 ML: 5 INJECTION INTRAVENOUS at 21:05

## 2021-12-15 RX ADMIN — SODIUM CHLORIDE 0.3 MCG/KG/HR: 9 INJECTION, SOLUTION INTRAVENOUS at 15:54

## 2021-12-15 RX ADMIN — FUROSEMIDE 40 MG: 10 INJECTION, SOLUTION INTRAMUSCULAR; INTRAVENOUS at 09:55

## 2021-12-15 RX ADMIN — HEPARIN SODIUM 5000 UNITS: 5000 INJECTION INTRAVENOUS; SUBCUTANEOUS at 09:55

## 2021-12-15 RX ADMIN — FENTANYL CITRATE 50 MCG: 50 INJECTION INTRAMUSCULAR; INTRAVENOUS at 21:04

## 2021-12-15 RX ADMIN — PIPERACILLIN AND TAZOBACTAM 3375 MG: 3; .375 INJECTION, POWDER, LYOPHILIZED, FOR SOLUTION INTRAVENOUS at 08:28

## 2021-12-15 ASSESSMENT — PAIN SCALES - GENERAL
PAINLEVEL_OUTOF10: 0
PAINLEVEL_OUTOF10: 3
PAINLEVEL_OUTOF10: 0
PAINLEVEL_OUTOF10: 7

## 2021-12-15 ASSESSMENT — PAIN DESCRIPTION - LOCATION: LOCATION: ABDOMEN

## 2021-12-15 ASSESSMENT — PAIN DESCRIPTION - PAIN TYPE: TYPE: SURGICAL PAIN

## 2021-12-15 ASSESSMENT — PAIN SCALES - WONG BAKER: WONGBAKER_NUMERICALRESPONSE: 0

## 2021-12-15 NOTE — PROGRESS NOTES
Pulmonary Critical Care Progress Note  Lucio Noriega M.D. Patient seen for the follow up of hypoxic respiratory failure, metabolic acidosis    Subjective:  She was successfully extubated 12/13/2021. She is currently resting comfortably in bed,  is at bedside. She is saturating well on nasal cannula. She denies significant shortness of breath. She has occasional loose cough, nonproductive. She is on Precedex at small dose as well as Sandostatin drip. TPN continues.       Examination:  Vitals: BP (!) 115/59   Pulse 83   Temp 98.2 °F (36.8 °C) (Temporal)   Resp 22   Ht 5' 4.5\" (1.638 m)   Wt 187 lb (84.8 kg)   LMP  (LMP Unknown)   SpO2 98%   BMI 31.60 kg/m²   General appearance: Awake, alert and following commands  Neck: No JVD  Lungs: Decreased breath sounds no crackles or wheezing  Heart: regular rate and rhythm, S1, S2 normal, no gallop  Abdomen: Soft, non tender, positive wound VAC  Extremities: no cyanosis or clubbing.  + edema    LABs:  CBC:   Recent Labs     12/14/21  0729 12/15/21  0435   WBC 11.0 12.0*   HGB 9.2* 10.1*   HCT 29.7* 32.9*   * 575*     BMP:   Recent Labs     12/14/21  0729 12/15/21  0435    140   K 3.6* 3.5*   CO2 33* 34*   BUN 21 23   CREATININE 0.45* 0.43*   LABGLOM >60 >60   GLUCOSE 153* 164*     ABG:  Lab Results   Component Value Date    GHB2FDI NOT REPORTED 12/13/2021    FIO2 30.0 12/13/2021       Lab Results   Component Value Date    POCPH 7.507 12/13/2021    POCPCO2 47.6 12/13/2021    POCPO2 79.2 12/13/2021    POCHCO3 37.7 12/13/2021    PBEA 13 12/13/2021    PPC1GIJ NOT REPORTED 12/13/2021    RWCP4XZT 96 12/13/2021    FIO2 30.0 12/13/2021     Radiology:  Chest x-ray 12/15/21      Impression:  · Acute respiratory insufficiency requiring ventilator support  · Feculent peritonitis s/p exploratory laparotomy with end colostomy/pelvic drain placement  · COPD  · History of GERD/dyslipidemia/hypothyroidism/hypertension  · Metabolic acidosis  · Bilateral pleural infiltrate/pulmonary edema    Recommendations:  · Oxygen via nasal cannula, keep SPO2 90%   · Incentive spirometry every hour while awake  · DuoNeb by nebulizer  · Monitor blood pressure off of Cesar-Synephrine  · Monitor blood pressure, as needed hydralazine for control of hypertension  · Continue diuresis IV Lasix 40 mg twice daily  · TPN/ monitor liver function/ off TF  · Monitor hemoglobin, transfuse for hemoglobin less than 7  · Sandostatin drip  · Zosyn/ off Flagyl  · Wound care/ wound VAC  · Labs in a.m.   · Discussed with RN/RT  · Peptic ulcer disease prophylaxis  · DVT prophylaxis, subcu heparin   · We will follow with you    Electronically signed by     Young Menezes MD on 12/15/2021 at 4:39 PM  Pulmonary Critical Care and Sleep Medicine,  [facility]  Cell: [phone]  Office: [phone]

## 2021-12-15 NOTE — PROGRESS NOTES
Dr. Kellen Watts at bedside. Wanted to talk to surgery about tube feedings. Will continue to monitor.

## 2021-12-15 NOTE — PROGRESS NOTES
Dietary at bedside. dicussed TPN and possible increase in insulin. Giving 1 unit with q6, will check orders and decide.

## 2021-12-15 NOTE — PROGRESS NOTES
PT at bedside, pt medicated for pain, tolerating well, will continue to monitor. Wound Care Deepti at bedside to check on patient. Will change wound vac tomorrow 12/16/2021.

## 2021-12-15 NOTE — PROGRESS NOTES
Nutrition Assessment     Type and Reason for Visit: Reassess    Nutrition Recommendations/Plan:   1. Continue NPO status  2. Continue TPN at 75 mL/hr (1800 mL total volume, 1584 kcal and 90 gm of protein). Increase insulin to 35 units  3. Monitor TPN rate/ tolerance, GI status, weights and labs    Nutrition Assessment:  Patient remains NPO and receiving TPN nutrition support. RN asked about adding more insulin to the bag. Will add a little more insulin to the bag. RN is also going to inquire about increasing insulin sliding scale. RN mentioned that some of the abdominal wounds are still open and patient may need a fourth surgery. Will continue TPN at 75 mL/hr (1800 mL total volume). Monitor TPN rate/ tolerance, GI status, weights and labs. Malnutrition Assessment:  Malnutrition Status: At risk for malnutrition (Comment)    Estimated Daily Nutrient Needs:  Energy (kcal): 5138-8080 kcal (15-18 kcal/kg); Weight Used for Energy Requirements:  Current     Protein (g): 84-95 gm of protein (1.5-1.7 gm/kg); Weight Used for Protein Requirements:  Ideal        Fluid (ml/day):  ; Weight Used for Fluid Requirements:  1 ml/kcal      Nutrition Related Findings: Edema: trace BUE, trace BLE. KAREN bowel sounds. NGT output. Wound vac and SHANA drains. Extubated 12/13. GI surgeries on 11/24; 12/1; 12/8.  Labs reviewed      Current Nutrition Therapies:    Diet NPO Exceptions are: Sips of Water with Meds, Ice Chips  PN-Adult 2-in-1 [facility] [employer] (Standard)    Anthropometric Measures:  · Height: 5' 4.5\" (163.8 cm)  · Current Body Wt: 187 lb (84.8 kg)   · BMI: 31.6    Nutrition Diagnosis:   · Inadequate protein-energy intake related to inadequate protein-energy intake as evidenced by NPO or clear liquid status due to medical condition, nutrition support - parenteral nutrition    · Altered GI function related to altered GI structure as evidenced by GI abnormality, nausea (status post small bowel surgery)      Nutrition Interventions: Food and/or Nutrient Delivery:  Continue NPO, Modify Parenteral Nutrition  Nutrition Education/Counseling:  Education not indicated   Coordination of Nutrition Care:  Continue to monitor while inpatient    Goals:  PN will meet greater than 75% of estimated nutrition needs       Nutrition Monitoring and Evaluation:   Behavioral-Environmental Outcomes:  None Identified   Food/Nutrient Intake Outcomes:  Parenteral Nutrition Intake/Tolerance  Physical Signs/Symptoms Outcomes:  Biochemical Data, Fluid Status or Edema, Skin, Weight, GI Status     Discharge Planning:     Too soon to determine           Patsy PETERSENN, RDN, LDN  Lead Clinical Dietitian  RD Office Phone [phone]

## 2021-12-15 NOTE — PLAN OF CARE
Problem: HEMODYNAMIC STATUS  Goal: Patient has stable vital signs and fluid balance  12/15/2021 0330 by Wily Tabor RN  Outcome: Ongoing     Problem: MOBILITY  Goal: Early mobilization is achieved  12/15/2021 0330 by Wily Tabor RN  Outcome: Ongoing     Problem: ELIMINATION  Goal: Elimination patterns are normal or improving  Description: Elimination patterns return to pre-surgery normal patterns  12/15/2021 0330 by Wily Tabor RN  Outcome: Ongoing     Problem: SKIN INTEGRITY  Goal: Skin integrity is maintained or improved  12/15/2021 0330 by Wily Tabor RN  Outcome: Ongoing     Problem: Pain:  Goal: Pain level will decrease  Description: Pain level will decrease  12/15/2021 0330 by Wily Tabor RN  Outcome: Ongoing     Problem: Skin Integrity:  Goal: Absence of new skin breakdown  Description: Absence of new skin breakdown  12/15/2021 0330 by Wily Tabor RN  Outcome: Ongoing     Problem: Nutrition  Goal: Optimal nutrition therapy  12/15/2021 0330 by Wily Tabor RN  Outcome: Ongoing

## 2021-12-15 NOTE — PROGRESS NOTES
Patient repositioned and linens changed, patient scooting towards the right side of the bed, bed alarm on, bed low. Will continue to monitor.

## 2021-12-15 NOTE — PROGRESS NOTES
Occupational Therapy  Facility/Department: [facility]  Daily Treatment Note  NAME: Calista Lezama  : 1950  MRN: [id number]    Date of Service: 12/15/2021   NACHO Andres reports patient is medically stable for therapy treatment this date. Chart reviewed prior to treatment and patient is agreeable for therapy. All lines intact and patient positioned comfortably at end of treatment. All patient needs addressed prior to ending therapy session. Discharge Recommendations:  Patient would benefit from continued therapy after discharge  OT Equipment Recommendations  Walker: Rolling  ADL Assistive Devices: Reacher; Sock-Aid Soft; Long-handled Shoe Horn; Long-handled Sponge; Emergency Alert System  Pt currently functioning below baseline. Would suggest additional therapy at time of discharge to maximize long term outcomes and prevent re-admission. Please refer to AM-PAC score for current level of function. Assessment   Performance deficits / Impairments: Decreased functional mobility ; Decreased safe awareness; Decreased balance; Decreased coordination; Decreased ADL status; Decreased endurance; Decreased high-level IADLs; Decreased posture  Assessment: Pt appearing in better mood during treatment this date. Pt will require a reassessment when appropriate for functional mobility and ADLs transfers when appropriate to modify POC. Pt would benefit from continued skilled OT Services to increase I and safety during functional task to return home at prior level of function as able. Prognosis: Fair  OT Education: OT Role; Plan of Care; Transfer Training; Energy Conservation; Precautions;  Family Education  Patient Education: proper bed mob tech, pursed lip breathing, safety in function, call light use/fall prevention, recommendation for continued therapy, benefits of being up OOB as able, postural control, proper positioning in bed and routine changes to protect skin integrity, benefits of ROM and completion on own  REQUIRES OT FOLLOW UP: Yes  Activity Tolerance  Activity Tolerance: Patient limited by fatigue; Patient limited by pain  Activity Tolerance: poor+  Safety Devices  Safety Devices in place: Yes  Type of devices: All fall risk precautions in place; Bed alarm in place; Call light within reach; Nurse notified; Left in bed; Patient at risk for falls; Gait belt         Patient Diagnosis(es): The encounter diagnosis was Chest pain, unspecified type. has a past medical history of Abdominal pain, Arthritis, Constipation, COPD (chronic obstructive pulmonary disease) ([facility] [address] Depressed, GERD (gastroesophageal reflux disease), HLD (hyperlipidemia), HTN (hypertension), Hypothyroid, IBS (irritable bowel syndrome), Lumbar spondylolysis, Myofascial pain, Poor appetite, RLS (restless legs syndrome), and Wears glasses. has a past surgical history that includes Bladder surgery; Rectal surgery; Cystocele repair; Enterocele repair; Abdominal adhesion surgery (3/9/2015); Hysterectomy; Appendectomy; Colonoscopy; Endoscopy, colon, diagnostic; eye surgery; Dilatation, esophagus; hernia repair; Tonsillectomy; Abdomen surgery (11/24/2021); Cystocopy (11/24/2021); colectomy (N/A, 11/24/2021); Cystoscopy (11/24/2021); sigmoidoscopy (11/24/2021); laparotomy (N/A, 12/1/2021); and laparotomy (N/A, 12/8/2021). Restrictions  Restrictions/Precautions  Restrictions/Precautions: General Precautions, Fall Risk  Required Braces or Orthoses?: No  Required Braces or Orthoses  Other: Abdominal Binder  Position Activity Restriction  Other position/activity restrictions: Extubated 12/13/21, SHANA drain, palmer, colostomy, RUE PICC, R jugular central line, wound vac, NG tube, telemetry, 3L O2 NC. surgeon clarified order -NO ABD BINDER  Subjective   General  Chart Reviewed: Yes  Patient assessed for rehabilitation services?: Yes  Response to previous treatment: Patient with no complaints from previous session  Family / Caregiver Present:  Yes ()  Subjective  Subjective: Pt supine in bed agreeable to co-treat with PT. Pt speaking in a soft voice but able to respond appropriatly to questions/commands with one word answers. Orientation  Orientation  Overall Orientation Status: Within Functional Limits  Objective                Bed mobility  Rolling to Left: Maximum assistance; 2 Person assistance  Rolling to Right: Maximum assistance; 2 Person assistance  Scooting: Maximal assistance; 2 Person assistance; Dependent/Total  Comment: Pt was able to roll to left/right with x2 assistance from staff to work toward increasing overall endurance. Skylift was used to position pt comfortably up in bed and to position her on her R side for pressure relief d/t pt being on her L upon arrival. Pillows were positioned under all extremities to protect bony prominences. Max vc's for pursed lip breathing, initiation, sequencing, pacing, proper technique, and awareness/assist with MULTIPLE lines. Transfers  Sit to stand: Unable to assess  Stand to sit: Unable to assess  Transfer Comments: Not safe or appropriate at this time. Cognition  Overall Cognitive Status: Exceptions  Arousal/Alertness: Delayed responses to stimuli  Following Commands: Follows one step commands with repetition; Follows one step commands with increased time  Attention Span: Attends with cues to redirect;  Difficulty attending to directions  Memory: Decreased short term memory; Decreased recall of recent events; Decreased recall of precautions; Decreased long term memory  Safety Judgement: Decreased awareness of need for assistance; Decreased awareness of need for safety  Problem Solving: Decreased awareness of errors; Assistance required to identify errors made; Assistance required to correct errors made; Assistance required to generate solutions; Assistance required to implement solutions  Insights: Not aware of deficits  Initiation: Requires cues for all  Sequencing: Requires cues for all                    Type of ROM/Therapeutic Exercise  Type of ROM/Therapeutic Exercise: AROM; AAROM  Comment: Pt agreed to complete ROM in UEs. Pt not able to complete full ROM and was slightly assisted for full completion during shoulder flexion. Pt performed x5 hip adduction with assistance to increase core strength. All to increase functional ROM for increased IND in ADLs. Exercises  Shoulder Flexion: x5  Elbow Flexion: x5  Supination: x5  Pronation: x5  Wrist Flexion: x5  Wrist Extension: x5                    Plan   Plan  Times per week: 4-5x/week 1-2x/day as akila  Times per day: Daily  Current Treatment Recommendations: Strengthening, Balance Training, Functional Mobility Training, Safety Education & Training, Positioning, Endurance Training, Neuromuscular Re-education, Patient/Caregiver Education & Training, Equipment Evaluation, Education, & procurement, Home Management Training, Cognitive/Perceptual Training, Pain Management, Self-Care / ADL  Plan Comment: Cont with stated POC                                                  AM-PAC Score        AM-PAC Inpatient Daily Activity Raw Score: 10 (12/15/21 1605)  AM-PAC Inpatient ADL T-Scale Score : 27.31 (12/15/21 1605)  ADL Inpatient CMS 0-100% Score: 74.7 (12/15/21 1605)  ADL Inpatient CMS G-Code Modifier : CL (12/15/21 1605)    Goals  Short term goals  Time Frame for Short term goals: by discharge, pt to demo  Short term goal 1: bed mob tasks with use of rail/lproper log rolling as needed to mod of 1. Short term goal 2: I with BUE HEP with use of handouts to maintain functional use as well as abd precautions for pain mgt tech. Short term goal 3: UB ADL to set up and LB ADL to mod assist of 1 and use of AD/AE. Short term goal 4: toileting tasks with use of BSC/AD and grab bar to mod assist of 1. Short term goal 5: ADL transfers and functional mob with AD to min assist of 1.   Long term goals  Long term goal 1: Pt to stand with SBA and AD akila > 6 mins to reduce falls in function. Long term goal 2: Pt/caregivers to be I with pressure relief, EC/WS and fall prevention tech as well as DME/AD and AE recommendations with use of handouts. Patient Goals   Patient goals : Get home soon! Therapy Time   Co-Treat Concurrent Group Co-treatment   Time In 1500         Time Out 1555         Minutes 54               Co-treatment with PT warranted secondary to decreased safety and independence requiring 2 skilled therapy professionals to address individual discipline's goals. OT addressing preparation for ADL transfer, sitting balance for increased ADL performance, sitting/activity tolerance, functional reaching, environmental safety/scanning, fall prevention, functional mobility for ADL transfers, ability to sequence and follow directions and functional UE strength. Upon writer exit, call light within reach, pt retired to bed. All lines intact and patient positioned comfortably. All patient needs addressed prior to ending therapy session. Chart reviewed prior to treatment and patient is agreeable for therapy. RN reports patient is medically stable for therapy treatment this date.     TAM Stewart

## 2021-12-15 NOTE — PROGRESS NOTES
Physical Therapy  Facility/Department: [facility] CV[facility]  Daily Treatment Note  NAME: Eddi Martin  : 1950  MRN: [id number]    Date of Service: 12/15/2021    Discharge Recommendations:  Patient would benefit from continued therapy after discharge Pt currently functioning below baseline. Would suggest additional therapy at time of discharge to maximize long term outcomes and prevent re-admission. Please refer to AM-PAC score for current level of function. Assessment   Body structures, Functions, Activity limitations: Decreased functional mobility ; Decreased strength; Decreased safe awareness; Decreased balance; Decreased endurance; Increased pain; Decreased cognition; Decreased posture    Assessment: Pt presents with increased alertness; followiing commands; pt unable to offload sacral area with pressure relief exercises so would encourage continued 2 hour rotations for pressure reducition. Global weakness and stable aerobic capacity while on n.c. Will progress as tolerated. Specific instructions for Next Treatment: dangle BEFORE wound vac nurse comes. Prognosis: Good  PT Education: Goals; PT Role; Plan of Care; General Safety; Functional Mobility Training; Orientation  REQUIRES PT FOLLOW UP: Yes  Activity Tolerance  Activity Tolerance: Patient Tolerated treatment well     Patient Diagnosis(es): The encounter diagnosis was Chest pain, unspecified type. has a past medical history of Abdominal pain, Arthritis, Constipation, COPD (chronic obstructive pulmonary disease) ([facility] [address] Depressed, GERD (gastroesophageal reflux disease), HLD (hyperlipidemia), HTN (hypertension), Hypothyroid, IBS (irritable bowel syndrome), Lumbar spondylolysis, Myofascial pain, Poor appetite, RLS (restless legs syndrome), and Wears glasses. has a past surgical history that includes Bladder surgery; Rectal surgery; Cystocele repair; Enterocele repair; Abdominal adhesion surgery (3/9/2015); Hysterectomy;  Appendectomy; Colonoscopy; Endoscopy, colon, diagnostic; eye surgery; Dilatation, esophagus; hernia repair; Tonsillectomy; Abdomen surgery (11/24/2021); Cystocopy (11/24/2021); colectomy (N/A, 11/24/2021); Cystoscopy (11/24/2021); sigmoidoscopy (11/24/2021); laparotomy (N/A, 12/1/2021); and laparotomy (N/A, 12/8/2021). Restrictions  Restrictions/Precautions  Restrictions/Precautions: General Precautions, Fall Risk  Required Braces or Orthoses?: No  Required Braces or Orthoses  Other: Abdominal Binder  Position Activity Restriction  Other position/activity restrictions: Extubated 12/13/21, SHANA drain, palmer, colostomy, RUE PICC, R jugular central line, wound vac, NG tube, telemetry, 3L O2 NC. (RN clarified order -NO ABD BINDER- will ask for order to be removed). Subjective   General  Chart Reviewed: Yes  Response To Previous Treatment: Patient with no complaints from previous session. Family / Caregiver Present: Yes (supportive )  Subjective  Subjective: Pt attentive; responsive and laughing at times throughout treatment. Pt reports feeling good at the end of the treatment. General Comment  Comments: wound vac not to be changed until tomorrow coupled with  drain with only a few sutures - no dangle this date. Will try dangling prior to wound vac change on 12/16. Objective   Bed mobility  Rolling to Left: Maximum assistance; 2 Person assistance  Rolling to Right: Maximum assistance; 2 Person assistance  Scooting: Maximal assistance; 2 Person assistance; Dependent/Total  Comment: Pt assist for repositioning and pressure relief; pt rolling left to right / right to left w/ LE and UE assist max assist x 2; saskia lift used for final boost for positioning- all tubes safe and loose. Pt with pressure relief pillow propping post treatment  Transfers - unable to progress at this time.      Exercises  Comments: gentle AAROM israel LE's; bridging; hip abd/add; ankle pumps; breathing exercises; huffing; deep breathing; deep breathing w/ holds; cough exercises. AM-PAC Score 7/24     Goals  Short term goals  Time Frame for Short term goals: 14 visits  Short term goal 1: Pt to demonstrate bed mobility w/ log roll technique Loulou x 2  Short term goal 2: Pt to demonstrate at least fair+ static and dynamic sitting balance  Short term goal 3: Pt to tolerate sitting EOB at least 20 minutes to maximize independence w/ ADLs  Short term goal 4: REASSESS TRANSFERS/GAIT WHEN APPROPRIATE  Short term goal 5: Pt to actively participate in at least 30 minutes of physical therapy for ther act, ther ex, balance, mobility, and endurance training  Patient Goals   Patient goals : pt goal it to get stronger for home    Plan    Plan  Times per week: 1-2x/day, 6-7x/week  Specific instructions for Next Treatment: dangle BEFORE wound vac nurse comes. Current Treatment Recommendations: Strengthening, Balance Training, Functional Mobility Training, Stair training, Gait Training, Transfer Training, Endurance Training, Neuromuscular Re-education, Safety Education & Training, Home Exercise Program, Equipment Evaluation, Education, & procurement, Patient/Caregiver Education & Training  Safety Devices  Type of devices: Bed alarm in place, Call light within reach, Nurse notified, Left in bed  Restraints  Initially in place: Yes  Restraints: 4 bedrails     Therapy Time   Individual   Time In 0272   Time Out 1600   Minutes 58     Co-treatment with OT warranted secondary to decreased safety and independence requiring 2 skilled therapy professionals to address individual discipline's goals. PT address strength and positioning.        Nile Roberson, PT

## 2021-12-15 NOTE — PROGRESS NOTES
12/15/2021    K 3.5 12/15/2021    CL 96 12/15/2021    CO2 34 12/15/2021    BUN 23 12/15/2021    LABALBU 2.0 12/14/2021    CREATININE 0.43 12/15/2021    CALCIUM 8.3 12/15/2021    GFRAA >60 12/15/2021    LABGLOM >60 12/15/2021    GLUCOSE 164 12/15/2021     Magnesium:    Lab Results   Component Value Date    MG 2.1 12/15/2021     Phosphorus:    Lab Results   Component Value Date    PHOS 3.1 12/15/2021     Radiology Review:     ASSESSMENT:  Active Hospital Problems    Diagnosis Date Noted    Stricture of sigmoid colon [facility]) [S09.804] 11/24/2021     80 y.o. female with sigmoid colon stricture. 11/24/21: Status post exploratory laparotomy with explantation pelvic mesh and mobilization sigmoid and proximal rectum, status post ileocecectomy with ileocolonic anastomosis. 12/1/21: Status post ex lap, creation of end colostomy, pelvic drain placement    12/8/21: Exploratory laparotomy, repair of ileocolonic anastomosis, abdominal washout, lysis of adhesions, revision of colostomy, creation of ileostomy with insertion of red rubber catheter, placement of Stratus biologic mesh, wound VAC application. Plan:  -Appreciate critical care management  -Continue strict n.p.o.  -Monitor I's and O's, replete electrolytes as needed  -Continue TPN  -Continue octreotide infusion  -Continue NGT to LIWS  -Continue red rubber catheter to gravity. This is functioning as a conduit end ileostomy.  -Wound/ostomy for wound vac exchanges 3 times/week. -Continue to monitor SHANA drain.  -Stoma appliance to colostomy/mucus fistula. -Continue IV abx  -OK for VTE ppx from surgery standpoint    Electronically signed by Nereida Fuentes DO  on 12/15/2021 at 8:02 AM     I Dr. Norman Loomis saw and examined the patient. I have edited the above and agree with the above. Ashley Chavez  Colorectal Surgery

## 2021-12-15 NOTE — PROGRESS NOTES
[facility] Wound Ostomy Continence Nursing  Follow Up      NAME:  Franklin Allred RECORD NUMBER:  [id number]  AGE: 80 y.o. GENDER: female  : 1950  TODAY'S DATE:  12/15/2021    The patient is awake/alert and working with therapy.  visiting. The wound vac dressing remains intact and appears to be functioning properly. The mucous fistula RUQ remains pouched/intact draining purulent exudate. Will plan for vac dressing change and mucous fistula pouch change tomorrow. Schedule clarified with general surgery Dr. Shana Jordan MSN RN, CWS, [facility] and Lyudmila Garcia [place] [address]  Wound, Ostomy, and Continence Nursing  [phone]

## 2021-12-16 ENCOUNTER — APPOINTMENT (OUTPATIENT)
Dept: GENERAL RADIOLOGY | Age: 71
DRG: 329 | End: 2021-12-16
Attending: COLON & RECTAL SURGERY
Payer: MEDICARE

## 2021-12-16 LAB
ABSOLUTE EOS #: 0.15 K/UL (ref 0–0.4)
ABSOLUTE IMMATURE GRANULOCYTE: 2.68 K/UL (ref 0–0.3)
ABSOLUTE LYMPH #: 2.38 K/UL (ref 1–4.8)
ABSOLUTE MONO #: 1.49 K/UL (ref 0.2–0.8)
ANION GAP SERPL CALCULATED.3IONS-SCNC: 9 MMOL/L (ref 9–17)
BASOPHILS # BLD: 1 %
BASOPHILS ABSOLUTE: 0.15 K/UL (ref 0–0.2)
BUN BLDV-MCNC: 22 MG/DL (ref 8–23)
BUN/CREAT BLD: 45 (ref 9–20)
CALCIUM SERPL-MCNC: 8.9 MG/DL (ref 8.6–10.4)
CHLORIDE BLD-SCNC: 94 MMOL/L (ref 98–107)
CO2: 34 MMOL/L (ref 20–31)
CREAT SERPL-MCNC: 0.49 MG/DL (ref 0.5–0.9)
DIFFERENTIAL TYPE: ABNORMAL
EOSINOPHILS RELATIVE PERCENT: 1 % (ref 1–4)
GFR AFRICAN AMERICAN: >60 ML/MIN
GFR NON-AFRICAN AMERICAN: >60 ML/MIN
GFR SERPL CREATININE-BSD FRML MDRD: ABNORMAL ML/MIN/{1.73_M2}
GFR SERPL CREATININE-BSD FRML MDRD: ABNORMAL ML/MIN/{1.73_M2}
GLUCOSE BLD-MCNC: 151 MG/DL (ref 65–105)
GLUCOSE BLD-MCNC: 152 MG/DL (ref 70–99)
GLUCOSE BLD-MCNC: 156 MG/DL (ref 65–105)
HCT VFR BLD CALC: 36.2 % (ref 36.3–47.1)
HEMOGLOBIN: 10.9 G/DL (ref 11.9–15.1)
IMMATURE GRANULOCYTES: 18 %
LYMPHOCYTES # BLD: 16 % (ref 24–44)
MAGNESIUM: 2.3 MG/DL (ref 1.6–2.6)
MCH RBC QN AUTO: 28.7 PG (ref 25.2–33.5)
MCHC RBC AUTO-ENTMCNC: 30.1 G/DL (ref 28.4–34.8)
MCV RBC AUTO: 95.3 FL (ref 82.6–102.9)
MONOCYTES # BLD: 10 % (ref 1–7)
MORPHOLOGY: ABNORMAL
MORPHOLOGY: ABNORMAL
NRBC AUTOMATED: 0.1 PER 100 WBC
PDW BLD-RTO: 14.7 % (ref 11.8–14.4)
PHOSPHORUS: 3.3 MG/DL (ref 2.6–4.5)
PLATELET # BLD: 630 K/UL (ref 138–453)
PLATELET ESTIMATE: ABNORMAL
PMV BLD AUTO: 11.2 FL (ref 8.1–13.5)
POTASSIUM SERPL-SCNC: 4.3 MMOL/L (ref 3.7–5.3)
RBC # BLD: 3.8 M/UL (ref 3.95–5.11)
RBC # BLD: ABNORMAL 10*6/UL
SEG NEUTROPHILS: 54 % (ref 36–66)
SEGMENTED NEUTROPHILS ABSOLUTE COUNT: 8.05 K/UL (ref 1.8–7.7)
SODIUM BLD-SCNC: 137 MMOL/L (ref 135–144)
WBC # BLD: 14.9 K/UL (ref 3.5–11.3)
WBC # BLD: ABNORMAL 10*3/UL

## 2021-12-16 PROCEDURE — 6360000002 HC RX W HCPCS: Performed by: STUDENT IN AN ORGANIZED HEALTH CARE EDUCATION/TRAINING PROGRAM

## 2021-12-16 PROCEDURE — 85025 COMPLETE CBC W/AUTO DIFF WBC: CPT

## 2021-12-16 PROCEDURE — 83735 ASSAY OF MAGNESIUM: CPT

## 2021-12-16 PROCEDURE — 2000000000 HC ICU R&B

## 2021-12-16 PROCEDURE — 71045 X-RAY EXAM CHEST 1 VIEW: CPT

## 2021-12-16 PROCEDURE — 36415 COLL VENOUS BLD VENIPUNCTURE: CPT

## 2021-12-16 PROCEDURE — 82947 ASSAY GLUCOSE BLOOD QUANT: CPT

## 2021-12-16 PROCEDURE — 6370000000 HC RX 637 (ALT 250 FOR IP): Performed by: STUDENT IN AN ORGANIZED HEALTH CARE EDUCATION/TRAINING PROGRAM

## 2021-12-16 PROCEDURE — 2580000003 HC RX 258: Performed by: STUDENT IN AN ORGANIZED HEALTH CARE EDUCATION/TRAINING PROGRAM

## 2021-12-16 PROCEDURE — 74018 RADEX ABDOMEN 1 VIEW: CPT

## 2021-12-16 PROCEDURE — 2500000003 HC RX 250 WO HCPCS: Performed by: COLON & RECTAL SURGERY

## 2021-12-16 PROCEDURE — 97110 THERAPEUTIC EXERCISES: CPT

## 2021-12-16 PROCEDURE — 6360000002 HC RX W HCPCS: Performed by: NURSE PRACTITIONER

## 2021-12-16 PROCEDURE — 2700000000 HC OXYGEN THERAPY PER DAY

## 2021-12-16 PROCEDURE — 2500000003 HC RX 250 WO HCPCS: Performed by: STUDENT IN AN ORGANIZED HEALTH CARE EDUCATION/TRAINING PROGRAM

## 2021-12-16 PROCEDURE — 94761 N-INVAS EAR/PLS OXIMETRY MLT: CPT

## 2021-12-16 PROCEDURE — 80048 BASIC METABOLIC PNL TOTAL CA: CPT

## 2021-12-16 PROCEDURE — C9113 INJ PANTOPRAZOLE SODIUM, VIA: HCPCS | Performed by: STUDENT IN AN ORGANIZED HEALTH CARE EDUCATION/TRAINING PROGRAM

## 2021-12-16 PROCEDURE — 84100 ASSAY OF PHOSPHORUS: CPT

## 2021-12-16 PROCEDURE — 97535 SELF CARE MNGMENT TRAINING: CPT

## 2021-12-16 PROCEDURE — 97530 THERAPEUTIC ACTIVITIES: CPT

## 2021-12-16 RX ADMIN — POTASSIUM CHLORIDE: 2 INJECTION, SOLUTION, CONCENTRATE INTRAVENOUS at 18:04

## 2021-12-16 RX ADMIN — FENTANYL CITRATE 100 MCG: 50 INJECTION INTRAMUSCULAR; INTRAVENOUS at 09:02

## 2021-12-16 RX ADMIN — FUROSEMIDE 40 MG: 10 INJECTION, SOLUTION INTRAMUSCULAR; INTRAVENOUS at 20:55

## 2021-12-16 RX ADMIN — PIPERACILLIN AND TAZOBACTAM 3375 MG: 3; .375 INJECTION, POWDER, LYOPHILIZED, FOR SOLUTION INTRAVENOUS at 15:23

## 2021-12-16 RX ADMIN — SODIUM CHLORIDE, PRESERVATIVE FREE 10 ML: 5 INJECTION INTRAVENOUS at 21:22

## 2021-12-16 RX ADMIN — SODIUM CHLORIDE, PRESERVATIVE FREE 10 ML: 5 INJECTION INTRAVENOUS at 09:02

## 2021-12-16 RX ADMIN — FENTANYL CITRATE 50 MCG: 50 INJECTION INTRAMUSCULAR; INTRAVENOUS at 04:06

## 2021-12-16 RX ADMIN — PIPERACILLIN AND TAZOBACTAM 3375 MG: 3; .375 INJECTION, POWDER, LYOPHILIZED, FOR SOLUTION INTRAVENOUS at 07:28

## 2021-12-16 RX ADMIN — SODIUM CHLORIDE 0.4 MCG/KG/HR: 9 INJECTION, SOLUTION INTRAVENOUS at 06:39

## 2021-12-16 RX ADMIN — FUROSEMIDE 40 MG: 10 INJECTION, SOLUTION INTRAMUSCULAR; INTRAVENOUS at 09:02

## 2021-12-16 RX ADMIN — SODIUM CHLORIDE 0.5 MCG/KG/HR: 9 INJECTION, SOLUTION INTRAVENOUS at 18:02

## 2021-12-16 RX ADMIN — FENTANYL CITRATE 100 MCG: 50 INJECTION INTRAMUSCULAR; INTRAVENOUS at 11:38

## 2021-12-16 RX ADMIN — PANTOPRAZOLE SODIUM 40 MG: 40 INJECTION, POWDER, FOR SOLUTION INTRAVENOUS at 09:01

## 2021-12-16 RX ADMIN — HEPARIN SODIUM 5000 UNITS: 5000 INJECTION INTRAVENOUS; SUBCUTANEOUS at 09:01

## 2021-12-16 RX ADMIN — HEPARIN SODIUM 5000 UNITS: 5000 INJECTION INTRAVENOUS; SUBCUTANEOUS at 20:55

## 2021-12-16 RX ADMIN — INSULIN LISPRO 1 UNITS: 100 INJECTION, SOLUTION INTRAVENOUS; SUBCUTANEOUS at 05:12

## 2021-12-16 RX ADMIN — OCTREOTIDE ACETATE 25 MCG/HR: 200 INJECTION, SOLUTION INTRAVENOUS; SUBCUTANEOUS at 05:48

## 2021-12-16 RX ADMIN — PIPERACILLIN AND TAZOBACTAM 3375 MG: 3; .375 INJECTION, POWDER, LYOPHILIZED, FOR SOLUTION INTRAVENOUS at 23:48

## 2021-12-16 NOTE — PROGRESS NOTES
[id number] [facility] Wound Ostomy Continence Nursing  Follow Up      NAME:  Franklin Allred RECORD NUMBER:  [id number]  AGE: 80 y.o. GENDER: female  : 1950  TODAY'S DATE:  2021    Follow up visit today for wound vac dressing change. The wound vac dressing was difficult to remove from the newly granulated abdominal wall tissue layer. The patient did not tolerate well despite Fentanyl dose per nursing. Although the pain did resolve once the dressing change was complete. The red rubber catheter remains in place under the mesh. White foam placed over the mesh then black foam. An incision line on the RLQ area that originates at the mid abdominal wound was protected with a small piece of hydrocolloid to allow for wound vac seal. Therapy was restarted at 25mmHg continuous. The mucous fistula pouching was changed as well. Output remains seropurulent, wound appears unchanged. Will plan next visit for wound vac dressing change and ostomy care on Monday. Measurements:  Negative Pressure Wound Therapy Abdomen Mid (Active)   $ Standard NPWT >50 sq cm PER TX $ Yes 21 1215   Wound Type Surgical 21 1200   Unit Type KCI 21 0400   Dressing Type Black foam 21 1200   Number of pieces used 3 21 1500   Cycle Continuous 21 1200   Target Pressure (mmHg) 125 21 1200   Canister changed? Yes 21 1500   Dressing Status Clean; Dry; Intact 21 1200   Dressing Changed Other (Comment) 21 1600   Drainage Amount Small 21 1200   Drainage Description Purulent; Sanguinous 21 1200   Dressing Change Due 21 1200   Output (ml) 50 ml 21 0548   Wound Assessment Other (Comment) 21 1200   Rosaura-wound Assessment Full blisters 21 1200   Number of days:        Wound Abdomen Mid (Active)   Wound Image   21 1215   Wound Etiology Surgical 21 1215   Dressing Status New dressing applied;  Old drainage noted 21 1215   Wound Cleansed Irrigated with saline 12/16/21 1215   Dressing/Treatment Barrier film; Negative pressure wound therapy 12/16/21 1215   Dressing Change Due 12/20/21 12/16/21 1215   Wound Length (cm) 17 cm 12/12/21 1500   Wound Width (cm) 16.3 cm 12/12/21 1500   Wound Depth (cm) 6.8 cm 12/12/21 1500   Wound Surface Area (cm^2) 277.1 cm^2 12/12/21 1500   Change in Wound Size % (l*w) -164.06 12/12/21 1500   Wound Volume (cm^3) 1884.28 cm^3 12/12/21 1500   Wound Healing % -153 12/12/21 1500   Undermining Starts ___ O'Clock 12 12/06/21 0950   Undermining Maxium Distance (cm) [email or web address] 12/06/21 0950   Wound Assessment Other (Comment) 12/16/21 1200   Drainage Amount Small 12/16/21 1200   Drainage Description Serosanguinous 12/16/21 1200   Odor Mild 12/12/21 1600   Rosaura-wound Assessment Edematous;  Non-blanchable erythema 12/16/21 1200   Margins Unattached edges 12/16/21 1200   Wound Thickness Description not for Pressure Injury Full thickness 12/16/21 1200   Number of days:      Robbi FLORES RN, CWS, [facility] [address] [place]  Wound, Ostomy, and Continence Nursing  [phone]

## 2021-12-16 NOTE — PROGRESS NOTES
Pulmonary Critical Care Progress Note  Eliu Huizar M.D. Patient seen for the follow up of hypoxic respiratory failure, metabolic acidosis    Subjective:  She was successfully extubated 12/13/2021. She is currently resting comfortably in bed, no distress. She denies significant shortness of breath or chest pain. She has occasional loose sounding cough. She is on room air and saturating 95%. She is on Precedex and Sandostatin. She remains on TPN.       Examination:  Vitals: /67   Pulse 73   Temp 98.8 °F (37.1 °C) (Oral)   Resp 19   Ht 5' 4.5\" (1.638 m)   Wt 187 lb (84.8 kg)   LMP  (LMP Unknown)   SpO2 96%   BMI 31.60 kg/m²   General appearance: Awake, alert and following commands  Neck: No JVD  Lungs: Decreased breath sounds no crackles or wheezing  Heart: regular rate and rhythm, S1, S2 normal, no gallop  Abdomen: Soft, non tender, positive wound VAC  Extremities: no cyanosis or clubbing.  + edema    LABs:  CBC:   Recent Labs     12/15/21  0435 12/16/21  0400   WBC 12.0* 14.9*   HGB 10.1* 10.9*   HCT 32.9* 36.2*   * 630*     BMP:   Recent Labs     12/15/21  1943 12/16/21  0400    137   K 3.9 4.3   CO2 34* 34*   BUN 22 22   CREATININE <0.40* 0.49*   LABGLOM Can not be calculated >60   GLUCOSE 140* 152*     ABG:  Lab Results   Component Value Date    CEO8YJZ NOT REPORTED 12/13/2021    FIO2 30.0 12/13/2021       Lab Results   Component Value Date    POCPH 7.507 12/13/2021    POCPCO2 47.6 12/13/2021    POCPO2 79.2 12/13/2021    POCHCO3 37.7 12/13/2021    PBEA 13 12/13/2021    PEI1XLP NOT REPORTED 12/13/2021    NGZZ9WEQ 96 12/13/2021    FIO2 30.0 12/13/2021     Radiology:  Chest x-ray 12/15/21      Impression:  · Acute respiratory insufficiency requiring ventilator support  · Feculent peritonitis s/p exploratory laparotomy with end colostomy/pelvic drain placement  · COPD  · History of GERD/dyslipidemia/hypothyroidism/hypertension  · Metabolic acidosis  · Bilateral pleural infiltrate/pulmonary edema    Recommendations:  · Oxygen via nasal cannula if necessary to keep SPO2 90%   · Incentive spirometry every hour while awake  · DuoNeb by nebulizer  · Monitor blood pressure off of Cesar-Synephrine  · Monitor blood pressure, as needed hydralazine for control of hypertension  · Continue diuresis IV Lasix 40 mg twice daily  · TPN/ monitor liver function/ off TF  · Monitor hemoglobin, transfuse for hemoglobin less than 7  · Sandostatin drip  · Zosyn/ off Flagyl  · Wound care/ wound VAC  · Labs in a.m. · X-ray chest in a.m.   · Discussed with RN/RT  · Peptic ulcer disease prophylaxis  · DVT prophylaxis, subcu heparin   · We will follow with you    Electronically signed by     Kvng Mcmahon MD on 12/16/2021 at 12:39 PM  Pulmonary Critical Care and Sleep Medicine,  [facility]  Cell: [phone]  Office: [phone]

## 2021-12-16 NOTE — PROGRESS NOTES
Occupational Therapy  Facility/Department: [facility] CVICU  Daily Treatment Note  NAME: Sarai Genao  : 1950  MRN: [id number]    Date of Service: 2021    Discharge Recommendations:  Patient would benefit from continued therapy after discharge   Pt currently functioning below baseline. Would suggest additional therapy at time of discharge to maximize long term outcomes and prevent re-admission. Please refer to AM-PAC score for current level of function. Assessment   Performance deficits / Impairments: Decreased functional mobility ; Decreased safe awareness; Decreased balance; Decreased coordination; Decreased ADL status; Decreased endurance; Decreased high-level IADLs; Decreased posture  Assessment: Pt appearing confused this date and req'd 2 staff assist for bed mobility and is unable to even sit EOB at this time. Pt will require a reassessment when appropriate for functional mobility and ADLs transfers when appropriate to modify POC. Pt would benefit from continued skilled OT Services to increase I and safety during functional task to return home at prior level of function as able. Prognosis: Poor; Fair  REQUIRES OT FOLLOW UP: Yes  Activity Tolerance  Activity Tolerance: Treatment limited secondary to decreased cognition  Activity Tolerance: poor  Safety Devices  Safety Devices in place: Yes  Type of devices: All fall risk precautions in place; Bed alarm in place; Call light within reach; Nurse notified; Left in bed; Patient at risk for falls; Gait belt         Patient Diagnosis(es): The encounter diagnosis was Chest pain, unspecified type.       has a past medical history of Abdominal pain, Arthritis, Constipation, COPD (chronic obstructive pulmonary disease) ([facility] [address] Depressed, GERD (gastroesophageal reflux disease), HLD (hyperlipidemia), HTN (hypertension), Hypothyroid, IBS (irritable bowel syndrome), Lumbar spondylolysis, Myofascial pain, Poor appetite, RLS (restless legs syndrome), and Wears glasses. has a past surgical history that includes Bladder surgery; Rectal surgery; Cystocele repair; Enterocele repair; Abdominal adhesion surgery (3/9/2015); Hysterectomy; Appendectomy; Colonoscopy; Endoscopy, colon, diagnostic; eye surgery; Dilatation, esophagus; hernia repair; Tonsillectomy; Abdomen surgery (11/24/2021); Cystocopy (11/24/2021); colectomy (N/A, 11/24/2021); Cystoscopy (11/24/2021); sigmoidoscopy (11/24/2021); laparotomy (N/A, 12/1/2021); and laparotomy (N/A, 12/8/2021). Restrictions  Restrictions/Precautions  Restrictions/Precautions: General Precautions, Fall Risk  Required Braces or Orthoses?: No  Required Braces or Orthoses  Other: Abdominal Binder  Position Activity Restriction  Other position/activity restrictions: Extubated 12/13/21, SHNAA drain, palmer, colostomy, RUE PICC, R jugular central line, wound vac, NG tube, telemetry, 3L O2 NC. (RN clarified order -NO ABD BINDER- will ask for order to be removed). Subjective   General  Chart Reviewed: Yes  Patient assessed for rehabilitation services?: Yes  Response to previous treatment: Patient with no complaints from previous session  Family / Caregiver Present: No  Subjective  Subjective: Pt in bed upon arrival, appears confused and did not speak much except asking \"Am I going\" and when asked where pt was unable to answer. General Comment  Comments: RN ok'd pt for therapy but on in bed and not to sit pt up. Orientation  Orientation  Overall Orientation Status: Impaired  Objective             Balance  Sitting Balance: Unable to assess(comment)  Standing Balance: Unable to assess(comment)  Functional Mobility  Functional Mobility Comments: UNABLE  Bed mobility  Comment: Used saskia lift to  pt up higher in bed and to center pt as pt was leaning heavily to the right upon arrival. Pt positioned on right side requiring 2 assist to slightly roll pt and place pillow.  Pt appears to have drop foot so writer and PTA spoke with RN about positioning boots to keep pt's feet in neutral. Positioning boots obtained from supply room and placed on pt. RN aware. Transfers  Sit to stand: Unable to assess  Stand to sit: Unable to assess  Transfer Comments: Not safe or appropriate at this time. Cognition  Overall Cognitive Status: Exceptions  Arousal/Alertness: Delayed responses to stimuli  Following Commands: Follows one step commands with repetition; Follows one step commands with increased time; Inconsistently follows commands  Attention Span: Attends with cues to redirect; Difficulty attending to directions; Difficulty dividing attention  Memory: Decreased short term memory; Decreased recall of recent events; Decreased recall of precautions; Decreased long term memory  Safety Judgement: Decreased awareness of need for assistance; Decreased awareness of need for safety  Problem Solving: Decreased awareness of errors; Assistance required to identify errors made; Assistance required to correct errors made; Assistance required to generate solutions; Assistance required to implement solutions  Insights: Not aware of deficits  Initiation: Requires cues for all  Sequencing: Requires cues for all  Cognition Comment: Pt did not speak much during session but when she did she appeared confused as to her situation. Perception  Overall Perceptual Status: Impaired  Initiation: Hand over hand to initiate tasks              Type of ROM/Therapeutic Exercise  Type of ROM/Therapeutic Exercise: AAROM; AROM  Comment: Pt req'd assist with ROM exercises shabnam with L UE. Max verbal cues to stay on task to to complete reps.   Exercises  Shoulder Flexion: x10  Shoulder Extension: x10  Shoulder ABduction: x10  Shoulder ADduction: x10  Elbow Flexion: x10  Elbow Extension: x10  Supination: x10  Pronation: x10  Wrist Flexion: x10  Wrist Extension: x10  Finger Flexion: x10  Finger Extension: x10                    Plan   Plan  Times per week: 4-5x/week 1-2x/day as akila  Times per day: Daily  Current Treatment Recommendations: Strengthening, Balance Training, Functional Mobility Training, Safety Education & Training, Positioning, Endurance Training, Neuromuscular Re-education, Patient/Caregiver Education & Training, Equipment Evaluation, Education, & procurement, Home Management Training, Cognitive/Perceptual Training, Pain Management, Self-Care / ADL  Plan Comment: Cont with stated POC                                                  AM-PAC Score        AM-PAC Inpatient Daily Activity Raw Score: 7 (12/16/21 1228)  AM-PAC Inpatient ADL T-Scale Score : 20.13 (12/16/21 1228)  ADL Inpatient CMS 0-100% Score: 92.44 (12/16/21 1228)  ADL Inpatient CMS G-Code Modifier : CM (12/16/21 1228)    Goals  Short term goals  Time Frame for Short term goals: by discharge, pt to demo  Short term goal 1: bed mob tasks with use of rail/lproper log rolling as needed to mod of 1. Short term goal 2: I with BUE HEP with use of handouts to maintain functional use as well as abd precautions for pain mgt tech. Short term goal 3: UB ADL to set up and LB ADL to mod assist of 1 and use of AD/AE. Short term goal 4: toileting tasks with use of BSC/AD and grab bar to mod assist of 1. Short term goal 5: ADL transfers and functional mob with AD to min assist of 1. Long term goals  Long term goal 1: Pt to stand with SBA and AD akila > 6 mins to reduce falls in function. Long term goal 2: Pt/caregivers to be I with pressure relief, EC/WS and fall prevention tech as well as DME/AD and AE recommendations with use of handouts. Patient Goals   Patient goals : Get home soon! Therapy Time   Individual Concurrent Group Co-treatment   Time In       6511   Time Out       1001   Minutes       45        Co-treatment with PT warranted secondary to decreased safety and independence requiring 2 skilled therapy professionals to address individual discipline's goals.  OT addressing \"preparation for ADL transfer\",\"sitting balance for increased ADL performance\",\"sitting/activity tolerance\",\"functional reaching\",\"environmental safety/scanning\",\"fall prevention\",\"functional mobility for ADL transfers\",\"ability to sequence and follow directions\",\"functional UE strength\".       TAM Finley

## 2021-12-16 NOTE — PROGRESS NOTES
Patient resting in bed, no s/s of distress, will continue to monitor Take prednisone 2 pills once a day with food for 5 days.    Take the antibiotic (azithromycin) 2 tablets today, then 1 tablet once a day for 4 days.  This medication stays in your system for 10 days, so you may not feel 100% better after finishing the last tablet.  Please follow up in 5 days if you do not feel at least 50% better or 10 days if your symptoms are not at least 90% better.    Use asmanex 2 puffs twice a day.    Use albuterol as needed.    Contact your GI to advise about delaying injection due to steroid and antibiotic prescription.    Follow up in 3-5 days if symptoms not starting to improve.    Follow up in 1-2 months for annual physical exam with PCP.      Patient Education     Caring for Your Inhaler  Your healthcare provider may prescribe medicine that you breathe in using a metered-dose inhaler. It is important to keep it clean. You should also keep track of how much medicine is left in the canister so you’ll never run out.    Keeping your inhaler clean  · Take off the canister, the part with the medicine, and cap from the mouthpiece.  · Don't wash the canister or put it in water.  · Run warm water through the mouthpiece for about a minute.  · Shake off the water and let it air-dry.  · If you need to use it before it is dry, shake off any water and replace the canister. Test spray it away from you to make sure it works.  · If you use a spacer, clean it with warm water and a small amount of mild dish soap. Do this once every week or two.  · Make sure you check the package insert for special instructions. The insert is the information that comes with the medicine. It may tell you how to take care of and clean your spacer.  When to replace your inhaler  Each inhaler is good for only a certain number of puffs of medicine. After those puffs are used up, any puffs left will not give you the amount of medicine you need. To be sure you’ll get enough medicine when you need it, keep track of how many puffs  you use. Here’s an easy way to keep track of the medicine in your inhaler:  1. Find the number on the mouthpiece that tells you how many puffs it contains. Some inhalers have the counter on the mouthpiece instead of the canister. Keep the canister and mouthpiece together so you can keep track of how many puffs are left.  2. Divide this number by how many puffs you are told to use in one day. This gives you the number of days your medicine should last.  3. Use your calendar to find out what date your medicine will run out. Jd it on the canister and on your calendar.  Be sure to get a refill of your medicines before you run out. Some inhalers have dose counters to track the amount of medicine used.  For example, if your new canister holds 200 puffs and you’ve been told to use 4 puffs a day:  200 ÷ 4 = 50 days  Sample for you to fill in:     ____________  Number of puffs in new canister ÷    ____________  Number of puffs you use each day =    ____________  Number of days medicine will last   Note: Remember that your medicine will not last long if you use your inhaler more often than planned.   Date Last Reviewed: 10/1/2016  © 1115-9180 [employer]. [address] [place] [address]. All rights reserved. This information is not intended as a substitute for professional medical care. Always follow your healthcare professional's instructions.           Patient Education     Asthma (Adult)  Asthma is a disease where the medium and  small air passages within the lung go into spasm and restrict the flow of air. Inflammation and swelling of the airways cause further blockage. During an acute asthma attack, these factors cause trouble breathing, wheezing, cough and chest tightness.    An asthma attack can be triggered by many things. Common triggers include infections such as the common cold, bronchitis, and pneumonia. Irritants such as smoke or pollutants in the air, very cold air, emotional upset, and  exercise can also trigger an attack. In many adults with asthma, allergies to dust, mold, pollen and animal dander can cause an asthma attack. Skipping doses of daily asthma medicine can also bring on an asthma attack.  Asthma can be controlled using the proper medicines prescribed by your healthcare provider and avoiding exposure to known triggers including allergens and irritants.  Home care  · Take prescribed medicine exactly at the times advised. If you need medicine such as from a hand held inhaler or aerosol breathing machine more than every 4 hours, contact your healthcare provider or seek immediate medical attention. If prescribed an antibiotic or prednisone, take all of the medicine as prescribed, even if you are feeling better after a few days.  · Don't smoke. Avoid being exposed to the smoke of others.  · Some people with asthma have worsening of their symptoms when they take aspirin and non-steroidal or fever-reducing medicines like ibuprofen and naproxen. Talk to your healthcare provider if you think this may apply to you.  Follow-up care  Follow up with your healthcare provider, or as advised. Always bring all of your current medicines to any appointments with your healthcare provider. Also bring a complete list of medicines even those not taken for asthma. If you don't already have one, talk to your healthcare provider about developing your own \"Asthma Action Plan.\"  A pneumococcal (pneumonia) vaccine and yearly flu shot (every fall) are recommended. Ask your doctor about this.  When to seek medical advice  Call your healthcare provider right away if any of these occur:   · Increased wheezing or shortness of breath  · Need to use your inhalers more often than usual without relief  · Fever of 100.4ºF (38ºC) or higher, or as directed by your healthcare provider  · Coughing up lots of dark-colored or bloody sputum (mucus)  · Chest pain with each breath  · If you use a peak flow meter as part of an Asthma  Action Plan, and you are still in the yellow zone (50% to 80%) 15 minutes after using inhaler medicine.  Call 911  Call 911 if any of the following occur  · Trouble walking or talking because of shortness of breath  · If you use a peak flow meter as part of an Asthma Action Plan and you are still in the red zone (less than 50%) 15 minutes after using inhaler medicine  · Lips or fingernails turning gray or blue  Date Last Reviewed: 5/1/2017 © 2000-2018 [employer]. [address] [place] [address]. All rights reserved. This information is not intended as a substitute for professional medical care. Always follow your healthcare professional's instructions.

## 2021-12-16 NOTE — PROGRESS NOTES
Nutrition Assessment     Type and Reason for Visit: Reassess    Nutrition Recommendations/Plan:   1. Continue NPO status  2. Continue TPN at 75 mL/hr (1800 mL total volume, 1584 kcal and 90 gm of protein). Increase insulin to 35 units  3. Monitor TPN rate/ tolerance, GI status, weights and labs    Nutrition Assessment:  Patient will continue NPO status on TPN nutrition support. Patient will be on bowel rest for a while due to open GI surgery wounds. Will continue TPN at 75 mL/hr (1800 mL total volume). Monitor TPN rate/ tolerance, GI status, weights and labs. Malnutrition Assessment:  Malnutrition Status: At risk for malnutrition (Comment)    Estimated Daily Nutrient Needs:  Energy (kcal): 4763-9656 kcal (15-18 kcal/kg); Weight Used for Energy Requirements:  Current     Protein (g): 84-95 gm of protein (1.5-1.7 gm/kg); Weight Used for Protein Requirements:  Ideal        Fluid (ml/day):  ; Weight Used for Fluid Requirements:  1 ml/kcal      Nutrition Related Findings: Edema: trace BUE, BLE. Hypoactive bowel sounds. NG tube LIWS. Wound vac and SHANA drains. Extubated 12/13. GI surgeries: 11/24; 12/1; 12/8.  Labs reviewed      Current Nutrition Therapies:    Diet NPO Exceptions are: Sips of Water with Meds, Ice Chips  PN-Adult 2-in-1 [facility] [employer] (Standard)    Anthropometric Measures:  · Height: 5' 4.5\" (163.8 cm)  · Current Body Wt: 187 lb (84.8 kg)   · BMI: 31.6    Nutrition Diagnosis:   · Inadequate protein-energy intake related to inadequate protein-energy intake as evidenced by NPO or clear liquid status due to medical condition, nutrition support - parenteral nutrition    · Altered GI function related to altered GI structure as evidenced by GI abnormality, nausea (status post small bowel surgery)      Nutrition Interventions:   Food and/or Nutrient Delivery:  Continue NPO, Modify Parenteral Nutrition  Nutrition Education/Counseling:  Education not indicated   Coordination of Nutrition Care:  Continue to monitor while inpatient    Goals:  PN will meet greater than 75% of estimated nutrition needs       Nutrition Monitoring and Evaluation:   Behavioral-Environmental Outcomes:  None Identified   Food/Nutrient Intake Outcomes:  Parenteral Nutrition Intake/Tolerance  Physical Signs/Symptoms Outcomes:  Biochemical Data, Fluid Status or Edema, Skin, Weight, GI Status     Discharge Planning:    Parenteral Nutrition           Shayy PETERSENN, RDN, LDN  Lead Clinical Dietitian  RD Office Phone [phone]

## 2021-12-16 NOTE — PROGRESS NOTES
Physical Therapy  Facility/Department: [facility] CVICU  Daily Treatment Note  NAME: Germán Forrest  : 1950  MRN: [id number]    Date of Service: 2021    Discharge Recommendations:  Patient would benefit from continued therapy after discharge        Assessment   Body structures, Functions, Activity limitations: Decreased functional mobility ; Decreased strength; Decreased safe awareness; Decreased balance; Decreased endurance; Increased pain; Decreased cognition; Decreased posture  Assessment: pt progressing toward goals slowly   Activity Tolerance  Activity Tolerance: Patient limited by fatigue; Patient limited by endurance; Patient limited by cognitive status     Patient Diagnosis(es): The encounter diagnosis was Chest pain, unspecified type. has a past medical history of Abdominal pain, Arthritis, Constipation, COPD (chronic obstructive pulmonary disease) ([facility] [address] Depressed, GERD (gastroesophageal reflux disease), HLD (hyperlipidemia), HTN (hypertension), Hypothyroid, IBS (irritable bowel syndrome), Lumbar spondylolysis, Myofascial pain, Poor appetite, RLS (restless legs syndrome), and Wears glasses. has a past surgical history that includes Bladder surgery; Rectal surgery; Cystocele repair; Enterocele repair; Abdominal adhesion surgery (3/9/2015); Hysterectomy; Appendectomy; Colonoscopy; Endoscopy, colon, diagnostic; eye surgery; Dilatation, esophagus; hernia repair; Tonsillectomy; Abdomen surgery (2021); Cystocopy (2021); colectomy (N/A, 2021); Cystoscopy (2021); sigmoidoscopy (2021); laparotomy (N/A, 2021); and laparotomy (N/A, 2021).     Restrictions  Restrictions/Precautions  Restrictions/Precautions: General Precautions, Fall Risk  Required Braces or Orthoses?: No  Required Braces or Orthoses  Other: Abdominal Binder  Position Activity Restriction  Other position/activity restrictions: Extubated 21, SHANA drain, palmer, colostomy, RUE PICC, R jugular central line, wound vac, NG tube, telemetry, 3L O2 NC. (RN clarified order -NO ABD BINDER- will ask for order to be removed). Subjective   General  Chart Reviewed: Yes  Subjective  Subjective: Pt attentive; responsive and laughing at times throughout treatment. Pt reports feeling good at the end of the treatment. General Comment  Comments: pt agreeable to PT RN ok's PT bedside  Pain Screening  Patient Currently in Pain: Denies  Vital Signs  Patient Currently in Pain: Denies       Orientation     Cognition      Objective         Ambulation  Ambulation?: No     Balance  Posture: Poor  Sitting - Static: Fair; -  Sitting - Dynamic: Poor  Standing - Static: Fair; +  Exercises  Comments: gentle AAROM LE's x 10-15 reps                      repositioned pt to left semi sidelying for comfort and offloading,pt requires MAX A x 2 for all bed mobility, saskia lift used for boosting to head of bed.  applied foot drop boots bilaterally  G-Code     OutComes Score                                                     AM-PAC Score  AM-PAC Inpatient Mobility Raw Score : 8 (12/16/21 1256)  AM-PAC Inpatient T-Scale Score : 28.52 (12/16/21 1256)  Mobility Inpatient CMS 0-100% Score: 86.62 (12/16/21 1256)  Mobility Inpatient CMS G-Code Modifier : CM (12/16/21 1256)          Goals  Short term goals  Time Frame for Short term goals: 14 visits  Short term goal 1: Pt to demonstrate bed mobility w/ log roll technique Loulou x 2  Short term goal 2: Pt to demonstrate at least fair+ static and dynamic sitting balance  Short term goal 3: Pt to tolerate sitting EOB at least 20 minutes to maximize independence w/ ADLs  Short term goal 4: REASSESS TRANSFERS/GAIT WHEN APPROPRIATE  Short term goal 5: Pt to actively participate in at least 30 minutes of physical therapy for ther act, ther ex, balance, mobility, and endurance training  Patient Goals   Patient goals : pt goal it to get stronger for home    Plan    Plan  Times per week: 1-2x/day, 6-7x/week  Specific instructions for Next Treatment: dangle BEFORE wound vac nurse comes. Current Treatment Recommendations: Strengthening, Balance Training, Functional Mobility Training, Stair training, Gait Training, Transfer Training, Endurance Training, Neuromuscular Re-education, Safety Education & Training, Home Exercise Program, Equipment Evaluation, Education, & procurement, Patient/Caregiver Education & Training  Safety Devices  Type of devices: Bed alarm in place, Call light within reach, Nurse notified, Left in bed  Restraints  Initially in place: Yes  Restraints: 4 bedrails     Therapy Time   cotreat Concurrent Group Co-treatment   Time In  923         Time Out  1001         Minutes  45               Co-treatment with OT warranted secondary to decreased safety and independence requiring 2 skilled therapy professionals to address individual discipline's goals. PT addressing preparation for  Transfers, gait and pre gait activities,  sitting balance for increased  sitting/activity tolerance, functional mobility, environmental safety/scanning, fall prevention, functional mobility  transfers and functional LE strength. Upon writer exit, call light within reach, pt retired to bed. All lines intact and patient positioned comfortably. All patient needs addressed prior to ending therapy session. Chart reviewed prior to treatment and patient is agreeable for therapy. RN reports patient is medically stable for therapy treatment this date.        AUBRIE MUSE, PTA

## 2021-12-16 NOTE — PROGRESS NOTES
Colorectal Surgery:  Daily Progress Note               PATIENT NAME: Chris Mccullough   TODAY'S DATE: 12/16/2021, 10:43 AM    SUBJECTIVE:     Patient seen and evaluated. Afebrile, HDS off pressors, satting >95% on RA. Wound vac in place and holding suction. UoP adaquate. Over past 24 hours: 225 cc bilious NGT output, 60 cc serosanguineous SHANA output, 125 cc out from red rubber catheter, 150 cc from wound vac. Remains on TPN. On Precedex for sedation. Wound vac exchanged today. OBJECTIVE:   VITALS:  /67   Pulse 73   Temp 98.8 °F (37.1 °C) (Oral)   Resp 19   Ht 5' 4.5\" (1.638 m)   Wt 187 lb (84.8 kg)   LMP  (LMP Unknown)   SpO2 96%   BMI 31.60 kg/m²      INTAKE/OUTPUT:      Intake/Output Summary (Last 24 hours) at 12/16/2021 1043  Last data filed at 12/16/2021 0800  Gross per 24 hour   Intake --   Output 3440 ml   Net -3440 ml       PHYSICAL EXAM:  General Appearance: No acute distress, GCS 15  HEENT:  Normocephalic, atraumatic, mucus membranes moist   Heart: Heart regular rate, normotensive  Lungs: Equal chest rise bilaterally, no increased work of breathing  Abdomen: Soft, wound VAC in place with serosanguineous output, SHANA drain murky white/brown, red rubber catheter draining liquid stool into bag. LLQ mucus fistula with ostomy appliance in place. Extremities: No cyanosis, pitting edema, rashes noted. Skin: Skin color, texture, turgor normal. No rashes or lesions.     Data:  CBC with Differential:    Lab Results   Component Value Date    WBC 14.9 12/16/2021    RBC 3.80 12/16/2021    HGB 10.9 12/16/2021    HCT 36.2 12/16/2021     12/16/2021    MCV 95.3 12/16/2021    MCH 28.7 12/16/2021    MCHC 30.1 12/16/2021    RDW 14.7 12/16/2021    LYMPHOPCT 16 12/16/2021    MONOPCT 10 12/16/2021    BASOPCT 1 12/16/2021    MONOSABS 1.49 12/16/2021    LYMPHSABS 2.38 12/16/2021    EOSABS 0.15 12/16/2021    BASOSABS 0.15 12/16/2021    DIFFTYPE NOT REPORTED 12/16/2021     BMP:    Lab Results   Component Value Date     12/16/2021    K 4.3 12/16/2021    CL 94 12/16/2021    CO2 34 12/16/2021    BUN 22 12/16/2021    LABALBU 2.0 12/14/2021    CREATININE 0.49 12/16/2021    CALCIUM 8.9 12/16/2021    GFRAA >60 12/16/2021    LABGLOM >60 12/16/2021    GLUCOSE 152 12/16/2021     Magnesium:    Lab Results   Component Value Date    MG 2.3 12/16/2021     Phosphorus:    Lab Results   Component Value Date    PHOS 3.3 12/16/2021     Radiology Review:     ASSESSMENT:  Active Hospital Problems    Diagnosis Date Noted    Stricture of sigmoid colon [facility]) [I20.084] 11/24/2021     80 y.o. female with sigmoid colon stricture. 11/24/21: Status post exploratory laparotomy with explantation pelvic mesh and mobilization sigmoid and proximal rectum, status post ileocecectomy with ileocolonic anastomosis. 12/1/21: Status post ex lap, creation of end colostomy, pelvic drain placement    12/8/21: Exploratory laparotomy, repair of ileocolonic anastomosis, abdominal washout, lysis of adhesions, revision of colostomy, creation of ileostomy with insertion of red rubber catheter, placement of Stratus biologic mesh, wound VAC application. Plan:  -Appreciate critical care management  -Continue strict n.p.o.  -Monitor I's and O's, replete electrolytes as needed  -Continue TPN  -Continue octreotide infusion  -Continue NGT to LIWS  -Continue red rubber catheter to gravity. This is functioning as a conduit end ileostomy.  -Wound/ostomy for wound vac exchanges 3 times/week. -Continue to monitor SHANA drain.  -Stoma appliance to colostomy/mucus fistula. -Continue IV abx  -OK for VTE ppx from surgery standpoint    Electronically signed by Param Ventura MD  on 12/16/2021 at 10:43 AM       I Dr. Vivian Bowen saw and examined the patient. I have edited the above and agree with the above. Ashley Chavez  Colorectal Surgery

## 2021-12-16 NOTE — PLAN OF CARE
Problem: HEMODYNAMIC STATUS  Goal: Patient has stable vital signs and fluid balance  12/16/2021 0530 by Jenn Brewer RN  Outcome: Met This Shift  12/15/2021 1733 by Vivian Madison RN  Outcome: Ongoing     Problem: Falls - Risk of:  Goal: Will remain free from falls  Description: Will remain free from falls  12/16/2021 0530 by Jenn Brewer RN  Outcome: Met This Shift  12/15/2021 1733 by Vivian Madison RN  Outcome: Ongoing  Goal: Absence of physical injury  Description: Absence of physical injury  12/16/2021 0530 by Jenn Brewer RN  Outcome: Met This Shift  12/15/2021 1733 by Vivian Madison RN  Outcome: Ongoing     Problem: OXYGENATION/RESPIRATORY FUNCTION  Goal: Patient will achieve/maintain normal respiratory rate/effort  12/16/2021 0530 by Jenn Brewer RN  Outcome: Ongoing  12/15/2021 1733 by Vivian Madison RN  Outcome: Ongoing     Problem: MOBILITY  Goal: Early mobilization is achieved  12/16/2021 0530 by Jenn Brewer RN  Outcome: Ongoing  12/15/2021 1733 by Vivian Madison RN  Outcome: Ongoing     Problem: ELIMINATION  Goal: Elimination patterns are normal or improving  Description: Elimination patterns return to pre-surgery normal patterns  12/16/2021 0530 by Jenn Brewer RN  Outcome: Ongoing  12/15/2021 1733 by Vivian Madison RN  Outcome: Ongoing     Problem: SKIN INTEGRITY  Goal: Skin integrity is maintained or improved  12/16/2021 0530 by Jenn Brewer RN  Outcome: Ongoing  12/15/2021 1733 by Vivian Madison RN  Outcome: Ongoing     Problem: Pain:  Goal: Pain level will decrease  Description: Pain level will decrease  12/16/2021 0530 by Jenn Brewer RN  Outcome: Ongoing  12/15/2021 1733 by Vivian Madison RN  Outcome: Ongoing  Goal: Control of acute pain  Description: Control of acute pain  12/16/2021 0530 by Jenn Brewer RN  Outcome: Ongoing  12/15/2021 1733 by Vivian Madison RN  Outcome: Ongoing  Goal: Control of chronic pain  Description: Control of chronic pain  12/16/2021 0530 by Jenn Brewer RN  Outcome: Ongoing  12/15/2021 1733 by Arden Negro RN  Outcome: Ongoing     Problem: Skin Integrity:  Goal: Will show no infection signs and symptoms  Description: Will show no infection signs and symptoms  12/16/2021 0530 by Tia Cao RN  Outcome: Ongoing  12/15/2021 1733 by Arden Negro RN  Outcome: Ongoing  Goal: Absence of new skin breakdown  Description: Absence of new skin breakdown  12/16/2021 0530 by Tia Cao RN  Outcome: Ongoing  12/15/2021 1733 by Arden Negro RN  Outcome: Ongoing     Problem: Nutrition  Goal: Optimal nutrition therapy  12/16/2021 0530 by Tia Cao RN  Outcome: Ongoing  12/15/2021 1733 by Arden Negro RN  Outcome: Ongoing

## 2021-12-17 ENCOUNTER — APPOINTMENT (OUTPATIENT)
Dept: GENERAL RADIOLOGY | Age: 71
DRG: 329 | End: 2021-12-17
Attending: COLON & RECTAL SURGERY
Payer: MEDICARE

## 2021-12-17 LAB
ABSOLUTE EOS #: 0.11 K/UL (ref 0–0.4)
ABSOLUTE IMMATURE GRANULOCYTE: 0.74 K/UL (ref 0–0.3)
ABSOLUTE LYMPH #: 1.91 K/UL (ref 1–4.8)
ABSOLUTE MONO #: 0.85 K/UL (ref 0.2–0.8)
ANION GAP SERPL CALCULATED.3IONS-SCNC: 9 MMOL/L (ref 9–17)
BASOPHILS # BLD: 1 %
BASOPHILS ABSOLUTE: 0.11 K/UL (ref 0–0.2)
BUN BLDV-MCNC: 23 MG/DL (ref 8–23)
BUN/CREAT BLD: 56 (ref 9–20)
CALCIUM SERPL-MCNC: 8.3 MG/DL (ref 8.6–10.4)
CHLORIDE BLD-SCNC: 96 MMOL/L (ref 98–107)
CO2: 33 MMOL/L (ref 20–31)
CREAT SERPL-MCNC: 0.41 MG/DL (ref 0.5–0.9)
DIFFERENTIAL TYPE: ABNORMAL
EOSINOPHILS RELATIVE PERCENT: 1 % (ref 1–4)
GFR AFRICAN AMERICAN: >60 ML/MIN
GFR NON-AFRICAN AMERICAN: >60 ML/MIN
GFR SERPL CREATININE-BSD FRML MDRD: ABNORMAL ML/MIN/{1.73_M2}
GFR SERPL CREATININE-BSD FRML MDRD: ABNORMAL ML/MIN/{1.73_M2}
GLUCOSE BLD-MCNC: 143 MG/DL (ref 65–105)
GLUCOSE BLD-MCNC: 150 MG/DL (ref 70–99)
GLUCOSE BLD-MCNC: 154 MG/DL (ref 65–105)
GLUCOSE BLD-MCNC: 156 MG/DL (ref 65–105)
GLUCOSE BLD-MCNC: 160 MG/DL (ref 65–105)
GLUCOSE BLD-MCNC: 161 MG/DL (ref 65–105)
HCT VFR BLD CALC: 32.9 % (ref 36.3–47.1)
HEMOGLOBIN: 10 G/DL (ref 11.9–15.1)
IMMATURE GRANULOCYTES: 7 %
LYMPHOCYTES # BLD: 18 % (ref 24–44)
MAGNESIUM: 2.1 MG/DL (ref 1.6–2.6)
MCH RBC QN AUTO: 28.3 PG (ref 25.2–33.5)
MCHC RBC AUTO-ENTMCNC: 30.4 G/DL (ref 28.4–34.8)
MCV RBC AUTO: 93.2 FL (ref 82.6–102.9)
MONOCYTES # BLD: 8 % (ref 1–7)
MORPHOLOGY: ABNORMAL
MORPHOLOGY: ABNORMAL
NRBC AUTOMATED: 0.2 PER 100 WBC
NUCLEATED RED BLOOD CELLS: 1 PER 100 WBC
PDW BLD-RTO: 14.7 % (ref 11.8–14.4)
PHOSPHORUS: 3.5 MG/DL (ref 2.6–4.5)
PLATELET # BLD: 478 K/UL (ref 138–453)
PLATELET ESTIMATE: ABNORMAL
PMV BLD AUTO: 11.2 FL (ref 8.1–13.5)
POTASSIUM SERPL-SCNC: 3.3 MMOL/L (ref 3.7–5.3)
RBC # BLD: 3.53 M/UL (ref 3.95–5.11)
RBC # BLD: ABNORMAL 10*6/UL
SEG NEUTROPHILS: 65 % (ref 36–66)
SEGMENTED NEUTROPHILS ABSOLUTE COUNT: 6.88 K/UL (ref 1.8–7.7)
SODIUM BLD-SCNC: 138 MMOL/L (ref 135–144)
TRIGL SERPL-MCNC: 194 MG/DL
WBC # BLD: 10.6 K/UL (ref 3.5–11.3)
WBC # BLD: ABNORMAL 10*3/UL

## 2021-12-17 PROCEDURE — 97530 THERAPEUTIC ACTIVITIES: CPT

## 2021-12-17 PROCEDURE — 2580000003 HC RX 258: Performed by: STUDENT IN AN ORGANIZED HEALTH CARE EDUCATION/TRAINING PROGRAM

## 2021-12-17 PROCEDURE — 82947 ASSAY GLUCOSE BLOOD QUANT: CPT

## 2021-12-17 PROCEDURE — 6360000002 HC RX W HCPCS: Performed by: STUDENT IN AN ORGANIZED HEALTH CARE EDUCATION/TRAINING PROGRAM

## 2021-12-17 PROCEDURE — 2500000003 HC RX 250 WO HCPCS: Performed by: STUDENT IN AN ORGANIZED HEALTH CARE EDUCATION/TRAINING PROGRAM

## 2021-12-17 PROCEDURE — 6360000002 HC RX W HCPCS: Performed by: INTERNAL MEDICINE

## 2021-12-17 PROCEDURE — 84100 ASSAY OF PHOSPHORUS: CPT

## 2021-12-17 PROCEDURE — 83735 ASSAY OF MAGNESIUM: CPT

## 2021-12-17 PROCEDURE — 71045 X-RAY EXAM CHEST 1 VIEW: CPT

## 2021-12-17 PROCEDURE — 85025 COMPLETE CBC W/AUTO DIFF WBC: CPT

## 2021-12-17 PROCEDURE — 80048 BASIC METABOLIC PNL TOTAL CA: CPT

## 2021-12-17 PROCEDURE — 36415 COLL VENOUS BLD VENIPUNCTURE: CPT

## 2021-12-17 PROCEDURE — 2000000000 HC ICU R&B

## 2021-12-17 PROCEDURE — 6370000000 HC RX 637 (ALT 250 FOR IP): Performed by: STUDENT IN AN ORGANIZED HEALTH CARE EDUCATION/TRAINING PROGRAM

## 2021-12-17 PROCEDURE — 2500000003 HC RX 250 WO HCPCS: Performed by: COLON & RECTAL SURGERY

## 2021-12-17 PROCEDURE — 97110 THERAPEUTIC EXERCISES: CPT

## 2021-12-17 PROCEDURE — C9113 INJ PANTOPRAZOLE SODIUM, VIA: HCPCS | Performed by: STUDENT IN AN ORGANIZED HEALTH CARE EDUCATION/TRAINING PROGRAM

## 2021-12-17 RX ORDER — FUROSEMIDE 10 MG/ML
40 INJECTION INTRAMUSCULAR; INTRAVENOUS DAILY
Status: DISCONTINUED | OUTPATIENT
Start: 2021-12-18 | End: 2021-12-20

## 2021-12-17 RX ADMIN — SODIUM CHLORIDE 0.5 MCG/KG/HR: 9 INJECTION, SOLUTION INTRAVENOUS at 23:43

## 2021-12-17 RX ADMIN — OCTREOTIDE ACETATE 25 MCG/HR: 200 INJECTION, SOLUTION INTRAVENOUS; SUBCUTANEOUS at 15:23

## 2021-12-17 RX ADMIN — INSULIN LISPRO 1 UNITS: 100 INJECTION, SOLUTION INTRAVENOUS; SUBCUTANEOUS at 01:09

## 2021-12-17 RX ADMIN — POTASSIUM CHLORIDE: 2 INJECTION, SOLUTION, CONCENTRATE INTRAVENOUS at 18:38

## 2021-12-17 RX ADMIN — INSULIN LISPRO 1 UNITS: 100 INJECTION, SOLUTION INTRAVENOUS; SUBCUTANEOUS at 12:58

## 2021-12-17 RX ADMIN — HEPARIN SODIUM 5000 UNITS: 5000 INJECTION INTRAVENOUS; SUBCUTANEOUS at 20:52

## 2021-12-17 RX ADMIN — PANTOPRAZOLE SODIUM 40 MG: 40 INJECTION, POWDER, FOR SOLUTION INTRAVENOUS at 08:12

## 2021-12-17 RX ADMIN — OCTREOTIDE ACETATE 25 MCG/HR: 200 INJECTION, SOLUTION INTRAVENOUS; SUBCUTANEOUS at 01:49

## 2021-12-17 RX ADMIN — SODIUM CHLORIDE 0.5 MCG/KG/HR: 9 INJECTION, SOLUTION INTRAVENOUS at 04:01

## 2021-12-17 RX ADMIN — HEPARIN SODIUM 5000 UNITS: 5000 INJECTION INTRAVENOUS; SUBCUTANEOUS at 08:12

## 2021-12-17 RX ADMIN — POTASSIUM CHLORIDE 20 MEQ: 29.8 INJECTION INTRAVENOUS at 06:27

## 2021-12-17 RX ADMIN — PIPERACILLIN AND TAZOBACTAM 3375 MG: 3; .375 INJECTION, POWDER, LYOPHILIZED, FOR SOLUTION INTRAVENOUS at 08:10

## 2021-12-17 RX ADMIN — INSULIN LISPRO 1 UNITS: 100 INJECTION, SOLUTION INTRAVENOUS; SUBCUTANEOUS at 06:15

## 2021-12-17 RX ADMIN — FUROSEMIDE 40 MG: 10 INJECTION, SOLUTION INTRAMUSCULAR; INTRAVENOUS at 08:12

## 2021-12-17 RX ADMIN — SODIUM CHLORIDE 0.5 MCG/KG/HR: 9 INJECTION, SOLUTION INTRAVENOUS at 14:15

## 2021-12-17 RX ADMIN — PIPERACILLIN AND TAZOBACTAM 3375 MG: 3; .375 INJECTION, POWDER, LYOPHILIZED, FOR SOLUTION INTRAVENOUS at 22:54

## 2021-12-17 RX ADMIN — SODIUM CHLORIDE, PRESERVATIVE FREE 10 ML: 5 INJECTION INTRAVENOUS at 20:52

## 2021-12-17 RX ADMIN — POTASSIUM CHLORIDE 20 MEQ: 29.8 INJECTION INTRAVENOUS at 05:24

## 2021-12-17 RX ADMIN — SODIUM CHLORIDE, PRESERVATIVE FREE 20 ML: 5 INJECTION INTRAVENOUS at 08:12

## 2021-12-17 RX ADMIN — PIPERACILLIN AND TAZOBACTAM 3375 MG: 3; .375 INJECTION, POWDER, LYOPHILIZED, FOR SOLUTION INTRAVENOUS at 15:26

## 2021-12-17 ASSESSMENT — PAIN SCALES - GENERAL
PAINLEVEL_OUTOF10: 0

## 2021-12-17 NOTE — PROGRESS NOTES
Nutrition Assessment     Type and Reason for Visit: Reassess    Nutrition Recommendations/Plan:   1. Continue NPO status  2. Continue TPN at 75 mL/hr (1800 mL total volume, 1584 kcal and 90 gm of protein). Increase insulin to 35 units  3. Monitor TPN rate/ tolerance, GI status, weights and labs  4. Re-check triglycerides on 12/8 and start lipids if appropriate    Nutrition Assessment:  Patient will continue NPO status on TPN nutrition support and lipids. Will continue bowel rest due to open GI surgery wounds. Will continue TPN at 75 mL/hr (1800 mL total volume). Monitor TPN rate/ tolerance, GI status, weights and labs. Will re-check triglycerides tomorrow and if improved, will consider adding lipids. Malnutrition Assessment:  Malnutrition Status: At risk for malnutrition (Comment)    Estimated Daily Nutrient Needs:  Energy (kcal): 7902-9705 kcal (15-18 kcal/kg); Weight Used for Energy Requirements:  Current     Protein (g): 84-95 gm of protein (1.5-1.7 gm/kg); Weight Used for Protein Requirements:  Ideal        Fluid (ml/day):  ; Weight Used for Fluid Requirements:  1 ml/kcal      Nutrition Related Findings: Edema: trace BUE, BLE. Hypoactive bowel sounds. NG tube LIWS. Wound vac and SHANA drains. Colostomy. Extubated 12/13. GI surgeries: 11/24; 12/1; 12/8.  Labs reviewed      Current Nutrition Therapies:    Diet NPO Exceptions are: Sips of Water with Meds, Ice Chips  PN-Adult 2-in-1 [facility] (Standard)  PN-Adult 2-in-1 [facility] (Standard)    Anthropometric Measures:  · Height: 5' 4.5\" (163.8 cm)  · Current Body Wt: 187 lb (84.8 kg)   · BMI: 31.6    Nutrition Diagnosis:   · Inadequate protein-energy intake related to inadequate protein-energy intake as evidenced by NPO or clear liquid status due to medical condition, nutrition support - parenteral nutrition      Nutrition Interventions:   Food and/or Nutrient Delivery:  Continue NPO, Continue Current Parenteral Nutrition  Nutrition Education/Counseling: Education not indicated   Coordination of Nutrition Care:  Continue to monitor while inpatient    Goals:  PN will meet greater than 75% of estimated nutrition needs       Nutrition Monitoring and Evaluation:   Behavioral-Environmental Outcomes:  None Identified   Food/Nutrient Intake Outcomes:  Parenteral Nutrition Intake/Tolerance  Physical Signs/Symptoms Outcomes:  Biochemical Data, Fluid Status or Edema, Weight, Skin, GI Status     Discharge Planning:    Parenteral Nutrition           Oscar   TRINITYN, RDN, LDN  Lead Clinical Dietitian  RD Office Phone [phone]

## 2021-12-17 NOTE — CARE COORDINATION
DC Planning    [facility]will need to call daily to check for bed availability. Pt extubated 12/13 now on RA. NG LIWS, TPN, wound vac, ostomy, Sandostatin gtt, Lasix IV, Zosyn IV, Precedex gtt-small dose.

## 2021-12-17 NOTE — PROGRESS NOTES
Physical Therapy  Facility/Department: [facility] CVICU  Daily Treatment Note  NAME: Luz Fields  : 1950  MRN: [id number]    Date of Service: 2021    Discharge Recommendations:  Patient would benefit from continued therapy after discharge     Assessment   Body structures, Functions, Activity limitations: Decreased functional mobility ; Decreased strength; Decreased safe awareness; Decreased balance; Decreased endurance; Increased pain; Decreased cognition; Decreased posture  Assessment: pt progressing toward goals slowly   Prognosis: Good  Decision Making: High Complexity  PT Education: Goals; PT Role; Plan of Care; General Safety; Functional Mobility Training; Orientation  Patient Education: Pt educated on: purpose of acute PT, importance of continued mobility throughout admission, general safety awareness, re-orientation, PT POC, pressure relief benefits from sitting EOB, paced breathing for pain control, and PT goals. Pt w/ poor understanding verbalized. Pt requires continued reinforcement of education. REQUIRES PT FOLLOW UP: Yes  Activity Tolerance  Activity Tolerance: Patient limited by fatigue; Patient limited by endurance; Patient limited by cognitive status     Patient Diagnosis(es): The encounter diagnosis was Chest pain, unspecified type. has a past medical history of Abdominal pain, Arthritis, Constipation, COPD (chronic obstructive pulmonary disease) ([facility] [address] Depressed, GERD (gastroesophageal reflux disease), HLD (hyperlipidemia), HTN (hypertension), Hypothyroid, IBS (irritable bowel syndrome), Lumbar spondylolysis, Myofascial pain, Poor appetite, RLS (restless legs syndrome), and Wears glasses. has a past surgical history that includes Bladder surgery; Rectal surgery; Cystocele repair; Enterocele repair; Abdominal adhesion surgery (3/9/2015); Hysterectomy; Appendectomy; Colonoscopy; Endoscopy, colon, diagnostic; eye surgery; Dilatation, esophagus; hernia repair; Tonsillectomy;  Abdomen Requires cues for all  Sequencing: Requires cues for all  Objective   Bed mobility  Comment: Patient in appropraite position and reported feeling comfortable. Mobility limited by several ABD lines. Transfers  Sit to Stand: Unable to assess  Stand to sit: Unable to assess  Bed to Chair: Unable to assess  Stand Pivot Transfers: Unable to assess  Squat Pivot Transfers: Unable to assess  Lateral Transfers: Unable to assess  Car Transfer: Unable to assess  Comment: Not appropriate to attempt transfers this date. Will continue to progress as able. Ambulation  Ambulation?: No  More Ambulation?: No     Balance  Comments: Unable to assess standing balance this date. Exercises  Comments: tashi MARIE LE's x 15 reps. Patient asking why she needed to perform ROM in bed and why she needs therapy at all. Educated on importance of mobilization to heal from ABD surgeries even when its limited such as . Patient just shook her head at write and continued with TAQUERIAOM.      AM-PAC Score  AM-PAC Inpatient Mobility Raw Score : 11 (11/26/21 1604)  AM-PAC Inpatient T-Scale Score : 33.86 (11/26/21 1604)  Mobility Inpatient CMS 0-100% Score: 72.57 (11/26/21 1604)  Mobility Inpatient CMS G-Code Modifier : CL (11/26/21 1604)          Goals  Short term goals  Time Frame for Short term goals: 14 visits  Short term goal 1: Pt to demonstrate bed mobility w/ log roll technique Loulou x 2  Short term goal 2: Pt to demonstrate at least fair+ static and dynamic sitting balance  Short term goal 3: Pt to tolerate sitting EOB at least 20 minutes to maximize independence w/ ADLs  Short term goal 4: REASSESS TRANSFERS/GAIT WHEN APPROPRIATE  Short term goal 5: Pt to actively participate in at least 30 minutes of physical therapy for ther act, ther ex, balance, mobility, and endurance training  Patient Goals   Patient goals : pt goal it to get stronger for home    Plan    Plan  Times per week: 1-2x/day, 6-7x/week  Current Treatment Recommendations: Strengthening, Balance Training, Functional Mobility Training, Stair training, Gait Training, Transfer Training, Endurance Training, Neuromuscular Re-education, Safety Education & Training, Home Exercise Program, Equipment Evaluation, Education, & procurement, Patient/Caregiver Education & Training  Safety Devices  Type of devices: Bed alarm in place, Call light within reach, Nurse notified, Left in bed  Restraints    Therapy Time   Individual Concurrent Group Co-treatment   Time In 1251         Time Out 1315         Minutes 24 June [place], [place]

## 2021-12-17 NOTE — PROGRESS NOTES
Occupational Therapy  Facility/Department: [facility]  Daily Treatment Note  NAME: Chante Florence  : 1950  MRN: [id number]    Date of Service: 2021    Discharge Recommendations:  Patient would benefit from continued therapy after discharge  OT Equipment Recommendations  ADL Assistive Devices: Reacher; Sock-Aid Soft; Long-handled Shoe Horn; Long-handled Sponge; Emergency Alert System    Assessment   Performance deficits / Impairments: Decreased functional mobility ; Decreased safe awareness; Decreased balance; Decreased coordination; Decreased ADL status; Decreased endurance; Decreased high-level IADLs; Decreased posture  Assessment: Pt. alert with eye open during treatment. Pt. tolerated slow gentle AAROM to both UE to promote functional strength and mobility for ADLS. Pt. provided with encouragement and educated on reasons why mobillity while in bed is still important to promote health. AAROM completed to both hands, wrists, elbows, and shoulders. Skilled OT warranted to promote I/safety to return pt to prior living arrangement with assist as needed. Prognosis: Poor; Fair  OT Education: OT Role; Plan of Care; Transfer Training; Energy Conservation; Precautions; Family Education  Patient Education: Pt. educated on importance of mobility and reasons for functional activity. REQUIRES OT FOLLOW UP: Yes  Activity Tolerance  Activity Tolerance: Treatment limited secondary to decreased cognition  Activity Tolerance: poor  Safety Devices  Safety Devices in place: Yes  Type of devices: All fall risk precautions in place; Bed alarm in place; Call light within reach; Nurse notified; Left in bed; Patient at risk for falls; Gait belt         Patient Diagnosis(es): The encounter diagnosis was Chest pain, unspecified type.       has a past medical history of Abdominal pain, Arthritis, Constipation, COPD (chronic obstructive pulmonary disease) ([facility] [address] Depressed, GERD (gastroesophageal reflux disease), HLD Cognitive Status: Exceptions  Arousal/Alertness: Delayed responses to stimuli  Following Commands: Follows one step commands with repetition; Follows one step commands with increased time; Inconsistently follows commands  Attention Span: Attends with cues to redirect; Difficulty attending to directions; Difficulty dividing attention  Memory: Decreased short term memory; Decreased recall of recent events; Decreased recall of precautions; Decreased long term memory  Safety Judgement: Decreased awareness of need for assistance; Decreased awareness of need for safety  Problem Solving: Decreased awareness of errors; Assistance required to identify errors made; Assistance required to correct errors made; Assistance required to generate solutions; Assistance required to implement solutions  Insights: Not aware of deficits  Initiation: Requires cues for all  Sequencing: Requires cues for all  Cognition Comment: Pt. did not offer information but appear frustrated over situation with eye rolling when encouraged. Perception  Overall Perceptual Status: Impaired  Initiation: Hand over hand to initiate tasks  Type of ROM/Therapeutic Exercise  Type of ROM/Therapeutic Exercise: AAROM  Comment: Pt. tolerated slow gentle AAROM to both UE to promote strength and endurance in hands, wrists, elbows and shoulders. Positive encouragement provided throughout exercises.   Exercises  Shoulder Flexion: x10  Shoulder Extension: x10  Horizontal ABduction: x10  Horizontal ADduction: x10  Elbow Flexion: x10  Elbow Extension: x10  Supination: x10  Pronation: x10  Wrist Flexion: x10  Wrist Extension: x10  Finger Flexion: x10  Finger Extension: x10       Plan   Plan  Times per week: 4-5x/week 1-2x/day as akila  Times per day: Daily  Current Treatment Recommendations: Strengthening, Balance Training, Functional Mobility Training, Safety Education & Training, Positioning, Endurance Training, Neuromuscular Re-education, Patient/Caregiver Education & Training, Equipment Evaluation, Education, & procurement, Home Management Training, Cognitive/Perceptual Training, Pain Management, Self-Care / ADL  Plan Comment: Cont with stated POC            AM-PAC Score        AM-PAC Inpatient Daily Activity Raw Score: 7 (12/17/21 1241)  AM-PAC Inpatient ADL T-Scale Score : 20.13 (12/17/21 1241)  ADL Inpatient CMS 0-100% Score: 92.44 (12/17/21 1241)  ADL Inpatient CMS G-Code Modifier : CM (12/17/21 1241)    Goals  Short term goals  Time Frame for Short term goals: by discharge, pt to demo  Short term goal 1: bed mob tasks with use of rail/lproper log rolling as needed to mod of 1. Short term goal 2: I with BUE HEP with use of handouts to maintain functional use as well as abd precautions for pain mgt tech. Short term goal 3: UB ADL to set up and LB ADL to mod assist of 1 and use of AD/AE. Short term goal 4: toileting tasks with use of BSC/AD and grab bar to mod assist of 1. Short term goal 5: ADL transfers and functional mob with AD to min assist of 1. Long term goals  Long term goal 1: Pt to stand with SBA and AD akila > 6 mins to reduce falls in function. Long term goal 2: Pt/caregivers to be I with pressure relief, EC/WS and fall prevention tech as well as DME/AD and AE recommendations with use of handouts. Patient Goals   Patient goals : Get home soon! Therapy Time   Individual Concurrent Group Co-treatment   Time In 6488         Time Out 1         Minutes 23              RN reports patient is medically stable for therapy treatment this date. Chart reviewed prior to treatment and patient is agreeable for therapy. All lines intact and patient positioned comfortably at end of treatment. All patient needs addressed prior to ending therapy session.       TAM Menon

## 2021-12-17 NOTE — PROGRESS NOTES
Colorectal Surgery:  Daily Progress Note               PATIENT NAME: Shelly Harp   TODAY'S DATE: 12/17/2021, 8:30 AM    SUBJECTIVE:     Patient seen and evaluated. No acute events overnight. Afebrile, hemodynamically stable, no acute distress, saturating at greater than 95% on 3 L nasal cannula. On 0.5 of Precedex. Over past 24 hours: Urine output 2300 cc, NGT to LI  cc, SHANA 60 cc murky fluid, wound VAC with 200 cc murky fluid, red rubber catheter with 75 cc succus. She is alert and oriented and answers questions appropriately. Remains on TPN and octreotide. White count is decreased to 10.6. OBJECTIVE:   VITALS:  BP (!) 144/69   Pulse 82   Temp 97.9 °F (36.6 °C) (Temporal)   Resp 19   Ht 5' 4.5\" (1.638 m)   Wt 187 lb (84.8 kg)   LMP  (LMP Unknown)   SpO2 95%   BMI 31.60 kg/m²      INTAKE/OUTPUT:      Intake/Output Summary (Last 24 hours) at 12/17/2021 0830  Last data filed at 12/17/2021 0600  Gross per 24 hour   Intake 1182 ml   Output 2805 ml   Net -1623 ml       PHYSICAL EXAM:  General Appearance: No acute distress, GCS 15  HEENT:  Normocephalic, atraumatic, mucus membranes moist   Heart: Heart regular rate, normotensive  Lungs: Equal chest rise bilaterally, no increased work of breathing  Abdomen: Soft, wound VAC in place with serosanguineous output, SHANA drain murky white/brown, red rubber catheter draining liquid stool into bag. LLQ mucus fistula with ostomy appliance in place. Extremities: No cyanosis, pitting edema, rashes noted. Skin: Skin color, texture, turgor normal. No rashes or lesions.     Data:  CBC with Differential:    Lab Results   Component Value Date    WBC 10.6 12/17/2021    RBC 3.53 12/17/2021    HGB 10.0 12/17/2021    HCT 32.9 12/17/2021     12/17/2021    MCV 93.2 12/17/2021    MCH 28.3 12/17/2021    MCHC 30.4 12/17/2021    RDW 14.7 12/17/2021    NRBC 1 12/17/2021    LYMPHOPCT 18 12/17/2021    MONOPCT 8 12/17/2021    BASOPCT 1 12/17/2021    MONOSABS 0.85 12/17/2021    LYMPHSABS 1.91 12/17/2021    EOSABS 0.11 12/17/2021    BASOSABS 0.11 12/17/2021    DIFFTYPE NOT REPORTED 12/17/2021     BMP:    Lab Results   Component Value Date     12/17/2021    K 3.3 12/17/2021    CL 96 12/17/2021    CO2 33 12/17/2021    BUN 23 12/17/2021    LABALBU 2.0 12/14/2021    CREATININE 0.41 12/17/2021    CALCIUM 8.3 12/17/2021    GFRAA >60 12/17/2021    LABGLOM >60 12/17/2021    GLUCOSE 150 12/17/2021     Magnesium:    Lab Results   Component Value Date    MG 2.1 12/17/2021     Phosphorus:    Lab Results   Component Value Date    PHOS 3.5 12/17/2021     Radiology Review:     ASSESSMENT:  Active Hospital Problems    Diagnosis Date Noted    Stricture of sigmoid colon [facility]) [T69.581] 11/24/2021     80 y.o. female with sigmoid colon stricture. 11/24/21: Status post exploratory laparotomy with explantation pelvic mesh and mobilization sigmoid and proximal rectum, status post ileocecectomy with ileocolonic anastomosis. 12/1/21: Status post ex lap, creation of end colostomy, pelvic drain placement    12/8/21: Exploratory laparotomy, repair of ileocolonic anastomosis, abdominal washout, lysis of adhesions, revision of colostomy, creation of ileostomy with insertion of red rubber catheter, placement of Stratus biologic mesh, wound VAC application. Plan:  -Appreciate critical care management  -Continue strict n.p.o.  -Monitor I's and O's, replete electrolytes as needed  -Continue TPN  -Continue octreotide infusion  -Continue NGT to LIWS  -Continue red rubber catheter to gravity. This is functioning as a conduit end ileostomy.  -Wound/ostomy for wound vac exchanges 3 times/week. -Continue to monitor SHANA drain.  -Stoma appliance to colostomy/mucus fistula. -Continue IV abx  -OK for VTE ppx from surgery standpoint  -Coordinating with wound/ostomy care for wound VAC change this weekend.   Please contact on-call surgical resident to evaluate and assist.    Electronically signed by Rosalie Suarez Karis Gonsalez MD  on 12/17/2021 at 8:30 AM    I Dr. Deven Abarca saw and examined the patient. I have edited the above and agree with the above. Ashley Chavez  Colorectal Surgery

## 2021-12-17 NOTE — PROGRESS NOTES
Pulmonary Critical Care Progress Note  Ru Dunham M.D. Patient seen for the follow up of hypoxic respiratory failure, metabolic acidosis    Subjective:  She was successfully extubated 12/13/2021. She is resting comfortably in bed, no distress. She denies shortness of breath, cough or chest pain. She remains on room air and saturating well. She is on small dose of Precedex. She also is still on TPN and Sandostatin.      Examination:  Vitals: /68   Pulse 85   Temp 97.9 °F (36.6 °C) (Temporal)   Resp 22   Ht 5' 4.5\" (1.638 m)   Wt 187 lb (84.8 kg)   LMP  (LMP Unknown)   SpO2 95%   BMI 31.60 kg/m²   General appearance: Awake, alert and following commands  Neck: No JVD  Lungs: Decreased breath sounds no crackles or wheezing  Heart: regular rate and rhythm, S1, S2 normal, no gallop  Abdomen: Soft, non tender, positive wound VAC  Extremities: no cyanosis or clubbing.  + edema    LABs:  CBC:   Recent Labs     12/16/21  0400 12/17/21  0317   WBC 14.9* 10.6   HGB 10.9* 10.0*   HCT 36.2* 32.9*   * 478*     BMP:   Recent Labs     12/16/21  0400 12/17/21  0317    138   K 4.3 3.3*   CO2 34* 33*   BUN 22 23   CREATININE 0.49* 0.41*   LABGLOM >60 >60   GLUCOSE 152* 150*     ABG:  Lab Results   Component Value Date    SQT8KZR NOT REPORTED 12/13/2021    FIO2 30.0 12/13/2021       Lab Results   Component Value Date    POCPH 7.507 12/13/2021    POCPCO2 47.6 12/13/2021    POCPO2 79.2 12/13/2021    POCHCO3 37.7 12/13/2021    PBEA 13 12/13/2021    EBD5BDI NOT REPORTED 12/13/2021    KPVS0SQU 96 12/13/2021    FIO2 30.0 12/13/2021     Radiology:  Chest x-ray 12/17/21        Impression:  · Acute respiratory insufficiency requiring ventilator support  · Feculent peritonitis s/p exploratory laparotomy with end colostomy/pelvic drain placement  · COPD  · History of GERD/dyslipidemia/hypothyroidism/hypertension  · Metabolic acidosis  · Bilateral pleural infiltrate/pulmonary edema    Recommendations:  · Monitor off of oxygen  · Incentive spirometry every hour while awake  · DuoNeb by nebulizer as needed  · Continue diuresis IV Lasix 40 mg twice daily, decrease to once daily  · TPN/ monitor liver function/ off TF per surgery  · Monitor hemoglobin, transfuse for hemoglobin less than 7  · Sandostatin drip  · Zosyn/ off Flagyl  · Wound care/ wound VAC  · Labs in a.m. · X-ray chest in a.m.   · Peptic ulcer disease prophylaxis  · DVT prophylaxis, subcu heparin   · Discussed with RN  · We will follow with you    Electronically signed by     Khoi Swann MD on 12/17/2021 at 4:20 PM  Pulmonary Critical Care and Sleep Medicine,  [facility]  Cell: [phone]  Office: [phone]

## 2021-12-17 NOTE — PROGRESS NOTES
[facility]   Urology Progress Note            Subjective:  Follow-up urinary retention    Patient Vitals for the past 24 hrs:   BP Temp Temp src Pulse Resp SpO2   12/17/21 0600 (!) 144/69 -- -- 82 19 --   12/17/21 0500 (!) 142/72 -- -- 84 25 95 %   12/17/21 0400 112/68 97.7 °F (36.5 °C) Tympanic 81 19 95 %   12/17/21 0300 (!) 102/53 -- -- 74 20 93 %   12/17/21 0200 (!) 100/54 -- -- 78 22 95 %   12/17/21 0100 112/62 -- -- 76 22 96 %   12/17/21 0000 112/63 97.6 °F (36.4 °C) Tympanic 81 22 96 %   12/16/21 2300 (!) 106/55 -- -- 81 20 96 %   12/16/21 2200 (!) 100/54 -- -- 80 19 95 %   12/16/21 2100 119/62 -- -- 82 23 95 %   12/16/21 2000 119/63 96.9 °F (36.1 °C) Tympanic 81 23 94 %   12/16/21 1900 116/65 -- -- 81 20 94 %   12/16/21 1800 113/72 -- -- 85 21 96 %   12/16/21 1700 119/66 -- -- 85 25 95 %   12/16/21 1647 -- -- -- -- 24 95 %   12/16/21 1600 121/70 97.6 °F (36.4 °C) Temporal 86 23 95 %   12/16/21 1500 125/66 -- -- 90 19 94 %   12/16/21 1400 (!) 129/58 -- -- 89 -- 92 %   12/16/21 1300 114/62 -- -- 83 -- 95 %   12/16/21 1200 131/66 97.4 °F (36.3 °C) Temporal 83 18 94 %   12/16/21 1100 108/62 -- -- 86 -- 94 %   12/16/21 0900 103/67 -- -- 73 -- 96 %   12/16/21 0800 (!) 113/57 98.8 °F (37.1 °C) Oral 83 19 94 %       Intake/Output Summary (Last 24 hours) at 12/17/2021 0708  Last data filed at 12/17/2021 0600  Gross per 24 hour   Intake 1182 ml   Output 2805 ml   Net -1623 ml       Recent Labs     12/15/21  0435 12/16/21  0400 12/17/21 0317   WBC 12.0* 14.9* 10.6   HGB 10.1* 10.9* 10.0*   HCT 32.9* 36.2* 32.9*   MCV 94.0 95.3 93.2   * 630* 478*     Recent Labs     12/15/21  0435 12/15/21  0435 12/15/21  1943 12/16/21  0400 12/17/21 0317      < > 135 137 138   K 3.5*   < > 3.9 4.3 3.3*   CL 96*   < > 94* 94* 96*   CO2 34*   < > 34* 34* 33*   PHOS 3.1  --   --  3.3 3.5   BUN 23   < > 22 22 23   CREATININE 0.43*   < > <0.40* 0.49* 0.41*    < > = values in this interval not displayed. No results for input(s): COLORU, PHUR, LABCAST, WBCUA, RBCUA, MUCUS, TRICHOMONAS, YEAST, BACTERIA, CLARITYU, SPECGRAV, LEUKOCYTESUR, UROBILINOGEN, Morna Rowan in the last 72 hours. Invalid input(s): NITRATE, GLUCOSEUKETONESUAMORPHOUS    Additional Lab/culture results:    Physical Exam: Patient with indwelling Rose urology following from distance patient has good urinary output, renal function stable    Interval Imaging Findings:    Impression:    Patient Active Problem List   Diagnosis    Intestinal adhesions    Obesity (BMI 30-39. 9)    Urinary incontinence    Constipation    Ventral incisional hernia    Smoker    Stricture of sigmoid colon ([facility] [address]       Plan: Maintain indwelling Rose    Noris Reyes MD  7:08 AM 12/17/2021

## 2021-12-18 ENCOUNTER — APPOINTMENT (OUTPATIENT)
Dept: GENERAL RADIOLOGY | Age: 71
DRG: 329 | End: 2021-12-18
Attending: COLON & RECTAL SURGERY
Payer: MEDICARE

## 2021-12-18 LAB
ABSOLUTE EOS #: 0.23 K/UL (ref 0–0.4)
ABSOLUTE IMMATURE GRANULOCYTE: 1.14 K/UL (ref 0–0.3)
ABSOLUTE LYMPH #: 1.94 K/UL (ref 1–4.8)
ABSOLUTE MONO #: 1.14 K/UL (ref 0.2–0.8)
ANION GAP SERPL CALCULATED.3IONS-SCNC: 11 MMOL/L (ref 9–17)
BASOPHILS # BLD: 0 %
BASOPHILS ABSOLUTE: 0 K/UL (ref 0–0.2)
BUN BLDV-MCNC: 23 MG/DL (ref 8–23)
BUN/CREAT BLD: 51 (ref 9–20)
CALCIUM SERPL-MCNC: 8.2 MG/DL (ref 8.6–10.4)
CHLORIDE BLD-SCNC: 100 MMOL/L (ref 98–107)
CO2: 27 MMOL/L (ref 20–31)
CREAT SERPL-MCNC: 0.45 MG/DL (ref 0.5–0.9)
DIFFERENTIAL TYPE: ABNORMAL
EOSINOPHILS RELATIVE PERCENT: 2 % (ref 1–4)
GFR AFRICAN AMERICAN: >60 ML/MIN
GFR NON-AFRICAN AMERICAN: >60 ML/MIN
GFR SERPL CREATININE-BSD FRML MDRD: ABNORMAL ML/MIN/{1.73_M2}
GFR SERPL CREATININE-BSD FRML MDRD: ABNORMAL ML/MIN/{1.73_M2}
GLUCOSE BLD-MCNC: 140 MG/DL (ref 65–105)
GLUCOSE BLD-MCNC: 148 MG/DL (ref 65–105)
GLUCOSE BLD-MCNC: 153 MG/DL (ref 65–105)
GLUCOSE BLD-MCNC: 153 MG/DL (ref 65–105)
GLUCOSE BLD-MCNC: 155 MG/DL (ref 65–105)
GLUCOSE BLD-MCNC: 158 MG/DL (ref 70–99)
GLUCOSE BLD-MCNC: 164 MG/DL (ref 65–105)
HCT VFR BLD CALC: 32.2 % (ref 36.3–47.1)
HEMOGLOBIN: 10 G/DL (ref 11.9–15.1)
IMMATURE GRANULOCYTES: 10 %
LYMPHOCYTES # BLD: 17 % (ref 24–44)
MAGNESIUM: 2.2 MG/DL (ref 1.6–2.6)
MCH RBC QN AUTO: 28.8 PG (ref 25.2–33.5)
MCHC RBC AUTO-ENTMCNC: 31.1 G/DL (ref 28.4–34.8)
MCV RBC AUTO: 92.8 FL (ref 82.6–102.9)
MONOCYTES # BLD: 10 % (ref 1–7)
MORPHOLOGY: ABNORMAL
MORPHOLOGY: ABNORMAL
NRBC AUTOMATED: 0 PER 100 WBC
PDW BLD-RTO: 14.9 % (ref 11.8–14.4)
PHOSPHORUS: 3.4 MG/DL (ref 2.6–4.5)
PLATELET # BLD: 469 K/UL (ref 138–453)
PLATELET ESTIMATE: ABNORMAL
PMV BLD AUTO: 11.2 FL (ref 8.1–13.5)
POTASSIUM SERPL-SCNC: 3.7 MMOL/L (ref 3.7–5.3)
RBC # BLD: 3.47 M/UL (ref 3.95–5.11)
RBC # BLD: ABNORMAL 10*6/UL
SEG NEUTROPHILS: 61 % (ref 36–66)
SEGMENTED NEUTROPHILS ABSOLUTE COUNT: 6.95 K/UL (ref 1.8–7.7)
SODIUM BLD-SCNC: 138 MMOL/L (ref 135–144)
TRIGL SERPL-MCNC: 200 MG/DL
WBC # BLD: 11.4 K/UL (ref 3.5–11.3)
WBC # BLD: ABNORMAL 10*3/UL

## 2021-12-18 PROCEDURE — 6360000002 HC RX W HCPCS: Performed by: STUDENT IN AN ORGANIZED HEALTH CARE EDUCATION/TRAINING PROGRAM

## 2021-12-18 PROCEDURE — 6370000000 HC RX 637 (ALT 250 FOR IP): Performed by: STUDENT IN AN ORGANIZED HEALTH CARE EDUCATION/TRAINING PROGRAM

## 2021-12-18 PROCEDURE — 2580000003 HC RX 258: Performed by: STUDENT IN AN ORGANIZED HEALTH CARE EDUCATION/TRAINING PROGRAM

## 2021-12-18 PROCEDURE — 2000000000 HC ICU R&B

## 2021-12-18 PROCEDURE — 84100 ASSAY OF PHOSPHORUS: CPT

## 2021-12-18 PROCEDURE — 85025 COMPLETE CBC W/AUTO DIFF WBC: CPT

## 2021-12-18 PROCEDURE — 6360000002 HC RX W HCPCS: Performed by: NURSE PRACTITIONER

## 2021-12-18 PROCEDURE — 71045 X-RAY EXAM CHEST 1 VIEW: CPT

## 2021-12-18 PROCEDURE — 82947 ASSAY GLUCOSE BLOOD QUANT: CPT

## 2021-12-18 PROCEDURE — 36415 COLL VENOUS BLD VENIPUNCTURE: CPT

## 2021-12-18 PROCEDURE — 80048 BASIC METABOLIC PNL TOTAL CA: CPT

## 2021-12-18 PROCEDURE — 2500000003 HC RX 250 WO HCPCS: Performed by: STUDENT IN AN ORGANIZED HEALTH CARE EDUCATION/TRAINING PROGRAM

## 2021-12-18 PROCEDURE — 84478 ASSAY OF TRIGLYCERIDES: CPT

## 2021-12-18 PROCEDURE — 83735 ASSAY OF MAGNESIUM: CPT

## 2021-12-18 PROCEDURE — C9113 INJ PANTOPRAZOLE SODIUM, VIA: HCPCS | Performed by: STUDENT IN AN ORGANIZED HEALTH CARE EDUCATION/TRAINING PROGRAM

## 2021-12-18 PROCEDURE — 2500000003 HC RX 250 WO HCPCS: Performed by: COLON & RECTAL SURGERY

## 2021-12-18 RX ADMIN — DOCUSATE SODIUM 100 MG: 100 CAPSULE ORAL at 20:56

## 2021-12-18 RX ADMIN — FUROSEMIDE 40 MG: 10 INJECTION, SOLUTION INTRAMUSCULAR; INTRAVENOUS at 09:49

## 2021-12-18 RX ADMIN — QUETIAPINE FUMARATE 25 MG: 25 TABLET ORAL at 20:56

## 2021-12-18 RX ADMIN — INSULIN LISPRO 1 UNITS: 100 INJECTION, SOLUTION INTRAVENOUS; SUBCUTANEOUS at 11:59

## 2021-12-18 RX ADMIN — SODIUM CHLORIDE, PRESERVATIVE FREE 10 ML: 5 INJECTION INTRAVENOUS at 21:01

## 2021-12-18 RX ADMIN — GABAPENTIN 300 MG: 300 CAPSULE ORAL at 20:56

## 2021-12-18 RX ADMIN — INSULIN LISPRO 1 UNITS: 100 INJECTION, SOLUTION INTRAVENOUS; SUBCUTANEOUS at 18:38

## 2021-12-18 RX ADMIN — INSULIN LISPRO 1 UNITS: 100 INJECTION, SOLUTION INTRAVENOUS; SUBCUTANEOUS at 06:22

## 2021-12-18 RX ADMIN — CYCLOBENZAPRINE 5 MG: 10 TABLET, FILM COATED ORAL at 20:56

## 2021-12-18 RX ADMIN — PIPERACILLIN AND TAZOBACTAM 3375 MG: 3; .375 INJECTION, POWDER, LYOPHILIZED, FOR SOLUTION INTRAVENOUS at 15:02

## 2021-12-18 RX ADMIN — SODIUM CHLORIDE, PRESERVATIVE FREE 10 ML: 5 INJECTION INTRAVENOUS at 09:50

## 2021-12-18 RX ADMIN — PIPERACILLIN AND TAZOBACTAM 3375 MG: 3; .375 INJECTION, POWDER, LYOPHILIZED, FOR SOLUTION INTRAVENOUS at 23:06

## 2021-12-18 RX ADMIN — HEPARIN SODIUM 5000 UNITS: 5000 INJECTION INTRAVENOUS; SUBCUTANEOUS at 09:49

## 2021-12-18 RX ADMIN — PANTOPRAZOLE SODIUM 40 MG: 40 INJECTION, POWDER, FOR SOLUTION INTRAVENOUS at 09:49

## 2021-12-18 RX ADMIN — ACETAMINOPHEN 1000 MG: 500 TABLET ORAL at 22:35

## 2021-12-18 RX ADMIN — HEPARIN SODIUM 5000 UNITS: 5000 INJECTION INTRAVENOUS; SUBCUTANEOUS at 20:56

## 2021-12-18 RX ADMIN — POTASSIUM CHLORIDE: 2 INJECTION, SOLUTION, CONCENTRATE INTRAVENOUS at 18:04

## 2021-12-18 RX ADMIN — PIPERACILLIN AND TAZOBACTAM 3375 MG: 3; .375 INJECTION, POWDER, LYOPHILIZED, FOR SOLUTION INTRAVENOUS at 07:26

## 2021-12-18 RX ADMIN — OCTREOTIDE ACETATE 25 MCG/HR: 200 INJECTION, SOLUTION INTRAVENOUS; SUBCUTANEOUS at 14:03

## 2021-12-18 RX ADMIN — ROSUVASTATIN CALCIUM 5 MG: 5 TABLET, FILM COATED ORAL at 20:56

## 2021-12-18 RX ADMIN — SODIUM CHLORIDE 0.7 MCG/KG/HR: 9 INJECTION, SOLUTION INTRAVENOUS at 07:27

## 2021-12-18 ASSESSMENT — PAIN SCALES - GENERAL
PAINLEVEL_OUTOF10: 4
PAINLEVEL_OUTOF10: 0

## 2021-12-18 NOTE — PLAN OF CARE
Problem: HEMODYNAMIC STATUS  Goal: Patient has stable vital signs and fluid balance  Outcome: Ongoing     Problem: OXYGENATION/RESPIRATORY FUNCTION  Goal: Patient will achieve/maintain normal respiratory rate/effort  Outcome: Ongoing     Problem: MOBILITY  Goal: Early mobilization is achieved  Outcome: Ongoing     Problem: ELIMINATION  Goal: Elimination patterns are normal or improving  Description: Elimination patterns return to pre-surgery normal patterns  Outcome: Ongoing     Problem: SKIN INTEGRITY  Goal: Skin integrity is maintained or improved  Outcome: Ongoing     Problem: Pain:  Goal: Control of acute pain  Description: Control of acute pain  Outcome: Ongoing     Problem: Falls - Risk of:  Goal: Will remain free from falls  Description: Will remain free from falls  Outcome: Ongoing     Problem: Skin Integrity:  Goal: Will show no infection signs and symptoms  Description: Will show no infection signs and symptoms  Outcome: Ongoing     Problem: Nutrition  Goal: Optimal nutrition therapy  Outcome: Ongoing

## 2021-12-18 NOTE — PROGRESS NOTES
end colostomy/pelvic drain placement  · COPD /atelectasis  ·  pulmonary edema  · History of GERD/dyslipidemia/hypothyroidism/hypertension        Recommendations:    · Incentive spirometry every hour while awake  · Wean off Precedex drip for agitation  · DuoNeb by nebulizer as needed  · Lasix 40 IV daily  · Monitor liver function test  · Monitor hemoglobin, transfuse for hemoglobin less than 7  · Consider stopping Sandostatin drip  · Zosyn/ off Flagyl  · TPN/n.p.o. per surgery  · Wound care/wound VAC/wound VAC change on Monday  · Physical therapy bedside  · Discussed with RN  · Discussed with  at bedside  · LTAC discharge planning  · DVT prophylaxis Heparin subcu    Electronically signed by     Lake Rodriguez MD on 12/18/2021 at 12:58 PM  Pulmonary Critical Care and Sleep Medicine,  [facility] AT [place]: [phone]

## 2021-12-18 NOTE — PROGRESS NOTES
Colorectal Surgery:  Daily Progress Note               PATIENT NAME: Constantino Forbes DATE: 12/18/2021, 6:51 AM    SUBJECTIVE:     Patient seen and evaluated. No acute events overnight. Afebrile, hemodynamically stable. Minimally responsive to questions this AM although is awake and alert. Remains on precedex. Remains on TPN. Over past 24 hours: Urine output 1500 cc, NGT to LIWS roughly 200 cc, SHANA 30 cc, wound VAC with 300 cc, red rubber catheter with minimal succus. OBJECTIVE:   VITALS:  /62   Pulse 80   Temp 98.2 °F (36.8 °C) (Tympanic)   Resp 25   Ht 5' 4.5\" (1.638 m)   Wt 211 lb (95.7 kg)   LMP  (LMP Unknown)   SpO2 94%   BMI 35.66 kg/m²      INTAKE/OUTPUT:      Intake/Output Summary (Last 24 hours) at 12/18/2021 0667  Last data filed at 12/18/2021 0241  Gross per 24 hour   Intake 2367.8 ml   Output 1830 ml   Net 537.8 ml       PHYSICAL EXAM:  General Appearance: No acute distress, mildly confused  HEENT:  Normocephalic, atraumatic, mucus membranes moist   Heart: Heart regular rate, normotensive  Lungs: Equal chest rise bilaterally, no increased work of breathing  Abdomen: Soft, wound VAC in place with moderate output in cannister, SHANA drain minimal amount of serous output with necrotic fat debris, red rubber catheter draining  into palmer collection bag.  LLQ mucus fistula with ostomy appliance in place, no significant output  Extremities: No cyanosis, pitting edema, rashes noted  Skin: Skin color, texture, turgor normal    Data:  CBC with Differential:    Lab Results   Component Value Date    WBC 11.4 12/18/2021    RBC 3.47 12/18/2021    HGB 10.0 12/18/2021    HCT 32.2 12/18/2021     12/18/2021    MCV 92.8 12/18/2021    MCH 28.8 12/18/2021    MCHC 31.1 12/18/2021    RDW 14.9 12/18/2021    NRBC 1 12/17/2021    LYMPHOPCT PENDING 12/18/2021    MONOPCT PENDING 12/18/2021    BASOPCT PENDING 12/18/2021    MONOSABS PENDING 12/18/2021    LYMPHSABS PENDING 12/18/2021    EOSABS PENDING 12/18/2021    BASOSABS PENDING 12/18/2021    DIFFTYPE NOT REPORTED 12/18/2021     BMP:    Lab Results   Component Value Date     12/18/2021    K 3.7 12/18/2021     12/18/2021    CO2 27 12/18/2021    BUN 23 12/18/2021    LABALBU 2.0 12/14/2021    CREATININE 0.45 12/18/2021    CALCIUM 8.2 12/18/2021    GFRAA >60 12/18/2021    LABGLOM >60 12/18/2021    GLUCOSE 158 12/18/2021     Magnesium:    Lab Results   Component Value Date    MG 2.2 12/18/2021     Phosphorus:    Lab Results   Component Value Date    PHOS 3.4 12/18/2021     Radiology Review:     ASSESSMENT:  Active Hospital Problems    Diagnosis Date Noted    Stricture of sigmoid colon [facility]) [Q38.637] 11/24/2021     80 y.o. female with sigmoid colon stricture. 11/24/21: Status post exploratory laparotomy with explantation pelvic mesh and mobilization sigmoid and proximal rectum, status post ileocecectomy with ileocolonic anastomosis. 12/1/21: Status post ex lap, creation of end colostomy, pelvic drain placement    12/8/21: Exploratory laparotomy, repair of ileocolonic anastomosis, abdominal washout, lysis of adhesions, revision of colostomy, creation of ileostomy with insertion of red rubber catheter, placement of Stratus biologic mesh, wound VAC application. Plan:  -Appreciate critical care management  -Continue strict NPO  -Monitor I's and O's, replete electrolytes as needed  -Continue TPN  -Continue octreotide infusion  -Continue NGT to LIWS  -Continue red rubber catheter to gravity. This is functioning as a conduit end ileostomy  -Wound/ostomy for wound vac exchanges 3 times/week. Discussed with bedside RN who repots wound ostomy out of town this weekend and will resume vac changes on Monday  -Continue to monitor SHANA drain  -Stoma appliance to colostomy/mucus fistula  -Continue IV abx  -OK for VTE ppx from surgery standpoint    Electronically signed by Chloe Pride MD  on 12/18/2021 at 6:51 AM    I Dr. Karla Rao saw and examined the patient.  I have edited the above and agree with the above. Ashley Chavez  Colorectal Surgery

## 2021-12-18 NOTE — PLAN OF CARE
Nutrition Problem #1: Inadequate protein-energy intake  Intervention: Food and/or Nutrient Delivery: Continue NPO,Continue Current Parenteral Nutrition  Nutritional Goals: PN will meet greater than 75% of estimated nutrition needs

## 2021-12-19 LAB
ABSOLUTE EOS #: 0.22 K/UL (ref 0–0.4)
ABSOLUTE IMMATURE GRANULOCYTE: 0.86 K/UL (ref 0–0.3)
ABSOLUTE LYMPH #: 1.73 K/UL (ref 1–4.8)
ABSOLUTE MONO #: 1.4 K/UL (ref 0.2–0.8)
ANION GAP SERPL CALCULATED.3IONS-SCNC: 12 MMOL/L (ref 9–17)
BASOPHILS # BLD: 1 %
BASOPHILS ABSOLUTE: 0.11 K/UL (ref 0–0.2)
BUN BLDV-MCNC: 24 MG/DL (ref 8–23)
BUN/CREAT BLD: 47 (ref 9–20)
CALCIUM SERPL-MCNC: 8.3 MG/DL (ref 8.6–10.4)
CHLORIDE BLD-SCNC: 96 MMOL/L (ref 98–107)
CO2: 26 MMOL/L (ref 20–31)
CREAT SERPL-MCNC: 0.51 MG/DL (ref 0.5–0.9)
DIFFERENTIAL TYPE: ABNORMAL
EOSINOPHILS RELATIVE PERCENT: 2 % (ref 1–4)
GFR AFRICAN AMERICAN: >60 ML/MIN
GFR NON-AFRICAN AMERICAN: >60 ML/MIN
GFR SERPL CREATININE-BSD FRML MDRD: ABNORMAL ML/MIN/{1.73_M2}
GFR SERPL CREATININE-BSD FRML MDRD: ABNORMAL ML/MIN/{1.73_M2}
GLUCOSE BLD-MCNC: 158 MG/DL (ref 65–105)
GLUCOSE BLD-MCNC: 160 MG/DL (ref 70–99)
GLUCOSE BLD-MCNC: 161 MG/DL (ref 65–105)
GLUCOSE BLD-MCNC: 171 MG/DL (ref 65–105)
GLUCOSE BLD-MCNC: 173 MG/DL (ref 65–105)
HCT VFR BLD CALC: 33.3 % (ref 36.3–47.1)
HEMOGLOBIN: 10.3 G/DL (ref 11.9–15.1)
IMMATURE GRANULOCYTES: 8 %
LYMPHOCYTES # BLD: 16 % (ref 24–44)
MAGNESIUM: 2.1 MG/DL (ref 1.6–2.6)
MCH RBC QN AUTO: 28.5 PG (ref 25.2–33.5)
MCHC RBC AUTO-ENTMCNC: 30.9 G/DL (ref 28.4–34.8)
MCV RBC AUTO: 92.2 FL (ref 82.6–102.9)
MONOCYTES # BLD: 13 % (ref 1–7)
MORPHOLOGY: ABNORMAL
MORPHOLOGY: ABNORMAL
NRBC AUTOMATED: 0.3 PER 100 WBC
PDW BLD-RTO: 14.9 % (ref 11.8–14.4)
PHOSPHORUS: 3.4 MG/DL (ref 2.6–4.5)
PLATELET # BLD: 425 K/UL (ref 138–453)
PLATELET ESTIMATE: ABNORMAL
PMV BLD AUTO: 11.1 FL (ref 8.1–13.5)
POTASSIUM SERPL-SCNC: 3.3 MMOL/L (ref 3.7–5.3)
RBC # BLD: 3.61 M/UL (ref 3.95–5.11)
RBC # BLD: ABNORMAL 10*6/UL
SEG NEUTROPHILS: 60 % (ref 36–66)
SEGMENTED NEUTROPHILS ABSOLUTE COUNT: 6.48 K/UL (ref 1.8–7.7)
SODIUM BLD-SCNC: 134 MMOL/L (ref 135–144)
WBC # BLD: 10.8 K/UL (ref 3.5–11.3)
WBC # BLD: ABNORMAL 10*3/UL

## 2021-12-19 PROCEDURE — 6360000002 HC RX W HCPCS: Performed by: STUDENT IN AN ORGANIZED HEALTH CARE EDUCATION/TRAINING PROGRAM

## 2021-12-19 PROCEDURE — 6370000000 HC RX 637 (ALT 250 FOR IP): Performed by: STUDENT IN AN ORGANIZED HEALTH CARE EDUCATION/TRAINING PROGRAM

## 2021-12-19 PROCEDURE — 83735 ASSAY OF MAGNESIUM: CPT

## 2021-12-19 PROCEDURE — 6360000002 HC RX W HCPCS: Performed by: INTERNAL MEDICINE

## 2021-12-19 PROCEDURE — 2580000003 HC RX 258: Performed by: STUDENT IN AN ORGANIZED HEALTH CARE EDUCATION/TRAINING PROGRAM

## 2021-12-19 PROCEDURE — 84100 ASSAY OF PHOSPHORUS: CPT

## 2021-12-19 PROCEDURE — 6360000002 HC RX W HCPCS: Performed by: NURSE PRACTITIONER

## 2021-12-19 PROCEDURE — 94761 N-INVAS EAR/PLS OXIMETRY MLT: CPT

## 2021-12-19 PROCEDURE — 2500000003 HC RX 250 WO HCPCS: Performed by: COLON & RECTAL SURGERY

## 2021-12-19 PROCEDURE — 80048 BASIC METABOLIC PNL TOTAL CA: CPT

## 2021-12-19 PROCEDURE — 85025 COMPLETE CBC W/AUTO DIFF WBC: CPT

## 2021-12-19 PROCEDURE — 82947 ASSAY GLUCOSE BLOOD QUANT: CPT

## 2021-12-19 PROCEDURE — C9113 INJ PANTOPRAZOLE SODIUM, VIA: HCPCS | Performed by: STUDENT IN AN ORGANIZED HEALTH CARE EDUCATION/TRAINING PROGRAM

## 2021-12-19 PROCEDURE — 36415 COLL VENOUS BLD VENIPUNCTURE: CPT

## 2021-12-19 PROCEDURE — 2000000000 HC ICU R&B

## 2021-12-19 RX ADMIN — INSULIN LISPRO 1 UNITS: 100 INJECTION, SOLUTION INTRAVENOUS; SUBCUTANEOUS at 05:31

## 2021-12-19 RX ADMIN — INSULIN LISPRO 1 UNITS: 100 INJECTION, SOLUTION INTRAVENOUS; SUBCUTANEOUS at 12:09

## 2021-12-19 RX ADMIN — INSULIN LISPRO 1 UNITS: 100 INJECTION, SOLUTION INTRAVENOUS; SUBCUTANEOUS at 00:05

## 2021-12-19 RX ADMIN — PIPERACILLIN AND TAZOBACTAM 3375 MG: 3; .375 INJECTION, POWDER, LYOPHILIZED, FOR SOLUTION INTRAVENOUS at 15:25

## 2021-12-19 RX ADMIN — POTASSIUM CHLORIDE 20 MEQ: 29.8 INJECTION INTRAVENOUS at 08:14

## 2021-12-19 RX ADMIN — POTASSIUM CHLORIDE: 2 INJECTION, SOLUTION, CONCENTRATE INTRAVENOUS at 18:20

## 2021-12-19 RX ADMIN — SODIUM CHLORIDE, PRESERVATIVE FREE 10 ML: 5 INJECTION INTRAVENOUS at 08:15

## 2021-12-19 RX ADMIN — SODIUM CHLORIDE, PRESERVATIVE FREE 10 ML: 5 INJECTION INTRAVENOUS at 21:01

## 2021-12-19 RX ADMIN — GABAPENTIN 300 MG: 300 CAPSULE ORAL at 05:30

## 2021-12-19 RX ADMIN — HEPARIN SODIUM 5000 UNITS: 5000 INJECTION INTRAVENOUS; SUBCUTANEOUS at 21:01

## 2021-12-19 RX ADMIN — OCTREOTIDE ACETATE 25 MCG/HR: 200 INJECTION, SOLUTION INTRAVENOUS; SUBCUTANEOUS at 10:45

## 2021-12-19 RX ADMIN — PANTOPRAZOLE SODIUM 40 MG: 40 INJECTION, POWDER, FOR SOLUTION INTRAVENOUS at 08:15

## 2021-12-19 RX ADMIN — POTASSIUM CHLORIDE 20 MEQ: 29.8 INJECTION INTRAVENOUS at 06:17

## 2021-12-19 RX ADMIN — ACETAMINOPHEN 1000 MG: 500 TABLET ORAL at 05:30

## 2021-12-19 RX ADMIN — INSULIN LISPRO 1 UNITS: 100 INJECTION, SOLUTION INTRAVENOUS; SUBCUTANEOUS at 18:21

## 2021-12-19 RX ADMIN — PIPERACILLIN AND TAZOBACTAM 3375 MG: 3; .375 INJECTION, POWDER, LYOPHILIZED, FOR SOLUTION INTRAVENOUS at 07:36

## 2021-12-19 RX ADMIN — HEPARIN SODIUM 5000 UNITS: 5000 INJECTION INTRAVENOUS; SUBCUTANEOUS at 08:15

## 2021-12-19 RX ADMIN — FUROSEMIDE 40 MG: 10 INJECTION, SOLUTION INTRAMUSCULAR; INTRAVENOUS at 08:15

## 2021-12-19 RX ADMIN — LEVOTHYROXINE SODIUM 100 MCG: 0.1 TABLET ORAL at 05:31

## 2021-12-19 ASSESSMENT — PAIN SCALES - GENERAL
PAINLEVEL_OUTOF10: 0

## 2021-12-19 NOTE — PLAN OF CARE
Problem: Pain:  Goal: Control of acute pain  Description: Control of acute pain  12/19/2021 0341 by Alisa Fatima RN  Outcome: Met This Shift  12/18/2021 1533 by Mariola Arshad RN  Outcome: Ongoing     Problem: HEMODYNAMIC STATUS  Goal: Patient has stable vital signs and fluid balance  12/19/2021 0341 by Alisa Fatima RN  Outcome: Ongoing  12/18/2021 1533 by Mariola Arshad RN  Outcome: Ongoing     Problem: SKIN INTEGRITY  Goal: Skin integrity is maintained or improved  12/19/2021 0341 by Alisa Fatima RN  Outcome: Ongoing  12/18/2021 1533 by Mariola Arshad RN  Outcome: Ongoing     Problem: Falls - Risk of:  Goal: Will remain free from falls  Description: Will remain free from falls  12/19/2021 0341 by Alisa Fatima RN  Outcome: Ongoing  12/18/2021 1533 by Mariola Arshad RN  Outcome: Ongoing     Problem: Nutrition  Goal: Optimal nutrition therapy  12/19/2021 0341 by Alisa Fatima RN  Outcome: Ongoing  12/18/2021 1533 by Mariola Arshad RN  Outcome: Ongoing

## 2021-12-19 NOTE — PROGRESS NOTES
Colorectal Surgery:  Daily Progress Note               PATIENT NAME: Alejo Santacruz   TODAY'S DATE: 12/19/2021, 6:30 AM    SUBJECTIVE:     Patient seen and evaluated. No acute events overnight. Afebrile, hemodynamically stable. Awake and alert this AM. Denies pain. Over past 24 hours: Urine output 1700 cc, NGT to LIWS roughly 450 cc, SHANA 35 cc, red rubber catheter with several hundred cc in collection bag. OBJECTIVE:   VITALS:  /68   Pulse 109   Temp 97.8 °F (36.6 °C) (Temporal)   Resp 28   Ht 5' 4.5\" (1.638 m)   Wt 213 lb 3.2 oz (96.7 kg)   LMP  (LMP Unknown)   SpO2 93%   BMI 36.03 kg/m²      INTAKE/OUTPUT:      Intake/Output Summary (Last 24 hours) at 12/19/2021 0630  Last data filed at 12/19/2021 0406  Gross per 24 hour   Intake 1925.14 ml   Output 2185 ml   Net -259.86 ml       PHYSICAL EXAM:  General Appearance: Awake and alert  HEENT:  Normocephalic, atraumatic, mucus membranes moist   Heart: Heart regular rate, normotensive  Lungs: Equal chest rise bilaterally, no increased work of breathing  Abdomen: Soft, wound VAC in place with moderate output in cannister, SHANA drain minimal amount of serous output with necrotic fat debris, red rubber catheter draining  into palmer collection bag.  LLQ mucus fistula with ostomy appliance in place, no significant output  Extremities: No cyanosis, pitting edema, rashes noted  Skin: Skin color, texture, turgor normal    Data:  CBC with Differential:    Lab Results   Component Value Date    WBC 10.8 12/19/2021    RBC 3.61 12/19/2021    HGB 10.3 12/19/2021    HCT 33.3 12/19/2021     12/19/2021    MCV 92.2 12/19/2021    MCH 28.5 12/19/2021    MCHC 30.9 12/19/2021    RDW 14.9 12/19/2021    NRBC 1 12/17/2021    LYMPHOPCT 16 12/19/2021    MONOPCT 13 12/19/2021    BASOPCT 1 12/19/2021    MONOSABS 1.40 12/19/2021    LYMPHSABS 1.73 12/19/2021    EOSABS 0.22 12/19/2021    BASOSABS 0.11 12/19/2021    DIFFTYPE NOT REPORTED 12/19/2021     BMP:    Lab Results Component Value Date     12/19/2021    K 3.3 12/19/2021    CL 96 12/19/2021    CO2 26 12/19/2021    BUN 24 12/19/2021    LABALBU 2.0 12/14/2021    CREATININE 0.51 12/19/2021    CALCIUM 8.3 12/19/2021    GFRAA >60 12/19/2021    LABGLOM >60 12/19/2021    GLUCOSE 160 12/19/2021     Magnesium:    Lab Results   Component Value Date    MG 2.1 12/19/2021     Phosphorus:    Lab Results   Component Value Date    PHOS 3.4 12/19/2021     Radiology Review:     ASSESSMENT:  Active Hospital Problems    Diagnosis Date Noted    Stricture of sigmoid colon [facility]) [Q36.081] 11/24/2021     80 y.o. female with sigmoid colon stricture. 11/24/21: Status post exploratory laparotomy with explantation pelvic mesh and mobilization sigmoid and proximal rectum, status post ileocecectomy with ileocolonic anastomosis. 12/1/21: Status post ex lap, creation of end colostomy, pelvic drain placement    12/8/21: Exploratory laparotomy, repair of ileocolonic anastomosis, abdominal washout, lysis of adhesions, revision of colostomy, creation of ileostomy with insertion of red rubber catheter, placement of Stratus biologic mesh, wound VAC application. Plan:  -Appreciate critical care management  -Continue strict NPO  -Monitor I's and O's, replete electrolytes as needed  -Continue TPN  -Continue octreotide infusion  -Continue NGT to LIWS  -Continue red rubber catheter to gravity. This is functioning as a conduit end ileostomy  -Wound/ostomy for wound vac exchanges 3 times/week. Resume vac changes on Monday  -Continue to monitor SHANA drain  -Stoma appliance to colostomy/mucus fistula  -Continue IV abx  -OK for VTE ppx from surgery standpoint    Electronically signed by Amari Camargo MD  on 12/19/2021 at 6:30 AM      I Dr. Mick Bowers saw and examined the patient. I have edited the above and agree with the above. Ashley Chavez  Colorectal Surgery

## 2021-12-19 NOTE — PROGRESS NOTES
Nutrition Assessment     Type and Reason for Visit: Reassess    Nutrition Recommendations/Plan:   1. Continue NPO status  2. Continue TPN at 75 mL/hr (1800 mL total volume, 1584 kcal and 90 gm of protein). Insulin 35 units  3. Monitor TPN rate/ tolerance, GI status, weights and labs  4. Re-check triglycerides on 12/20 and start lipids if appropriate      Nutrition Assessment:  Patient remains NPO status and on TPN. She is weaned off Precedex drip. More alert today - sleeping at time of writer's visit. No changes reported per RN. NGT to LIWS. Continued TPN at 75 mL/hr (1800 mL total volume). Monitor TPN rate/ tolerance, GI status, weights and labs. Malnutrition Assessment:  Malnutrition Status: At risk for malnutrition (Comment)    Estimated Daily Nutrient Needs:  Energy (kcal): 5378-8409 kcal (15-18 kcal/kg); Weight Used for Energy Requirements:  Current     Protein (g): 84-95 gm of protein (1.5-1.7 gm/kg); Weight Used for Protein Requirements:  Ideal        Weight Used for Fluid Requirements:  1 ml/kcal      Nutrition Related Findings: NGT to LIWS. absent bowel sounds. Edema: Generalized +2; BUE/BLE trace non-pitting. Colostomy. Extubated 12/13. Multiple GI surgeries. Labs reviewed. TPN running.       Current Nutrition Therapies:    Diet NPO Exceptions are: Sips of Water with Meds, Ice Chips  PN-Adult 2-in-1 [facility] (Standard)  PN-Adult 2-in-1 [facility] (Standard)    Anthropometric Measures:  · Height: 5' 4.5\" (163.8 cm)  · Current Body Wt: 213 lb 3.2 oz (96.7 kg) (Accurate weight ?)   · BMI: 36    Nutrition Diagnosis:   · Inadequate protein-energy intake related to inadequate protein-energy intake as evidenced by NPO or clear liquid status due to medical condition,nutrition support - parenteral nutrition    · Altered GI function related to altered GI structure as evidenced by GI abnormality,nausea (status post small bowel surgery)      Nutrition Interventions:   Food and/or Nutrient Delivery: Continue NPO,Continue Current Parenteral Nutrition  Nutrition Education/Counseling:  Education not indicated   Coordination of Nutrition Care:  Continue to monitor while inpatient    Goals:  PN will meet greater than 75% of estimated nutrition needs       Nutrition Monitoring and Evaluation:   Behavioral-Environmental Outcomes:  None Identified   Food/Nutrient Intake Outcomes:  Parenteral Nutrition Intake/Tolerance  Physical Signs/Symptoms Outcomes:  Biochemical Data,Fluid Status or Edema,Weight,Skin,GI Status     Discharge Planning:    Parenteral Nutrition     Evelio Adam, [address] [place] [address]  Office Number: [phone]

## 2021-12-19 NOTE — PLAN OF CARE
Problem: Pain:  Goal: Control of acute pain  Description: Control of acute pain  12/19/2021 0341 by Tr  Problem: HEMODYNAMIC STATUS  Goal: Patient has stable vital signs and fluid balance  12/19/2021 1625 by Mansi Pastor RN  Outcome: Ongoing  12/19/2021 0341 by Singh Tobin RN  Outcome: Ongoing     Problem: OXYGENATION/RESPIRATORY FUNCTION  Goal: Patient will achieve/maintain normal respiratory rate/effort  Outcome: Ongoing     Problem: MOBILITY  Goal: Early mobilization is achieved  Outcome: Ongoing     Problem: ELIMINATION  Goal: Elimination patterns are normal or improving  Description: Elimination patterns return to pre-surgery normal patterns  Outcome: Ongoing     Problem: SKIN INTEGRITY  Goal: Skin integrity is maintained or improved  12/19/2021 1625 by Mansi Pastor RN  Outcome: Ongoing  12/19/2021 0341 by Singh Tobin RN  Outcome: Ongoing     Problem: Pain:  Goal: Pain level will decrease  Description: Pain level will decrease  Outcome: Ongoing     Problem: Falls - Risk of:  Goal: Will remain free from falls  Description: Will remain free from falls  12/19/2021 1625 by Mansi Pastor RN  Outcome: Ongoing  12/19/2021 0341 by Singh Tobin RN  Outcome: Ongoing     Problem: Skin Integrity:  Goal: Will show no infection signs and symptoms  Description: Will show no infection signs and symptoms  Outcome: Ongoing     Problem: Nutrition  Goal: Optimal nutrition therapy  12/19/2021 1625 by Mansi Pastor RN  Outcome: Ongoing  12/19/2021 0341 by Singh Tobin RN  Outcome: Ongoing   Christal Bynum RN  Outcome: Met This Shift

## 2021-12-19 NOTE — PROGRESS NOTES
Pulmonary Critical Care Progress Note       Patient seen for the follow up of hypoxic respiratory failure, metabolic acidosis    Subjective:  She has been on room air. She is still on octreotide at 5 milligram/hour  per surgery. She is on TPN. She is still confused but more awake. She is weaned off Precedex drip . e. She denies shortness of breath and mild cough. She was successfully extubated 12/13/2021. She is not ambulating. She has good urine output on diuresis.   Examination:  Vitals: /74   Pulse 104   Temp 97.5 °F (36.4 °C)   Resp 26   Ht 5' 4.5\" (1.638 m)   Wt 213 lb 3.2 oz (96.7 kg)   LMP  (LMP Unknown)   SpO2 94%   BMI 36.03 kg/m²   General appearance: Awake, alert and following commands  Neck: No JVD  Lungs: Decreased breath sounds no crackles or wheezing  Heart: regular rate and rhythm, S1, S2 normal, no gallop  Abdomen: Soft, non tender, ostomy in place  wound VAC  Extremities: no cyanosis or clubbing.  + edema    LABs:  CBC:   Recent Labs     12/18/21  0400 12/19/21  0430   WBC 11.4* 10.8   HGB 10.0* 10.3*   HCT 32.2* 33.3*   * 425     BMP:   Recent Labs     12/18/21  0400 12/19/21  0430    134*   K 3.7 3.3*   CO2 27 26   BUN 23 24*   CREATININE 0.45* 0.51   LABGLOM >60 >60   GLUCOSE 158* 160*     ABG:  Lab Results   Component Value Date    UPF9TPB NOT REPORTED 12/13/2021    FIO2 30.0 12/13/2021       Lab Results   Component Value Date    POCPH 7.507 12/13/2021    POCPCO2 47.6 12/13/2021    POCPO2 79.2 12/13/2021    POCHCO3 37.7 12/13/2021    PBEA 13 12/13/2021    DPX7MJX NOT REPORTED 12/13/2021    FKHX0DEO 96 12/13/2021    FIO2 30.0 12/13/2021     Radiology:  Chest x-ray 12/18  Shallow inflation with findings consistent with subsegmental atelectasis.       Unchanged appearance of support devices           Chest x-ray 12/17/21        Impression:  · Acute respiratory insufficiency requiring ventilator support  · Feculent peritonitis s/p exploratory laparotomy with end colostomy/pelvic drain placement  · COPD /atelectasis  ·  pulmonary edema  · History of GERD/dyslipidemia/hypothyroidism/hypertension        Recommendations:    · Incentive spirometry every hour while awake  ·  monitor off Precedex drip for agitation  · DuoNeb by nebulizer as needed  · Lasix 40 IV daily  · Monitor liver function test  · Monitor hemoglobin, transfuse for hemoglobin less than 7  · Consider stopping Sandostatin drip  · Zosyn/ off Flagyl  · TPN/n.p.o. per surgery  · Wound care/wound VAC/wound VAC change on Monday  · Discussed with RN  ·  physical therapy  · LTAC discharge planning  · DVT prophylaxis Heparin subcu    Electronically signed by     Flori Barriga MD on 12/19/2021 at 2:15 PM  Pulmonary Critical Care and Sleep Medicine,  [facility] AT [place]: [phone]

## 2021-12-20 LAB
ABSOLUTE EOS #: 0 K/UL (ref 0–0.4)
ABSOLUTE IMMATURE GRANULOCYTE: 0.49 K/UL (ref 0–0.3)
ABSOLUTE LYMPH #: 2.09 K/UL (ref 1–4.8)
ABSOLUTE MONO #: 1.35 K/UL (ref 0.2–0.8)
ANION GAP SERPL CALCULATED.3IONS-SCNC: 12 MMOL/L (ref 9–17)
BASOPHILS # BLD: 1 %
BASOPHILS ABSOLUTE: 0.12 K/UL (ref 0–0.2)
BUN BLDV-MCNC: 24 MG/DL (ref 8–23)
BUN/CREAT BLD: 52 (ref 9–20)
CALCIUM SERPL-MCNC: 8.3 MG/DL (ref 8.6–10.4)
CHLORIDE BLD-SCNC: 100 MMOL/L (ref 98–107)
CO2: 25 MMOL/L (ref 20–31)
CREAT SERPL-MCNC: 0.46 MG/DL (ref 0.5–0.9)
DIFFERENTIAL TYPE: ABNORMAL
EOSINOPHILS RELATIVE PERCENT: 0 % (ref 1–4)
GFR AFRICAN AMERICAN: >60 ML/MIN
GFR NON-AFRICAN AMERICAN: >60 ML/MIN
GFR SERPL CREATININE-BSD FRML MDRD: ABNORMAL ML/MIN/{1.73_M2}
GFR SERPL CREATININE-BSD FRML MDRD: ABNORMAL ML/MIN/{1.73_M2}
GLUCOSE BLD-MCNC: 147 MG/DL (ref 65–105)
GLUCOSE BLD-MCNC: 167 MG/DL (ref 65–105)
GLUCOSE BLD-MCNC: 170 MG/DL (ref 65–105)
GLUCOSE BLD-MCNC: 173 MG/DL (ref 65–105)
GLUCOSE BLD-MCNC: 182 MG/DL (ref 70–99)
HCT VFR BLD CALC: 34 % (ref 36.3–47.1)
HEMOGLOBIN: 10.5 G/DL (ref 11.9–15.1)
IMMATURE GRANULOCYTES: 4 %
LYMPHOCYTES # BLD: 17 % (ref 24–44)
MAGNESIUM: 2.1 MG/DL (ref 1.6–2.6)
MCH RBC QN AUTO: 28.7 PG (ref 25.2–33.5)
MCHC RBC AUTO-ENTMCNC: 30.9 G/DL (ref 28.4–34.8)
MCV RBC AUTO: 92.9 FL (ref 82.6–102.9)
MONOCYTES # BLD: 11 % (ref 1–7)
MORPHOLOGY: ABNORMAL
MORPHOLOGY: ABNORMAL
NRBC AUTOMATED: 0.3 PER 100 WBC
NUCLEATED RED BLOOD CELLS: 1 PER 100 WBC
PDW BLD-RTO: 15 % (ref 11.8–14.4)
PHOSPHORUS: 3.3 MG/DL (ref 2.6–4.5)
PLATELET # BLD: 374 K/UL (ref 138–453)
PLATELET ESTIMATE: ABNORMAL
PMV BLD AUTO: 11.1 FL (ref 8.1–13.5)
POTASSIUM SERPL-SCNC: 3.7 MMOL/L (ref 3.7–5.3)
RBC # BLD: 3.66 M/UL (ref 3.95–5.11)
RBC # BLD: ABNORMAL 10*6/UL
SEG NEUTROPHILS: 67 % (ref 36–66)
SEGMENTED NEUTROPHILS ABSOLUTE COUNT: 8.25 K/UL (ref 1.8–7.7)
SODIUM BLD-SCNC: 137 MMOL/L (ref 135–144)
WBC # BLD: 12.3 K/UL (ref 3.5–11.3)
WBC # BLD: ABNORMAL 10*3/UL

## 2021-12-20 PROCEDURE — 82947 ASSAY GLUCOSE BLOOD QUANT: CPT

## 2021-12-20 PROCEDURE — 97110 THERAPEUTIC EXERCISES: CPT

## 2021-12-20 PROCEDURE — 36415 COLL VENOUS BLD VENIPUNCTURE: CPT

## 2021-12-20 PROCEDURE — 6360000002 HC RX W HCPCS: Performed by: STUDENT IN AN ORGANIZED HEALTH CARE EDUCATION/TRAINING PROGRAM

## 2021-12-20 PROCEDURE — 6370000000 HC RX 637 (ALT 250 FOR IP): Performed by: STUDENT IN AN ORGANIZED HEALTH CARE EDUCATION/TRAINING PROGRAM

## 2021-12-20 PROCEDURE — C9113 INJ PANTOPRAZOLE SODIUM, VIA: HCPCS | Performed by: STUDENT IN AN ORGANIZED HEALTH CARE EDUCATION/TRAINING PROGRAM

## 2021-12-20 PROCEDURE — 80048 BASIC METABOLIC PNL TOTAL CA: CPT

## 2021-12-20 PROCEDURE — 2580000003 HC RX 258: Performed by: STUDENT IN AN ORGANIZED HEALTH CARE EDUCATION/TRAINING PROGRAM

## 2021-12-20 PROCEDURE — 97530 THERAPEUTIC ACTIVITIES: CPT

## 2021-12-20 PROCEDURE — 83735 ASSAY OF MAGNESIUM: CPT

## 2021-12-20 PROCEDURE — 2000000000 HC ICU R&B

## 2021-12-20 PROCEDURE — 85025 COMPLETE CBC W/AUTO DIFF WBC: CPT

## 2021-12-20 PROCEDURE — 2500000003 HC RX 250 WO HCPCS: Performed by: COLON & RECTAL SURGERY

## 2021-12-20 PROCEDURE — 6360000002 HC RX W HCPCS: Performed by: NURSE PRACTITIONER

## 2021-12-20 PROCEDURE — 84100 ASSAY OF PHOSPHORUS: CPT

## 2021-12-20 PROCEDURE — 94761 N-INVAS EAR/PLS OXIMETRY MLT: CPT

## 2021-12-20 RX ORDER — LORAZEPAM 2 MG/ML
0.25 INJECTION INTRAMUSCULAR EVERY 6 HOURS PRN
Status: DISCONTINUED | OUTPATIENT
Start: 2021-12-20 | End: 2021-12-24 | Stop reason: HOSPADM

## 2021-12-20 RX ADMIN — SODIUM CHLORIDE, PRESERVATIVE FREE 10 ML: 5 INJECTION INTRAVENOUS at 09:45

## 2021-12-20 RX ADMIN — INSULIN LISPRO 1 UNITS: 100 INJECTION, SOLUTION INTRAVENOUS; SUBCUTANEOUS at 12:32

## 2021-12-20 RX ADMIN — HEPARIN SODIUM 5000 UNITS: 5000 INJECTION INTRAVENOUS; SUBCUTANEOUS at 09:53

## 2021-12-20 RX ADMIN — FUROSEMIDE 40 MG: 10 INJECTION, SOLUTION INTRAMUSCULAR; INTRAVENOUS at 09:44

## 2021-12-20 RX ADMIN — SODIUM CHLORIDE, PRESERVATIVE FREE 10 ML: 5 INJECTION INTRAVENOUS at 21:28

## 2021-12-20 RX ADMIN — FENTANYL CITRATE 50 MCG: 50 INJECTION INTRAMUSCULAR; INTRAVENOUS at 09:40

## 2021-12-20 RX ADMIN — FENTANYL CITRATE 100 MCG: 50 INJECTION INTRAMUSCULAR; INTRAVENOUS at 11:50

## 2021-12-20 RX ADMIN — OCTREOTIDE ACETATE 25 MCG/HR: 200 INJECTION, SOLUTION INTRAVENOUS; SUBCUTANEOUS at 10:34

## 2021-12-20 RX ADMIN — HEPARIN SODIUM 5000 UNITS: 5000 INJECTION INTRAVENOUS; SUBCUTANEOUS at 21:27

## 2021-12-20 RX ADMIN — INSULIN LISPRO 1 UNITS: 100 INJECTION, SOLUTION INTRAVENOUS; SUBCUTANEOUS at 00:06

## 2021-12-20 RX ADMIN — POTASSIUM CHLORIDE: 2 INJECTION, SOLUTION, CONCENTRATE INTRAVENOUS at 17:35

## 2021-12-20 RX ADMIN — INSULIN LISPRO 1 UNITS: 100 INJECTION, SOLUTION INTRAVENOUS; SUBCUTANEOUS at 06:19

## 2021-12-20 RX ADMIN — PIPERACILLIN AND TAZOBACTAM 3375 MG: 3; .375 INJECTION, POWDER, LYOPHILIZED, FOR SOLUTION INTRAVENOUS at 00:00

## 2021-12-20 RX ADMIN — PANTOPRAZOLE SODIUM 40 MG: 40 INJECTION, POWDER, FOR SOLUTION INTRAVENOUS at 09:45

## 2021-12-20 RX ADMIN — FENTANYL CITRATE 50 MCG: 50 INJECTION INTRAMUSCULAR; INTRAVENOUS at 09:33

## 2021-12-20 RX ADMIN — PIPERACILLIN AND TAZOBACTAM 3375 MG: 3; .375 INJECTION, POWDER, LYOPHILIZED, FOR SOLUTION INTRAVENOUS at 13:58

## 2021-12-20 RX ADMIN — PIPERACILLIN AND TAZOBACTAM 3375 MG: 3; .375 INJECTION, POWDER, LYOPHILIZED, FOR SOLUTION INTRAVENOUS at 23:50

## 2021-12-20 RX ADMIN — INSULIN LISPRO 1 UNITS: 100 INJECTION, SOLUTION INTRAVENOUS; SUBCUTANEOUS at 18:55

## 2021-12-20 ASSESSMENT — PAIN SCALES - GENERAL
PAINLEVEL_OUTOF10: 6
PAINLEVEL_OUTOF10: 4
PAINLEVEL_OUTOF10: 4
PAINLEVEL_OUTOF10: 10
PAINLEVEL_OUTOF10: 2
PAINLEVEL_OUTOF10: 0
PAINLEVEL_OUTOF10: 10

## 2021-12-20 ASSESSMENT — PAIN DESCRIPTION - FREQUENCY
FREQUENCY: CONTINUOUS

## 2021-12-20 ASSESSMENT — PAIN DESCRIPTION - LOCATION
LOCATION: ABDOMEN

## 2021-12-20 ASSESSMENT — PAIN DESCRIPTION - DESCRIPTORS
DESCRIPTORS: SHARP

## 2021-12-20 ASSESSMENT — PAIN DESCRIPTION - ONSET
ONSET: ON-GOING

## 2021-12-20 ASSESSMENT — PAIN DESCRIPTION - ORIENTATION: ORIENTATION: MID

## 2021-12-20 ASSESSMENT — PAIN DESCRIPTION - PAIN TYPE
TYPE: SURGICAL PAIN

## 2021-12-20 ASSESSMENT — PAIN DESCRIPTION - PROGRESSION: CLINICAL_PROGRESSION: GRADUALLY WORSENING

## 2021-12-20 ASSESSMENT — PAIN - FUNCTIONAL ASSESSMENT
PAIN_FUNCTIONAL_ASSESSMENT: PREVENTS OR INTERFERES SOME ACTIVE ACTIVITIES AND ADLS
PAIN_FUNCTIONAL_ASSESSMENT: PREVENTS OR INTERFERES SOME ACTIVE ACTIVITIES AND ADLS

## 2021-12-20 NOTE — PLAN OF CARE
Nutrition Problem #1: Inadequate oral intake  Intervention: Food and/or Nutrient Delivery: Continue NPO,Modify Parenteral Nutrition  Nutritional Goals: PN will meet greater than 75% of estimated nutrition needs

## 2021-12-20 NOTE — PROGRESS NOTES
[facility]   Urology Progress Note            Subjective:  Follow-up urinary retention    Patient Vitals for the past 24 hrs:   BP Temp Temp src Pulse Resp SpO2 Weight   12/20/21 0600 (!) 142/60 -- -- 98 22 95 % --   12/20/21 0500 127/73 -- -- 99 23 94 % --   12/20/21 0400 136/64 97.7 °F (36.5 °C) Oral 87 25 95 % 213 lb (96.6 kg)   12/20/21 0300 136/71 -- -- 102 27 94 % --   12/20/21 0200 131/68 -- -- 101 (!) 31 95 % --   12/20/21 0100 (!) 143/72 -- -- 103 (!) 31 94 % --   12/20/21 0000 (!) 146/72 97.4 °F (36.3 °C) Oral 103 22 94 % --   12/19/21 2300 136/66 -- -- 103 30 95 % --   12/19/21 2200 -- -- -- 104 30 94 % --   12/19/21 2100 128/71 -- -- 105 26 96 % --   12/19/21 2000 135/81 97.2 °F (36.2 °C) Oral 104 21 95 % --   12/19/21 1900 (!) 141/66 -- -- 101 30 93 % --   12/19/21 1800 136/70 -- -- 104 27 94 % --   12/19/21 1700 (!) 140/71 -- -- 101 26 95 % --   12/19/21 1614 -- 97.9 °F (36.6 °C) -- -- -- -- --   12/19/21 1600 131/74 -- -- 104 27 94 % --   12/19/21 1500 133/70 -- -- 100 25 96 % --   12/19/21 1430 -- -- -- -- 21 96 % --   12/19/21 1400 (!) 144/76 -- -- 103 25 94 % --   12/19/21 1300 137/74 -- -- 104 26 94 % --   12/19/21 1200 (!) 140/71 -- -- 108 24 95 % --   12/19/21 1127 -- 97.5 °F (36.4 °C) -- -- -- -- --   12/19/21 1100 112/70 -- -- 104 26 94 % --   12/19/21 1000 125/74 -- -- 106 27 93 % --   12/19/21 0900 129/68 -- -- 106 27 94 % --   12/19/21 0800 122/62 -- -- 102 26 96 % --   12/19/21 0733 -- 97.7 °F (36.5 °C) -- -- -- -- --       Intake/Output Summary (Last 24 hours) at 12/20/2021 0715  Last data filed at 12/20/2021 1223  Gross per 24 hour   Intake 2251.08 ml   Output 1475 ml   Net 776.08 ml       Recent Labs     12/18/21  0400 12/19/21  0430 12/20/21  0406   WBC 11.4* 10.8 12.3*   HGB 10.0* 10.3* 10.5*   HCT 32.2* 33.3* 34.0*   MCV 92.8 92.2 92.9   * 425 374     Recent Labs     12/18/21  0400 12/19/21  0430 12/20/21  0406    134* 137   K 3.7 3.3* 3.7    96* 100   CO2 27 26 25   PHOS 3.4 3.4 3.3   BUN 23 24* 24*   CREATININE 0.45* 0.51 0.46*       No results for input(s): COLORU, PHUR, LABCAST, WBCUA, RBCUA, MUCUS, TRICHOMONAS, YEAST, BACTERIA, CLARITYU, SPECGRAV, LEUKOCYTESUR, UROBILINOGEN, BILIRUBINUR, BLOODU in the last 72 hours. Invalid input(s): NITRATE, GLUCOSEUKETONESUAMORPHOUS    Additional Lab/culture results:    Physical Exam: No changes from urology standpoint catheter in place bladder decompressed. Colorectal surgery findings reviewed    Interval Imaging Findings:    Impression:    Patient Active Problem List   Diagnosis    Intestinal adhesions    Obesity (BMI 30-39. 9)    Urinary incontinence    Constipation    Ventral incisional hernia    Smoker    Stricture of sigmoid colon ([facility] [address]       Plan: Maintain indwelling Rose    Ladonna Jeans, MD  7:15 AM 12/20/2021

## 2021-12-20 NOTE — PLAN OF CARE
Problem: HEMODYNAMIC STATUS  Goal: Patient has stable vital signs and fluid balance  12/20/2021 1827 by Donovan Morales RN  Outcome: Met This Shift    Problem: OXYGENATION/RESPIRATORY FUNCTION  Goal: Patient will achieve/maintain normal respiratory rate/effort  12/20/2021 1827 by Donovan Morales RN  Outcome: Ongoing    Problem: MOBILITY  Goal: Early mobilization is achieved  12/20/2021 1827 by Donovan Morales RN  Outcome: Ongoing       Problem: ELIMINATION  Goal: Elimination patterns are normal or improving  Description: Elimination patterns return to pre-surgery normal patterns  12/20/2021 1827 by Donovan Morales RN  Outcome: Ongoing       Problem: SKIN INTEGRITY  Goal: Skin integrity is maintained or improved  12/20/2021 1827 by Donovan Morales RN  Outcome: Ongoing       Problem: Nutrition  Goal: Optimal nutrition therapy  12/20/2021 1827 by Donovan Morales RN  Outcome: Ongoing

## 2021-12-20 NOTE — PROGRESS NOTES
Pulmonary Critical Care Progress Note  Ru Dunham M.D. Patient seen for the follow up of hypoxic respiratory failure, metabolic acidosis    Subjective:  She is resting comfortably in bed, no distress. Her  is at bedside. She denies shortness of breath, cough or chest pain. She was successfully extubated on 12/13/2021. She remains on TPN and Sandostatin drips. Surgery had detailed discussion with her and her  this morning about need to potentially transfer to [address] [place] [address].       Examination:  Vitals: BP (!) 115/93   Pulse 108   Temp 97.8 °F (36.6 °C) (Temporal)   Resp 20   Ht 5' 4.5\" (1.638 m)   Wt 213 lb (96.6 kg)   LMP  (LMP Unknown)   SpO2 96%   BMI 36.00 kg/m²   General appearance: Awake, alert and cooperative with exam, slightly anxious  Neck: No JVD  Lungs: Decreased breath sounds no crackles or wheezing  Heart: regular rate and rhythm, S1, S2 normal, no gallop  Abdomen: Soft, non tender, positive wound VAC  Extremities: no cyanosis or clubbing.  + edema    LABs:  CBC:   Recent Labs     12/19/21  0430 12/20/21  0406   WBC 10.8 12.3*   HGB 10.3* 10.5*   HCT 33.3* 34.0*    374     BMP:   Recent Labs     12/19/21  0430 12/20/21  0406   * 137   K 3.3* 3.7   CO2 26 25   BUN 24* 24*   CREATININE 0.51 0.46*   LABGLOM >60 >60   GLUCOSE 160* 182*     ABG:  Lab Results   Component Value Date    SBJ6VOZ NOT REPORTED 12/13/2021    FIO2 30.0 12/13/2021       Lab Results   Component Value Date    POCPH 7.507 12/13/2021    POCPCO2 47.6 12/13/2021    POCPO2 79.2 12/13/2021    POCHCO3 37.7 12/13/2021    PBEA 13 12/13/2021    CLW7RMY NOT REPORTED 12/13/2021    RFAY5NPO 96 12/13/2021    FIO2 30.0 12/13/2021     Radiology:  Chest x-ray 12/18/21      Impression:  · Acute respiratory insufficiency requiring ventilator support  · Feculent peritonitis s/p exploratory laparotomy with end colostomy/pelvic drain placement  · COPD  · History of GERD/dyslipidemia/hypothyroidism/hypertension  · Metabolic acidosis  · Bilateral pleural infiltrate/pulmonary edema    Recommendations:  · Monitor off of oxygen  · Incentive spirometry every hour while awake  · DuoNeb by nebulizer as needed  · Hold diuresis for now. She is -11 L since admission  · TPN/ monitor liver function/ off TF per surgery  · Monitor hemoglobin, transfuse for hemoglobin less than 7  · Sandostatin drip  · Zosyn/ off Flagyl  · Wound care/ wound VAC  · Add Ativan as needed for anxiety  · Labs in a.m.   · Peptic ulcer disease prophylaxis  · DVT prophylaxis, subcu heparin   · Tentative plans to transfer to [facility]  · Discussed with RN  · We will follow with you    Electronically signed by     Daniella Baugh MD on 12/20/2021 at 2:45 PM  Pulmonary Critical Care and Sleep Medicine,  [facility]  Cell: [phone]  Office: [phone]

## 2021-12-20 NOTE — CARE COORDINATION
Discharge Planning:    Plan is to transfer patient to [facility] in the near future per Dr. Ford Arteaga note today and Paula/NACHO.  Writer left information for patient's  in patient's room for  service information for the [facility], as patient's spouse had several questions about financial assistance with lodging while the patient is in the hospital.

## 2021-12-20 NOTE — PROGRESS NOTES
[id number] [facility] Wound Ostomy Continence Nursing  Follow Up      NAME:  Franklin Allred RECORD NUMBER:  [id number]  AGE: 80 y.o. GENDER: female  : 1950  TODAY'S DATE:  2021        Assisted Dr. Ya Bajwa with midline wound vac dressing change. The red rubber catheter had dislodged from the ileal stump and the wound bed beneath the white foam was filled with bowel content. This was removed and the wound was copiously irrigated with saline. The surgical mesh had come loose, exposing bowel- but was able to be reapproximated and the red rubber catheter was able to be secured into the now stomatized ileum to drain bowel content. A new wound vac dressing was applied by Dr. Ya Bajwa starting with two layers of white foam. The catheter draining the stomatized ileum was isolated and secured, wound vac dressing around it. Therapy was increased to 50mmHg per Dr. Ya Bajwa direction. The mucous fistula (site of previous colostomy) was repouched- the wound around the stoma appears to be improving as the wound bed is beginning to fill in with granulation tissue and the wound in general is alexa a bit. The patient had been given Fentanyl prior to initiation of dressing change and was given another dose during the dressing change per Dr. Ya Bajwa request as the patient was not initially tolerating well. The patient's  arrived to bedside near end of dressing change. Dr. Ya Bajwa spent time with him explaining things and made him aware of potential transfer to [facility] or other higher level facility to manage the complicated surgical bowel concerns.     Vladimir FLORES RN, CWS, [place] and [facility]  Wound, Ostomy, and Continence Nursing  [phone]

## 2021-12-20 NOTE — PROGRESS NOTES
Colorectal Surgery:  Daily Progress Note               PATIENT NAME: Tre Hou   TODAY'S DATE: 12/20/2021, 9:25 AM    SUBJECTIVE:     Patient seen and evaluated. Afebrile, hemodynamically stable, no acute distress. Wound vac taken down today and red rubber catheter noted to be dislodged, with succus spilling into wound bed. Biologic mesh also noted to be dehisced off of fascial layer. Well formed fistula noted in the ileal stump where the red rubber catheter had been. Catheter was re-inserted and the wound bed was thoroughly irrigated with saline and wound vac re-applied.      OBJECTIVE:   VITALS:  BP (!) 142/60   Pulse 98   Temp 97.8 °F (36.6 °C) (Temporal)   Resp 22   Ht 5' 4.5\" (1.638 m)   Wt 213 lb (96.6 kg)   LMP  (LMP Unknown)   SpO2 95%   BMI 36.00 kg/m²      INTAKE/OUTPUT:      Intake/Output Summary (Last 24 hours) at 12/20/2021 0925  Last data filed at 12/20/2021 8579  Gross per 24 hour   Intake 2251.08 ml   Output 1475 ml   Net 776.08 ml       PHYSICAL EXAM:  General Appearance: Awake and alert  HEENT:  Normocephalic, atraumatic, mucus membranes moist   Heart: Heart regular rate, normotensive  Lungs: Equal chest rise bilaterally, no increased work of breathing  Abdomen: Soft, dehiscence of biologic mesh from fascia, dislodged red rubber catheter with succus spillage, SHANA drain with 30 cc murky fluid, wound VAC with 175 cc murky fluid  Extremities: No cyanosis, pitting edema, rashes noted  Skin: Skin color, texture, turgor normal            Data:  CBC with Differential:    Lab Results   Component Value Date    WBC 12.3 12/20/2021    RBC 3.66 12/20/2021    HGB 10.5 12/20/2021    HCT 34.0 12/20/2021     12/20/2021    MCV 92.9 12/20/2021    MCH 28.7 12/20/2021    MCHC 30.9 12/20/2021    RDW 15.0 12/20/2021    NRBC 1 12/20/2021    LYMPHOPCT 17 12/20/2021    MONOPCT 11 12/20/2021    BASOPCT 1 12/20/2021    MONOSABS 1.35 12/20/2021    LYMPHSABS 2.09 12/20/2021    EOSABS 0.00 12/20/2021 BASOSABS 0.12 12/20/2021    DIFFTYPE NOT REPORTED 12/20/2021     BMP:    Lab Results   Component Value Date     12/20/2021    K 3.7 12/20/2021     12/20/2021    CO2 25 12/20/2021    BUN 24 12/20/2021    LABALBU 2.0 12/14/2021    CREATININE 0.46 12/20/2021    CALCIUM 8.3 12/20/2021    GFRAA >60 12/20/2021    LABGLOM >60 12/20/2021    GLUCOSE 182 12/20/2021     Magnesium:    Lab Results   Component Value Date    MG 2.1 12/20/2021     Phosphorus:    Lab Results   Component Value Date    PHOS 3.3 12/20/2021     Radiology Review:     ASSESSMENT:  Active Hospital Problems    Diagnosis Date Noted    Stricture of sigmoid colon [facility]) [K61.906] 11/24/2021     80 y.o. female with sigmoid colon stricture. 11/24/21: Status post exploratory laparotomy with explantation pelvic mesh and mobilization sigmoid and proximal rectum, status post ileocecectomy with ileocolonic anastomosis. 12/1/21: Status post ex lap, creation of end colostomy, pelvic drain placement    12/8/21: Exploratory laparotomy, repair of ileocolonic anastomosis, abdominal washout, lysis of adhesions, revision of colostomy, creation of ileostomy with insertion of red rubber catheter, placement of Stratus biologic mesh, wound VAC application. Plan:  -Appreciate critical care management  -Continue strict NPO  -Monitor I's and O's, replete electrolytes as needed  -Continue TPN  -Continue octreotide infusion  -Continue NGT to LIWS  -Continue red rubber catheter to gravity. This is functioning as a conduit end ileostomy  -Wound/ostomy for wound vac exchanges 3 times/week. Resume vac changes on Monday  -Continue to monitor SHANA drain  -Stoma appliance to colostomy/mucus fistula  -Continue IV abx  -OK for VTE ppx from surgery standpoint  - present at bedside and was updated on clinical status. Tentative plan for transfer to [facility] in near future.     Electronically signed by Griselda Samuel MD  on 12/20/2021 at 9:25 AM       I Dr. Dawood Jacome saw and examined the patient. I have edited the above and agree with the above. Ashley Chavez  Colorectal Surgery

## 2021-12-20 NOTE — PROGRESS NOTES
Physical Therapy  Facility/Department: [facility] CVICU  Daily Treatment Note  NAME: Alejo Santacruz  : 1950  MRN: [id number]    Date of Service: 2021    Discharge Recommendations:  Patient would benefit from continued therapy after discharge      Pt currently functioning below baseline. Would suggest additional therapy at time of discharge to maximize long term outcomes and prevent re-admission. Please refer to AM-PAC score for current level of function. Assessment   Body structures, Functions, Activity limitations: Decreased functional mobility ; Decreased strength;Decreased safe awareness;Decreased balance;Decreased endurance; Increased pain;Decreased cognition;Decreased posture  Assessment: pt progressing toward goals slowly. Patient with global deconditioning and complex medical condition and would benefit from continued skilled PT treatment. Specific instructions for Next Treatment: Check vannesa in with RN regarding dangling status. Prognosis: Good  Decision Making: High Complexity  PT Education: Goals;PT Role;Plan of Care;General Safety; Functional Mobility Training;Orientation  Activity Tolerance  Activity Tolerance: Patient limited by fatigue;Patient limited by endurance; Patient limited by cognitive status  Activity Tolerance: .  Pt w/ no changes in vitals throughout session, tolerated PROM well. Patient Diagnosis(es): The encounter diagnosis was Chest pain, unspecified type. has a past medical history of Abdominal pain, Arthritis, Constipation, COPD (chronic obstructive pulmonary disease) ([facility] [address] Depressed, GERD (gastroesophageal reflux disease), HLD (hyperlipidemia), HTN (hypertension), Hypothyroid, IBS (irritable bowel syndrome), Lumbar spondylolysis, Myofascial pain, Poor appetite, RLS (restless legs syndrome), and Wears glasses. has a past surgical history that includes Bladder surgery; Rectal surgery;  Cystocele repair; Enterocele repair; Abdominal adhesion surgery (3/9/2015); : 7 (12/20/21 1207)  AM-PAC Inpatient T-Scale Score : 26.42 (12/20/21 1207)  Mobility Inpatient CMS 0-100% Score: 92.36 (12/20/21 1207)  Mobility Inpatient CMS G-Code Modifier : CM (12/20/21 1207)          Goals  Short term goals  Time Frame for Short term goals: 14 visits  Short term goal 1: Pt to demonstrate bed mobility w/ log roll technique Loulou x 2  Short term goal 2: Pt to demonstrate at least fair+ static and dynamic sitting balance  Short term goal 3: Pt to tolerate sitting EOB at least 20 minutes to maximize independence w/ ADLs  Short term goal 4: REASSESS TRANSFERS/GAIT WHEN APPROPRIATE  Short term goal 5: Pt to actively participate in at least 30 minutes of physical therapy for ther act, ther ex, balance, mobility, and endurance training  Patient Goals   Patient goals : pt goal it to get stronger for home    Plan    Plan  Times per week: 1-2x/day, 6-7x/week  Specific instructions for Next Treatment: Check vannesa in with RN regarding dangling status. Current Treatment Recommendations: Strengthening,Balance Training,Functional Mobility Training,Stair training,Gait Training,Transfer Training,Endurance Training,Neuromuscular Re-education,Safety Education & Training,Home Exercise Program,Equipment Evaluation, Education, & procurement,Patient/Caregiver Education & Training  Safety Devices  Type of devices: Bed alarm in place,Call light within reach,Nurse notified,Left in bed  Restraints  Initially in place: Yes  Restraints: 4 bedrails     Therapy Time   Individual Concurrent Group Co-treatment   Time In 0900         Time Out 0927         Minutes 32              Co-treatment with OT warranted secondary to decreased safety and independence requiring 2 skilled therapy professionals to address individual discipline's goals. PT addressing bed mobility techniques, core control, and AROM-AAROM techniques this date.   Adeel Ogden, PTA

## 2021-12-20 NOTE — PLAN OF CARE
Problem: HEMODYNAMIC STATUS  Goal: Patient has stable vital signs and fluid balance  Outcome: Ongoing     Problem: OXYGENATION/RESPIRATORY FUNCTION  Goal: Patient will achieve/maintain normal respiratory rate/effort  Outcome: Ongoing     Problem: MOBILITY  Goal: Early mobilization is achieved  Outcome: Ongoing     Problem: ELIMINATION  Goal: Elimination patterns are normal or improving  Description: Elimination patterns return to pre-surgery normal patterns  Outcome: Ongoing     Problem: SKIN INTEGRITY  Goal: Skin integrity is maintained or improved  Outcome: Ongoing     Problem: Pain:  Goal: Pain level will decrease  Description: Pain level will decrease  Outcome: Ongoing  Goal: Control of acute pain  Description: Control of acute pain  Outcome: Ongoing  Goal: Control of chronic pain  Description: Control of chronic pain  Outcome: Ongoing     Problem: Falls - Risk of:  Goal: Will remain free from falls  Description: Will remain free from falls  Outcome: Ongoing  Goal: Absence of physical injury  Description: Absence of physical injury  Outcome: Ongoing     Problem: Skin Integrity:  Goal: Will show no infection signs and symptoms  Description: Will show no infection signs and symptoms  Outcome: Ongoing  Goal: Absence of new skin breakdown  Description: Absence of new skin breakdown  Outcome: Ongoing     Problem: Nutrition  Goal: Optimal nutrition therapy  Outcome: Ongoing

## 2021-12-20 NOTE — PROGRESS NOTES
Occupational Therapy  Facility/Department: [facility]  Daily Treatment Note  NAME: Ranulfo De Dios  : 1950  MRN: [id number]    Date of Service: 2021    Discharge Recommendations:  Patient would benefit from continued therapy after discharge  OT Equipment Recommendations  ADL Assistive Devices: Reacher;Sock-Aid Soft;Long-handled Shoe Horn;Long-handled Sponge;Emergency Alert System    Assessment   Performance deficits / Impairments: Decreased functional mobility ; Decreased safe awareness;Decreased balance;Decreased coordination;Decreased ADL status; Decreased endurance;Decreased high-level IADLs;Decreased posture  Assessment: Pt. alert with eyes open and displayed increased participation today. Pt. followed cues to use siderails to assist with positioning with rolling side to side with max assist x2. Pt. instructed and completed AAROM exercises to promote mobility and strength to assist with self care needs. Nursing to clarify with surgeon mobility orders to advance positioning and mobility. Skilled OT warranted to promote functional I/safety to return pt to prior living arrangement with assist as needed. Prognosis: Poor  OT Education: OT Role;Plan of Care;Transfer Training;Energy Conservation;Precautions; Family Education  Patient Education: Pt. educated on importance of mobility and reasons for functional activity. REQUIRES OT FOLLOW UP: Yes  Activity Tolerance  Activity Tolerance: Treatment limited secondary to decreased cognition  Activity Tolerance: poor  Safety Devices  Safety Devices in place: Yes  Type of devices: All fall risk precautions in place; Bed alarm in place;Call light within reach;Nurse notified; Left in bed;Patient at risk for falls;Gait belt         Patient Diagnosis(es): The encounter diagnosis was Chest pain, unspecified type.       has a past medical history of Abdominal pain, Arthritis, Constipation, COPD (chronic obstructive pulmonary disease) ([facility] [address] Depressed, GERD (gastroesophageal reflux disease), HLD (hyperlipidemia), HTN (hypertension), Hypothyroid, IBS (irritable bowel syndrome), Lumbar spondylolysis, Myofascial pain, Poor appetite, RLS (restless legs syndrome), and Wears glasses. has a past surgical history that includes Bladder surgery; Rectal surgery; Cystocele repair; Enterocele repair; Abdominal adhesion surgery (3/9/2015); Hysterectomy; Appendectomy; Colonoscopy; Endoscopy, colon, diagnostic; eye surgery; Dilatation, esophagus; hernia repair; Tonsillectomy; Abdomen surgery (11/24/2021); Cystocopy (11/24/2021); colectomy (N/A, 11/24/2021); Cystoscopy (11/24/2021); sigmoidoscopy (11/24/2021); laparotomy (N/A, 12/1/2021); and laparotomy (N/A, 12/8/2021). Restrictions  Restrictions/Precautions  Restrictions/Precautions: General Precautions,Fall Risk (NPO)  Required Braces or Orthoses?: No  Required Braces or Orthoses  Other: Abdominal Binder  Position Activity Restriction  Other position/activity restrictions: Extubated 12/13/21, SHANA drain, palmer, colostomy, RUE PICC, R jugular central line, wound vac, NG tube, telemetry, 3L O2 NC. (RN clarified order -NO ABD BINDER- will ask for order to be removed). Subjective   General  Chart Reviewed: Yes  Patient assessed for rehabilitation services?: Yes  Additional Pertinent Hx: Pt. agreed for OT treatment. Response to previous treatment: Patient with no complaints from previous session  Family / Caregiver Present: No  Subjective  Subjective: Pt. agreed to treatment and stated \"What do you want me to do? \"  General Comment  Comments: Pt. alert and eyes open entire treatment.       Orientation  Orientation  Overall Orientation Status: Impaired  Orientation Level: Disoriented to situation;Disoriented to time;Disoriented to place  Objective    ADL  Feeding: NPO  Grooming: Maximum assistance  Additional Comments: Pt is limited by general weakness, cognition and fatigue        Balance  Sitting Balance: Unable to assess(comment)  Standing Balance: Flexion: x10  Shoulder Extension: x10  Shoulder ABduction: x10  Shoulder ADduction: x10  Horizontal ABduction: x10  Horizontal ADduction: x10  Elbow Flexion: x10  Elbow Extension: x10  Supination: x10  Pronation: x10          Plan   Plan  Times per week: 4-5x/week 1-2x/day as akila  Times per day: Daily  Current Treatment Recommendations: Mari Davies Mobility Training,Safety Education & Barrie Curry Re-education,Patient/Caregiver Education & Training,Equipment Evaluation, Education, & procurement,Home Management Training,Cognitive/Perceptual Training,Pain Management,Self-Care / ADL  Plan Comment: Cont with stated POC       AM-PAC Score        AM-PAC Inpatient Daily Activity Raw Score: 7 (12/20/21 1253)  AM-PAC Inpatient ADL T-Scale Score : 20.13 (12/20/21 1253)  ADL Inpatient CMS 0-100% Score: 92.44 (12/20/21 1253)  ADL Inpatient CMS G-Code Modifier : CM (12/20/21 1253)    Goals  Short term goals  Time Frame for Short term goals: by discharge, pt to demo  Short term goal 1: bed mob tasks with use of rail/lproper log rolling as needed to mod of 1. Short term goal 2: I with BUE HEP with use of handouts to maintain functional use as well as abd precautions for pain mgt tech. Short term goal 3: UB ADL to set up and LB ADL to mod assist of 1 and use of AD/AE. Short term goal 4: toileting tasks with use of BSC/AD and grab bar to mod assist of 1. Short term goal 5: ADL transfers and functional mob with AD to min assist of 1. Long term goals  Long term goal 1: Pt to stand with SBA and AD akila > 6 mins to reduce falls in function. Long term goal 2: Pt/caregivers to be I with pressure relief, EC/WS and fall prevention tech as well as DME/AD and AE recommendations with use of handouts. Patient Goals   Patient goals : Get home soon!        Therapy Time   Individual Concurrent Group Co-treatment   Time In 0900         Time Out 0927         Minutes 27 Co-treatment with PT warranted secondary to decreased safety and independence requiring 2 skilled therapy professionals to address individual discipline's goals. OT addressing preparation for ADL transfer, functional reaching, environmental safety/scanning, ability to sequence and follow directions and functional UE strength.     TAM Bergeron

## 2021-12-21 LAB
ABSOLUTE EOS #: 0.09 K/UL (ref 0–0.4)
ABSOLUTE IMMATURE GRANULOCYTE: 0.63 K/UL (ref 0–0.3)
ABSOLUTE LYMPH #: 1.17 K/UL (ref 1–4.8)
ABSOLUTE MONO #: 0.99 K/UL (ref 0.2–0.8)
ALBUMIN SERPL-MCNC: 2.3 G/DL (ref 3.5–5.2)
ALBUMIN/GLOBULIN RATIO: ABNORMAL (ref 1–2.5)
ALP BLD-CCNC: 439 U/L (ref 35–104)
ALT SERPL-CCNC: 44 U/L (ref 5–33)
ANION GAP SERPL CALCULATED.3IONS-SCNC: 13 MMOL/L (ref 9–17)
AST SERPL-CCNC: 67 U/L
BASOPHILS # BLD: 1 %
BASOPHILS ABSOLUTE: 0.09 K/UL (ref 0–0.2)
BILIRUB SERPL-MCNC: 3.37 MG/DL (ref 0.3–1.2)
BUN BLDV-MCNC: 28 MG/DL (ref 8–23)
BUN/CREAT BLD: 68 (ref 9–20)
CALCIUM SERPL-MCNC: 8.5 MG/DL (ref 8.6–10.4)
CHLORIDE BLD-SCNC: 101 MMOL/L (ref 98–107)
CO2: 25 MMOL/L (ref 20–31)
CREAT SERPL-MCNC: 0.41 MG/DL (ref 0.5–0.9)
DIFFERENTIAL TYPE: ABNORMAL
EOSINOPHILS RELATIVE PERCENT: 1 % (ref 1–4)
GFR AFRICAN AMERICAN: >60 ML/MIN
GFR NON-AFRICAN AMERICAN: >60 ML/MIN
GFR SERPL CREATININE-BSD FRML MDRD: ABNORMAL ML/MIN/{1.73_M2}
GFR SERPL CREATININE-BSD FRML MDRD: ABNORMAL ML/MIN/{1.73_M2}
GLUCOSE BLD-MCNC: 153 MG/DL (ref 65–105)
GLUCOSE BLD-MCNC: 156 MG/DL (ref 65–105)
GLUCOSE BLD-MCNC: 163 MG/DL (ref 65–105)
GLUCOSE BLD-MCNC: 166 MG/DL (ref 65–105)
GLUCOSE BLD-MCNC: 180 MG/DL (ref 70–99)
GLUCOSE BLD-MCNC: 182 MG/DL (ref 65–105)
GLUCOSE BLD-MCNC: 182 MG/DL (ref 65–105)
HCT VFR BLD CALC: 33.4 % (ref 36.3–47.1)
HEMOGLOBIN: 10.4 G/DL (ref 11.9–15.1)
IMMATURE GRANULOCYTES: 7 %
LYMPHOCYTES # BLD: 13 % (ref 24–44)
MAGNESIUM: 2.2 MG/DL (ref 1.6–2.6)
MCH RBC QN AUTO: 28.8 PG (ref 25.2–33.5)
MCHC RBC AUTO-ENTMCNC: 31.1 G/DL (ref 28.4–34.8)
MCV RBC AUTO: 92.5 FL (ref 82.6–102.9)
MONOCYTES # BLD: 11 % (ref 1–7)
MORPHOLOGY: ABNORMAL
MORPHOLOGY: ABNORMAL
NRBC AUTOMATED: 0.2 PER 100 WBC
PDW BLD-RTO: 15.1 % (ref 11.8–14.4)
PHOSPHORUS: 3.3 MG/DL (ref 2.6–4.5)
PLATELET # BLD: 345 K/UL (ref 138–453)
PLATELET ESTIMATE: ABNORMAL
PMV BLD AUTO: 11.4 FL (ref 8.1–13.5)
POTASSIUM SERPL-SCNC: 3.8 MMOL/L (ref 3.7–5.3)
RBC # BLD: 3.61 M/UL (ref 3.95–5.11)
RBC # BLD: ABNORMAL 10*6/UL
SEG NEUTROPHILS: 67 % (ref 36–66)
SEGMENTED NEUTROPHILS ABSOLUTE COUNT: 6.03 K/UL (ref 1.8–7.7)
SODIUM BLD-SCNC: 139 MMOL/L (ref 135–144)
TOTAL PROTEIN: 6.3 G/DL (ref 6.4–8.3)
WBC # BLD: 9 K/UL (ref 3.5–11.3)
WBC # BLD: ABNORMAL 10*3/UL

## 2021-12-21 PROCEDURE — 6360000002 HC RX W HCPCS: Performed by: STUDENT IN AN ORGANIZED HEALTH CARE EDUCATION/TRAINING PROGRAM

## 2021-12-21 PROCEDURE — 2580000003 HC RX 258: Performed by: STUDENT IN AN ORGANIZED HEALTH CARE EDUCATION/TRAINING PROGRAM

## 2021-12-21 PROCEDURE — 80053 COMPREHEN METABOLIC PANEL: CPT

## 2021-12-21 PROCEDURE — 2000000000 HC ICU R&B

## 2021-12-21 PROCEDURE — 83735 ASSAY OF MAGNESIUM: CPT

## 2021-12-21 PROCEDURE — 85025 COMPLETE CBC W/AUTO DIFF WBC: CPT

## 2021-12-21 PROCEDURE — 82947 ASSAY GLUCOSE BLOOD QUANT: CPT

## 2021-12-21 PROCEDURE — 36415 COLL VENOUS BLD VENIPUNCTURE: CPT

## 2021-12-21 PROCEDURE — 84100 ASSAY OF PHOSPHORUS: CPT

## 2021-12-21 PROCEDURE — 94761 N-INVAS EAR/PLS OXIMETRY MLT: CPT

## 2021-12-21 PROCEDURE — 6360000002 HC RX W HCPCS: Performed by: NURSE PRACTITIONER

## 2021-12-21 PROCEDURE — 6370000000 HC RX 637 (ALT 250 FOR IP): Performed by: STUDENT IN AN ORGANIZED HEALTH CARE EDUCATION/TRAINING PROGRAM

## 2021-12-21 PROCEDURE — C9113 INJ PANTOPRAZOLE SODIUM, VIA: HCPCS | Performed by: STUDENT IN AN ORGANIZED HEALTH CARE EDUCATION/TRAINING PROGRAM

## 2021-12-21 PROCEDURE — 2500000003 HC RX 250 WO HCPCS: Performed by: COLON & RECTAL SURGERY

## 2021-12-21 RX ADMIN — PIPERACILLIN AND TAZOBACTAM 3375 MG: 3; .375 INJECTION, POWDER, LYOPHILIZED, FOR SOLUTION INTRAVENOUS at 23:33

## 2021-12-21 RX ADMIN — PIPERACILLIN AND TAZOBACTAM 3375 MG: 3; .375 INJECTION, POWDER, LYOPHILIZED, FOR SOLUTION INTRAVENOUS at 08:52

## 2021-12-21 RX ADMIN — INSULIN LISPRO 1 UNITS: 100 INJECTION, SOLUTION INTRAVENOUS; SUBCUTANEOUS at 12:21

## 2021-12-21 RX ADMIN — INSULIN LISPRO 1 UNITS: 100 INJECTION, SOLUTION INTRAVENOUS; SUBCUTANEOUS at 18:20

## 2021-12-21 RX ADMIN — FENTANYL CITRATE 50 MCG: 50 INJECTION INTRAMUSCULAR; INTRAVENOUS at 13:47

## 2021-12-21 RX ADMIN — HEPARIN SODIUM 5000 UNITS: 5000 INJECTION INTRAVENOUS; SUBCUTANEOUS at 20:32

## 2021-12-21 RX ADMIN — INSULIN LISPRO 1 UNITS: 100 INJECTION, SOLUTION INTRAVENOUS; SUBCUTANEOUS at 23:53

## 2021-12-21 RX ADMIN — POTASSIUM CHLORIDE: 2 INJECTION, SOLUTION, CONCENTRATE INTRAVENOUS at 18:29

## 2021-12-21 RX ADMIN — PIPERACILLIN AND TAZOBACTAM 3375 MG: 3; .375 INJECTION, POWDER, LYOPHILIZED, FOR SOLUTION INTRAVENOUS at 15:45

## 2021-12-21 RX ADMIN — FENTANYL CITRATE 50 MCG: 50 INJECTION INTRAMUSCULAR; INTRAVENOUS at 09:40

## 2021-12-21 RX ADMIN — HEPARIN SODIUM 5000 UNITS: 5000 INJECTION INTRAVENOUS; SUBCUTANEOUS at 09:13

## 2021-12-21 RX ADMIN — PANTOPRAZOLE SODIUM 40 MG: 40 INJECTION, POWDER, FOR SOLUTION INTRAVENOUS at 09:13

## 2021-12-21 RX ADMIN — INSULIN LISPRO 1 UNITS: 100 INJECTION, SOLUTION INTRAVENOUS; SUBCUTANEOUS at 00:31

## 2021-12-21 RX ADMIN — INSULIN LISPRO 1 UNITS: 100 INJECTION, SOLUTION INTRAVENOUS; SUBCUTANEOUS at 06:34

## 2021-12-21 RX ADMIN — SODIUM CHLORIDE, PRESERVATIVE FREE 10 ML: 5 INJECTION INTRAVENOUS at 09:14

## 2021-12-21 RX ADMIN — OCTREOTIDE ACETATE 25 MCG/HR: 200 INJECTION, SOLUTION INTRAVENOUS; SUBCUTANEOUS at 09:49

## 2021-12-21 ASSESSMENT — PAIN DESCRIPTION - LOCATION
LOCATION: ABDOMEN
LOCATION: ABDOMEN

## 2021-12-21 ASSESSMENT — PAIN DESCRIPTION - PROGRESSION
CLINICAL_PROGRESSION: GRADUALLY WORSENING
CLINICAL_PROGRESSION: GRADUALLY WORSENING

## 2021-12-21 ASSESSMENT — PAIN DESCRIPTION - ORIENTATION: ORIENTATION: MID

## 2021-12-21 ASSESSMENT — PAIN - FUNCTIONAL ASSESSMENT
PAIN_FUNCTIONAL_ASSESSMENT: PREVENTS OR INTERFERES WITH MANY ACTIVE NOT PASSIVE ACTIVITIES
PAIN_FUNCTIONAL_ASSESSMENT: PREVENTS OR INTERFERES SOME ACTIVE ACTIVITIES AND ADLS

## 2021-12-21 ASSESSMENT — PAIN DESCRIPTION - ONSET
ONSET: ON-GOING
ONSET: ON-GOING

## 2021-12-21 ASSESSMENT — PAIN DESCRIPTION - FREQUENCY
FREQUENCY: CONTINUOUS
FREQUENCY: CONTINUOUS

## 2021-12-21 ASSESSMENT — PAIN SCALES - GENERAL
PAINLEVEL_OUTOF10: 10
PAINLEVEL_OUTOF10: 4

## 2021-12-21 ASSESSMENT — PAIN DESCRIPTION - DESCRIPTORS
DESCRIPTORS: CONSTANT;SHARP
DESCRIPTORS: SHARP;CRAMPING

## 2021-12-21 ASSESSMENT — PAIN SCALES - WONG BAKER: WONGBAKER_NUMERICALRESPONSE: 0

## 2021-12-21 ASSESSMENT — PAIN DESCRIPTION - PAIN TYPE
TYPE: SURGICAL PAIN
TYPE: SURGICAL PAIN

## 2021-12-21 NOTE — PROGRESS NOTES
Pulmonary Critical Care Progress Note  Osvaldo Wahl M.D. Patient seen for the follow up of hypoxic respiratory failure, metabolic acidosis    Subjective:  She is resting comfortably in bed, no distress. She denies shortness of breath, cough or chest pain. She was successfully extubated on 12/13/2021. She remains on TPN and Sandostatin drips. She is awaiting acceptance at [facility]. Examination:  Vitals: /72   Pulse 91   Temp 97.8 °F (36.6 °C)   Resp 20   Ht 5' 4.5\" (1.638 m)   Wt 212 lb (96.2 kg)   LMP  (LMP Unknown)   SpO2 96%   BMI 35.83 kg/m²   General appearance: Awake, alert and cooperative with exam  Neck: No JVD  Lungs: Decreased breath sounds no crackles or wheezing  Heart: regular rate and rhythm, S1, S2 normal, no gallop  Abdomen: Soft, non tender, positive wound VAC  Extremities: no cyanosis or clubbing.   No significant edema    LABs:  CBC:   Recent Labs     12/20/21  0406 12/21/21  0458   WBC 12.3* 9.0   HGB 10.5* 10.4*   HCT 34.0* 33.4*    345     BMP:   Recent Labs     12/20/21  0406 12/21/21  0458    139   K 3.7 3.8   CO2 25 25   BUN 24* 28*   CREATININE 0.46* 0.41*   LABGLOM >60 >60   GLUCOSE 182* 180*     ABG:  Lab Results   Component Value Date    RTN1UFC NOT REPORTED 12/13/2021    FIO2 30.0 12/13/2021       Lab Results   Component Value Date    POCPH 7.507 12/13/2021    POCPCO2 47.6 12/13/2021    POCPO2 79.2 12/13/2021    POCHCO3 37.7 12/13/2021    PBEA 13 12/13/2021    PJG0VCJ NOT REPORTED 12/13/2021    HLSA6FYQ 96 12/13/2021    FIO2 30.0 12/13/2021     Radiology:  Chest x-ray 12/18/21      Impression:  · Acute respiratory insufficiency requiring ventilator support  · Feculent peritonitis s/p exploratory laparotomy with end colostomy/pelvic drain placement  · COPD  · History of GERD/dyslipidemia/hypothyroidism/hypertension  · Metabolic acidosis  · Bilateral pleural infiltrate/pulmonary edema    Recommendations:  · Monitor off of oxygen  · Incentive

## 2021-12-21 NOTE — CARE COORDINATION
Writer spoke with Brayden Hudson at [facility] transfer center. [phone]. Brayden Hudson checked to see if Dr. Garcia Cobb started the transfer by speaking to one their physicians. Brayden Hudson checked and nothing was started. Brayden Hudson states that they are not taking anyone from the outside at this time. She states that they could put her on a log but it is 20 days or more. Writer will call Surgery to see if the plan is still with advanced LTACH.    0820-Spoke with Dr. Radha Ltuher at the patient's bedside, and he is going to call a physician at [facility] and inform us as to what the outcome is. Continue to follow.

## 2021-12-21 NOTE — PLAN OF CARE
Nutrition Problem #1: Inadequate oral intake  Intervention: Food and/or Nutrient Delivery: Continue Current Parenteral Nutrition,Continue NPO  Nutritional Goals: PN will meet greater than 75% of estimated nutrition needs

## 2021-12-21 NOTE — CARE COORDINATION
General surgery is attempting to get a bed at [address] [place] [address]. States patient is not ready for an LTACH. FELIBERTO is still following.

## 2021-12-21 NOTE — PROGRESS NOTES
Adrienne Evans   Urology Progress Note            Subjective:Patient seen in conjunction with nursing staff Follow-up urinary retention,    Patient Vitals for the past 24 hrs:   BP Temp Temp src Pulse Resp SpO2 Height Weight   12/21/21 1343 -- -- -- -- -- -- 5' 4.5\" (1.638 m) --   12/21/21 1202 -- -- -- -- 23 96 % -- --   12/21/21 1200 -- 97.7 °F (36.5 °C) Temporal -- -- -- -- --   12/21/21 0800 -- -- -- -- 20 -- -- --   12/21/21 0600 130/72 -- -- 91 22 96 % -- --   12/21/21 0520 -- -- -- -- -- -- -- 212 lb (96.2 kg)   12/21/21 0500 132/69 -- -- 90 24 93 % -- --   12/21/21 0400 123/66 -- -- 91 21 97 % -- --   12/21/21 0300 135/69 97.8 °F (36.6 °C) -- 95 24 95 % -- --   12/21/21 0200 127/74 -- -- 96 22 98 % -- --   12/21/21 0100 116/65 -- -- 93 25 94 % -- --   12/21/21 0000 113/68 97.5 °F (36.4 °C) -- 90 24 95 % -- --   12/20/21 2300 130/69 -- -- 98 21 95 % -- --   12/20/21 2200 131/76 -- -- 96 24 96 % -- --   12/20/21 2100 134/73 -- -- 93 22 94 % -- --   12/20/21 2000 (!) 146/74 97.2 °F (36.2 °C) Oral 100 24 93 % -- --   12/20/21 1900 (!) 140/73 -- -- 106 25 93 % -- --   12/20/21 1800 (!) 147/72 -- -- 105 25 94 % -- --   12/20/21 1700 117/70 -- -- 106 27 -- -- --       Intake/Output Summary (Last 24 hours) at 12/21/2021 1616  Last data filed at 12/21/2021 1300  Gross per 24 hour   Intake 2823.96 ml   Output 995 ml   Net 1828.96 ml       Recent Labs     12/19/21  0430 12/20/21  0406 12/21/21  0458   WBC 10.8 12.3* 9.0   HGB 10.3* 10.5* 10.4*   HCT 33.3* 34.0* 33.4*   MCV 92.2 92.9 92.5    374 345     Recent Labs     12/19/21  0430 12/20/21  0406 12/21/21  0458   * 137 139   K 3.3* 3.7 3.8   CL 96* 100 101   CO2 26 25 25   PHOS 3.4 3.3 3.3   BUN 24* 24* 28*   CREATININE 0.51 0.46* 0.41*       No results for input(s): COLORU, PHUR, LABCAST, WBCUA, RBCUA, MUCUS, TRICHOMONAS, YEAST, BACTERIA, CLARITYU, SPECGRAV, LEUKOCYTESUR, UROBILINOGEN, Keisha Hem in the last 72 hours.    Invalid input(s): NITRATE, GLUCOSEUKETONESUAMORPHOUS    Additional Lab/culture results:    Physical Exam: No change in neurologic status overnight, discussions with nursing staff the patient is in process of the [facility] management    Interval Imaging Findings:    Impression:  Urinary retention,  Patient Active Problem List   Diagnosis    Intestinal adhesions    Obesity (BMI 30-39. 9)    Urinary incontinence    Constipation    Ventral incisional hernia    Smoker    Stricture of sigmoid colon ([facility] [address]       Plan:  We have recommended to maintain the indwelling FoleyMonitor for catheter associated UTI    Arti Webb MD  4:16 PM 12/21/2021

## 2021-12-21 NOTE — PROGRESS NOTES
DATE: 2021    NAME: David Leal  MRN: [id number]   : 1950    Patient not seen this date for Physical Therapy due to:      [x] Cancel by RN or physician due to: medical instability    [] Hemodialysis    [] Critical Lab Value Level     [] Blood transfusion in progress    [] Acute or unstable cardiovascular status   _MAP < 55 or more than >115  _HR < 40 or > 130    [] Acute or unstable pulmonary status   -FiO2 > 60%   _RR < 5 or >40    _O2 sats < 85%    [] Strict Bedrest    [] Off Unit for surgery or procedure    [] Off Unit for testing       [] Pending imaging to R/O fracture    [] Refusal by Patient      [] Other      [] PT being discontinued at this time. Patient independent. No further needs. [] PT being discontinued at this time as the patient has been transferred to hospice care. No further needs.       AUBRIE MUSE, PTA

## 2021-12-21 NOTE — PROGRESS NOTES
Occupational Therapy  DATE: 2021    NAME: Tre Brenner  MRN: [id number]   : 1950    Patient not seen this date for Occupational Therapy due to:      [x] Cancel by RN or physician due to:Pt. Not medically stable at this time for treatment. OT will consult with nursing prior to next visit. [] Hemodialysis    [] Critical Lab Value Level     [] Blood transfusion in progress    [] Acute or unstable cardiovascular status   _MAP < 55 or more than >115  _HR < 40 or > 130    [] Acute or unstable pulmonary status   -FiO2 > 60%   _RR < 5 or >40    _O2 sats < 85%    [] Strict Bedrest    [] Off Unit for surgery or procedure    [] Off Unit for testing       [] Pending imaging to R/O fracture    [] Refusal by Patient      [] Other      [] OT being discontinued at this time. Patient independent. No further needs. [] OT being discontinued at this time as the patient has been transferred to hospice care. No further needs.       Kana President, TAM

## 2021-12-21 NOTE — PROGRESS NOTES
Nutrition Assessment     Type and Reason for Visit: Reassess    Nutrition Recommendations/Plan:   1. Continue NPO status  2. Continue cental, standard TPN 5/20 at 83.3 mL/hr (2000 mL total volume). No lipids. Remove MVI and trace elements  3. Monitor TPN rate/ tolerance, labs, weight and GI function     Nutrition Assessment:  Patient remains NPO and receiving TPN for bowel rest. RN reports abdominal wound has dehiscenced and the mesh is very visable. Patient had an incident where stool was in the wound because tube had fallen out of place and stool had to be cleansed out. At this time efforts are being made to have patient transfered to [facility]. Continue central, standard TPN 5/20 at 83.3 mL/hr (2000 mL total volume) without lipids. Will monitor TPN rate/ tolerance, GI satus, weights and labs. Malnutrition Assessment:  Malnutrition Status: At risk for malnutrition (Comment)    Estimated Daily Nutrient Needs:  Energy (kcal): 0221-3991 kcal (15-18 kcal/kg); Weight Used for Energy Requirements:  Current     Protein (g): 112-123 gm protein (2.0-2.2 g/kg); Weight Used for Protein Requirements:  Ideal        Fluid (ml/day):  ; Weight Used for Fluid Requirements:  1 ml/kcal      Nutrition Related Findings: Edema: trace BUE, BLE. Absent bowel sounds. Wound vac. Extubated 12/13. Multiple GI surgeries. Abdominal wound dehiscence.  TPN for bowel rest      Current Nutrition Therapies:    Diet NPO Exceptions are: Sips of Water with Meds, Ice Chips  PN-Adult 2-in-1 [facility] (Standard)  PN-Adult 2-in-1 [facility] (Standard)    Anthropometric Measures:  · Height: 5' 4.5\" (163.8 cm)  · Current Body Wt: 212 lb (96.2 kg)   · BMI: 35.8    Nutrition Diagnosis:   · Inadequate oral intake related to inadequate protein-energy intake as evidenced by NPO or clear liquid status due to medical condition,nutrition support - parenteral nutrition    · Altered GI function related to altered GI structure as evidenced by GI abnormality,nausea (status post small bowel surgery)      Nutrition Interventions:   Food and/or Nutrient Delivery:  Continue Current Parenteral Nutrition,Continue NPO  Nutrition Education/Counseling:  Education not indicated   Coordination of Nutrition Care:  Continue to monitor while inpatient    Goals:  PN will meet greater than 75% of estimated nutrition needs       Nutrition Monitoring and Evaluation:   Behavioral-Environmental Outcomes:  None Identified   Food/Nutrient Intake Outcomes:  Parenteral Nutrition Intake/Tolerance  Physical Signs/Symptoms Outcomes:  Biochemical Data,Fluid Status or Edema,Skin,Weight,GI Status     Discharge Planning:    Parenteral Nutrition           Madeline PETERSENN, RDN, LDN  Lead Clinical Dietitian  RD Office Phone [phone]

## 2021-12-21 NOTE — PROGRESS NOTES
Colorectal Surgery:  Daily Progress Note               PATIENT NAME: Radah Alcantar DATE: 12/21/2021, 9:20 AM    SUBJECTIVE:     Patient seen and evaluated. Afebrile, hemodynamically stable, no acute distress. 85 cc bilious from NGT, 2 cc murky fluid from SHANA, 125 from red rubber, 230 from wound vac, 15 cc from stoma. Now on wait list for transfer to [facility]. OBJECTIVE:   VITALS:  /72   Pulse 91   Temp 97.1 °F (36.2 °C) (Temporal)   Resp 22   Ht 5' 4.5\" (1.638 m)   Wt 212 lb (96.2 kg)   LMP  (LMP Unknown)   SpO2 96%   BMI 35.83 kg/m²      INTAKE/OUTPUT:      Intake/Output Summary (Last 24 hours) at 12/21/2021 0920  Last data filed at 12/21/2021 0400  Gross per 24 hour   Intake 1141.88 ml   Output 1740 ml   Net -598.12 ml       PHYSICAL EXAM:  General Appearance: Awake and alert  HEENT:  Normocephalic, atraumatic, mucus membranes moist   Heart: Heart regular rate, normotensive  Lungs: Equal chest rise bilaterally, no increased work of breathing  Abdomen: Soft, wound vac in place, red rubber catheter with succus draining, SHANA drain with murky fluid, wound VAC serosang, mucus fistula with stoma appliance.   Extremities: No cyanosis, pitting edema, rashes noted  Skin: Skin color, texture, turgor normal            Data:  CBC with Differential:    Lab Results   Component Value Date    WBC 9.0 12/21/2021    RBC 3.61 12/21/2021    HGB 10.4 12/21/2021    HCT 33.4 12/21/2021     12/21/2021    MCV 92.5 12/21/2021    MCH 28.8 12/21/2021    MCHC 31.1 12/21/2021    RDW 15.1 12/21/2021    NRBC 1 12/20/2021    LYMPHOPCT 13 12/21/2021    MONOPCT 11 12/21/2021    BASOPCT 1 12/21/2021    MONOSABS 0.99 12/21/2021    LYMPHSABS 1.17 12/21/2021    EOSABS 0.09 12/21/2021    BASOSABS 0.09 12/21/2021    DIFFTYPE NOT REPORTED 12/21/2021     BMP:    Lab Results   Component Value Date     12/21/2021    K 3.8 12/21/2021     12/21/2021    CO2 25 12/21/2021    BUN 28 12/21/2021    LABALBU 2.3 12/21/2021    CREATININE 0.41 12/21/2021    CALCIUM 8.5 12/21/2021    GFRAA >60 12/21/2021    LABGLOM >60 12/21/2021    GLUCOSE 180 12/21/2021     Magnesium:    Lab Results   Component Value Date    MG 2.2 12/21/2021     Phosphorus:    Lab Results   Component Value Date    PHOS 3.3 12/21/2021     Radiology Review:     ASSESSMENT:  Active Hospital Problems    Diagnosis Date Noted    Stricture of sigmoid colon [facility]) [K38.829] 11/24/2021     80 y.o. female with sigmoid colon stricture. 11/24/21: Status post exploratory laparotomy with explantation pelvic mesh and mobilization sigmoid and proximal rectum, status post ileocecectomy with ileocolonic anastomosis. 12/1/21: Status post ex lap, creation of end colostomy, pelvic drain placement    12/8/21: Exploratory laparotomy, repair of ileocolonic anastomosis, abdominal washout, lysis of adhesions, revision of colostomy, creation of ileostomy with insertion of red rubber catheter, placement of Stratus biologic mesh, wound VAC application. Plan:  -Appreciate critical care management  -Continue strict NPO  -Monitor I's and O's, replete electrolytes as needed  -Continue TPN  -Continue octreotide infusion  -Continue NGT to LIWS  -Continue red rubber catheter to gravity. This is functioning as a conduit end ileostomy  -Wound/ostomy for wound vac exchanges 3 times/week. Resume vac changes on Monday  -Continue to monitor SHANA drain  -Stoma appliance to colostomy/mucus fistula  -Continue IV abx  -OK for VTE ppx from surgery standpoint  -Tentative plan for transfer to [facility] in near future. Electronically signed by María Galvez MD  on 12/21/2021 at 9:20 AM      I Dr. Norman Looims saw and examined the patient. I have edited the above and agree with the above. Ashley Chavez  Colorectal Surgery

## 2021-12-22 ENCOUNTER — APPOINTMENT (OUTPATIENT)
Dept: CT IMAGING | Age: 71
DRG: 329 | End: 2021-12-22
Attending: COLON & RECTAL SURGERY
Payer: MEDICARE

## 2021-12-22 LAB
ABSOLUTE EOS #: 0.1 K/UL (ref 0–0.44)
ABSOLUTE IMMATURE GRANULOCYTE: 0.57 K/UL (ref 0–0.3)
ABSOLUTE LYMPH #: 0.86 K/UL (ref 1.1–3.7)
ABSOLUTE MONO #: 1.14 K/UL (ref 0.1–1.2)
ANION GAP SERPL CALCULATED.3IONS-SCNC: 9 MMOL/L (ref 9–17)
ANTI-XA UNFRAC HEPARIN: 0.59 IU/L (ref 0.3–0.7)
BASOPHILS # BLD: 1 % (ref 0–2)
BASOPHILS ABSOLUTE: 0.1 K/UL (ref 0–0.2)
BNP INTERPRETATION: NORMAL
BUN BLDV-MCNC: 23 MG/DL (ref 8–23)
BUN/CREAT BLD: 47 (ref 9–20)
CALCIUM SERPL-MCNC: 8.4 MG/DL (ref 8.6–10.4)
CHLORIDE BLD-SCNC: 101 MMOL/L (ref 98–107)
CO2: 25 MMOL/L (ref 20–31)
CREAT SERPL-MCNC: 0.49 MG/DL (ref 0.5–0.9)
DIFFERENTIAL TYPE: ABNORMAL
EKG ATRIAL RATE: 127 BPM
EKG P-R INTERVAL: 56 MS
EKG Q-T INTERVAL: 418 MS
EKG QRS DURATION: 78 MS
EKG QTC CALCULATION (BAZETT): 605 MS
EKG R AXIS: 3 DEGREES
EKG T AXIS: 25 DEGREES
EKG VENTRICULAR RATE: 126 BPM
EOSINOPHILS RELATIVE PERCENT: 1 % (ref 1–4)
GFR AFRICAN AMERICAN: >60 ML/MIN
GFR NON-AFRICAN AMERICAN: >60 ML/MIN
GFR SERPL CREATININE-BSD FRML MDRD: ABNORMAL ML/MIN/{1.73_M2}
GFR SERPL CREATININE-BSD FRML MDRD: ABNORMAL ML/MIN/{1.73_M2}
GLUCOSE BLD-MCNC: 169 MG/DL (ref 65–105)
GLUCOSE BLD-MCNC: 176 MG/DL (ref 70–99)
GLUCOSE BLD-MCNC: 179 MG/DL (ref 65–105)
GLUCOSE BLD-MCNC: 187 MG/DL (ref 65–105)
GLUCOSE BLD-MCNC: 190 MG/DL (ref 65–105)
HCT VFR BLD CALC: 31.6 % (ref 36.3–47.1)
HCT VFR BLD CALC: 34.5 % (ref 36.3–47.1)
HEMOGLOBIN: 10.1 G/DL (ref 11.9–15.1)
HEMOGLOBIN: 10.9 G/DL (ref 11.9–15.1)
IMMATURE GRANULOCYTES: 6 %
INR BLD: 1.2
LYMPHOCYTES # BLD: 9 % (ref 24–43)
MAGNESIUM: 2 MG/DL (ref 1.6–2.6)
MCH RBC QN AUTO: 28.8 PG (ref 25.2–33.5)
MCH RBC QN AUTO: 29 PG (ref 25.2–33.5)
MCHC RBC AUTO-ENTMCNC: 31.6 G/DL (ref 28.4–34.8)
MCHC RBC AUTO-ENTMCNC: 32 G/DL (ref 28.4–34.8)
MCV RBC AUTO: 90 FL (ref 82.6–102.9)
MCV RBC AUTO: 91.8 FL (ref 82.6–102.9)
MONOCYTES # BLD: 12 % (ref 3–12)
MORPHOLOGY: ABNORMAL
MORPHOLOGY: ABNORMAL
NRBC AUTOMATED: 0.2 PER 100 WBC
NRBC AUTOMATED: 0.4 PER 100 WBC
PARTIAL THROMBOPLASTIN TIME: 35.9 SEC (ref 23.9–33.8)
PDW BLD-RTO: 15.4 % (ref 11.8–14.4)
PDW BLD-RTO: 15.4 % (ref 11.8–14.4)
PHOSPHORUS: 3.3 MG/DL (ref 2.6–4.5)
PLATELET # BLD: 295 K/UL (ref 138–453)
PLATELET # BLD: 325 K/UL (ref 138–453)
PLATELET ESTIMATE: ABNORMAL
PMV BLD AUTO: 11.1 FL (ref 8.1–13.5)
PMV BLD AUTO: 11.5 FL (ref 8.1–13.5)
POTASSIUM SERPL-SCNC: 4.1 MMOL/L (ref 3.7–5.3)
PRO-BNP: 288 PG/ML
PROCALCITONIN: 0.33 NG/ML
PROTHROMBIN TIME: 15.2 SEC (ref 11.5–14.2)
RBC # BLD: 3.51 M/UL (ref 3.95–5.11)
RBC # BLD: 3.76 M/UL (ref 3.95–5.11)
RBC # BLD: ABNORMAL 10*6/UL
SEG NEUTROPHILS: 71 % (ref 36–65)
SEGMENTED NEUTROPHILS ABSOLUTE COUNT: 6.73 K/UL (ref 1.5–8.1)
SODIUM BLD-SCNC: 135 MMOL/L (ref 135–144)
TROPONIN INTERP: ABNORMAL
TROPONIN T: ABNORMAL NG/ML
TROPONIN, HIGH SENSITIVITY: 19 NG/L (ref 0–14)
WBC # BLD: 10 K/UL (ref 3.5–11.3)
WBC # BLD: 9.5 K/UL (ref 3.5–11.3)
WBC # BLD: ABNORMAL 10*3/UL

## 2021-12-22 PROCEDURE — 85730 THROMBOPLASTIN TIME PARTIAL: CPT

## 2021-12-22 PROCEDURE — 85025 COMPLETE CBC W/AUTO DIFF WBC: CPT

## 2021-12-22 PROCEDURE — 84145 PROCALCITONIN (PCT): CPT

## 2021-12-22 PROCEDURE — 93971 EXTREMITY STUDY: CPT

## 2021-12-22 PROCEDURE — 99212 OFFICE O/P EST SF 10 MIN: CPT

## 2021-12-22 PROCEDURE — 2500000003 HC RX 250 WO HCPCS: Performed by: COLON & RECTAL SURGERY

## 2021-12-22 PROCEDURE — 6360000002 HC RX W HCPCS: Performed by: STUDENT IN AN ORGANIZED HEALTH CARE EDUCATION/TRAINING PROGRAM

## 2021-12-22 PROCEDURE — 6370000000 HC RX 637 (ALT 250 FOR IP): Performed by: STUDENT IN AN ORGANIZED HEALTH CARE EDUCATION/TRAINING PROGRAM

## 2021-12-22 PROCEDURE — 71260 CT THORAX DX C+: CPT

## 2021-12-22 PROCEDURE — 83880 ASSAY OF NATRIURETIC PEPTIDE: CPT

## 2021-12-22 PROCEDURE — C9113 INJ PANTOPRAZOLE SODIUM, VIA: HCPCS | Performed by: STUDENT IN AN ORGANIZED HEALTH CARE EDUCATION/TRAINING PROGRAM

## 2021-12-22 PROCEDURE — 93005 ELECTROCARDIOGRAM TRACING: CPT | Performed by: STUDENT IN AN ORGANIZED HEALTH CARE EDUCATION/TRAINING PROGRAM

## 2021-12-22 PROCEDURE — 6360000004 HC RX CONTRAST MEDICATION: Performed by: STUDENT IN AN ORGANIZED HEALTH CARE EDUCATION/TRAINING PROGRAM

## 2021-12-22 PROCEDURE — 2500000003 HC RX 250 WO HCPCS: Performed by: INTERNAL MEDICINE

## 2021-12-22 PROCEDURE — 85027 COMPLETE CBC AUTOMATED: CPT

## 2021-12-22 PROCEDURE — 2580000003 HC RX 258: Performed by: STUDENT IN AN ORGANIZED HEALTH CARE EDUCATION/TRAINING PROGRAM

## 2021-12-22 PROCEDURE — 84100 ASSAY OF PHOSPHORUS: CPT

## 2021-12-22 PROCEDURE — 6360000002 HC RX W HCPCS: Performed by: NURSE PRACTITIONER

## 2021-12-22 PROCEDURE — 85610 PROTHROMBIN TIME: CPT

## 2021-12-22 PROCEDURE — 87205 SMEAR GRAM STAIN: CPT

## 2021-12-22 PROCEDURE — 80048 BASIC METABOLIC PNL TOTAL CA: CPT

## 2021-12-22 PROCEDURE — 97530 THERAPEUTIC ACTIVITIES: CPT

## 2021-12-22 PROCEDURE — 87106 FUNGI IDENTIFICATION YEAST: CPT

## 2021-12-22 PROCEDURE — 87150 DNA/RNA AMPLIFIED PROBE: CPT

## 2021-12-22 PROCEDURE — 83735 ASSAY OF MAGNESIUM: CPT

## 2021-12-22 PROCEDURE — 87040 BLOOD CULTURE FOR BACTERIA: CPT

## 2021-12-22 PROCEDURE — 84484 ASSAY OF TROPONIN QUANT: CPT

## 2021-12-22 PROCEDURE — 2580000003 HC RX 258: Performed by: INTERNAL MEDICINE

## 2021-12-22 PROCEDURE — 85520 HEPARIN ASSAY: CPT

## 2021-12-22 PROCEDURE — 2000000000 HC ICU R&B

## 2021-12-22 PROCEDURE — 82947 ASSAY GLUCOSE BLOOD QUANT: CPT

## 2021-12-22 PROCEDURE — 6360000002 HC RX W HCPCS: Performed by: INTERNAL MEDICINE

## 2021-12-22 PROCEDURE — 36415 COLL VENOUS BLD VENIPUNCTURE: CPT

## 2021-12-22 PROCEDURE — 99223 1ST HOSP IP/OBS HIGH 75: CPT | Performed by: INTERNAL MEDICINE

## 2021-12-22 RX ORDER — METOPROLOL TARTRATE 5 MG/5ML
5 INJECTION INTRAVENOUS ONCE
Status: DISCONTINUED | OUTPATIENT
Start: 2021-12-22 | End: 2021-12-24 | Stop reason: HOSPADM

## 2021-12-22 RX ORDER — 0.9 % SODIUM CHLORIDE 0.9 %
80 INTRAVENOUS SOLUTION INTRAVENOUS ONCE
Status: COMPLETED | OUTPATIENT
Start: 2021-12-22 | End: 2021-12-22

## 2021-12-22 RX ORDER — METOPROLOL TARTRATE 5 MG/5ML
5 INJECTION INTRAVENOUS EVERY 4 HOURS PRN
Status: DISCONTINUED | OUTPATIENT
Start: 2021-12-22 | End: 2021-12-24 | Stop reason: HOSPADM

## 2021-12-22 RX ORDER — HEPARIN SODIUM 10000 [USP'U]/100ML
5-30 INJECTION, SOLUTION INTRAVENOUS CONTINUOUS
Status: DISCONTINUED | OUTPATIENT
Start: 2021-12-22 | End: 2021-12-24 | Stop reason: HOSPADM

## 2021-12-22 RX ORDER — SODIUM CHLORIDE 0.9 % (FLUSH) 0.9 %
10 SYRINGE (ML) INJECTION PRN
Status: DISCONTINUED | OUTPATIENT
Start: 2021-12-22 | End: 2021-12-24 | Stop reason: HOSPADM

## 2021-12-22 RX ORDER — FENTANYL CITRATE 50 UG/ML
50 INJECTION, SOLUTION INTRAMUSCULAR; INTRAVENOUS
Status: DISCONTINUED | OUTPATIENT
Start: 2021-12-22 | End: 2021-12-24 | Stop reason: HOSPADM

## 2021-12-22 RX ORDER — HEPARIN SODIUM 1000 [USP'U]/ML
40 INJECTION, SOLUTION INTRAVENOUS; SUBCUTANEOUS PRN
Status: DISCONTINUED | OUTPATIENT
Start: 2021-12-22 | End: 2021-12-24 | Stop reason: HOSPADM

## 2021-12-22 RX ORDER — HEPARIN SODIUM 1000 [USP'U]/ML
80 INJECTION, SOLUTION INTRAVENOUS; SUBCUTANEOUS PRN
Status: DISCONTINUED | OUTPATIENT
Start: 2021-12-22 | End: 2021-12-24 | Stop reason: HOSPADM

## 2021-12-22 RX ORDER — HEPARIN SODIUM 1000 [USP'U]/ML
80 INJECTION, SOLUTION INTRAVENOUS; SUBCUTANEOUS ONCE
Status: COMPLETED | OUTPATIENT
Start: 2021-12-22 | End: 2021-12-22

## 2021-12-22 RX ADMIN — QUETIAPINE FUMARATE 25 MG: 25 TABLET ORAL at 08:33

## 2021-12-22 RX ADMIN — INSULIN LISPRO 1 UNITS: 100 INJECTION, SOLUTION INTRAVENOUS; SUBCUTANEOUS at 11:54

## 2021-12-22 RX ADMIN — POTASSIUM CHLORIDE: 2 INJECTION, SOLUTION, CONCENTRATE INTRAVENOUS at 18:14

## 2021-12-22 RX ADMIN — MEROPENEM 1000 MG: 1 INJECTION, POWDER, FOR SOLUTION INTRAVENOUS at 21:13

## 2021-12-22 RX ADMIN — ACETAMINOPHEN 1000 MG: 500 TABLET ORAL at 08:16

## 2021-12-22 RX ADMIN — FENTANYL CITRATE 50 MCG: 50 INJECTION INTRAMUSCULAR; INTRAVENOUS at 21:09

## 2021-12-22 RX ADMIN — LORAZEPAM 0.25 MG: 2 INJECTION INTRAMUSCULAR; INTRAVENOUS at 04:39

## 2021-12-22 RX ADMIN — PIPERACILLIN AND TAZOBACTAM 3375 MG: 3; .375 INJECTION, POWDER, LYOPHILIZED, FOR SOLUTION INTRAVENOUS at 08:29

## 2021-12-22 RX ADMIN — MEROPENEM 2000 MG: 1 INJECTION, POWDER, FOR SOLUTION INTRAVENOUS at 14:52

## 2021-12-22 RX ADMIN — INSULIN LISPRO 1 UNITS: 100 INJECTION, SOLUTION INTRAVENOUS; SUBCUTANEOUS at 06:40

## 2021-12-22 RX ADMIN — FENTANYL CITRATE 50 MCG: 50 INJECTION INTRAMUSCULAR; INTRAVENOUS at 11:00

## 2021-12-22 RX ADMIN — ACETAMINOPHEN 1000 MG: 500 TABLET ORAL at 14:09

## 2021-12-22 RX ADMIN — HEPARIN SODIUM 7700 UNITS: 1000 INJECTION, SOLUTION INTRAVENOUS; SUBCUTANEOUS at 16:55

## 2021-12-22 RX ADMIN — SODIUM CHLORIDE, PRESERVATIVE FREE 10 ML: 5 INJECTION INTRAVENOUS at 11:08

## 2021-12-22 RX ADMIN — METOPROLOL TARTRATE 5 MG: 5 INJECTION INTRAVENOUS at 21:02

## 2021-12-22 RX ADMIN — Medication 18 UNITS/KG/HR: at 16:55

## 2021-12-22 RX ADMIN — INSULIN LISPRO 1 UNITS: 100 INJECTION, SOLUTION INTRAVENOUS; SUBCUTANEOUS at 18:10

## 2021-12-22 RX ADMIN — IOPAMIDOL 100 ML: 755 INJECTION, SOLUTION INTRAVENOUS at 11:07

## 2021-12-22 RX ADMIN — PANTOPRAZOLE SODIUM 40 MG: 40 INJECTION, POWDER, FOR SOLUTION INTRAVENOUS at 08:33

## 2021-12-22 RX ADMIN — SODIUM CHLORIDE 80 ML: 0.9 INJECTION, SOLUTION INTRAVENOUS at 11:06

## 2021-12-22 RX ADMIN — DEXTROSE MONOHYDRATE 200 MG: 50 INJECTION, SOLUTION INTRAVENOUS at 14:07

## 2021-12-22 RX ADMIN — SODIUM CHLORIDE, PRESERVATIVE FREE 10 ML: 5 INJECTION INTRAVENOUS at 21:12

## 2021-12-22 RX ADMIN — SODIUM CHLORIDE, PRESERVATIVE FREE 10 ML: 5 INJECTION INTRAVENOUS at 08:33

## 2021-12-22 RX ADMIN — INSULIN LISPRO 1 UNITS: 100 INJECTION, SOLUTION INTRAVENOUS; SUBCUTANEOUS at 23:53

## 2021-12-22 RX ADMIN — POTASSIUM CHLORIDE: 2 INJECTION, SOLUTION, CONCENTRATE INTRAVENOUS at 08:26

## 2021-12-22 RX ADMIN — HEPARIN SODIUM 5000 UNITS: 5000 INJECTION INTRAVENOUS; SUBCUTANEOUS at 08:33

## 2021-12-22 RX ADMIN — ACETAMINOPHEN 1000 MG: 500 TABLET ORAL at 22:03

## 2021-12-22 RX ADMIN — OCTREOTIDE ACETATE 25 MCG/HR: 200 INJECTION, SOLUTION INTRAVENOUS; SUBCUTANEOUS at 00:22

## 2021-12-22 ASSESSMENT — PAIN SCALES - GENERAL
PAINLEVEL_OUTOF10: 9
PAINLEVEL_OUTOF10: 5
PAINLEVEL_OUTOF10: 5
PAINLEVEL_OUTOF10: 0

## 2021-12-22 NOTE — PLAN OF CARE
Nutrition Problem #1: Inadequate oral intake  Intervention: Food and/or Nutrient Delivery: Continue Current Parenteral Nutrition  Nutritional Goals: PN will meet greater than 75% of estimated nutrition needs

## 2021-12-22 NOTE — PROGRESS NOTES
Occupational Therapy  Facility/Department: [facility] [[email or web address] CVICU  Daily Treatment Note  NAME: Tre Brenner  : 1950  MRN: [id number]    Date of Service: 2021    Discharge Recommendations:  Patient would benefit from continued therapy after discharge  OT Equipment Recommendations  ADL Assistive Devices: Reacher;Sock-Aid Soft;Long-handled Shoe Horn;Long-handled Sponge;Emergency Alert System    Assessment   Performance deficits / Impairments: Decreased functional mobility ; Decreased safe awareness;Decreased balance;Decreased coordination;Decreased ADL status; Decreased endurance;Decreased high-level IADLs;Decreased posture  Assessment: Pt. alert with eyes open upon arrival to room. Pt. agreed to UE exercise to promote functional mobility. Pt. repositioned with max assist x2 to head of bed. Pt. tolerated slow gentle AAROM to both UE. Pt. encouraged to assist with mobility of each exercise. Skilled OT warranted to promote I/safety to return pt to prior living arrangement with assist as needed. Prognosis: Poor  OT Education: OT Role;Plan of Care;Transfer Training;Energy Conservation;Precautions; Family Education  Patient Education: Pt. educated on importance of mobility and reasons for functional activity. REQUIRES OT FOLLOW UP: Yes  Activity Tolerance  Activity Tolerance: Treatment limited secondary to decreased cognition  Activity Tolerance: poor  Safety Devices  Safety Devices in place: Yes  Type of devices: All fall risk precautions in place; Bed alarm in place;Call light within reach;Nurse notified; Left in bed;Patient at risk for falls;Gait belt         Patient Diagnosis(es): The encounter diagnosis was Chest pain, unspecified type.       has a past medical history of Abdominal pain, Arthritis, Constipation, COPD (chronic obstructive pulmonary disease) ([facility] [address] Depressed, GERD (gastroesophageal reflux disease), HLD (hyperlipidemia), HTN (hypertension), Hypothyroid, IBS (irritable bowel syndrome), Lumbar spondylolysis, Myofascial pain, Poor appetite, RLS (restless legs syndrome), and Wears glasses. has a past surgical history that includes Bladder surgery; Rectal surgery; Cystocele repair; Enterocele repair; Abdominal adhesion surgery (3/9/2015); Hysterectomy; Appendectomy; Colonoscopy; Endoscopy, colon, diagnostic; eye surgery; Dilatation, esophagus; hernia repair; Tonsillectomy; Abdomen surgery (11/24/2021); Cystocopy (11/24/2021); colectomy (N/A, 11/24/2021); Cystoscopy (11/24/2021); sigmoidoscopy (11/24/2021); laparotomy (N/A, 12/1/2021); and laparotomy (N/A, 12/8/2021). Restrictions  Restrictions/Precautions  Restrictions/Precautions: General Precautions,Fall Risk  Required Braces or Orthoses?: No  Required Braces or Orthoses  Other: Abdominal Binder  Position Activity Restriction  Other position/activity restrictions: Extubated 12/13/21, SHANA drain, palmer, colostomy, RUE PICC, R jugular central line, wound vac, NG tube, telemetry, 3L O2 NC. (RN clarified order -NO ABD BINDER- will ask for order to be removed). Subjective   General  Chart Reviewed: Yes  Patient assessed for rehabilitation services?: Yes  Additional Pertinent Hx: Pt. agreed for OT treatment. Response to previous treatment: Patient with no complaints from previous session  Family / Caregiver Present: No  Subjective  Subjective: \"What do you want me to do? \"  General Comment  Comments: Pt. alert and eye open for treatment.       Orientation  Orientation  Overall Orientation Status: Impaired  Orientation Level: Disoriented to situation;Disoriented to time;Disoriented to place  Objective             Balance  Sitting Balance: Unable to assess(comment)  Standing Balance: Unable to assess(comment)  Functional Mobility  Functional Mobility Comments: UNABLE  Toilet Transfers  Toilet Transfers Comments: N/T as pt has palmer  Bed mobility  Rolling to Left: 2 Person assistance;Maximum assistance  Rolling to Right: 2 Person assistance;Maximum assistance  Supine to Sit: Unable to assess  Sit to Supine: Unable to assess  Scootin Person assistance;Maximal assistance  Comment: Pt. repositioned to center of bed with max assist x2. Nursing assisted with repositioning  Transfers  Transfer Comments: Not safe or appropriate at this time. Cognition  Overall Cognitive Status: Exceptions  Following Commands: Follows one step commands with repetition; Follows one step commands with increased time; Inconsistently follows commands  Attention Span: Attends with cues to redirect; Difficulty attending to directions; Difficulty dividing attention  Memory: Decreased short term memory;Decreased recall of recent events;Decreased recall of precautions;Decreased long term memory  Safety Judgement: Decreased awareness of need for assistance;Decreased awareness of need for safety  Problem Solving: Decreased awareness of errors;Assistance required to identify errors made;Assistance required to correct errors made;Assistance required to generate solutions;Assistance required to implement solutions  Insights: Not aware of deficits  Initiation: Requires cues for all  Sequencing: Requires cues for all  Cognition Comment: Pt. displayed increased ablity to carry conversation with appropriated eye contact and short answers. Perception  Overall Perceptual Status: Impaired  Initiation: Hand over hand to initiate tasks  Type of ROM/Therapeutic Exercise  Type of ROM/Therapeutic Exercise: AAROM  Comment: Pt. tolerated slow gentle AAROM to both UE to promote strength and endurance in hands, wrists, elbows and shoulders. Positive encouragement provided throughout exercises.   Exercises  Scapular Protraction: x3  Scapular Retraction: x3  Shoulder Flexion: x5  Shoulder Extension: x5  Shoulder ABduction: x5  Shoulder ADduction: x5  Elbow Flexion: x5  Elbow Extension: x5  Pronation: x5  Wrist Flexion: x5  Finger Flexion: x5  Finger Extension: x5        Plan   Plan  Times per week: 4-5x/week 1-2x/day as akila  Times per day: Daily  Current Treatment Recommendations: Olga Bustamante Mobility Training,Safety Education & Lisandro Walton Re-education,Patient/Caregiver Education & Training,Equipment Evaluation, Education, & procurement,Home Management Training,Cognitive/Perceptual Training,Pain Management,Self-Care / ADL  Plan Comment: Cont with stated POC        AM-PAC Score        AM-PAC Inpatient Daily Activity Raw Score: 6 (12/22/21 1217)  AM-PAC Inpatient ADL T-Scale Score : 17.07 (12/22/21 1217)  ADL Inpatient CMS 0-100% Score: 100 (12/22/21 1217)  ADL Inpatient CMS G-Code Modifier : CN (12/22/21 1217)    Goals  Short term goals  Time Frame for Short term goals: by discharge, pt to demo  Short term goal 1: bed mob tasks with use of rail/lproper log rolling as needed to mod of 1. Short term goal 2: I with BUE HEP with use of handouts to maintain functional use as well as abd precautions for pain mgt tech. Short term goal 3: UB ADL to set up and LB ADL to mod assist of 1 and use of AD/AE. Short term goal 4: toileting tasks with use of BSC/AD and grab bar to mod assist of 1. Short term goal 5: ADL transfers and functional mob with AD to min assist of 1. Long term goals  Long term goal 1: Pt to stand with SBA and AD akila > 6 mins to reduce falls in function. Long term goal 2: Pt/caregivers to be I with pressure relief, EC/WS and fall prevention tech as well as DME/AD and AE recommendations with use of handouts. Patient Goals   Patient goals : Get home soon! Therapy Time   Individual Concurrent Group Co-treatment   Time In 4412         Time Out 0948         Minutes 12              RN reports patient is medically stable for therapy treatment this date. Chart reviewed prior to treatment and patient is agreeable for therapy. All lines intact and patient positioned comfortably at end of treatment.   All patient needs addressed prior to ending therapy session.       TAM eDng

## 2021-12-22 NOTE — PROGRESS NOTES
Comprehensive Nutrition Assessment    Type and Reason for Visit:  Reassess    Nutrition Recommendations/Plan:   1. Continue NPO status  2. Continue central, standard TPN 5/20 at 83.3 mL/hr (2000 mL total volume). No lipids. Add MVI and trace elements  3. Monitor TPN rate/ tolerance, labs, weight and GI function     Nutrition Assessment:  Patient remains NPO and receiving TPN for bowel rest. Patient has increase confusion, temperature and heart rate today. NG tube clamped trial today. Patient is awaiting bed at [facility]. Continue central, standard TPN 5/20 at 83.3 mL/hr (2000 mL total volume) without lipids. Will monitor TPN rate/ tolerance, GI satus, weights and labs. Malnutrition Assessment:  Malnutrition Status: At risk for malnutrition (Comment)      Estimated Daily Nutrient Needs:  Energy (kcal):  3059-7133 kcal (15-18 kcal/kg); Weight Used for Energy Requirements:  Current     Protein (g):  112-123 gm protein (2.0-2.2 g/kg); Weight Used for Protein Requirements:  Ideal        Fluid (ml/day):   ; Method Used for Fluid Requirements:  1 ml/kcal      Nutrition Related Findings:  Edema: trace BUE, BLE. Hypoactive bowel sounds. NG tube clamped 12/22. Wound vac, Colostomy. Multiple GI surgeries. Abdominal wound dehiscence.  TPN for bowel rest      Wounds:  Multiple,Surgical Incision,Full Thickness,Wound Vac       Current Nutrition Therapies:    Diet NPO Exceptions are: Sips of Water with Meds, Ice Chips  PN-Adult 2-in-1 [facility] (Standard)  PN-Adult 2-in-1 [facility] [employer] (Standard)  Current Parenteral Nutrition Orders:  · Type and Formula: Premix Central,2-in-1 Standard   · Lipids: None  · Duration: Continuous  · Rate/Volume: 83.3 mL/hr (2 L total volume)  · Current PN Order Provides: 1760 kcal and 100 gm protein  · Goal PN Orders Provides: 1525 kcal and 112-123 gm protein      Anthropometric Measures:  · Height: 5' 4.5\" (163.8 cm)  · Current Body Weight: 212 lb (96.2 kg)   · Admission Body Weight: 184 lb (83.5 kg)      · Ideal Body Weight: 123 lbs; % Ideal Body Weight 172.4 %   · BMI: 35.8  · Adjusted Body Weight:  ; No Adjustment   · BMI Categories: Obese Class 1 (BMI 30.0-34. 9)       Nutrition Diagnosis:   · Inadequate oral intake related to inadequate protein-energy intake as evidenced by NPO or clear liquid status due to medical condition,nutrition support - parenteral nutrition    · Altered GI function related to altered GI structure as evidenced by GI abnormality,nausea (status post small bowel surgery)      Nutrition Interventions:   Food and/or Nutrient Delivery:  Continue Current Parenteral Nutrition  Nutrition Education/Counseling:  Education not indicated   Coordination of Nutrition Care:  Continue to monitor while inpatient    Goals:  PN will meet greater than 75% of estimated nutrition needs       Nutrition Monitoring and Evaluation:   Behavioral-Environmental Outcomes:  None Identified   Food/Nutrient Intake Outcomes:  Parenteral Nutrition Intake/Tolerance  Physical Signs/Symptoms Outcomes:  Biochemical Data,Fluid Status or Edema,Skin,Weight,GI Status     Discharge Planning:    Parenteral Nutrition             Leatha PETERSENN, RDN, LDN  Lead Clinical Dietitian  RD Office Phone [phone]

## 2021-12-22 NOTE — PROGRESS NOTES
Colorectal Surgery:  Daily Progress Note               PATIENT NAME: Kenyon Pna DATE: 12/22/2021, 6:22 AM    SUBJECTIVE:     Patient seen and evaluated. Afebrile, developed tachycardia overnight. Presently 120s regular rhythm. Patient confused overnight. Continues having adequate UOP. Minimal thick drainage from SHANA. Output increased from red rubber catheter. Minimal drainage from vac. OBJECTIVE:   VITALS:  BP (!) 154/75   Pulse 121   Temp 98.1 °F (36.7 °C) (Temporal)   Resp 30   Ht 5' 4.5\" (1.638 m)   Wt 212 lb (96.2 kg)   LMP  (LMP Unknown)   SpO2 (!) 88%   BMI 35.83 kg/m²      INTAKE/OUTPUT:      Intake/Output Summary (Last 24 hours) at 12/22/2021 3512  Last data filed at 12/22/2021 0400  Gross per 24 hour   Intake 1682.08 ml   Output 1280 ml   Net 402.08 ml       PHYSICAL EXAM:  General Appearance: Awake and alert  HEENT:  Normocephalic, atraumatic, mucus membranes moist   Heart: Heart regular rate, normotensive  Lungs: Equal chest rise bilaterally, no increased work of breathing  Abdomen: Soft, wound vac in place, red rubber catheter with succus draining, SHANA drain with purulent appearing fluid, wound VAC in place with good seal, mucus fistula with stoma appliance.   Extremities: No cyanosis, pitting edema, rashes noted      Data:  CBC with Differential:    Lab Results   Component Value Date    WBC 9.5 12/22/2021    RBC 3.76 12/22/2021    HGB 10.9 12/22/2021    HCT 34.5 12/22/2021     12/22/2021    MCV 91.8 12/22/2021    MCH 29.0 12/22/2021    MCHC 31.6 12/22/2021    RDW 15.4 12/22/2021    NRBC 1 12/20/2021    LYMPHOPCT PENDING 12/22/2021    MONOPCT PENDING 12/22/2021    BASOPCT PENDING 12/22/2021    MONOSABS PENDING 12/22/2021    LYMPHSABS PENDING 12/22/2021    EOSABS PENDING 12/22/2021    BASOSABS PENDING 12/22/2021    DIFFTYPE NOT REPORTED 12/22/2021     BMP:    Lab Results   Component Value Date     12/22/2021    K 4.1 12/22/2021     12/22/2021    CO2 25 12/22/2021    BUN 23 12/22/2021    LABALBU 2.3 12/21/2021    CREATININE 0.49 12/22/2021    CALCIUM 8.4 12/22/2021    GFRAA >60 12/22/2021    LABGLOM >60 12/22/2021    GLUCOSE 176 12/22/2021     Magnesium:    Lab Results   Component Value Date    MG 2.0 12/22/2021     Phosphorus:    Lab Results   Component Value Date    PHOS 3.3 12/22/2021     Radiology Review:     ASSESSMENT:  Active Hospital Problems    Diagnosis Date Noted    Stricture of sigmoid colon [facility]) [N47.926] 11/24/2021     80 y.o. female with sigmoid colon stricture. 11/24/21: Status post exploratory laparotomy with explantation pelvic mesh and mobilization sigmoid and proximal rectum, status post ileocecectomy with ileocolonic anastomosis. 12/1/21: Status post ex lap, creation of end colostomy, pelvic drain placement    12/8/21: Exploratory laparotomy, repair of ileocolonic anastomosis, abdominal washout, lysis of adhesions, revision of colostomy, creation of ileostomy with insertion of red rubber catheter, placement of Stratus biologic mesh, wound VAC application. Plan:  -Appreciate critical care management  -D/C flexeril and neurontin, will wean IV narcotics  -Tachycardia overnight - will obtain EKG, troponin and BNP, remainder of labs appear wnl  -Continue strict NPO  -Monitor I's and O's, replete electrolytes as needed  -Continue TPN  -Continue octreotide infusion  -Continue NGT to LIWS  -Continue red rubber catheter to gravity.  This is functioning as a conduit end ileostomy  -Wound/ostomy for wound vac exchanges 3 times/week  -Continue to monitor SHANA drain  -Stoma appliance to colostomy/mucus fistula  -Continue IV abx  -OK for VTE ppx from surgery standpoint  -Tentative plan for transfer to [facility] in near future    Electronically signed by Abi Goff MD  on 12/22/2021 at 6:22 AM      .[email or web address]

## 2021-12-22 NOTE — PLAN OF CARE
Pt with fever during shift. To ct for evaluation of abd. Dozed on and off during shift. Lab work stable. Dressings intact.  No new skin breakdown noted

## 2021-12-22 NOTE — PROGRESS NOTES
Pulmonary Critical Care Progress Note  Rhonda Ortiz M.D. Patient seen for the follow up of hypoxic respiratory failure, metabolic acidosis    Subjective:  She is resting comfortably in bed, no distress. She is sleepy but arousable. She denies shortness of breath, cough or chest pain. She remains on TPN and Sandostatin drips. She is awaiting transfer to [facility]. She did have temperature this morning and tachycardia, she was given acetaminophen via NG tube. She had repeat CT chest, abdomen/pelvis this morning, results reviewed. Examination:  Vitals: /61   Pulse 111   Temp 97.3 °F (36.3 °C) (Axillary)   Resp 27   Ht 5' 4.5\" (1.638 m)   Wt 212 lb (96.2 kg)   LMP  (LMP Unknown)   SpO2 95%   BMI 35.83 kg/m²   General appearance: Sleepy but arousable  Neck: No JVD  Lungs: Decreased breath sounds no crackles or wheezing  Heart: regular rate and rhythm, S1, S2 normal, no gallop  Abdomen: Soft, non tender, positive wound VAC  Extremities: no cyanosis or clubbing.   No significant edema    LABs:  CBC:   Recent Labs     12/21/21 0458 12/22/21 0321   WBC 9.0 9.5   HGB 10.4* 10.9*   HCT 33.4* 34.5*    325     BMP:   Recent Labs     12/21/21 0458 12/22/21 0321    135   K 3.8 4.1   CO2 25 25   BUN 28* 23   CREATININE 0.41* 0.49*   LABGLOM >60 >60   GLUCOSE 180* 176*     ABG:  Lab Results   Component Value Date    HAV1VIP NOT REPORTED 12/13/2021    FIO2 30.0 12/13/2021       Lab Results   Component Value Date    POCPH 7.507 12/13/2021    POCPCO2 47.6 12/13/2021    POCPO2 79.2 12/13/2021    POCHCO3 37.7 12/13/2021    PBEA 13 12/13/2021    UVG3PMD NOT REPORTED 12/13/2021    UAML1MBL 96 12/13/2021    FIO2 30.0 12/13/2021     Radiology:  Chest x-ray 12/18/21      Impression:  · Acute respiratory insufficiency requiring ventilator support, extubated 12/13/2021  · Feculent peritonitis s/p exploratory laparotomy with end colostomy/pelvic drain placement  · COPD  · History of GERD/dyslipidemia/hypothyroidism/hypertension  · Metabolic acidosis  · Bilateral pleural infiltrate/pulmonary edema    Recommendations:  · Monitor off of oxygen  · Incentive spirometry every hour while awake  · DuoNeb by nebulizer as needed  · Monitor off of diuresis  · TPN/ monitor liver function/ off TF per surgery  · Monitor hemoglobin, transfuse for hemoglobin less than 7  · Sandostatin drip  · Antibiotics per infectious disease, Eraxis and meropenem  · Wound care/ wound VAC  · Ativan as needed for anxiety  · Labs in a.m.   · Peptic ulcer disease prophylaxis  · DVT prophylaxis, will start on high intensity heparin drip secondary to right brachiocephalic partially occlusive clot  · Discussed with RN and clinical pharmacist  · Plans to transfer to [facility] per surgery when bed available  · We will follow with you    Electronically signed by     Kelli Chinchilla MD on 12/22/2021 at [facility] PM  Pulmonary [address] [facility]  Cell: [phone]  Office: [phone]

## 2021-12-22 NOTE — PROGRESS NOTES
Lucy Pickett   Urology Progress Note            Subjective:  Follow-up urinary retention indwelling Rose    Patient Vitals for the past 24 hrs:   BP Temp Temp src Pulse Resp SpO2   12/22/21 1400 (!) 158/82 103.1 °F (39.5 °C) -- 130 (!) 35 (!) 79 %   12/22/21 1300 132/84 98.3 °F (36.8 °C) -- 118 30 93 %   12/22/21 1200 118/61 98.1 °F (36.7 °C) Temporal 109 24 95 %   12/22/21 1000 109/61 97.3 °F (36.3 °C) Axillary 111 27 95 %   12/22/21 0900 132/66 -- -- 123 (!) 36 92 %   12/22/21 0800 (!) 157/80 98.1 °F (36.7 °C) Temporal 127 (!) 31 95 %   12/22/21 0600 (!) 150/78 -- -- 123 (!) 36 --   12/22/21 0500 (!) 154/75 -- -- 121 30 (!) 88 %   12/22/21 0400 (!) 154/79 98.1 °F (36.7 °C) Temporal 115 29 96 %   12/22/21 0300 (!) 148/77 -- -- 117 26 95 %   12/22/21 0200 136/68 -- -- 112 26 93 %   12/22/21 0100 128/74 -- -- 109 25 95 %   12/22/21 0000 (!) 125/57 98.7 °F (37.1 °C) Temporal 105 27 95 %   12/21/21 2300 136/73 -- -- 105 26 95 %   12/21/21 2200 136/73 -- -- 106 24 96 %   12/21/21 2100 135/70 -- -- 105 25 96 %   12/21/21 2000 130/67 98.2 °F (36.8 °C) Temporal 104 26 94 %   12/21/21 1900 131/72 -- -- 101 21 94 %   12/21/21 1600 -- 98.1 °F (36.7 °C) Temporal -- -- --       Intake/Output Summary (Last 24 hours) at 12/22/2021 1437  Last data filed at 12/22/2021 1400  Gross per 24 hour   Intake 220 ml   Output 1400 ml   Net -1180 ml       Recent Labs     12/20/21  0406 12/21/21  0458 12/22/21  0321   WBC 12.3* 9.0 9.5   HGB 10.5* 10.4* 10.9*   HCT 34.0* 33.4* 34.5*   MCV 92.9 92.5 91.8    345 325     Recent Labs     12/20/21  0406 12/21/21 0458 12/22/21  0321    139 135   K 3.7 3.8 4.1    101 101   CO2 25 25 25   PHOS 3.3 3.3 3.3   BUN 24* 28* 23   CREATININE 0.46* 0.41* 0.49*       No results for input(s): COLORU, PHUR, LABCAST, WBCUA, RBCUA, MUCUS, TRICHOMONAS, YEAST, BACTERIA, CLARITYU, SPECGRAV, LEUKOCYTESUR, UROBILINOGEN, [place] in the last 72 hours.    Invalid input(s): NITRATE, GLUCOSEUKETONESUAMORPHOUS    Additional Lab/culture results:    Physical Exam: Patient remains on TPN we have recommended to maintain the indwelling Rose. Bladder decompressed catheter draining well    Interval Imaging Findings:    Impression:    Patient Active Problem List   Diagnosis    Intestinal adhesions    Obesity (BMI 30-39. 9)    Urinary incontinence    Constipation    Ventral incisional hernia    Smoker    Stricture of sigmoid colon ([facility] [address]       Plan: Maintain indwelling Rose    Tammy Lambert MD  2:37 PM 12/22/2021

## 2021-12-22 NOTE — CONSULTS
VASCULAR SURGERY   CONSULT        Name: David Leal  MRN: [id number]     Acct: [de-identified]  Room: 2029/2029-01    Admit Date: 11/24/2021  PCP: RAMON Kenny    Physician Requesting Consult:  Dr. Ashok Hughes    Reason for Consult: Right upper extremity deep venous thrombosis    Chief Complaint:     No chief complaint on file. Abdominal surgery    History Obtained From:     patient, electronic medical record and nursing    History of Present Illness:      David Leal is a  80 y.o.  female who presents with No chief complaint on file. Found on recent CT scan to have a partial occlusive thrombus involving the right internal jugular vein and brachiocephalic vein. She denies amaurosis fugax, lateralizing symptoms or claudication. She has had multiple abdominal issues.     Past Medical History:     Past Medical History:   Diagnosis Date    Abdominal pain     Arthritis     Constipation     COPD (chronic obstructive pulmonary disease) (HCC)     Depressed     GERD (gastroesophageal reflux disease)     HLD (hyperlipidemia)     HTN (hypertension)     Hypothyroid     IBS (irritable bowel syndrome)     Lumbar spondylolysis     Myofascial pain     Poor appetite     RLS (restless legs syndrome)     Wears glasses         Past Surgical History:     Past Surgical History:   Procedure Laterality Date    ABDOMEN SURGERY  11/24/2021    exploration with lysis of adhesions, small bowel resection with ileocecetomy and pelvic mesh removal    ABDOMINAL ADHESION SURGERY  3/9/2015    APPENDECTOMY      BLADDER SURGERY      COLECTOMY N/A 11/24/2021    ABDOMINAL EXPLORATION LYSIS OF EXTENSIVE ADHESIONS SMALL BOWEL RESECTION WITH ILEOCECECTOMY  EXPLANTATIOIN OF PELVIC MESH  ILEOCLONIC ANASTAMOSIS MOBILIZATION OF RECTUM AND SIGMOID performed by José Luis Daley MD at [address] [place] [address] [address]  11/24/2021    with bilateral ureteral stent insertion    CYSTOSCOPY 11/24/2021    CYSTOSCOPY EXPLORATION OF URETER BILATERAL URETERAL STENT INSERTION performed by Claire Angeles MD at [address] [facility]      ENDOSCOPY, COLON, DIAGNOSTIC      ENTEROCELE REPAIR      EYE SURGERY      HERNIA REPAIR      HYSTERECTOMY      LAPAROTOMY N/A 12/1/2021    LAPAROTOMY EXPLORATORY CREATION OF END COLOSTOMY, LAVAGE, PELVIC DRAIN PLACEMENT performed by Claire Angeles MD at [facility] N/A 12/8/2021    LAPAROTOMY EXPLORATORY, REPAIR OF ILEOCOLONIC ANASTAMOSIS, ABDOMINAL WASH OUT, LYSIS OF ADHESIONS, REVISION OF COLOSTOMY, CREATION OF ILEOSTOMY WITH INSERTION OF RED RUBBER CATHETER AND WOUND VAC PLACEMENT performed by Claire Angeles MD at [address] [place] [address]  11/24/2021    SIGMOIDOSCOPY FLEXIBLE X 2 performed by Claire Angeles MD at [place] [address]          Medications Prior to Admission:       Prior to Admission medications    Medication Sig Start Date End Date Taking? Authorizing Provider   spironolactone (ALDACTONE) 25 MG tablet Take 25 mg by mouth daily   Yes Historical Provider, MD   rosuvastatin (CRESTOR) 5 MG tablet Take 5 mg by mouth daily   Yes Historical Provider, MD   esomeprazole (NEXIUM) 40 MG delayed release capsule Take 40 mg by mouth daily    Yes Historical Provider, MD   famotidine (PEPCID) 40 MG tablet Take 40 mg by mouth daily as needed (heartburn)    Yes Historical Provider, MD   amLODIPine (NORVASC) 10 MG tablet Take 10 mg by mouth daily  3/17/15  Yes Historical Provider, MD   citalopram (CELEXA) 40 MG tablet Take 60 mg by mouth daily Takes 1.5 tabs daily 3/5/15  Yes Historical Provider, MD   diphenoxylate-atropine (LOMOTIL) 2.5-0.025 MG per tablet Take 1 tablet by mouth 4 times daily as needed.   12/22/14  Yes Historical Provider, MD   levothyroxine (SYNTHROID) 100 MCG tablet Take 100 mcg by mouth Daily  3/5/15  Yes Historical Provider, MD   celecoxib (CELEBREX) 200 MG capsule Take 200 mg by mouth daily     Historical Provider, MD        Allergies:       Morphine and Sulfa antibiotics    Social History:     Tobacco:    reports that she has been smoking cigarettes. She has a 26.00 pack-year smoking history. She has never used smokeless tobacco.  Alcohol:      reports current alcohol use. Drug Use:  reports current drug use. Drug: Marijuana [place]).     Family History:     Family History   Problem Relation Age of Onset    High Blood Pressure Mother        Review of Systems:     Positive and Negative as described in HPI    Constitutional:  negative for  fevers, chills, sweats, fatigue, and weight loss  HEENT:  negative for vision or hearing changes,   Respiratory:  negative for shortness of breath, cough, or congestion  Cardiovascular:  negative for  chest pain, palpitations  Gastrointestinal:  negative for nausea, vomiting, diarrhea, constipation, abdominal pain  Genitourinary:  negative for frequency, dysuria  Integument/Breast:  negative for rash, skin lesions  Musculoskeletal:  negative for muscle aches or joint pain  Neurological:  negative for headaches, dizziness, lightheadedness, numbness, pain and tingling extremities  Behavior/Psych:  negative for depression and anxiety    Code Status:  Full Code    Physical Exam:     Vitals:  BP (!) 158/82   Pulse 130   Temp 103.1 °F (39.5 °C)   Resp (!) 35   Ht 5' 4.5\" (1.638 m)   Wt 212 lb (96.2 kg)   LMP  (LMP Unknown)   SpO2 (!) 79%   BMI 35.83 kg/m²   Temp (24hrs), Av.7 °F (37.1 °C), Min:97.3 °F (36.3 °C), Max:103.1 °F (39.5 °C)      General appearance - alert, well appearing and in no acute distress  Mental status - oriented to person, place and time with normal affect  Head - normocephalic and atraumatic  Eyes - pupils equal and reactive, extraocular eye movements intact, conjunctiva clear  Ears - hearing appears to be intact  Nose - no drainage noted  Mouth - mucous membranes moist  Neck - supple, no carotid bruits, thyroid not palpable, no JVD  Chest - clear to auscultation, normal effort  Heart - normal rate, regular rhythm, no murmurs  Abdomen - soft, non-tender, non-distended, bowel sounds present all four quadrants, no masses, hepatomegaly, splenomegaly or aortic enlargement  Neurological - normal speech, no focal findings or movement disorder noted, cranial nerves II through XII grossly intact  Extremities - peripheral pulses palpable, no pedal edema or calf pain with palpation  Skin - no gross lesions, rashes, or induration noted        R brachial 2+ L brachial 2+   R radial 2+ L radial 2+   R femoral 2+ L femoral 2+   R popliteal 2+ L popliteal 2+   R posterior tibial 2+ L posterior tibial 2+   R dorsalis pedis 2+ L dorsalis pedis 2+         Data:     Hematology:  Recent Labs     12/20/21  0406 12/20/21  0406 12/21/21 0458 12/22/21  0321 12/22/21  1544   WBC 12.3*   < > 9.0 9.5 10.0   RBC 3.66*   < > 3.61* 3.76* 3.51*   HGB 10.5*   < > 10.4* 10.9* 10.1*   HCT 34.0*   < > 33.4* 34.5* 31.6*   MCV 92.9   < > 92.5 91.8 90.0   MCH 28.7   < > 28.8 29.0 28.8   MCHC 30.9   < > 31.1 31.6 32.0   RDW 15.0*   < > 15.1* 15.4* 15.4*      < > 345 325 295   MPV 11.1   < > 11.4 11.5 11.1   SEGS 67*  --  67* 71*  --    LYMPHOPCT 17*  --  13* 9*  --    MONOPCT 11*  --  11* 12  --    EOSRELPCT 0*  --  1 1  --    BASOPCT 1  --  1 1  --     < > = values in this interval not displayed.      Chemistry:  Recent Labs     12/20/21  0406 12/21/21 0458 12/22/21  0321    139 135   K 3.7 3.8 4.1    101 101   CO2 25 25 25   GLUCOSE 182* 180* 176*   BUN 24* 28* 23   CREATININE 0.46* 0.41* 0.49*   MG 2.1 2.2 2.0   ANIONGAP 12 13 9   LABGLOM >60 >60 >60   GFRAA >60 >60 >60   CALCIUM 8.3* 8.5* 8.4*   PHOS 3.3 3.3 3.3   PROBNP  --   --  288   TROPHS  --   --  19*     Recent Labs     12/21/21  0458   PROT 6.3*   LABALBU 2.3*   AST 67*   ALT 44*   ALKPHOS 439*   BILITOT 3.37*     Glucose:  Recent Labs     12/21/21  1116 12/21/21  1732 12/21/21  1908 12/21/21  2304 12/22/21  0600 12/22/21  1118   POCGLU 166* 153* 156* 182* 169* 179*         Assessment:     Primary Problem  <principal problem not specified>  3 71-year-old female with partially occlusive DVT involving the right internal jugular vein and brachiocephalic vein by CT    Active Hospital Problems    Diagnosis Date Noted    Strict[facility] of sigmoid colon [facility] [Z14.410] 11/24/2021       Plan:     1. Continue IV heparin  2. Right upper extremity venous duplex  3.  Anticoagulation for approximately 3 months if okay from a surgical standpoint      Electronically signed by Marisela Ly MD on 12/22/2021 at 4:10 PM     Copy sent to RAMON Bridges

## 2021-12-22 NOTE — CONSULTS
[facility]  Initial Consult Note  Date: 12/22/2021    Hospital day :28     Impression:   1. Suspected sigmoid: Stricture status post exploratory laparotomy with explantation of pelvic mesh and mobilization of sigmoid as well as proximal rectum with ileocecectomy and ileocolonic anastomosis 11/24/2021  2. Feculent peritonitis secondary to complete mid transverse colonic breakdown status post exploratory laparotomy with creation of end colostomy, lavage, pelvic drain placement 12/1/2021  3. Ileocolonic anastomotic leak and distal transverse colon staple line leak status post exploratory laparotomy with repair of ileocolonic anastomosis, abdominal washout, lysis of adhesions, revision of colostomy, creation of ileostomy, with placement of Stratus biologic mesh and wound VAC application 45/5/8589  4. COPD  5. Metabolic acidosis    Recommendations   · The patient likely has residual intra-abdominal abscesses given that she has purulent drainage from the ostomy tube, SHANA drain. · She has been on Zosyn for quite some time. · I will empirically switch her to meropenem and Eraxis for now. · Check blood cultures x2 sets. · Await the official CT report    Chief complaint/reason for consultation:   Sepsis    History of Present Illness:   Patrice Jacobo is a 80y.o.-year-old female who was initially admitted on 11/24/2021. Corrinne Augusta is seen in consultation on hospital day #28. Corrinne Augusta has a history of multiple prior abdominal and pelvic surgeries and was admitted for exploratory laparotomy on 11/24/2021 with suspected sigmoid stricture as the patient had prior colonoscopies and more recently there was difficulty being able to pass the scope distal to the strictured/kinked area of sigmoid colon.     The patient was found to have an extreme amount of intra-abdominal adhesions, pelvic mesh with fixation of sigmoid colon and kinked positioner requiring explantation of the pelvic mesh and mobilization of the colon, traction injury to the terminal ileum with injury to ileal mesentery requiring ileocecectomy with ileocolonic reanastomosis. Bilateral ureteral stents were placed by urology to help identification of the ureters due to extreme amount of adhesions in the pelvis. Postoperatively the patient did have issues tolerating a diet and subsequently developed worsening abdominal pain and was noted to have what appeared to be stool leaking from the inferior aspect of the midline incision on 12/1/2021  A CT scan of the abdomen pelvis without contrast showed some concern for an anastomotic leak and the patient was taken to the operating room for feculent peritonitis and had a laparotomy with creation of end colostomy, lavage, pelvic drain placement related to a complete mid transverse colon breakdown. The patient has been on antimicrobial therapy with Zosyn and at times has required pressors. The patient was subsequently seen in consultation by nephrology, pulmonary critical care. The patient was taken back to the operating room 12/8/2021 and underwent an exploratory laparotomy with repair of an ileocolonic anastomosis with abdominal washout, lysis of adhesions, revision of colostomy and creation of ileostomy with insertion of a red rubber catheter, basement of Stratus biologic mesh and wound VAC placement. The patient has continued to have murky fluid draining from the SHANA drain. Arrangements were made for transfer to the [facility] clinic. The patient did have episodes of confusion, tachycardia and drainage from the SHANA catheter. The patient did develop some high-grade fever. I was asked to evaluate some help with antibiotic choice and the patient did have a CT scan of the chest abdomen pelvis done earlier today. I have personally reviewed the past medical history, past surgical history, medications, social history, and family history, and I have updated the database accordingly.   Past Medical History:     Past Medical History:   Diagnosis Date    Abdominal pain     Arthritis     Constipation     COPD (chronic obstructive pulmonary disease) (HCC)     Depressed     GERD (gastroesophageal reflux disease)     HLD (hyperlipidemia)     HTN (hypertension)     Hypothyroid     IBS (irritable bowel syndrome)     Lumbar spondylolysis     Myofascial pain     Poor appetite     RLS (restless legs syndrome)     Wears glasses      Past Surgical  History:     Past Surgical History:   Procedure Laterality Date    ABDOMEN SURGERY  11/24/2021    exploration with lysis of adhesions, small bowel resection with ileocecetomy and pelvic mesh removal    ABDOMINAL ADHESION SURGERY  3/9/2015    APPENDECTOMY      BLADDER SURGERY      COLECTOMY N/A 11/24/2021    ABDOMINAL EXPLORATION LYSIS OF EXTENSIVE ADHESIONS SMALL BOWEL RESECTION WITH ILEOCECECTOMY  EXPLANTATIOIN OF PELVIC MESH  ILEOCLONIC ANASTAMOSIS MOBILIZATION OF RECTUM AND SIGMOID performed by Alexa Boudreaux MD at [address] [facility]  11/24/2021    with bilateral ureteral stent insertion    CYSTOSCOPY  11/24/2021    CYSTOSCOPY EXPLORATION OF URETER BILATERAL URETERAL STENT INSERTION performed by Alexa Boudreaux MD at [address] [facility]      ENDOSCOPY, COLON, DIAGNOSTIC      ENTEROCELE REPAIR      EYE SURGERY      HERNIA REPAIR      HYSTERECTOMY      LAPAROTOMY N/A 12/1/2021    LAPAROTOMY EXPLORATORY CREATION OF END COLOSTOMY, LAVAGE, PELVIC DRAIN PLACEMENT performed by Alexa Boudreaux MD at [address] [place] [address] N/A 12/8/2021    LAPAROTOMY EXPLORATORY, REPAIR OF ILEOCOLONIC ANASTAMOSIS, ABDOMINAL WASH OUT, LYSIS OF ADHESIONS, REVISION OF COLOSTOMY, CREATION OF ILEOSTOMY WITH INSERTION OF RED RUBBER CATHETER AND WOUND VAC PLACEMENT performed by Alexa Boudreaux MD at [address] [facility]  11/24/2021    SIGMOIDOSCOPY FLEXIBLE X 2 performed by Alexa Boudreaux MD at [address] [place] [address] TONSILLECTOMY       Medications:      metoprolol  5 mg IntraVENous Once    QUEtiapine  25 mg Per NG tube BID    heparin (porcine)  5,000 Units SubCUTAneous BID    pantoprazole  40 mg IntraVENous Daily    insulin lispro  0-6 Units SubCUTAneous Q6H    piperacillin-tazobactam  3,375 mg IntraVENous Q8H    metroNIDAZOLE  500 mg IntraVENous Once    citalopram  60 mg Oral Daily    docusate sodium  100 mg Oral BID    influenza virus vaccine  0.5 mL IntraMUSCular Prior to discharge    levothyroxine  100 mcg Oral Daily    rosuvastatin  5 mg Oral Nightly    sodium chloride flush  5-40 mL IntraVENous 2 times per day    acetaminophen  1,000 mg Oral 3 times per day     Social History:     Social History     Socioeconomic History    Marital status:      Spouse name: Not on file    Number of children: Not on file    Years of education: Not on file    Highest education level: Not on file   Occupational History    Not on file   Tobacco Use    Smoking status: Current Every Day Smoker     Packs/day: 0.50     Years: 52.00     Pack years: 26.00     Types: Cigarettes    Smokeless tobacco: Never Used   Vaping Use    Vaping Use: Never used   Substance and Sexual Activity    Alcohol use: Yes     Comment: every other week    Drug use: Yes     Types: Marijuana Berneta Himanshu)     Comment: every other week-inhalation    Sexual activity: Not on file   Other Topics Concern    Not on file   Social History Narrative    Not on file     Social Determinants of Health     Financial Resource Strain:     Difficulty of Paying Living Expenses: Not on file   Food Insecurity:     Worried About Running Out of Food in the Last Year: Not on file    Solomon of Food in the Last Year: Not on file   Transportation Needs:     Lack of Transportation (Medical): Not on file    Lack of Transportation (Non-Medical):  Not on file   Physical Activity:     Days of Exercise per Week: Not on file    Minutes of Exercise per Session: Not on file Stress:     Feeling of Stress : Not on file   Social Connections:     Frequency of Communication with Friends and Family: Not on file    Frequency of Social Gatherings with Friends and Family: Not on file    Attends Anabaptist Services: Not on file    Active Member of Clubs or Organizations: Not on file    Attends Club or Organization Meetings: Not on file    Marital Status: Not on file   Intimate Partner Violence:     Fear of Current or Ex-Partner: Not on file    Emotionally Abused: Not on file    Physically Abused: Not on file    Sexually Abused: Not on file   Housing Stability:     Unable to Pay for Housing in the Last Year: Not on file    Number of [place] in the Last Year: Not on file    Unstable Housing in the Last Year: Not on file     Family History:     Family History   Problem Relation Age of Onset    High Blood Pressure Mother       Allergies:   Morphine and Sulfa antibiotics     Review of Systems:   The patient is pleasantly confused and the system review could not be obtained    Physical Examination :   /84   Pulse 118   Temp 98.1 °F (36.7 °C) (Temporal)   Resp 30   Ht 5' 4.5\" (1.638 m)   Wt 212 lb (96.2 kg)   LMP  (LMP Unknown)   SpO2 93%   BMI 35.83 kg/m²     Temperature Range: Temp: 98.1 °F (36.7 °C) Temp  Av.1 °F (36.7 °C)  Min: 97.3 °F (36.3 °C)  Max: 98.7 °F (37.1 °C)  General Appearance: The patient is awake and alert does respond to her name but is not really able to answer questions for me. Head: Normocephalic, without obvious abnormality, atraumatic  Eyes: Pupils equal, round, reactive, to light and accommodation; extraocular movements intact; sclera anicteric; conjunctivae pink  ENT: Oropharynx clear, without erythema, exudate, or thrush. Neck: Supple, without lymphadenopathy.    Pulmonary/Chest: Clear to auscultation, without wheezes, rales, or rhonchi  Cardiovascular: normal rate, regular rhythm, normal S1 and S2, no murmurs, rubs, clicks or gallops, distal pulses intact, no carotid bruits   Abdomen: The abdomen has a wound VAC in place anteriorly, there is a right lower quadrant ostomy with semipurulent fluid output, the left lower quadrant SHANA drain with murky purulent fluid. Extremities: No cyanosis, clubbing, edema, or effusions. Neurologic: The patient is awake but pleasantly confused and neurological exam deferred  Skin: Warm and dry with no rash. Medical Decision Making:   I have independently reviewed/ordered the following labs:  CBC with Differential:   Recent Labs     12/21/21 0458 12/22/21 0321   WBC 9.0 9.5   HGB 10.4* 10.9*   HCT 33.4* 34.5*    325   LYMPHOPCT 13* 9*   MONOPCT 11* 12     BMP:   Recent Labs     12/21/21 0458 12/22/21 0321    135   K 3.8 4.1    101   CO2 25 25   BUN 28* 23   CREATININE 0.41* 0.49*   MG 2.2 2.0     Hepatic Function Panel:   Recent Labs     12/21/21 0458   PROT 6.3*   LABALBU 2.3*   BILITOT 3.37*   ALKPHOS 439*   ALT 44*   AST 67*       No results found for: PROCAL    No results found for: CRP  No results found for: FERRITIN  No results found for: FIBRINOGEN  No results found for: DDIMER  No results found for: LDH    No results found for: SEDRATE    Lab Results   Component Value Date    COVID19 Not Detected 12/01/2021     No results found for requested labs within last 30 days. Imaging Studies:   XR CHEST PORTABLE    Result Date: 12/18/2021  EXAMINATION: ONE XRAY VIEW OF THE CHEST 12/18/2021 6:36 am COMPARISON: 12/17/2021, 12/16/2021 HISTORY: ORDERING SYSTEM PROVIDED HISTORY: Effusion/infiltrate & NG placement TECHNOLOGIST PROVIDED HISTORY: Effusion/infiltrate & NG placement Reason for Exam: effusion/ infiltrate/ng FINDINGS: The enteric tube courses off the field of view in the upper abdomen. Right internal jugular line and right PICC line remain in place. Shallow inflation. The cardiomediastinal silhouette is within normal limits. Linear opacities in the lung bases are noted. No pneumothorax. Bones stable. No acute finding; resolution suspected opacity right base, which was probably atelectatic. Support tubes and lines stable with additional findings as above. XR ABDOMEN FOR NG/OG/NE TUBE PLACEMENT    Result Date: 12/16/2021  EXAMINATION: ONE SUPINE XRAY VIEW(S) OF THE ABDOMEN 12/16/2021 6:07 pm COMPARISON: None. HISTORY: ORDERING SYSTEM PROVIDED HISTORY: Confirmation of course of NG/OG/NE tube and location of tip of tube TECHNOLOGIST PROVIDED HISTORY: Confirmation of course of NG/OG/NE tube and location of tip of tube Portable? ->Yes Reason for Exam: NG tube placement confirmation FINDINGS: The NG tube was noted with the tip in the gastric body. No obstruction or ileus was noted. The NG tube was noted with the tip in the gastric body. No obstruction or ileus was noted. XR ABDOMEN FOR NG/OG/NE TUBE PLACEMENT    Result Date: 12/16/2021  EXAMINATION: ONE SUPINE XRAY VIEW(S) OF THE ABDOMEN 12/16/2021 9:10 am COMPARISON: None. HISTORY: ORDERING SYSTEM PROVIDED HISTORY: Confirmation of course of NG/OG/NE tube and location of tip of tube TECHNOLOGIST PROVIDED HISTORY: Confirmation of course of NG/OG/NE tube and location of tip of tube Portable? ->Yes Reason for Exam: NG confirmation. AP UPRIGHT FINDINGS: Nasogastric tube tip is in the distal body of the stomach with the side hole well past the GE junction. Partially visualized right arm PICC line and dialysis catheter. Nasogastric tube tip is in the stomach     CT CHEST ABDOMEN PELVIS W CONTRAST    Result Date: 12/22/2021  EXAMINATION: CT OF THE CHEST, ABDOMEN, AND PELVIS WITH CONTRAST 12/22/2021 7:51 am TECHNIQUE: CT of the chest, abdomen and pelvis was performed with the administration of intravenous contrast. Multiplanar reformatted images are provided for review.  Dose modulation, iterative reconstruction, and/or weight based adjustment of the mA/kV was utilized to reduce the radiation dose to as low as reasonably achievable. COMPARISON: 12/08/2021, 11/30/2021 HISTORY: ORDERING SYSTEM PROVIDED HISTORY: new onset tachycardia and fever, recent postop TECHNOLOGIST PROVIDED HISTORY: new onset tachycardia and fever, recent postop Reason for Exam: New onset tachycardia and fever, recent postop. FINDINGS: Chest: Mediastinum: A right upper extremity PICC terminates at the distal superior vena cava. Partially occlusive thrombus in the right brachiocephalic and visualized portion of the lower right internal jugular vein. Enteric tube courses through the esophagus terminating in the region of the distal gastric body. Mediastinal fat is normal in attenuation. No pneumomediastinum or adenopathy. Aorta is normal caliber with moderate atherosclerosis. Main pulmonary artery is upper limits of normal for size. Mild cardiomegaly. Three-vessel coronary artery calcifications. No pericardial effusion. Partially imaged thyroid appears within normal limits. Lungs/pleura: There are few small scattered mixed ground-glass and somewhat linear bandlike opacities within the aerated portions of both lungs. Atelectasis in the lung bases, greater on the right. No pneumothorax or pleural effusion. Central airways are patent. Soft Tissues/Bones: No acute abnormality in the superficial soft tissues. No axillary adenopathy. No acute or aggressive osseous lesion. Abdomen/Pelvis: Organs: Liver is enlarged but unchanged from prior. Mildly distended gallbladder with decreased wall thickening and pericholecystic inflammation. Spleen is normal in size and attenuation. Atrophic pancreas without peripancreatic inflammatory change. Adrenal glands are normal caliber. 4.9 cm unilocular water attenuation cyst exophytic from the upper pole left kidney without any internal septations or calcifications. Kidneys otherwise enhance symmetrically and normally. No hydronephrosis. Normal caliber ureters.  GI/Bowel: Enteric tube terminates in the distal gastric body.  Postoperative changes with an ileocolic anastomosis in the central anterior pelvis. What appears to be residual enteric contrast is present in the proximal colon extending to the right lower quadrant ostomy. No other enteric contrast is present which limits assessment. Numerous small bowel loops which are fluid-filled with mild wall thickening approximate the anterior abdominal wall. No definite bowel obstruction. Pelvis: Indwelling Rose catheter with intraluminal gas in the bladder. Mild bladder wall thickening. Hysterectomy. Neither ovary is definitively visualized. Peritoneum/Retroperitoneum: A surgical drain enters the left lower quadrant and coils in the pelvis terminating in the right lower quadrant posterolateral to the anastomosis. The distal portion of the drain traverses the posterior aspect of a mildly rim enhancing complex fluid collection with a small volume of internal gas which is located immediately above the urinary bladder and spans roughly 6.9 cm transverse by 3.7 cm AP (series 2, image 203). There are numerous other smaller contiguous collections of complex fluid and gas throughout the right hemiabdomen with the dominant pockets measuring on the order of 2-3 cm each which appear to communicate with the open abdomen best appreciated on series 2 images 190-192 and series 501 images 72 and 73. There are small rim enhancing fluid collections in the left hemiabdomen along the left paracolic gutter and more centrally with the largest pocket measuring 6.1 x 3.8 cm. There is a 2 cm fluid collection anterior to the spleen in the subdiaphragmatic left upper quadrant and rim enhancing fluid tracks along the anterior splenic margin as well as along the anteroinferior hepatic margin anterior to the gallbladder fundus. Bones/Soft Tissues: Open abdominal incision with an overlying wound VAC.  Extensive complex fluid and gas fill the open incision spanning a region of approximately 13 cm transverse by 22 cm craniocaudal.  The collection along the anterior abdomen measures up to 5.1 cm in greatest AP thickness, though the majority is thinner measuring approximately 2-3 cm in thickness. No acute bony findings. CHEST: Partially occlusive thrombus in the right brachiocephalic vein and visualized portion of the lower right internal jugular vein extending above the imaged field of view. Right greater than left bibasilar atelectasis. A few small scattered mixed ground-glass and somewhat linear bandlike opacities in the aerated portions of both lungs, possibly infectious/inflammatory. No prior chest CT is available to assess for interval change. ABDOMEN/PELVIS Assessment of the bowel is limited by the absence of enteric contrast. Numerous rim enhancing fluid collections within the abdomen and pelvis, presumably representing abscesses. The pelvic drain traverses only the posterior margin of a dominant 6.9 cm right pelvic collection. Persistent 6 cm left upper quadrant collection, not substantially changed relative to 2 weeks prior, with other multifocal slightly smaller collections throughout the left upper quadrant. A grouping of smaller rim enhancing gas and fluid collections measuring on the order of 2-3 cm interdigitate between the small bowel loops in the right hemiabdomen and appear to coalesce and communicate with the lower portion of the open anterior abdominal incision which contains a large volume of mixed gas and complex fluid. Cultures:     Culture, Blood 1 [[id number]] Collected: 12/01/21 2340   Order Status: Completed Specimen: Blood Updated: 12/07/21 0126    Specimen Description . BLOOD    Special Requests LT HAND, 9 ML    Culture NO GROWTH 5 DAYS   Culture, Blood 1 [[id number]] Collected: 12/01/21 2340   Order Status: Completed Specimen: Blood Updated: 12/07/21 0126    Specimen Description . BLOOD    Special Requests LT HAND, 11 ML    Culture NO GROWTH 5 DAYS   COVID-19, Rapid [[id number]] Collected: 12/01/21 0936   Order Status: Completed Specimen: Nasopharyngeal Swab Updated: 12/01/21 0959    Specimen Description . NASOPHARYNGEAL SWAB    SARS-CoV-2, Rapid Not Detected    Comment:        Rapid NAAT:  The specimen is NEGATIVE for SARS-CoV-2, the novel coronavirus associated with   COVID-19.         The ID NOW COVID-19 assay is designed to detect the virus that causes COVID-19 in patients   with signs and symptoms of infection who are suspected of COVID-19. An individual without symptoms of COVID-19 and who is not shedding SARS-CoV-2 virus would   expect to have a negative (not detected) result in this assay. Negative results should be treated as presumptive and, if inconsistent with clinical signs   and symptoms or necessary for patient management,   should be tested with an alternative molecular assay. Negative results do not preclude   SARS-CoV-2 infection and   should not be used as the sole basis for patient management decisions.         Fact sheet for Healthcare Providers: [place]   Fact sheet for Patients: [place]           Methodology: Isothermal Nucleic Acid Amplification             Thank you for allowing us to participate in the care of this patient. Please call with questions. Electronically signed by Landry Arroyo MD on 12/22/2021 at 1:19 PM      [facility]  Landry Arroyo MD  Perfect Serve messaging  OFFICE: [phone]      This note is created with the assistance of a speech recognition program.  While intending to generate a document that actually reflects the content of the visit, the document can still have some errors including those of syntax and sound a like substitutions which may escape proof reading. In such instances, actual meaning can be extrapolated by contextual diversion.

## 2021-12-22 NOTE — CARE COORDINATION
Writer spoke with [employer] at [facility] [phone]. Clarified that Dr. Ashok Hughes spoke with general surgery, Dr. Jenny Riedel. Dr. Jenny Riedel cannot except the patient but Dr. Jenny Riedel spoke with Ford James with colo-rectal surgery, and he can accept the patient at [facility] when bed is available. Writer gave [employer] from the transfer center, patient information, including Dr. Pete Thomas phone number. She is going to reach out to Dr. Jan Snyder.     Face sheet faxed to [phone]

## 2021-12-22 NOTE — FLOWSHEET NOTE
Dr Shannan Landa to bedside. Informed patient has temp of 102.7 and increased heart rate. Ok to give acetaminophen via ng. Ng tube clamped. No other orders received.   Pt will be transferred to [address] [place] [address] when bed is available, no family in at this point of day to update

## 2021-12-22 NOTE — PROGRESS NOTES
Patient is experiencing increased confusion at this time. She is saying she is \"scared to death\" that she is going to snf (on previous shifts she told writer that she needed to go to court and thought RNs were ) and that she wants her . She is also trying to climb out of bed. Patient's HR noted to be elevated in 110s with increased respirations. PRN ativan given. Patient reassured, reoriented, and encouraged to take deep breaths through her nose. Will continue to monitor closely.

## 2021-12-22 NOTE — PROGRESS NOTES
[facility]         NAME:  Franklin Allred RECORD NUMBER:  [id number]  AGE: 80 y.o. GENDER: female  : 1950  TODAY'S DATE:  2021      The patient is confused and somewhat combative this morning per staff. The wound vac dressing appears to be intact at this time and functioning properly. A small leak beyond the piece of hydrocolloid covering a stapled incision was noted. The hydrocolloid was cut back and the drainage appeared to be coming from the incision line. This was cleansed gently and left partially open to air. This did not seem to affect the wound vac dressing seal.   The ostomy pouch at the site of old colostomy remains intact and is draining seropurulent drainage. Will plan wound vac dressing change tomorrow morning.     Sabine Barba MSN RN, CWS, [facility] and [facility] [address]  Wound, Ostomy, and Continence Nursing  [phone]

## 2021-12-23 ENCOUNTER — APPOINTMENT (OUTPATIENT)
Dept: CT IMAGING | Age: 71
DRG: 329 | End: 2021-12-23
Attending: COLON & RECTAL SURGERY
Payer: MEDICARE

## 2021-12-23 VITALS
OXYGEN SATURATION: 95 % | HEIGHT: 65 IN | DIASTOLIC BLOOD PRESSURE: 71 MMHG | WEIGHT: 212 LBS | TEMPERATURE: 97 F | RESPIRATION RATE: 25 BRPM | BODY MASS INDEX: 35.32 KG/M2 | SYSTOLIC BLOOD PRESSURE: 130 MMHG | HEART RATE: 93 BPM

## 2021-12-23 LAB
ABSOLUTE EOS #: 0 K/UL (ref 0–0.4)
ABSOLUTE IMMATURE GRANULOCYTE: 0.85 K/UL (ref 0–0.3)
ABSOLUTE LYMPH #: 1.1 K/UL (ref 1–4.8)
ABSOLUTE MONO #: 1.22 K/UL (ref 0.2–0.8)
ANION GAP SERPL CALCULATED.3IONS-SCNC: 9 MMOL/L (ref 9–17)
ANTI-XA UNFRAC HEPARIN: 0.53 IU/L (ref 0.3–0.7)
BASOPHILS # BLD: 0 %
BASOPHILS ABSOLUTE: 0 K/UL (ref 0–0.2)
BUN BLDV-MCNC: 19 MG/DL (ref 8–23)
BUN/CREAT BLD: 43 (ref 9–20)
CALCIUM SERPL-MCNC: 8 MG/DL (ref 8.6–10.4)
CHLORIDE BLD-SCNC: 100 MMOL/L (ref 98–107)
CO2: 25 MMOL/L (ref 20–31)
CREAT SERPL-MCNC: 0.44 MG/DL (ref 0.5–0.9)
CULTURE: NORMAL
CULTURE: NORMAL
DIFFERENTIAL TYPE: ABNORMAL
DIRECT EXAM: NORMAL
EOSINOPHILS RELATIVE PERCENT: 0 % (ref 1–4)
GFR AFRICAN AMERICAN: >60 ML/MIN
GFR NON-AFRICAN AMERICAN: >60 ML/MIN
GFR SERPL CREATININE-BSD FRML MDRD: ABNORMAL ML/MIN/{1.73_M2}
GFR SERPL CREATININE-BSD FRML MDRD: ABNORMAL ML/MIN/{1.73_M2}
GLUCOSE BLD-MCNC: 150 MG/DL (ref 65–105)
GLUCOSE BLD-MCNC: 166 MG/DL (ref 65–105)
GLUCOSE BLD-MCNC: 182 MG/DL (ref 70–99)
HCT VFR BLD CALC: 33.7 % (ref 36.3–47.1)
HEMOGLOBIN: 10.5 G/DL (ref 11.9–15.1)
IMMATURE GRANULOCYTES: 7 %
LYMPHOCYTES # BLD: 9 % (ref 24–44)
Lab: NORMAL
MAGNESIUM: 2 MG/DL (ref 1.6–2.6)
MCH RBC QN AUTO: 28.7 PG (ref 25.2–33.5)
MCHC RBC AUTO-ENTMCNC: 31.2 G/DL (ref 28.4–34.8)
MCV RBC AUTO: 92.1 FL (ref 82.6–102.9)
MONOCYTES # BLD: 10 % (ref 1–7)
MORPHOLOGY: ABNORMAL
MORPHOLOGY: ABNORMAL
NRBC AUTOMATED: 0.4 PER 100 WBC
NUCLEATED RED BLOOD CELLS: 1 PER 100 WBC
PDW BLD-RTO: 15.6 % (ref 11.8–14.4)
PHOSPHORUS: 2.8 MG/DL (ref 2.6–4.5)
PLATELET # BLD: 246 K/UL (ref 138–453)
PLATELET ESTIMATE: ABNORMAL
PMV BLD AUTO: 11.4 FL (ref 8.1–13.5)
POTASSIUM SERPL-SCNC: 4 MMOL/L (ref 3.7–5.3)
RBC # BLD: 3.66 M/UL (ref 3.95–5.11)
RBC # BLD: ABNORMAL 10*6/UL
SARS-COV-2, RAPID: NOT DETECTED
SEG NEUTROPHILS: 74 % (ref 36–66)
SEGMENTED NEUTROPHILS ABSOLUTE COUNT: 9.03 K/UL (ref 1.8–7.7)
SODIUM BLD-SCNC: 134 MMOL/L (ref 135–144)
SPECIMEN DESCRIPTION: NORMAL
SPECIMEN DESCRIPTION: NORMAL
WBC # BLD: 12.2 K/UL (ref 3.5–11.3)
WBC # BLD: ABNORMAL 10*3/UL

## 2021-12-23 PROCEDURE — 6370000000 HC RX 637 (ALT 250 FOR IP): Performed by: STUDENT IN AN ORGANIZED HEALTH CARE EDUCATION/TRAINING PROGRAM

## 2021-12-23 PROCEDURE — 80048 BASIC METABOLIC PNL TOTAL CA: CPT

## 2021-12-23 PROCEDURE — 99215 OFFICE O/P EST HI 40 MIN: CPT

## 2021-12-23 PROCEDURE — 83735 ASSAY OF MAGNESIUM: CPT

## 2021-12-23 PROCEDURE — 6360000002 HC RX W HCPCS: Performed by: STUDENT IN AN ORGANIZED HEALTH CARE EDUCATION/TRAINING PROGRAM

## 2021-12-23 PROCEDURE — 99233 SBSQ HOSP IP/OBS HIGH 50: CPT | Performed by: INTERNAL MEDICINE

## 2021-12-23 PROCEDURE — 87075 CULTR BACTERIA EXCEPT BLOOD: CPT

## 2021-12-23 PROCEDURE — 2580000003 HC RX 258: Performed by: INTERNAL MEDICINE

## 2021-12-23 PROCEDURE — 2500000003 HC RX 250 WO HCPCS: Performed by: INTERNAL MEDICINE

## 2021-12-23 PROCEDURE — 94761 N-INVAS EAR/PLS OXIMETRY MLT: CPT

## 2021-12-23 PROCEDURE — 87077 CULTURE AEROBIC IDENTIFY: CPT

## 2021-12-23 PROCEDURE — 6360000002 HC RX W HCPCS: Performed by: RADIOLOGY

## 2021-12-23 PROCEDURE — 2500000003 HC RX 250 WO HCPCS: Performed by: COLON & RECTAL SURGERY

## 2021-12-23 PROCEDURE — 85025 COMPLETE CBC W/AUTO DIFF WBC: CPT

## 2021-12-23 PROCEDURE — 2709999900 CT ABSCESS DRAINAGE

## 2021-12-23 PROCEDURE — 6360000002 HC RX W HCPCS: Performed by: INTERNAL MEDICINE

## 2021-12-23 PROCEDURE — 87186 SC STD MICRODIL/AGAR DIL: CPT

## 2021-12-23 PROCEDURE — 87635 SARS-COV-2 COVID-19 AMP PRB: CPT

## 2021-12-23 PROCEDURE — 2580000003 HC RX 258: Performed by: STUDENT IN AN ORGANIZED HEALTH CARE EDUCATION/TRAINING PROGRAM

## 2021-12-23 PROCEDURE — 2500000003 HC RX 250 WO HCPCS: Performed by: RADIOLOGY

## 2021-12-23 PROCEDURE — 82947 ASSAY GLUCOSE BLOOD QUANT: CPT

## 2021-12-23 PROCEDURE — 0W9F30Z DRAINAGE OF ABDOMINAL WALL WITH DRAINAGE DEVICE, PERCUTANEOUS APPROACH: ICD-10-PCS | Performed by: RADIOLOGY

## 2021-12-23 PROCEDURE — 2000000000 HC ICU R&B

## 2021-12-23 PROCEDURE — 85520 HEPARIN ASSAY: CPT

## 2021-12-23 PROCEDURE — 87205 SMEAR GRAM STAIN: CPT

## 2021-12-23 PROCEDURE — 87070 CULTURE OTHR SPECIMN AEROBIC: CPT

## 2021-12-23 PROCEDURE — 84100 ASSAY OF PHOSPHORUS: CPT

## 2021-12-23 PROCEDURE — 77012 CT SCAN FOR NEEDLE BIOPSY: CPT

## 2021-12-23 PROCEDURE — 36415 COLL VENOUS BLD VENIPUNCTURE: CPT

## 2021-12-23 PROCEDURE — C9113 INJ PANTOPRAZOLE SODIUM, VIA: HCPCS | Performed by: STUDENT IN AN ORGANIZED HEALTH CARE EDUCATION/TRAINING PROGRAM

## 2021-12-23 RX ORDER — FENTANYL CITRATE 50 UG/ML
INJECTION, SOLUTION INTRAMUSCULAR; INTRAVENOUS
Status: COMPLETED | OUTPATIENT
Start: 2021-12-23 | End: 2021-12-23

## 2021-12-23 RX ORDER — LIDOCAINE HYDROCHLORIDE 10 MG/ML
INJECTION, SOLUTION INFILTRATION; PERINEURAL
Status: COMPLETED | OUTPATIENT
Start: 2021-12-23 | End: 2021-12-23

## 2021-12-23 RX ADMIN — DEXTROSE MONOHYDRATE 100 MG: 50 INJECTION, SOLUTION INTRAVENOUS at 15:13

## 2021-12-23 RX ADMIN — Medication 50 MCG: at 16:18

## 2021-12-23 RX ADMIN — OCTREOTIDE ACETATE 25 MCG/HR: 200 INJECTION, SOLUTION INTRAVENOUS; SUBCUTANEOUS at 00:40

## 2021-12-23 RX ADMIN — MEROPENEM 1000 MG: 1 INJECTION, POWDER, FOR SOLUTION INTRAVENOUS at 04:30

## 2021-12-23 RX ADMIN — PANTOPRAZOLE SODIUM 40 MG: 40 INJECTION, POWDER, FOR SOLUTION INTRAVENOUS at 08:39

## 2021-12-23 RX ADMIN — MEROPENEM 1000 MG: 1 INJECTION, POWDER, FOR SOLUTION INTRAVENOUS at 20:53

## 2021-12-23 RX ADMIN — SODIUM CHLORIDE, PRESERVATIVE FREE 10 ML: 5 INJECTION INTRAVENOUS at 12:18

## 2021-12-23 RX ADMIN — ACETAMINOPHEN 1000 MG: 500 TABLET ORAL at 15:16

## 2021-12-23 RX ADMIN — FENTANYL CITRATE 50 MCG: 50 INJECTION INTRAMUSCULAR; INTRAVENOUS at 14:00

## 2021-12-23 RX ADMIN — ACETAMINOPHEN 1000 MG: 500 TABLET ORAL at 06:05

## 2021-12-23 RX ADMIN — Medication 18 UNITS/KG/HR: at 08:38

## 2021-12-23 RX ADMIN — METOPROLOL TARTRATE 5 MG: 5 INJECTION INTRAVENOUS at 12:09

## 2021-12-23 RX ADMIN — LIDOCAINE HYDROCHLORIDE 10 ML: 10 INJECTION, SOLUTION INFILTRATION; PERINEURAL at 16:10

## 2021-12-23 RX ADMIN — ONDANSETRON 4 MG: 2 INJECTION INTRAMUSCULAR; INTRAVENOUS at 12:17

## 2021-12-23 RX ADMIN — MEROPENEM 1000 MG: 1 INJECTION, POWDER, FOR SOLUTION INTRAVENOUS at 12:09

## 2021-12-23 RX ADMIN — ACETAMINOPHEN 1000 MG: 500 TABLET ORAL at 21:51

## 2021-12-23 RX ADMIN — INSULIN LISPRO 1 UNITS: 100 INJECTION, SOLUTION INTRAVENOUS; SUBCUTANEOUS at 17:22

## 2021-12-23 RX ADMIN — FENTANYL CITRATE 50 MCG: 50 INJECTION INTRAMUSCULAR; INTRAVENOUS at 08:39

## 2021-12-23 RX ADMIN — POTASSIUM CHLORIDE: 2 INJECTION, SOLUTION, CONCENTRATE INTRAVENOUS at 17:27

## 2021-12-23 RX ADMIN — INSULIN LISPRO 1 UNITS: 100 INJECTION, SOLUTION INTRAVENOUS; SUBCUTANEOUS at 06:05

## 2021-12-23 ASSESSMENT — ENCOUNTER SYMPTOMS
ABDOMINAL PAIN: 1
RESPIRATORY NEGATIVE: 1

## 2021-12-23 ASSESSMENT — PAIN SCALES - GENERAL
PAINLEVEL_OUTOF10: 0
PAINLEVEL_OUTOF10: 0
PAINLEVEL_OUTOF10: 2
PAINLEVEL_OUTOF10: 5
PAINLEVEL_OUTOF10: 5
PAINLEVEL_OUTOF10: 4
PAINLEVEL_OUTOF10: 10

## 2021-12-23 NOTE — PROGRESS NOTES
Nutrition Assessment     Type and Reason for Visit: Reassess    Nutrition Recommendations/Plan:   1. Continue NPO status  2. Continue central, standard TPN 5/20 at 83.3 mL/hr (2000 mL total volume). No lipids. Add MVI and trace elements  3. Monitor TPN rate/ tolerance, labs, weight and GI function     Nutrition Assessment:  Patient remains NPO and receiving TPN for bowel rest. NG tube is back to LIWS. Patient is awaiting bed at [facility]. Continue central, standard TPN 5/20 at 83.3 mL/hr (2000 mL total volume) without lipids. Will monitor TPN rate/ tolerance, GI satus, weights and labs. Malnutrition Assessment:  Malnutrition Status: At risk for malnutrition (Comment)    Estimated Daily Nutrient Needs:  Energy (kcal): 6510-2527 kcal (15-18 kcal/kg); Weight Used for Energy Requirements:  Current     Protein (g): 112-123 gm protein (2.0-2.2 g/kg); Weight Used for Protein Requirements:  Ideal        Fluid (ml/day):  ; Weight Used for Fluid Requirements:  1 ml/kcal      Nutrition Related Findings: Edema trace BUE, BLE. Hypoactive bowel sounds. NG tube LIWS. Wound vac. Colostomy. Multiple GI surgeries. Abdominal wound dehiscence.  TPN for bowel rest      Current Nutrition Therapies:    Diet NPO Exceptions are: Sips of Water with Meds, Ice Chips  PN-Adult 2-in-1 [facility] (Standard)  PN-Adult 2-in-1 [facility] [employer] (Standard)    Anthropometric Measures:  · Height: 5' 4.5\" (163.8 cm)  · Current Body Wt: 212 lb (96.2 kg)   · BMI: 35.8    Nutrition Diagnosis:   · Inadequate oral intake related to inadequate protein-energy intake as evidenced by NPO or clear liquid status due to medical condition,nutrition support - parenteral nutrition    · Altered GI function related to altered GI structure as evidenced by GI abnormality,nausea (status post small bowel surgery)      Nutrition Interventions:   Food and/or Nutrient Delivery:  Continue NPO,Continue Current Parenteral Nutrition  Nutrition Education/Counseling:  Education not indicated   Coordination of Nutrition Care:  Continue to monitor while inpatient    Goals:  PN will meet greater than 75% of estimated nutrition needs       Nutrition Monitoring and Evaluation:   Behavioral-Environmental Outcomes:  None Identified   Food/Nutrient Intake Outcomes:  Parenteral Nutrition Intake/Tolerance  Physical Signs/Symptoms Outcomes:  Biochemical Data,Fluid Status or Edema,Skin,Weight,GI Status     Discharge Planning:    Parenteral Nutrition           Catie PETERSENN, RDN, LDN  Lead Clinical Dietitian  RD Office Phone [phone]

## 2021-12-23 NOTE — PROGRESS NOTES
DATE: 2021    NAME: Tre Hou  MRN: [id number]   : 1950    Patient not seen this date for Physical Therapy due to:      [x] Cancel by RN or physician due to: strict bedrest    [] Hemodialysis    [] Critical Lab Value Level     [] Blood transfusion in progress    [] Acute or unstable cardiovascular status   _MAP < 55 or more than >115  _HR < 40 or > 130    [] Acute or unstable pulmonary status   -FiO2 > 60%   _RR < 5 or >40    _O2 sats < 85%    [] Strict Bedrest    [] Off Unit for surgery or procedure    [] Off Unit for testing       [] Pending imaging to R/O fracture    [] Refusal by Patient      [] Other      [] PT being discontinued at this time. Patient independent. No further needs. [] PT being discontinued at this time as the patient has been transferred to hospice care. No further needs.       AUBRIE MUSE, PTA

## 2021-12-23 NOTE — PROGRESS NOTES
[facility] requesting covid swab before they can initiate transfer. Open bed is a possibility for patient tonight pending covid results.  at bedside and Dr. Zoltan Leong updated.

## 2021-12-23 NOTE — PROGRESS NOTES
[facility]  Progress Note    Patrice Jacobo  MRN: [id number]  Date: 12/23/2021  LOS: 34     Reason for F/U :   Postoperative intra-abdominal abscess    Impression :   1. Suspected sigmoid colon stricture   · status post exploratory laparotomy with explantation of pelvic mesh and mobilization of sigmoid as well as proximal rectum with ileocecectomy and ileocolonic anastomosis 11/24/2021  2. Feculent peritonitis secondary to complete mid transverse colonic breakdown   · status post exploratory laparotomy with creation of end colostomy, lavage, pelvic drain placement 12/1/2021  3. Ileocolonic anastomotic leak and distal transverse colon staple line leak   · status post exploratory laparotomy with repair of ileocolonic anastomosis, abdominal washout, lysis of adhesions, revision of colostomy, creation of ileostomy, with placement of Stratus biologic mesh and wound VAC application 62/8/4203  4. Postoperative intra-abdominal abscesses  5. COPD  6. Metabolic acidosis    Recommendations:   · Continue intravenous antimicrobial therapy with meropenem and Eraxis. · Blood cultures x2 sets are negative thus far. · The patient is scheduled for IR guided aspirate of the abscesses. · The patient will hopefully have cultures sent from the aspirate procedures  · Continue supportive therapy and plans of potential transfer to the [facility]    Infection Control Recommendations:   Universal precautions    Discharge Planning:   Estimated Length of IV antimicrobials: To be determined  Patient will need Midline Catheter Insertion/ PICC line Insertion: No  Patient will need: Home IV , [place],  SNF,  LTAC: Undetermined  Patient willneed outpatient wound care: No    Medical Decision making / Summary of Stay:   Patrice Jacobo is a 80y.o.-year-old female who was initially admitted on 11/24/2021. Austinrinne Augusta is seen in consultation on hospital day #28-12/22/2021.     Corrinne Augusta has a history of multiple prior abdominal and pelvic surgeries and was admitted for exploratory laparotomy on 11/24/2021 with suspected sigmoid stricture as the patient had prior colonoscopies and more recently there was difficulty being able to pass the scope distal to the strictured/kinked area of sigmoid colon. The patient was found to have an extreme amount of intra-abdominal adhesions, pelvic mesh with fixation of sigmoid colon and kinked positioner requiring explantation of the pelvic mesh and mobilization of the colon, traction injury to the terminal ileum with injury to ileal mesentery requiring ileocecectomy with ileocolonic reanastomosis. Bilateral ureteral stents were placed by urology to help identification of the ureters due to extreme amount of adhesions in the pelvis.       Postoperatively the patient did have issues tolerating a diet and subsequently developed worsening abdominal pain and was noted to have what appeared to be stool leaking from the inferior aspect of the midline incision on 12/1/2021  A CT scan of the abdomen pelvis without contrast showed some concern for an anastomotic leak and the patient was taken to the operating room for feculent peritonitis and had a laparotomy with creation of end colostomy, lavage, pelvic drain placement related to a complete mid transverse colon breakdown.     The patient has been on antimicrobial therapy with Zosyn and at times has required pressors.     The patient was subsequently seen in consultation by nephrology, pulmonary critical care.     The patient was taken back to the operating room 12/8/2021 and underwent an exploratory laparotomy with repair of an ileocolonic anastomosis with abdominal washout, lysis of adhesions, revision of colostomy and creation of ileostomy with insertion of a red rubber catheter, basement of Stratus biologic mesh and wound VAC placement.     The patient has continued to have murky fluid draining from the SHANA drain.   Arrangements were made for data:   I have independently reviewed the followinglabs:  CBC with Differential:   Recent Labs     12/22/21  0321 12/22/21  0321 12/22/21  1544 12/23/21  0504   WBC 9.5   < > 10.0 12.2*   HGB 10.9*   < > 10.1* 10.5*   HCT 34.5*   < > 31.6* 33.7*      < > 295 246   LYMPHOPCT 9*  --   --  9*   MONOPCT 12  --   --  10*    < > = values in this interval not displayed. BMP:   Recent Labs     12/22/21  0321 12/23/21  0504    134*   K 4.1 4.0    100   CO2 25 25   BUN 23 19   CREATININE 0.49* 0.44*   MG 2.0 2.0     Hepatic Function Panel:   Recent Labs     12/21/21  0458   PROT 6.3*   LABALBU 2.3*   BILITOT 3.37*   ALKPHOS 439*   ALT 44*   AST 67*         Lab Results   Component Value Date    PROCAL 0.33 12/22/2021     No results found for: CRP  No results found for: SEDRATE      No results found for: DDIMER  No results found for: FERRITIN  No results found for: LDH  No results found for: FIBRINOGEN    Results in Past 30 Days  Result Component Current Result Ref Range Previous Result Ref Range   SARS-CoV-2, Rapid Not Detected (12/1/2021) Not Detected Not in Time Range      Lab Results   Component Value Date    COVID19 Not Detected 12/01/2021       No results for input(s): [facility] in the last 72 hours.     Imaging Studies:   CT OF THE CHEST, ABDOMEN, AND PELVIS WITH CONTRAST 12/22/2021 7:51 am  ABDOMEN/PELVIS   Assessment of the bowel is limited by the absence of enteric contrast.       Numerous rim enhancing fluid collections within the abdomen and pelvis,   presumably representing abscesses.  The pelvic drain traverses only the   posterior margin of a dominant 6.9 cm right pelvic collection.       Persistent 6 cm left upper quadrant collection, not substantially changed   relative to 2 weeks prior, with other multifocal slightly smaller collections   throughout the left upper quadrant.       A grouping of smaller rim enhancing gas and fluid collections measuring on   the order of 2-3 cm interdigitate between the small bowel loops in the right   hemiabdomen and appear to coalesce and communicate with the lower portion of   the open anterior abdominal incision which contains a large volume of mixed   gas and complex fluid.           Cultures:     Culture, Blood 1 [[id number]] Collected: 12/22/21 1350   Order Status: Completed Specimen: Blood Updated: 12/23/21 0409    Specimen Description . BLOOD    Special Requests LEFT AC, 10ML    Culture NO GROWTH 12 HOURS   Culture, Blood 1 [[id number]] Collected: 12/22/21 1349   Order Status: Completed Specimen: Blood Updated: 12/23/21 0407    Specimen Description . BLOOD    Special Requests RIGHT AC, 10ML    Culture NO GROWTH 12 HOURS   Culture, Blood 1 [[id number]] Collected: 12/01/21 2340   Order Status: Completed Specimen: Blood Updated: 12/07/21 0126    Specimen Description . BLOOD    Special Requests LT HAND, 9 ML    Culture NO GROWTH 5 DAYS   Culture, Blood 1 [[id number]] Collected: 12/01/21 2340   Order Status: Completed Specimen: Blood Updated: 12/07/21 0126    Specimen Description . BLOOD    Special Requests LT HAND, 11 ML    Culture NO GROWTH 5 DAYS   COVID-19, Rapid [[id number]] Collected: 12/01/21 0936   Order Status: Completed Specimen: Nasopharyngeal Swab Updated: 12/01/21 0959    Specimen Description . NASOPHARYNGEAL SWAB    SARS-CoV-2, Rapid Not Detected    Comment:        Rapid NAAT:  The specimen is NEGATIVE for SARS-CoV-2, the novel coronavirus associated with   COVID-19.         The ID NOW COVID-19 assay is designed to detect the virus that causes COVID-19 in patients   with signs and symptoms of infection who are suspected of COVID-19. An individual without symptoms of COVID-19 and who is not shedding SARS-CoV-2 virus would   expect to have a negative (not detected) result in this assay.    Negative results should be treated as presumptive and, if inconsistent with clinical signs   and symptoms or necessary for patient management,   should be tested with an alternative molecular assay. Negative results do not preclude   SARS-CoV-2 infection and   should not be used as the sole basis for patient management decisions.         Fact sheet for Healthcare Providers: [place]   Fact sheet for Patients: [place]           Methodology: Isothermal Nucleic Acid Amplification               Medications:      metoprolol  5 mg IntraVENous Once    meropenem  1,000 mg IntraVENous Q8H    anidulafungin  100 mg IntraVENous Q24H    QUEtiapine  25 mg Per NG tube BID    pantoprazole  40 mg IntraVENous Daily    insulin lispro  0-6 Units SubCUTAneous Q6H    citalopram  60 mg Oral Daily    docusate sodium  100 mg Oral BID    influenza virus vaccine  0.5 mL IntraMUSCular Prior to discharge    levothyroxine  100 mcg Oral Daily    rosuvastatin  5 mg Oral Nightly    sodium chloride flush  5-40 mL IntraVENous 2 times per day    acetaminophen  1,000 mg Oral 3 times per day           [facility]  Michael Wheeler MD  Perfect Serve messaging  OFFICE: [phone]      Electronically signed by Michael Wheeler MD on 12/23/2021 at 1:47 PM  Thank you for allowing us to participate in the care of this patient. Please call with questions. This note iscreated with the assistance of a speech recognition program.  While intending to generate a document that actually reflects the content of the visit, the document can still have some errors including those of syntax andsound a like substitutions which may escape proof reading. In such instances, actual meaning can be extrapolated by contextual diversion.

## 2021-12-23 NOTE — PROGRESS NOTES
[facility] Wound Ostomy Continence Nursing  Follow Up      NAME:  Franklin Allred RECORD NUMBER:  [id number]  AGE: 80 y.o. GENDER: female  : 1950  TODAY'S DATE:  2021          Wound vac dressing due to be changed today. The dressing was taken down and there was a large amount of stool under the wound vac dressing. This was irrigated thoroughly with saline. The mesh layer is very loose and disintegrating, discolored green with stool. The red rubber catheter remains in place in the stomatized ileum, but stool is seen leaking around the catheter. Dr. Soco Haley was called to the bedside and she pulled/cut away the disintegrating mesh that was remaining. There appears to be a layer of granulating tissue over the exposed bowels. The wound was again thoroughly irrigated with saline. There does not appear to be stool or bowel content otherwise from any area other than the ileal stoma. It was decided that isolating the Ileal stoma may be more helpful than just counting on the catheter, however Dr. Horace Cole preferred to leave the catheter in place. An isolation and wound vac technique was used which included the following steps:  -Skin prep to entire wound perimeter  -A small amount of stoma powder was put into the wound bed immediately around the ileal stoma. A total of 6 Brava protective seals were stacked one by one on top of each other (to isolate from vac dressing). -White foam was used to cover wound bed. -Black foam was then used to cover the white foam going around the isolated stoma.  -A seal was obtained with draping and a hole cut over the isolated stoma tunnel of protective seals.  Another seal was placed on top of the draping over this tunnel and then the barrier to a 2 pc pouching system was placed to fit over the tunnel of seals.   -The red rubber catheter was then secured into the ileal stoma by tacking with a small piece of vac draping to the barrier of the two piece prior to placing the catheter through a hole cut in the pouch and connecting a drainage bag. The hole created was sealed around the catheter with tape. This will allow any output outside of the catheter to be drained. Dr. Torrie Goddard was notified of therapy at [place] [address] in order to maintain seal and isolation of stoma. She preferred it to be at 75mmHg, but can be bumped up if it does not seal. Dr. Cesar Hargrove will be rounding per Dr. Torrie Goddard. RN was shown how system works and how to change the therapy suction if needed. Hopefully this system will keep the stoma isolated and manage wound care over the weekend. Will plan to change the system on Monday if the patient is still at [facility]. The colostomy/mucous fistula pouching system was also changed during this visit.       Patsy FLORES RN, CWS, [facility] [place] [address]  Wound, Ostomy, and Continence Nursing  [phone]

## 2021-12-23 NOTE — PROGRESS NOTES
[facility]   Urology Progress Note            Subjective:  Follow-up urinary retention indwelling Rose    Patient Vitals for the past 24 hrs:   BP Temp Temp src Pulse Resp SpO2   12/23/21 1516 -- 100.1 °F (37.8 °C) -- -- -- --   12/23/21 1300 136/77 98.8 °F (37.1 °C) Temporal 99 30 95 %   12/23/21 1200 (!) 168/68 -- -- 111 23 90 %   12/23/21 1100 (!) 156/70 98.9 °F (37.2 °C) Temporal 102 23 96 %   12/23/21 1000 (!) 149/61 -- -- 93 24 96 %   12/23/21 0900 136/63 -- -- 93 21 95 %   12/23/21 0800 (!) 151/76 99 °F (37.2 °C) -- 96 21 94 %   12/23/21 0500 (!) 153/72 -- -- 108 26 97 %   12/23/21 0400 (!) 158/77 97.6 °F (36.4 °C) Temporal 107 24 94 %   12/23/21 0300 (!) 155/69 -- -- 100 26 96 %   12/23/21 0200 128/65 -- -- 90 26 95 %   12/23/21 0100 134/68 97 °F (36.1 °C) Temporal 92 25 94 %   12/23/21 0000 116/68 98.7 °F (37.1 °C) Temporal 96 28 91 %   12/22/21 2300 136/72 99 °F (37.2 °C) Temporal 103 26 94 %   12/22/21 2200 138/75 99.8 °F (37.7 °C) Temporal 105 (!) 33 94 %   12/22/21 2100 (!) 156/80 101.3 °F (38.5 °C) Temporal 121 (!) 34 94 %   12/22/21 2000 (!) 147/78 99.1 °F (37.3 °C) Temporal 123 (!) 33 95 %   12/22/21 1900 (!) 150/71 -- -- 114 (!) 31 (!) 89 %   12/22/21 1800 108/69 -- -- 110 25 (!) 87 %   12/22/21 1700 (!) 113/52 99.2 °F (37.3 °C) -- 102 25 95 %   12/22/21 1600 127/67 98.7 °F (37.1 °C) Temporal 108 28 93 %       Intake/Output Summary (Last 24 hours) at 12/23/2021 1521  Last data filed at 12/23/2021 0400  Gross per 24 hour   Intake 464 ml   Output 1450 ml   Net -986 ml       Recent Labs     12/22/21  0321 12/22/21  1544 12/23/21  0504   WBC 9.5 10.0 12.2*   HGB 10.9* 10.1* 10.5*   HCT 34.5* 31.6* 33.7*   MCV 91.8 90.0 92.1    295 246     Recent Labs     12/21/21  0458 12/22/21  0321 12/23/21  0504    135 134*   K 3.8 4.1 4.0    101 100   CO2 25 25 25   PHOS 3.3 3.3 2.8   BUN 28* 23 19   CREATININE 0.41* 0.49* 0.44*       No results for input(s): COLORU, PHUR, LABCAST, WBCUA, RBCUA, MUCUS, TRICHOMONAS, YEAST, BACTERIA, CLARITYU, SPECGRAV, LEUKOCYTESUR, UROBILINOGEN, [place] in the last 72 hours. Invalid input(s): NITRATE, GLUCOSEUKETONESUAMORPHOUS    Additional Lab/culture results:    Physical Exam: Patient urologically stable, renal function and urinary output and stable catheter in place bladder decompressed    Interval Imaging Findings:    Impression:    Patient Active Problem List   Diagnosis    Intestinal adhesions    Obesity (BMI 30-39. 9)    Urinary incontinence    Constipation    Ventral incisional hernia    Smoker    Stricture of sigmoid colon ([facility] [address]       Plan:  We recommended to maintain the indwelling Rose until the patient is transferred to a tertiary care center for further management    Irving Hamilton MD  3:21 PM 12/23/2021

## 2021-12-23 NOTE — PLAN OF CARE
Problem: Falls - Risk of:  Goal: Will remain free from falls  Description: Will remain free from falls  12/23/2021 0548 by Kelvin Avilez RN  Outcome: Met This Shift  12/22/2021 1835 by Darius Salazar RN  Outcome: Met This Shift  Goal: Absence of physical injury  Description: Absence of physical injury  12/23/2021 0548 by Kelvin Avilez RN  Outcome: Met This Shift  12/22/2021 1835 by Darius Salazar RN  Outcome: Met This Shift     Problem: HEMODYNAMIC STATUS  Goal: Patient has stable vital signs and fluid balance  12/23/2021 0548 by Kelvin Avilez RN  Outcome: Ongoing  12/22/2021 1835 by Darius Salazar RN  Outcome: Ongoing     Problem: OXYGENATION/RESPIRATORY FUNCTION  Goal: Patient will achieve/maintain normal respiratory rate/effort  12/23/2021 0548 by Kelvin Avilez RN  Outcome: Ongoing  12/22/2021 1835 by Darius Salazar RN  Outcome: Met This Shift     Problem: MOBILITY  Goal: Early mobilization is achieved  12/23/2021 0548 by Kelvin Avilez RN  Outcome: Ongoing  12/22/2021 1835 by Darius Salazar RN  Outcome: Not Met This Shift     Problem: ELIMINATION  Goal: Elimination patterns are normal or improving  Description: Elimination patterns return to pre-surgery normal patterns  Outcome: Ongoing     Problem: SKIN INTEGRITY  Goal: Skin integrity is maintained or improved  12/23/2021 0548 by Kelvin Avilez RN  Outcome: Ongoing  12/22/2021 1835 by Darius Salazar RN  Outcome: Not Met This Shift     Problem: Pain:  Goal: Pain level will decrease  Description: Pain level will decrease  12/23/2021 0548 by Kelvin Avilez RN  Outcome: Ongoing  12/22/2021 1835 by Darius Salazar RN  Outcome: Ongoing  Goal: Control of acute pain  Description: Control of acute pain  12/23/2021 0548 by Kelvin Avilez RN  Outcome: Ongoing  12/22/2021 1835 by Darius Salaazr RN  Outcome: Ongoing  Goal: Control of chronic pain  Description: Control of chronic pain  12/23/2021 0548 by Kelvin Avilez RN  Outcome: Ongoing  12/22/2021 1835 by Natanael Francisco RN  Outcome: Ongoing     Problem: Skin Integrity:  Goal: Will show no infection signs and symptoms  Description: Will show no infection signs and symptoms  12/23/2021 0548 by Eldon George RN  Outcome: Ongoing  12/22/2021 1835 by Natanael Francisco RN  Outcome: Met This Shift  Goal: Absence of new skin breakdown  Description: Absence of new skin breakdown  12/23/2021 0548 by Eldon George RN  Outcome: Ongoing  12/22/2021 1835 by Natanael Francisco RN  Outcome: Met This Shift     Problem: Nutrition  Goal: Optimal nutrition therapy  12/23/2021 0548 by Eldon George RN  Outcome: Ongoing  12/22/2021 1835 by Natanael Francisco RN  Outcome: Ongoing

## 2021-12-23 NOTE — PROGRESS NOTES
[facility] called unit requesting a patient status update. RN provided update & answered all questions. They requested that we call with status updates when changes occur. Their callback number is [phone].

## 2021-12-23 NOTE — FLOWSHEET NOTE
Patient is sleeping and no longer intubated. No family is present. Writer offers a silent prayer and leaves a note of support on the tray table. Patient does not respond. Spiritual Care will follow as needed. 12/22/21 1900   Encounter Summary   Services provided to: Patient   Referral/Consult From: [facility]   Support System Spouse   Continue Visiting   (12/22/21 Pt.  Sleeping)   Complexity of Encounter Low   Length of Encounter 15 minutes   Routine   Type Initial   Assessment Sleeping   Intervention Prayer   Outcome Did not respond

## 2021-12-23 NOTE — CARE COORDINATION
Patient needs to be transferred to [address] [place] [address]. Spoke with Dalila Guerra at [address] [place] [address] and there is no bed availability at this time. [phone]    Writer notified [facility] [phone], and spoke with Yesi. She took down patient information and face sheet faxed   [phone]. Yesi will call Dr. Isha Edwards and then inform writer of determination to transfer. Notified [facility] transfer center. [phone]. Spoke with Marco. Gladys Jean states that they are not taking any patients at this time. Have no beds and that we will need to attempt other hospitals in our area.

## 2021-12-23 NOTE — PROGRESS NOTES
Occupational Therapy  DATE: 2021    NAME: Angelo Salgado  MRN: [id number]   : 1950    Patient not seen this date for Occupational Therapy due to:      [] Cancel by RN or physician due to:    [] Hemodialysis    [] Critical Lab Value Level     [] Blood transfusion in progress    [] Acute or unstable cardiovascular status   _MAP < 55 or more than >115  _HR < 40 or > 130    [] Acute or unstable pulmonary status   -FiO2 > 60%   _RR < 5 or >40    _O2 sats < 85%    [x] Strict Bedrest : per RN    [] Off Unit for surgery or procedure    [] Off Unit for testing       [] Pending imaging to R/O fracture    [] Refusal by Patient      [] Other      [] OT being discontinued at this time. Patient independent. No further needs. [] OT being discontinued at this time as the patient has been transferred to hospice care. No further needs.       Digna Barker, TAM

## 2021-12-23 NOTE — PROGRESS NOTES
Colorectal Surgery:  Daily Progress Note               PATIENT NAME: Carmina Case     TODAY'S DATE: 12/23/2021, 3:47 AM    SUBJECTIVE:     Patient seen and evaluated. Has been intermittently febrile since yesterday. Responds to tylenol administration. Intermittent tachycardia, regular rate. Patient denies complaints this AM. Wound vac with 200cc output. SHANA with 80cc ouput. Red rubber cath with 40cc output.     OBJECTIVE:   VITALS:  BP (!) 155/69   Pulse 100   Temp 97 °F (36.1 °C) (Temporal)   Resp 26   Ht 5' 4.5\" (1.638 m)   Wt 212 lb (96.2 kg)   LMP  (LMP Unknown)   SpO2 96%   BMI 35.83 kg/m²      INTAKE/OUTPUT:      Intake/Output Summary (Last 24 hours) at 12/23/2021 0347  Last data filed at 12/23/2021 0000  Gross per 24 hour   Intake 684 ml   Output 1580 ml   Net -896 ml       PHYSICAL EXAM:  General Appearance: Awake and alert  HEENT:  Normocephalic, atraumatic, mucus membranes moist   Heart: Heart tachycardic rate, normotensive  Lungs: Equal chest rise bilaterally, no increased work of breathing  Abdomen: Soft, wound vac in place, red rubber catheter with succus draining, SHANA drain with purulent appearing fluid, wound VAC in place with good seal, mucus fistula with stoma appliance no significant output  Extremities: No cyanosis, pitting edema, rashes noted      Data:  CBC with Differential:    Lab Results   Component Value Date    WBC 10.0 12/22/2021    RBC 3.51 12/22/2021    HGB 10.1 12/22/2021    HCT 31.6 12/22/2021     12/22/2021    MCV 90.0 12/22/2021    MCH 28.8 12/22/2021    MCHC 32.0 12/22/2021    RDW 15.4 12/22/2021    NRBC 1 12/20/2021    LYMPHOPCT 9 12/22/2021    MONOPCT 12 12/22/2021    BASOPCT 1 12/22/2021    MONOSABS 1.14 12/22/2021    LYMPHSABS 0.86 12/22/2021    EOSABS 0.10 12/22/2021    BASOSABS 0.10 12/22/2021    DIFFTYPE NOT REPORTED 12/22/2021     BMP:    Lab Results   Component Value Date     12/22/2021    K 4.1 12/22/2021     12/22/2021    CO2 25 12/22/2021 BUN 23 12/22/2021    LABALBU 2.3 12/21/2021    CREATININE 0.49 12/22/2021    CALCIUM 8.4 12/22/2021    GFRAA >60 12/22/2021    LABGLOM >60 12/22/2021    GLUCOSE 176 12/22/2021     Magnesium:    Lab Results   Component Value Date    MG 2.0 12/22/2021     Phosphorus:    Lab Results   Component Value Date    PHOS 3.3 12/22/2021     Radiology Review:     ASSESSMENT:  Active Hospital Problems    Diagnosis Date Noted    Stricture of sigmoid colon [facility]) [Z00.520] 11/24/2021     80 y.o. female with sigmoid colon stricture. 11/24/21: Status post exploratory laparotomy with explantation pelvic mesh and mobilization sigmoid and proximal rectum, status post ileocecectomy with ileocolonic anastomosis. 12/1/21: Status post ex lap, creation of end colostomy, pelvic drain placement    12/8/21: Exploratory laparotomy, repair of ileocolonic anastomosis, abdominal washout, lysis of adhesions, revision of colostomy, creation of ileostomy with insertion of red rubber catheter, placement of Stratus biologic mesh, wound VAC application. Plan:  -Appreciate critical care management  -D/C flexeril and neurontin, will wean IV narcotics  -Tachycardia improves with tylenol administration and lopressor q6 prn  -Continue strict NPO  -Monitor I's and O's, replete electrolytes as needed  -Continue TPN  -Dc octreotide  -Continue NGT to LIWS  -Continue red rubber catheter to gravity. This is functioning as a conduit end ileostomy  -Wound/ostomy for wound vac exchanges 3 times/week  -Continue to monitor SHANA drain  -Stoma appliance to colostomy/mucus fistula  -Continue to monitor fevers, IV abx per ID  -Anticoagulation per Vascular surgery for R IJ and brachiocephalic thrombus  -Follow up IR for eval of possible perc drainage of intraabdominal abscesses  -Plan for transfer to [facility] when bed available    Electronically signed by ePnny Mcpherson MD  on 12/23/2021 at 3:47 AM      I Dr. Satya Winters saw and examined the patient.  I have edited the above and agree with the above. Ashley Chavez  Colorectal Surgery

## 2021-12-23 NOTE — PROGRESS NOTES
Pulmonary Critical Care Progress Note  Marina Talley M.D. Patient seen for the follow up of hypoxic respiratory failure, metabolic acidosis    Subjective:  She is resting comfortably in bed, no distress. She is sleepy but arousable  is at bedside. She denies shortness of breath, cough or chest pain. She remains on TPN. She is awaiting transfer to [facility]. She continues to have intermittent fevers. Heparin drip is currently for pending IR drainage. Examination:  Vitals: BP (!) 151/76   Pulse 96   Temp 99 °F (37.2 °C)   Resp 21   Ht 5' 4.5\" (1.638 m)   Wt 212 lb (96.2 kg)   LMP  (LMP Unknown)   SpO2 94%   BMI 35.83 kg/m²   General appearance: Sleepy but arousable  Neck: No JVD  Lungs: Decreased breath sounds no crackles or wheezing  Heart: regular rate and rhythm, S1, S2 normal, no gallop  Abdomen: Soft, non tender, positive wound VAC  Extremities: no cyanosis or clubbing. No significant edema    LABs:  CBC:   Recent Labs     12/22/21  1544 12/23/21  0504   WBC 10.0 12.2*   HGB 10.1* 10.5*   HCT 31.6* 33.7*    246     BMP:   Recent Labs     12/22/21  0321 12/23/21  0504    134*   K 4.1 4.0   CO2 25 25   BUN 23 19   CREATININE 0.49* 0.44*   LABGLOM >60 >60   GLUCOSE 176* 182*     ABG:  Lab Results   Component Value Date    KEA3GML NOT REPORTED 12/13/2021    FIO2 30.0 12/13/2021       Lab Results   Component Value Date    POCPH 7.507 12/13/2021    POCPCO2 47.6 12/13/2021    POCPO2 79.2 12/13/2021    POCHCO3 37.7 12/13/2021    PBEA 13 12/13/2021    EGK7BTD NOT REPORTED 12/13/2021    AZPC5UOB 96 12/13/2021    FIO2 30.0 12/13/2021   Results for Nidia Robbins (MRN [id number]) as of 12/23/2021 11:14   Ref.  Range 12/22/2021 13:51   Procalcitonin Latest Ref Range: <0.09 ng/mL 0.33 (H)     Radiology:  Chest x-ray 12/18/21      Impression:  · Acute respiratory insufficiency requiring ventilator support, extubated 12/13/2021  · Feculent peritonitis s/p exploratory laparotomy with end colostomy/pelvic drain placement  · COPD  · History of GERD/dyslipidemia/hypothyroidism/hypertension  · Metabolic acidosis  · Bilateral pleural infiltrate/pulmonary edema  · Right brachiocephalic partially occlusive clot    Recommendations:  · Monitor off of oxygen  · Incentive spirometry every hour while awake  · DuoNeb by nebulizer as needed  · Monitor off of diuresis  · TPN/ monitor liver function/ off TF per surgery  · Monitor hemoglobin, transfuse for hemoglobin less than 7  · Antibiotics per infectious disease, Eraxis and meropenem  · Wound care/ wound VAC  · Ativan as needed for anxiety  · Labs in a.m. · Peptic ulcer disease prophylaxis  · DVT prophylaxis,  high intensity heparin drip, vascular surgery following  · Plans to transfer to [facility] per surgery when bed available. Also looking into transfer to [facility] as well.   · We will follow with you    Electronically signed by     Jinny Wheeler MD on 12/23/2021 at 2:25 PM  Pulmonary Critical Care and Sleep Medicine,  [facility]  Cell: [phone]  Office: [phone]

## 2021-12-23 NOTE — PLAN OF CARE
Nutrition Problem #1: Inadequate oral intake  Intervention: Food and/or Nutrient Delivery: Continue NPO,Continue Current Parenteral Nutrition  Nutritional Goals: PN will meet greater than 75% of estimated nutrition needs

## 2021-12-24 NOTE — PROGRESS NOTES
All transfer instructions given at this time as well as all patient belongings returned to patient. Pt denies any further questions regarding transfer to [facility] at this time. [facility] Access discharge packet with unit letter, discharge instructions/restrictions and medication handouts regarding all discharge medications and side effects. Pt denies any further issues at this time. Pt wheeled out with [id number] Access at this time. Pt left premises without any issues in private vehicle at this time.

## 2021-12-24 NOTE — DISCHARGE SUMMARY
Surgery Discharge Summary     Patient Identification  Librado Garza is a 80 y.o. female. :  1950  Admit Date:  2021    Discharge date:   2021 11:30 PM                                   Disposition: Transfer to higher level of care    Discharge Diagnoses:   Patient Active Problem List   Diagnosis    Intestinal adhesions    Obesity (BMI 30-39. 9)    Urinary incontinence    Constipation    Ventral incisional hernia    Smoker    Stricture of sigmoid colon ([facility] [address]       Condition on discharge: Guarded    Consults: Pulmonology critical care, urology, infectious disease, nephrology, cardiology    Surgery: Exploratory laparotomy, low anterior resection, ileocecectomy, ileocolonic anastomosis, explantation of pelvic mesh, transverse colectomy and end colostomy creation, resection of ileocolonic anastomosis, revision of colostomy, creation of mucous fistula, and ileostomy creation, biologic mesh placement    HPI and Hospital Course:   80 y.o. female presented on 2021 for elective low anterior resection due to a sigmoid stricture that was identified on a colonoscopy. Initial procedure that patient underwent on  included exploratory laparotomy with extensive lysis of adhesions as well as ileocecectomy, ileocolonic anastomosis, explantation of a previously existing pelvic mesh. Unfortunately, the patient did require return to the operating room for reopening of her recent laparotomy due to feculent peritonitis from a perforation in the transverse colon. The transverse colectomy was performed and colostomy was created. Several days later, the patient unfortunately required additional return to the operating room for reopening of recent laparotomy with resection of ileocolonic anastomosis due to anastomotic leak. The patient had a revision of her colostomy, mucous fistula creation, creation of an end ileostomy with insertion of red rubber catheter and placement of a biologic mesh.     The patient had a long and tenuous hospital stay requiring consultation of pulmonology and critical care, urology, infectious disease, nephrology, cardiology, interventional radiology for intra-abdominal abscess drain placement. Postoperatively, the patient had significant wound healing difficulties requiring the extensive support of our wound, ostomy, continence RN. Ultimately, the patient's previously placed biologic mesh did not incorporate well and was removed during a bedside wound VAC change on 12/23/2021. At th the time of transfer, the patient's wound care included white foam over exposed small bowel, black foam to cover and pouching of end ileostomy with red rubber catheter in place. At the time of transfer, the patient was awake, alert and oriented. She was febrile, intermittently tachycardic requiring as needed Lopressor. She remained on broad-spectrum antibiotics per infectious disease recommendations. She was n.p.o. on TPN with an NG tube in place to low intermittent suction. She had indwelling Rose catheter in place. Due to the complexity of the patient's history and hospitalization, the decision was made to transfer the patient to a higher level of care.     Electronically signed by Gee Jc MD on 12/24/2021 at 6:25 AM

## 2021-12-24 NOTE — PROGRESS NOTES
Patient to be picked up by [facility] access at 11PM for transfer to [facility]. Family notified.

## 2021-12-24 NOTE — FLOWSHEET NOTE
12/23/21 1936   Provider Notification   Reason for Communication Review case   Provider Name Dr. Yosi Ivey   Provider Notification Physician   Method of Communication Call   Response Other (Comment)   Notification Time 1939   Dr. Yosi Ivey updated on patient receiving a bed at [facility] [address] to leave heparin gtt off at this time. Will continue to monitor.

## 2021-12-25 LAB
CULTURE: ABNORMAL
Lab: ABNORMAL
Lab: ABNORMAL
SPECIMEN DESCRIPTION: ABNORMAL
SPECIMEN DESCRIPTION: ABNORMAL

## 2021-12-27 LAB
CULTURE: ABNORMAL
DIRECT EXAM: ABNORMAL
DIRECT EXAM: ABNORMAL
Lab: ABNORMAL
SPECIMEN DESCRIPTION: ABNORMAL

## 2022-08-19 NOTE — PROGRESS NOTES
Nephrology Consult Note      Inpatient consult to Nephrology  Consult performed by: Lorene Martin MD  Consult ordered by: Mau Mercer MD       for ESRD        History Of Present Illness  Mague is a 64 year old female w/ pmxh of htn, hld, anemia DM2, CVA w/ left sided deficits, ESRD on HD TTS schedule whom presents to the ED bc of diarrhea (after eating beans?) which debilitated her to the point where she missed HD. Neg covid, c.diff negative. Pt w/ ongoing diarrhea but feels better. Plan for nephrology consult for missed HD      Review of Systems  10 point ROS neg except HPI    Physical Exam  Blood pressure (!) 178/73, pulse 77, temperature 98 °F (36.7 °C), temperature source Oral, resp. rate (!) 29, SpO2 99 %.    Physical Exam  Lying in bed, NAD  s1 and s2, RRR  ctab  abd benign  No edema    Assessment:  • ESRD on HD TTS  • Diarrhea  • Weakness  • HTN  • DM2  • PE on eliquis  • Chronic venous statsis in b/l EXT  • CAD  • Anemia- m/l 2/2 to WILMA  • MBD        Plan:  • HD today and tomorrow; Continue TTHS schedule  • Strict ins/outs  • Hb goal 10-12  • Avoid gadolinium/MRI contrast  • Keep euvolemic  • Strict glycemic control w/ glucose <180  • Will continue to monitor      Thank you for the opportunity to partake in this patient's care    Past Medical History  Past Medical History:   Diagnosis Date   • Anemia    • Atrial flutter (CMS/HCC)    • Cardiomegaly    • Cataract    • Cellulitis     infectious cellulitis of lower extremities   • Cerebral infarction (CMS/HCC) 2004    left side weakness with slight left foot drop, uses a cane   • Chronic kidney disease    • Coronary artery disease    • COVID-19 virus infection    • Diet-controlled diabetes mellitus (CMS/HCC)    • DVT (deep venous thrombosis) (CMS/HCC)    • ESRD (end stage renal disease) on dialysis (CMS/HCC)     Tuesday, thursday saturdays   • Essential (primary) hypertension    • Former smoker    • Gastritis    • GI  Physical Therapy  DATE: 2021    NAME: Francesca Ramirez  MRN: [id number]   : 1950    Patient not seen this date for Physical Therapy due to:      [] Cancel by RN or physician due to:    [] Hemodialysis    [] Critical Lab Value Level     [] Blood transfusion in progress    [] Acute or unstable cardiovascular status   _MAP < 55 or more than >115  _HR < 40 or > 130    [] Acute or unstable pulmonary status   -FiO2 > 60%   _RR < 5 or >40    _O2 sats < 85%    [] Strict Bedrest    [] Off Unit for surgery or procedure    [] Off Unit for testing       [] Pending imaging to R/O fracture    [] Refusal by Patient      [x] Other:  Pt currently intubated/sedated. Plans for CPAP trial this morning per pulmonology. PT will continue to follow and ck back as able. [] PT being discontinued at this time. Patient independent. No further needs. [] PT being discontinued at this time as the patient has been transferred to hospice care. No further needs.       Robbi Quintanilla, PT hemorrhage    • Gout    • Hemodialysis access, AV graft (CMS/[facility])    • High cholesterol    • Hyperlipoproteinemia    • Hypothyroid    • Ischemic cardiomyopathy    • Left ventricular systolic dysfunction    • Lower extremity edema    • Mitral valve regurgitation    • Morbid obesity (CMS/[facility])    • MRSA (methicillin resistant Staphylococcus aureus)    • Pericardial effusion    • Pulmonary embolism (CMS/[facility]) 2020   • Pulmonic valve regurgitation    • Right bundle branch block    • Sleep apnea     her machine broke and she never replaced it.    • Status post placement of implantable loop recorder         Surgical History  Past Surgical History:   Procedure Laterality Date   • Cabg, artery-vein, three  2020   • Cardiac catherization     • Drain skin abscess complic      from Right buttock and left inner thigh   • Ivc filter placement     • Pr hemodialysis via av graft Right    • Thrombectomy  2019    of right AV graft        Social History  Social History     Tobacco Use   • Smoking status: Former Smoker     Types: Cigarettes     Quit date:      Years since quittin.6   • Smokeless tobacco: Never Used   Vaping Use   • Vaping Use: never used   Substance Use Topics   • Alcohol use: Not Currently   • Drug use: Never       Family History  Family History   Problem Relation Age of Onset   • Cancer Mother    • COPD Mother    • Stroke Father    • Hypertension Father    • Hyperlipidemia Father    • Pacemaker Paternal Grandparent         Allergies  ALLERGIES:  Patient has no known allergies.    Medications  (Not in a hospital admission)        Relevant Results  Recent Labs     22  0633   SODIUM 136   POTASSIUM 5.3*   CO2 22   ANIONGAP 13   GLUCOSE 114*   BUN 56*   CREATININE 8.37*   BCRAT 7   CALCIUM 9.3   BILIRUBIN 0.5   AST 38*   GPT 42   ALKPT 96   GLOB 3.6   AGR 1.0        Recent Labs     22  0633   WBC 5.8   RBC 3.06*   HGB 9.9*   HCT 30.5*      MCV 99.7   MCH 32.4   MCHC 32.5   NRBCRE  0         XR CHEST PA OR AP 1 VIEW    Result Date: 8/19/2022  Exam: AP chest film was performed on 08/19/2022 at 0728 hours. Clinical Indication: Dyspnea Comparison: Chest film on 07/23/2022 Findings: The heart is  enlarged. Mediastinal shadow is within normal limits. Postsurgical changes from previous coronary revascularization are noted. Moderate pulmonary vascular congestion is again noted.  Bibasilar airspace disease is again noted The costophrenic angles are sharp.  No pneumothorax identified.  No acute abnormalities are seen on limited evaluation of the bones.     Impression: Cardiomegaly with pulmonary edema, stable from prior exam. Electronically Signed by: SONY IZAGUIRRE MD Signed on: 8/19/2022 7:41 AM        Principal Problem:    Dialysis patient (CMS/[facility])  Resolved Problems:    * No resolved hospital problems. *          Lorene Martin MD  8/19/2022

## 2022-09-18 PROBLEM — M46.40 DISCITIS: Status: ACTIVE | Noted: 2022-09-18

## 2022-10-24 NOTE — PROGRESS NOTES
Chief Complaint   Patient presents with    Grief Counseling     Follow-up    1. Have you been to the ER, urgent care clinic since your last visit? Hospitalized since your last visit? No    2. Have you seen or consulted any other health care providers outside of the [address] [place] [address] since your last visit? Include any pap smears or colon screening.  No Nutrition Assessment     Type and Reason for Visit: Reassess    Nutrition Recommendations/Plan:   1. Continue NPO status  2. Continue TPN at 75 mL/hr (1800 mL total volume, 1584 kcal and 90 gm of protein). Insulin 35 units  3. Monitor TPN rate/ tolerance, GI status, weights and labs  4. Re-check triglycerides on 12/19 and start lipids if appropriate     Nutrition Assessment:  Patient remains NPO status and on TPN. She is still on Precedex drip. NGT set to LIWS. Suggest obtaining accurate weight to better assess weight changes during admission. Pt remains on bowel rest due to open GI surgery wounds. Will continue TPN at 75 mL/hr (1800 mL total volume). Monitor TPN rate/ tolerance, GI status, weights and labs. No Triglyceride lab today (12/18) will re-check tomorrow and will consider adding lipids. Malnutrition Assessment:  Malnutrition Status: At risk for malnutrition (Comment)    Estimated Daily Nutrient Needs:  Energy (kcal): 1535-4483 kcal (15-18 kcal/kg); Weight Used for Energy Requirements:  Current     Protein (g): 84-95 gm of protein (1.5-1.7 gm/kg); Weight Used for Protein Requirements:  Ideal        Weight Used for Fluid Requirements:  1 ml/kcal      Nutrition Related Findings: NGT to LIWS roughly 200 cc, SHANA 30 cc, wound VAC with 300 cc. Hypoactive bowel sounds. Edema: generalized trace; BUE/BLE non-pitting trace. Colostomy. Extubated 12/13. Multiple GI surgeries. Labs reviewed.       Current Nutrition Therapies:    Diet NPO Exceptions are: Sips of Water with Meds, Ice Chips  PN-Adult 2-in-1 [facility] (Standard)  PN-Adult 2-in-1 [facility] (Standard)    Anthropometric Measures:  · Height: 5' 4.5\" (163.8 cm)  · Current Body Wt: 211 lb (95.7 kg) (Likely inaccurate weight.)   · BMI: 35.7    Nutrition Diagnosis:   · Inadequate protein-energy intake related to inadequate protein-energy intake as evidenced by NPO or clear liquid status due to medical condition,nutrition support - parenteral nutrition    · Altered GI function related to altered GI structure as evidenced by GI abnormality,nausea (status post small bowel surgery)      Nutrition Interventions:   Food and/or Nutrient Delivery:  Continue NPO,Continue Current Parenteral Nutrition  Nutrition Education/Counseling:  Education not indicated   Coordination of Nutrition Care:  Continue to monitor while inpatient    Goals:  PN will meet greater than 75% of estimated nutrition needs       Nutrition Monitoring and Evaluation:   Behavioral-Environmental Outcomes:  None Identified   Food/Nutrient Intake Outcomes:  Parenteral Nutrition Intake/Tolerance  Physical Signs/Symptoms Outcomes:  Biochemical Data,Fluid Status or Edema,Weight,Skin,GI Status     Discharge Planning:    Parenteral Nutrition     Laura Jones, [address]  Office Number: [phone]

## 2023-02-03 NOTE — PROGRESS NOTES
[id number] [facility] Wound Ostomy Continence Nursing  Follow Up      NAME:  Franklin Allred RECORD NUMBER:  [id number]  AGE: 80 y.o. GENDER: female  : 1950  TODAY'S DATE:  2021      Site of colostomy pouch removed and replaced. The wound was copiously irrigated with saline. The mucocutaneous junction is  circumferentially (being held in place by minimal sutures). There continues to be some purulent exudate with oil-spotting from around the junction. RN made aware and requested to have surgery notified. Calcium alginate dressing material use to gently pack the wound section of the open area and placed to attempt to avoid contamination of this with the mucoid bowel effluent. Midline abdominal wound vac- previous dressing had a green hue under the dressing and output in canister appears to have similar color mixed with serosang output. The dressing was removed. There was a large amount of oily serosanguinous drainage mixed with thin feculent material within the wound bed. Surgery notified and came to room to evaluate. This finding was anticipated at this time and it was decided by the resident to continue with vac therapy. The dressing was replaced. The 8 o'clock periwound skin had a small stapled incision that extended slightly beyond the main wound. This was covered with hydrocolloid to protect and absorb drainage. White foam was placed over the entire mesh-covered wound bed and cut to fit around the red rubber catheter insertion site. Black foam was then applied on top and secured with draping. A seal was obtained with cont therapy at 25mmHg. The patient had been premedicated with Fentanyl per RN prior to dressing change. The patient's  was at bedside during the dressing change and had the opportunity to discuss with surgery resident.        Measurements:  Negative Pressure Wound Therapy Abdomen Mid (Active)   $ Standard NPWT >50 sq cm PER TX $ Yes 21 1500   Wound Type Surgical Pt called in stating she has a runny nose on the right side. Also Crusty and bleeds. Patient declines virtual visit and in person visit. Patient stated she just wants antibiotics.        Please advise 12/12/21 1500   Unit Type KCI 12/12/21 1500   Dressing Type White foam; Black foam 12/12/21 1500   Number of pieces used 3 12/12/21 1500   Cycle On; Continuous 12/12/21 1500   Target Pressure (mmHg) Other (Comment) 12/12/21 1500   Canister changed? Yes 12/12/21 1500   Number of days:        Wound Abdomen Mid (Active)   Wound Image   12/12/21 1500   Wound Etiology Surgical 12/12/21 1500   Dressing Status New dressing applied; Old drainage noted 12/12/21 1500   Wound Cleansed Irrigated with saline 12/12/21 1500   Dressing/Treatment Negative pressure wound therapy 12/12/21 1500   Dressing Change Due 12/15/21 12/12/21 1500   Wound Length (cm) 17 cm 12/12/21 1500   Wound Width (cm) 16.3 cm 12/12/21 1500   Wound Depth (cm) 6.8 cm 12/12/21 1500   Wound Surface Area (cm^2) 277.1 cm^2 12/12/21 1500   Change in Wound Size % (l*w) -164.06 12/12/21 1500   Wound Volume (cm^3) 1884.28 cm^3 12/12/21 1500   Wound Healing % -153 12/12/21 1500   Undermining Starts ___ O'Clock 12 12/06/21 0950   Undermining Maxium Distance (cm) [email or web address] 12/06/21 0950   Wound Assessment Exposed mesh; Pink/red; Granulation tissue 12/12/21 1500   Drainage Amount Large 12/12/21 1500   Drainage Description Serosanguinous; Sanguinous 12/12/21 1500   Odor Mild 12/12/21 1500   Rosaura-wound Assessment Blanchable erythema 12/12/21 1500   Margins Unattached edges;  Undefined edges 12/12/21 1500   Wound Thickness Description not for Pressure Injury Full thickness 12/12/21 1500   Number of days:      Aldo Meza MSN RN, CWS, [facility]  Wound, Ostomy, and Continence Nursing  [phone]

## 2023-05-16 ENCOUNTER — HOSPITAL ENCOUNTER (OUTPATIENT)
Age: 73
Setting detail: SPECIMEN
Discharge: HOME | End: 2023-05-16
Payer: COMMERCIAL

## 2023-05-16 LAB
ALBUMIN SERPL-MCNC: 4 G/DL (ref 3.5–5.2)
ALBUMIN/GLOB SERPL: 1.7 {RATIO} (ref 1–2.5)
ALP SERPL-CCNC: 182 U/L (ref 35–104)
ALT SERPL-CCNC: 59 U/L (ref 5–33)
ANION GAP SERPL CALCULATED.3IONS-SCNC: 14 MMOL/L (ref 9–17)
AST SERPL-CCNC: 66 U/L
BASOPHILS # BLD: 0.05 K/UL (ref 0–0.2)
BASOPHILS # BLD: 1 % (ref 0–2)
BILIRUB SERPL-MCNC: 0.9 MG/DL (ref 0.3–1.2)
BUN SERPL-MCNC: 24 MG/DL (ref 8–23)
BUN/CREAT BLD: 26 (ref 9–20)
CALCIUM SERPL-MCNC: 9.4 MG/DL (ref 8.6–10.4)
CHLORIDE SERPL-SCNC: 106 MMOL/L (ref 98–107)
CO2 SERPL-SCNC: 21 MMOL/L (ref 20–31)
CREAT SERPL-MCNC: 0.92 MG/DL (ref 0.5–0.9)
EOSINOPHIL # BLD: 0.16 K/UL (ref 0–0.44)
EOSINOPHILS RELATIVE PERCENT: 3 % (ref 1–4)
ERYTHROCYTE [DISTWIDTH] IN BLOOD BY AUTOMATED COUNT: 17.9 % (ref 11.8–14.4)
GFR SERPL CREATININE-BSD FRML MDRD: >60 ML/MIN/1.73M2
GLUCOSE SERPL-MCNC: 150 MG/DL (ref 70–99)
HCT VFR BLD AUTO: 39.2 % (ref 36.3–47.1)
HGB BLD-MCNC: 12.7 G/DL (ref 11.9–15.1)
IMM GRANULOCYTES # BLD AUTO: 0.03 K/UL (ref 0–0.3)
IMM GRANULOCYTES NFR BLD: 1 %
LYMPHOCYTES # BLD: 26 % (ref 24–43)
LYMPHOCYTES NFR BLD: 1.63 K/UL (ref 1.1–3.7)
MAGNESIUM SERPL-MCNC: 1.6 MG/DL (ref 1.6–2.6)
MCH RBC QN AUTO: 30.5 PG (ref 25.2–33.5)
MCHC RBC AUTO-ENTMCNC: 32.4 G/DL (ref 25.2–33.5)
MCV RBC AUTO: 94.2 FL (ref 82.6–102.9)
MONOCYTES NFR BLD: 0.57 K/UL (ref 0.1–1.2)
MONOCYTES NFR BLD: 9 % (ref 3–12)
NEUTROPHILS NFR BLD: 60 % (ref 36–65)
NEUTS SEG NFR BLD: 3.87 K/UL (ref 1.5–8.1)
NRBC AUTOMATED: 0 PER 100 WBC
PHOSPHATE SERPL-MCNC: 3.2 MG/DL (ref 2.6–4.5)
PLATELET # BLD AUTO: 207 K/UL (ref 138–453)
PMV BLD AUTO: 10.5 FL (ref 8.1–13.5)
POTASSIUM SERPL-SCNC: 3.7 MMOL/L (ref 3.7–5.3)
PROT SERPL-MCNC: 6.4 G/DL (ref 6.4–8.3)
RBC # BLD AUTO: 4.16 M/UL (ref 3.95–5.11)
RBC # BLD: ABNORMAL 10*6/UL
SODIUM SERPL-SCNC: 141 MMOL/L (ref 135–144)
WBC OTHER # BLD: 6.3 K/UL (ref 3.5–11.3)

## 2023-05-16 PROCEDURE — 80053 COMPREHEN METABOLIC PANEL: CPT

## 2023-05-16 PROCEDURE — 85025 COMPLETE CBC W/AUTO DIFF WBC: CPT

## 2023-05-16 PROCEDURE — 83735 ASSAY OF MAGNESIUM: CPT

## 2023-05-16 PROCEDURE — 84478 ASSAY OF TRIGLYCERIDES: CPT

## 2023-05-16 PROCEDURE — 84100 ASSAY OF PHOSPHORUS: CPT

## 2023-05-17 LAB — TRIGL SERPL-MCNC: 140 MG/DL

## 2024-02-12 NOTE — CARE COORDINATION
Possible extubation this afternoon. Continue to follow. Spouse would like a SNF in the [place] area. Spoke to him about LTACH as well. 4 = No assist / stand by assistance

## 2024-03-22 RX ORDER — BUSPIRONE HYDROCHLORIDE 5 MG/1
5 TABLET ORAL 2 TIMES DAILY
COMMUNITY

## 2024-03-22 RX ORDER — MULTIVIT WITH MINERALS/LUTEIN
250 TABLET ORAL DAILY
COMMUNITY

## 2024-03-22 RX ORDER — PAROXETINE 30 MG/1
30 TABLET, FILM COATED ORAL EVERY MORNING
COMMUNITY

## 2024-03-22 RX ORDER — VENLAFAXINE HYDROCHLORIDE 75 MG/1
75 CAPSULE, EXTENDED RELEASE ORAL DAILY
COMMUNITY

## 2024-04-22 ENCOUNTER — INITIAL CONSULT (OUTPATIENT)
Dept: SURGERY | Age: 74
End: 2024-04-22
Payer: MEDICARE

## 2024-04-22 ENCOUNTER — HOSPITAL ENCOUNTER (OUTPATIENT)
Age: 74
Discharge: HOME | End: 2024-04-22
Payer: MEDICARE

## 2024-04-22 VITALS
WEIGHT: 156 LBS | TEMPERATURE: 98.3 F | DIASTOLIC BLOOD PRESSURE: 70 MMHG | BODY MASS INDEX: 26.36 KG/M2 | SYSTOLIC BLOOD PRESSURE: 130 MMHG | HEART RATE: 74 BPM

## 2024-04-22 DIAGNOSIS — K62.89 RECTAL PAIN: ICD-10-CM

## 2024-04-22 DIAGNOSIS — R10.84 GENERALIZED ABDOMINAL PAIN: Primary | ICD-10-CM

## 2024-04-22 DIAGNOSIS — R10.84 GENERALIZED ABDOMINAL PAIN: ICD-10-CM

## 2024-04-22 DIAGNOSIS — Z93.2 ILEOSTOMY STATUS (HCC): ICD-10-CM

## 2024-04-22 LAB
CREAT SERPL-MCNC: 0.8 MG/DL (ref 0.5–0.9)
GFR SERPL CREATININE-BSD FRML MDRD: 78 ML/MIN/1.73M2

## 2024-04-22 PROCEDURE — 3017F COLORECTAL CA SCREEN DOC REV: CPT | Performed by: SURGERY

## 2024-04-22 PROCEDURE — 99214 OFFICE O/P EST MOD 30 MIN: CPT | Performed by: SURGERY

## 2024-04-22 PROCEDURE — G8428 CUR MEDS NOT DOCUMENT: HCPCS | Performed by: SURGERY

## 2024-04-22 PROCEDURE — 99215 OFFICE O/P EST HI 40 MIN: CPT | Performed by: SURGERY

## 2024-04-22 PROCEDURE — 1090F PRES/ABSN URINE INCON ASSESS: CPT | Performed by: SURGERY

## 2024-04-22 PROCEDURE — 36415 COLL VENOUS BLD VENIPUNCTURE: CPT

## 2024-04-22 PROCEDURE — G8419 CALC BMI OUT NRM PARAM NOF/U: HCPCS | Performed by: SURGERY

## 2024-04-22 PROCEDURE — 4004F PT TOBACCO SCREEN RCVD TLK: CPT | Performed by: SURGERY

## 2024-04-22 PROCEDURE — 82565 ASSAY OF CREATININE: CPT

## 2024-04-22 PROCEDURE — 1123F ACP DISCUSS/DSCN MKR DOCD: CPT | Performed by: SURGERY

## 2024-04-22 PROCEDURE — G8400 PT W/DXA NO RESULTS DOC: HCPCS | Performed by: SURGERY

## 2024-04-22 NOTE — PROGRESS NOTES
Mouna Sánchez is a 73 y.o. female      CC:    ***    HISTORY OF PRESENT ILLNESS:    ***        Review of Systems:    General:  Fever: Negative  Weight Change:Negative  Night Sweats: Negative    Eye:  Blurry Vision:Negative  Double Vision: Negative    Ent:  Headaches: Negative  Sore throat: Negative    Allergy/Immunology:  Hives: Negative  Latex allergy: Negative    Hematology/Lymphatic:  Bleeding Problems: Negative  Blood Clots: Negative  Swollen Lymph Nodes: Negative    Lungs:  Cough: Negative  SOB: Negative  Wheezing:Negative    Cardiovascular:  Chest Pain: Negative  Palpitations:Negative    GI:   Decreased Appetite: Negative  Heartburn: Negative  Dysphagia: Negative  Nausea/Vomiting: Negative  Abdominal Pain: Negative  Change in Bowels:Negative  Constipation: Negative  Diarrhea: Negative  Rectal Bleeding: Negative    :   Dysuria: Negative  Increase Urinary Frequency/Urgency: Negative    Neuro:  Seizures: Negative  Confusion: Negative        PAST MEDICAL HISTORY:      Family History   Problem Relation Age of Onset    High Blood Pressure Mother      Social History     Socioeconomic History    Marital status:      Spouse name: Not on file    Number of children: Not on file    Years of education: Not on file    Highest education level: Not on file   Occupational History    Not on file   Tobacco Use    Smoking status: Every Day     Current packs/day: 0.50     Average packs/day: 0.5 packs/day for 52.0 years (26.0 ttl pk-yrs)     Types: Cigarettes    Smokeless tobacco: Never   Vaping Use    Vaping Use: Never used   Substance and Sexual Activity    Alcohol use: Yes     Comment: every other week    Drug use: Yes     Types: Marijuana (Weed)     Comment: every other week-inhalation    Sexual activity: Not on file   Other Topics Concern    Not on file   Social History Narrative    Not on file     Social Determinants of Health     Financial Resource Strain: Not on file   Food Insecurity: Not on file 
from that practice.  This of there was nothing else they could do.  Unless it was an acute issue there is no way they could see her.  That was just her opinion.  She does not want a go back and see the surgeons in [place].    She is not acutely ill at this moment but I had a long discussion with her and her family that she is a very complicated case and not sure that there is anything I can do for her.  However I will work with her and try to do a workup to see if we can find out any causes or sources for her complaints as well as try to contact the surgeon in [place] and see if they have any suggestions or we will see the patient.    I think it is reasonable to do a CT scan of the abdomen pelvis with oral and IV contrast to Kiner to see the anatomy as well as see if there is a hernia abscess any complication still existing from the surgery that could be causing her pain.  It also gives a chance to look at the rectal stump.    After we get the CT results back and all the reports from an [facility] I think it be reasonable to try to scope the rectal stump to look for the stricture and obstruction proctitis and other sources that could be causing the symptoms in the rectum.    PLAN:    CT scan abd/pelvis with po and IV contrast  Get OP reports from [place] from 2023  Consider flex sig of rectal stump after getting op notes to see how much distal colon and recum pt has.  Then after 1-3 are done will make further recommendations  Will also try and contact her colorectal surgeon at [place]

## 2024-04-24 ENCOUNTER — HOSPITAL ENCOUNTER (OUTPATIENT)
Dept: CT IMAGING | Age: 74
Discharge: HOME | End: 2024-04-26
Attending: SURGERY
Payer: MEDICARE

## 2024-04-24 DIAGNOSIS — R10.84 GENERALIZED ABDOMINAL PAIN: ICD-10-CM

## 2024-04-24 PROCEDURE — 6360000004 HC RX CONTRAST MEDICATION: Performed by: SURGERY

## 2024-04-24 PROCEDURE — 2500000003 HC RX 250 WO HCPCS: Performed by: SURGERY

## 2024-04-24 PROCEDURE — 74177 CT ABD & PELVIS W/CONTRAST: CPT

## 2024-04-24 RX ADMIN — BARIUM SULFATE 450 ML: 20 SUSPENSION ORAL at 11:01

## 2024-04-24 RX ADMIN — IOPAMIDOL 100 ML: 755 INJECTION, SOLUTION INTRAVENOUS at 10:58

## 2024-04-25 ENCOUNTER — TELEPHONE (OUTPATIENT)
Dept: SURGERY | Age: 74
End: 2024-04-25

## 2024-04-25 NOTE — TELEPHONE ENCOUNTER
Patient called inquiring about the results of her CT scan. Asking for a call back to # [phone].

## 2024-05-01 NOTE — H&P
H &P Colonoscopy  Patient:Mouna Sánchez                 :1950  (no) patient has seen Dr. King prior to procedure  PCP: Alyssa Nassar     CC:  Pain below her ileostomy  Pressure in rectum        HPI:     Patient is a 73-year-old female reports having a stricture in the sigmoid colon and was taken for surgery in [place] by Dr. Chavze.  There were some complications involving some leaks and peritonitis.  She was transferred up to [facility] in [place] and had further surgeries ended up with an ileostomy and an end: Stump.  I do not have specific details on the surgeries especially the ones at [place].  We do have some of the op reports on the surgeries in [place].     She has been having pain in the right lower abdomen below the ileostomy for the last 6 to 8 months since her surgery.  In [place].  Has rectal pressure has really just started in the last 1 to 2 months.  It is getting worse.       Colonoscopy  Abd pain: yes lower, rates at #6  Anemia: no  Bloating:no  Diarrhea: no  Constipation: no  Melena: n  Hematochezia:no  Rectal Bleeding:no  Rectal/Anal Pain:yes  Pruritus: no  Family history colon Cancer: no  Previous colon cancer: no  Previous Colon Polyp: no  Change in bowels: no  Decrease caliber of stool: no  Change in color of stool: no     Previous work up date: 22-colonoscopy         Family History         Family History   Problem Relation Age of Onset    High Blood Pressure Mother           Social History               Socioeconomic History    Marital status:        Spouse name: Not on file    Number of children: Not on file    Years of education: Not on file    Highest education level: Not on file   Occupational History    Not on file   Tobacco Use    Smoking status: Every Day       Current packs/day: 0.50       Average packs/day: 0.5 packs/day for 52.0 years (26.0 ttl pk-yrs)       Types: Cigarettes    Smokeless tobacco: Never   Vaping Use    Vaping Use: Never used   Substance and Sexual  tablet Take 1 tablet by mouth Daily Taking differently   88mcg  one daily   0      No current facility-administered medications on file prior to visit.            Allergies as of 04/22/2024 - Fully Reviewed 12/23/2021   Allergen Reaction Noted    Morphine Itching 03/18/2015    Sulfa antibiotics   03/18/2015               PHYSICAL EXAM:             ASSESSMENT:  Pain below ileostomy everyday  Pressure and very uncomfortable feeling in rectum        -Clearly this is a very complex situation.  With there are multiple surgeons involved in multiple hospitals.  She has had very serious surgeries and very serious complications.  I asked her why she does not go back to [place] to her original surgeon.  She said that she was discharged from that practice.  This of there was nothing else they could do.  Unless it was an acute issue there is no way they could see her.  That was just her opinion.  She does not want a go back and see the surgeons in [place].     She is not acutely ill at this moment but I had a long discussion with her and her family that she is a very complicated case and not sure that there is anything I can do for her.  However I will work with her and try to do a workup to see if we can find out any causes or sources for her complaints as well as try to contact the surgeon in [place] and see if they have any suggestions or we will see the patient.     I think it is reasonable to do a CT scan of the abdomen pelvis with oral and IV contrast to Kiner to see the anatomy as well as see if there is a hernia abscess any complication still existing from the surgery that could be causing her pain.  It also gives a chance to look at the rectal stump.     After we get the CT results back and all the reports from an [facility] I think it be reasonable to try to scope the rectal stump to look for the stricture and obstruction proctitis and other sources that could be causing the symptoms in the rectum.         CT Scan

## 2024-05-07 ENCOUNTER — ANESTHESIA EVENT (OUTPATIENT)
Dept: OPERATING ROOM | Age: 74
End: 2024-05-07
Payer: MEDICARE

## 2024-05-07 ENCOUNTER — HOSPITAL ENCOUNTER (OUTPATIENT)
Age: 74
Setting detail: OUTPATIENT SURGERY
Discharge: HOME | End: 2024-05-07
Attending: SURGERY | Admitting: SURGERY
Payer: MEDICARE

## 2024-05-07 ENCOUNTER — ANESTHESIA (OUTPATIENT)
Dept: OPERATING ROOM | Age: 74
End: 2024-05-07
Payer: MEDICARE

## 2024-05-07 VITALS
HEIGHT: 64 IN | HEART RATE: 59 BPM | TEMPERATURE: 97.7 F | SYSTOLIC BLOOD PRESSURE: 111 MMHG | OXYGEN SATURATION: 95 % | WEIGHT: 154 LBS | RESPIRATION RATE: 16 BRPM | BODY MASS INDEX: 26.29 KG/M2 | DIASTOLIC BLOOD PRESSURE: 63 MMHG

## 2024-05-07 DIAGNOSIS — K62.89 RECTAL PAIN: ICD-10-CM

## 2024-05-07 DIAGNOSIS — R10.9 ABDOMINAL PAIN, UNSPECIFIED ABDOMINAL LOCATION: ICD-10-CM

## 2024-05-07 PROCEDURE — 3609008300 HC SIGMOIDOSCOPY W/BIOPSY SINGLE/MULTIPLE: Performed by: SURGERY

## 2024-05-07 PROCEDURE — 7100000010 HC PHASE II RECOVERY - FIRST 15 MIN: Performed by: SURGERY

## 2024-05-07 PROCEDURE — 88305 TISSUE EXAM BY PATHOLOGIST: CPT

## 2024-05-07 PROCEDURE — 2709999900 HC NON-CHARGEABLE SUPPLY: Performed by: SURGERY

## 2024-05-07 PROCEDURE — 45331 SIGMOIDOSCOPY AND BIOPSY: CPT | Performed by: SURGERY

## 2024-05-07 PROCEDURE — 3700000001 HC ADD 15 MINUTES (ANESTHESIA): Performed by: SURGERY

## 2024-05-07 PROCEDURE — 7100000011 HC PHASE II RECOVERY - ADDTL 15 MIN: Performed by: SURGERY

## 2024-05-07 PROCEDURE — 2580000003 HC RX 258: Performed by: NURSE ANESTHETIST, CERTIFIED REGISTERED

## 2024-05-07 PROCEDURE — 6360000002 HC RX W HCPCS: Performed by: NURSE ANESTHETIST, CERTIFIED REGISTERED

## 2024-05-07 PROCEDURE — 2580000003 HC RX 258: Performed by: PHYSICIAN ASSISTANT

## 2024-05-07 PROCEDURE — 3700000000 HC ANESTHESIA ATTENDED CARE: Performed by: SURGERY

## 2024-05-07 RX ORDER — SODIUM CHLORIDE, SODIUM LACTATE, POTASSIUM CHLORIDE, CALCIUM CHLORIDE 600; 310; 30; 20 MG/100ML; MG/100ML; MG/100ML; MG/100ML
INJECTION, SOLUTION INTRAVENOUS CONTINUOUS
Status: DISCONTINUED | OUTPATIENT
Start: 2024-05-07 | End: 2024-05-07 | Stop reason: HOSPADM

## 2024-05-07 RX ORDER — SODIUM CHLORIDE, SODIUM LACTATE, POTASSIUM CHLORIDE, CALCIUM CHLORIDE 600; 310; 30; 20 MG/100ML; MG/100ML; MG/100ML; MG/100ML
INJECTION, SOLUTION INTRAVENOUS CONTINUOUS PRN
Status: DISCONTINUED | OUTPATIENT
Start: 2024-05-07 | End: 2024-05-07 | Stop reason: SDUPTHER

## 2024-05-07 RX ORDER — PROPOFOL 10 MG/ML
INJECTION, EMULSION INTRAVENOUS PRN
Status: DISCONTINUED | OUTPATIENT
Start: 2024-05-07 | End: 2024-05-07 | Stop reason: SDUPTHER

## 2024-05-07 RX ADMIN — SODIUM CHLORIDE, SODIUM LACTATE, POTASSIUM CHLORIDE, AND CALCIUM CHLORIDE: .6; .31; .03; .02 INJECTION, SOLUTION INTRAVENOUS at 10:51

## 2024-05-07 RX ADMIN — SODIUM CHLORIDE, POTASSIUM CHLORIDE, SODIUM LACTATE AND CALCIUM CHLORIDE: 600; 310; 30; 20 INJECTION, SOLUTION INTRAVENOUS at 10:55

## 2024-05-07 RX ADMIN — PROPOFOL 150 MG: 10 INJECTION, EMULSION INTRAVENOUS at 10:55

## 2024-05-07 RX ADMIN — PROPOFOL 180 MCG/KG/MIN: 10 INJECTION, EMULSION INTRAVENOUS at 10:56

## 2024-05-07 ASSESSMENT — PAIN - FUNCTIONAL ASSESSMENT
PAIN_FUNCTIONAL_ASSESSMENT: ADULT NONVERBAL PAIN SCALE (NPVS)
PAIN_FUNCTIONAL_ASSESSMENT: NONE - DENIES PAIN
PAIN_FUNCTIONAL_ASSESSMENT: NONE - DENIES PAIN
PAIN_FUNCTIONAL_ASSESSMENT: 0-10

## 2024-05-07 ASSESSMENT — PAIN DESCRIPTION - DESCRIPTORS: DESCRIPTORS: STABBING

## 2024-05-07 NOTE — ANESTHESIA PRE PROCEDURE
GI/Hepatic/Renal:   (+) GERD:          Endo/Other:    (+) hypothyroidism::..                 Abdominal:             Vascular:          Other Findings:         Anesthesia Plan      general and TIVA     ASA 3       Induction: intravenous.      Anesthetic plan and risks discussed with patient.      Plan discussed with surgical team.                  Patrice Godwin, LESLY - CRNA   5/7/2024

## 2024-05-07 NOTE — DISCHARGE INSTRUCTIONS
Await bx  Then make further recommdations.       DISCHARGE INSTRUCTIONS FOLLOWING COLONOSCOPY    Activity  You have received sedation.  Your judgement and coordination may be impaired.  Do not drive or operate any machinery today.  Do not make personal, medical, legal or business decisions today.  Do not consume alcohol, tranquilizers or sleeping medications today unless otherwise instructed by your physician.  Rest today.  You may resume normal activity tomorrow.    Because air is put into your colon during this procedure, it is normal to pass large amounts of air from your rectum.  Feelings of bloating, gas or cramping may persist for 24 hours.  You may not have a bowel movement for 1-3 days after this procedure.    Diet  Begin with sips of clear liquids and if no nausea, you may progress to your normal diet.    Medications  You may resume your usual medications, unless otherwise instructed.    Follow-Up  As instructed.    CALL YOUR PHYSICIAN if you experience any of the following:  Bleeding from your rectum (a teaspoonful or more)      - If you had a biopsy taken today, a small amount of bleeding may occur.  Severe abdominal pain/cramping and/or distention that is not relieved by passing gas.  Persistent nausea or vomiting.  Signs of infection including fever, chills, redness or swelling @ the IV site.      If you have questions or problems call [phone] ([facility]) or after business hours call [phone] ([facility])

## 2024-05-07 NOTE — OP NOTE
Flexible Sigmoidoscopy PROCEDURE NOTE:      Pre op diagnosis:     Rectal pain [K62.89]  Abdominal pain, unspecified abdominal location [R10.9]   Hx of mulitple colon surgeries and ostomy for resection of sigmoid stricture.  Now has stump pressure/pain    Operative Procedure:    1.  Flexible Sigmoidoscopy with cold bx    Surgeon:    Dr. Addi King     Anesthesia:    IV sedation per CRNA    EBL:  minimal    Procedure:    Patient was taken to the endoscopy suite and placed in a left lateral decubitus position.  They were given IV sedation as mentioned above.  A digital rectal exam was performed.  There were no masses and anal tone was normal.  The colonoscope was inserted into the rectum and carefully manipulated up to the sigmoid colon.  Here there was a tight turn or stricture.  There was some solid stool pale green/gray in the rectum.  There was some mild erythema of the rectum. Cold bx was taken to rule out proctitis.  The scope was retroflexed in the rectum and the dentate line was examined.  The scope was removed.  The patient tolerated the procedure well.  The following findings were noted.        Final Diagnosis:    Flex sig up to 20 cm, then tight curve and could not go any further  Mild erythema of rectum, rule out proctitis  Small solid stool in the rectum    Plan:    Await bx  Then make further recommdations.  Will try and get OR reports from [facility]. needs referred up to tertiary center for parastomal hernia and rectal pain, due to the complexity of her history and case.

## 2024-05-07 NOTE — ANESTHESIA POSTPROCEDURE EVALUATION
Department of Anesthesiology  Postprocedure Note    Patient: Mouna Sánchez  MRN: [id number]  YOB: 1950  Date of evaluation: 5/7/2024    Procedure Summary       Date: 05/07/24 Room / Location: [facility]    Anesthesia Start: 1055 Anesthesia Stop: 1109    Procedure: Flexible Sigmoidoscopy of rectal stump up to 20 cm Diagnosis:       Rectal pain      Abdominal pain, unspecified abdominal location      (Rectal pain [K62.89])      (Abdominal pain, unspecified abdominal location [R10.9])    Surgeons: Addi King MD Responsible Provider: Desmond Gallagher APRN - CRNA    Anesthesia Type: General, TIVA ASA Status: 3            Anesthesia Type: General, TIVA    Tamara Phase I: Tamara Score: 10    Tamara Phase II:      Anesthesia Post Evaluation    Patient location during evaluation: bedside  Level of consciousness: sleepy but conscious  Airway patency: patent  Nausea & Vomiting: no nausea and no vomiting  Cardiovascular status: hemodynamically stable  Respiratory status: spontaneous ventilation  Hydration status: euvolemic  Pain management: satisfactory to patient    No notable events documented.

## 2024-05-09 LAB — SURGICAL PATHOLOGY REPORT: NORMAL

## 2024-05-22 ENCOUNTER — TELEPHONE (OUTPATIENT)
Dept: SURGERY | Age: 74
End: 2024-05-22

## 2024-05-22 NOTE — TELEPHONE ENCOUNTER
Explained to patient that Dr King has not done the endo review with recommendations.  Patient still wanted the path results. Path results were given to patient and verbalized understanding.    Please do endo review

## 2024-06-11 ENCOUNTER — TELEPHONE (OUTPATIENT)
Dept: SURGERY | Age: 74
End: 2024-06-11

## 2024-06-11 NOTE — TELEPHONE ENCOUNTER
Talked to patient with results from recent Flex Sigm at [facility] with Dr. King on 5-7-24. Updated history and forwarded results to PCP.  Explained to patient Dr King recommendation and verbalized understanding.  Patient had requested to have Op note, Dr King's progress note and CT scan results sent to Dr Frias.  Faxed to her as requested.

## (undated) DEVICE — YANKAUER,POOLE TIP,STERILE,50/CS: Brand: MEDLINE

## (undated) DEVICE — GOWN,SIRUS,NON REINFRCD,LARGE,SET IN SL: Brand: MEDLINE

## (undated) DEVICE — COVER LT HNDL BLU PLAS

## (undated) DEVICE — WOUND RETRACTOR AND PROTECTOR: Brand: ALEXIS O WOUND PROTECTOR-RETRACTOR

## (undated) DEVICE — LAPAROSCOPIC SCISSORS: Brand: EPIX LAPAROSCOPIC SCISSORS

## (undated) DEVICE — STAPLER INT 75MM CUT LN L73MM STPL LN L77MM LNAR B-FORM

## (undated) DEVICE — CATHETER URET 5FR L70CM OPN END SGL LUMN INJ HUB FLEXIMA

## (undated) DEVICE — SUTURE PROL SZ 4-0 L30IN NONABSORBABLE BLU SH L26MM 1/2 CIR 8831H

## (undated) DEVICE — PAD,ABDOMINAL,5"X9",ST,LF,25/BX: Brand: MEDLINE INDUSTRIES, INC.

## (undated) DEVICE — RELOAD STPL L75MM OPN STPL H4.5MM CLS STPL H2MM WIRE

## (undated) DEVICE — GARMENT,MEDLINE,DVT,INT,CALF,MED, GEN2: Brand: MEDLINE

## (undated) DEVICE — SEALER ENDOSCP NANO COAT OPN DIV CRV L JAW LIGASURE IMPACT

## (undated) DEVICE — DRAPE,UNDERBUTTOCKS,PCH,STERILE: Brand: MEDLINE

## (undated) DEVICE — DRESSING KIT SM 10X75X1 CM VAC WHITEFOAM SENSATRAC

## (undated) DEVICE — SUTURE VCRL SZ 4-0 L27IN ABSRB UD L26MM SH 1/2 CIR J415H

## (undated) DEVICE — JELLY, LUBE, STERILE, FOIL PACK, 5G: Brand: MEDLINE

## (undated) DEVICE — SUTURE PERMA-HAND SZ 2-0 L30IN NONABSORBABLE BLK L26MM SH K833H

## (undated) DEVICE — SUTURE PERMAHAND SZ 3-0 L30IN NONABSORBABLE BLK SILK BRAID A304H

## (undated) DEVICE — INTENDED FOR TISSUE SEPARATION, AND OTHER PROCEDURES THAT REQUIRE A SHARP SURGICAL BLADE TO PUNCTURE OR CUT.: Brand: BARD-PARKER ® CARBON RIB-BACK BLADES

## (undated) DEVICE — SET CATH 20GA L1.75IN RAD ART POLYUR RADPQ W/ INTEGR

## (undated) DEVICE — STAPLER INT L75MM CUT LN L73MM STPL LN L77MM BLU B FRM 8

## (undated) DEVICE — RELOAD STPL L75MM OPN H3.8MM CLS 1.5MM WIRE DIA0.2MM REG

## (undated) DEVICE — APPLIER LIG CLP M L11IN TI STR RNG HNDL FOR 20 CLP DISP

## (undated) DEVICE — SUTURE ETHIB EXCL BR SUTUPAK 4-0 30 X303H

## (undated) DEVICE — FORCEPS BX L240CM JAW DIA2.4MM ORNG L CAP W/ NDL DISP RAD

## (undated) DEVICE — SUTURE PDS II SZ 0 L36IN ABSRB VLT L40MM CT 1/2 CIR Z358T

## (undated) DEVICE — CO2 CANNULA,SSOFT,ADLT,7O2,4CO2,FEMALE: Brand: MEDLINE

## (undated) DEVICE — CANISTER NEG PRSS 1000ML W/ GEL INFOVAC

## (undated) DEVICE — SUTURE PERMAHAND SZ 4-0 L18IN NONABSORBABLE BLK SILK BRAID A183H

## (undated) DEVICE — 4-PORT MANIFOLD: Brand: NEPTUNE 2

## (undated) DEVICE — BLADE ES L6IN ELASTOMERIC COAT EXT DURABLE BEND UPTO 90DEG

## (undated) DEVICE — SUTURE PROL SZ 2-0 L30IN NONABSORBABLE BLU L26MM CT-1 1/2 8423H

## (undated) DEVICE — SUTURE PERMAHAND SZ 4-0 L18IN NONABSORBABLE BLK L26MM SH C014D

## (undated) DEVICE — GAUZE,SPONGE,4"X4",16PLY,XRAY,STRL,LF: Brand: MEDLINE

## (undated) DEVICE — SURGICAL PROCEDURE KIT BASIN MAJ SET UP

## (undated) DEVICE — CATHETER,URETHRAL,REDRUBBER,STRL,16FR: Brand: MEDLINE

## (undated) DEVICE — SUTURE VCRL SZ 2-0 L36IN ABSRB UD L36MM CT-1 1/2 CIR J945H

## (undated) DEVICE — CATHETER FOL 2 W 18 FRX16 IN 5 CC URETH SIL ELASTMR DOVER

## (undated) DEVICE — VAC ULTA THERAPY SYSTEM: Brand: V.A.C.ULTA™

## (undated) DEVICE — SUTURE ETHBND EXCEL SZ 0 L30IN NONABSORBABLE GRN CT1 L36MM X424H

## (undated) DEVICE — GLOVE SURG SZ 75 CRM LTX FREE POLYISOPRENE POLYMER BEAD ANTI

## (undated) DEVICE — DRAINBAG,ANTI-REFLUX TOWER,L/F,2000ML,LL: Brand: MEDLINE

## (undated) DEVICE — GAUZE,SPONGE,FLUFF,6"X6.75",STRL,5/TRAY: Brand: MEDLINE

## (undated) DEVICE — APPLIER CLP L L13IN TI MULT RNG HNDL 20 CLP STR LIGACLP

## (undated) DEVICE — CYSTO PACK: Brand: MEDLINE INDUSTRIES, INC.

## (undated) DEVICE — COVER,TABLE,HEAVY DUTY,50"X90",STRL: Brand: MEDLINE

## (undated) DEVICE — 3M™ IOBAN™ 2 ANTIMICROBIAL INCISE DRAPE 6650EZ: Brand: IOBAN™ 2

## (undated) DEVICE — TOWEL,OR,DSP,ST,BLUE,DLX,XR,4/PK,20PK/CS: Brand: MEDLINE

## (undated) DEVICE — MERCY DEFIANCE ENDO KIT: Brand: MEDLINE INDUSTRIES, INC.

## (undated) DEVICE — YANKAUER,BULB TIP,W/O VENT,RIGID,STERILE: Brand: MEDLINE

## (undated) DEVICE — 450 ML BOTTLE OF 0.05% CHLORHEXIDINE GLUCONATE IN 99.95% STERILE WATER FOR IRRIGATION, USP AND APPLICATOR.: Brand: IRRISEPT ANTIMICROBIAL WOUND LAVAGE

## (undated) DEVICE — TOTAL TRAY, DB, 100% SILI FOLEY, 16FR 10: Brand: MEDLINE

## (undated) DEVICE — TUBING, SUCTION, 1/4" X 12', STRAIGHT: Brand: MEDLINE

## (undated) DEVICE — DRAPE IRRIG FLD WRM W44XL44IN W/ AORN STD PRTBL INTRATEMP

## (undated) DEVICE — RESERVOIR,SUCTION,100CC,SILICONE: Brand: MEDLINE

## (undated) DEVICE — DRAPE,REIN 53X77,STERILE: Brand: MEDLINE

## (undated) DEVICE — RADIFOCUS GLIDEWIRE: Brand: GLIDEWIRE

## (undated) DEVICE — SUTURE PROL SZ 3-0 L30IN NONABSORBABLE BLU L26MM SH 1/2 CIR 8832H

## (undated) DEVICE — CLIP LIG M TI 6 SIL HNDL FOR OPN AND ENDOSCP SGL APPL

## (undated) DEVICE — SOLUTION IV IRRIG POUR BRL 0.9% SODIUM CHL 2F7124

## (undated) DEVICE — SPONGE LAP W18XL18IN WHT COT 4 PLY FLD STRUNG RADPQ DISP ST

## (undated) DEVICE — PAD,NON-ADHERENT,3X8,STERILE,LF,1/PK: Brand: MEDLINE

## (undated) DEVICE — SUTURE PERMAHAND SZ 2-0 L30IN NONABSORBABLE BLK SILK W/O A305H

## (undated) DEVICE — YANKAUER,FLEXIBLE HANDLE,REGLR CAPACITY: Brand: MEDLINE INDUSTRIES, INC.

## (undated) DEVICE — SUTURE PDS II SZ 0 L60IN ABSRB VLT L65MM TP-1 1/2 CIR Z991G

## (undated) DEVICE — Device

## (undated) DEVICE — ELECTRODE PT RET AD L9FT HI MOIST COND ADH HYDRGEL CORDED

## (undated) DEVICE — LEGGINGS, PAIR, 33X51 XL, STERILE: Brand: MEDLINE

## (undated) DEVICE — SHEET, T, LAPAROTOMY, STERILE: Brand: MEDLINE

## (undated) DEVICE — PACK PROCEDURE SURG FACILITY SPEC GI BWL SPT SVMMC

## (undated) DEVICE — DRESSING WND VAC XLG GRANUFOAM SENSATRAC

## (undated) DEVICE — DRAIN SURG 19FR 100% SIL RADPQ RND CHN FULL FLUT

## (undated) DEVICE — BLANKET WRM W29.9XL79.1IN UP BODY FORC AIR MISTRAL-AIR

## (undated) DEVICE — GOWN,SIRUS,NONRNF,SETINSLV,XL,20/CS: Brand: MEDLINE

## (undated) DEVICE — BANDAGE,GAUZE,BULKEE II,4.5"X4.1YD,STRL: Brand: MEDLINE